# Patient Record
Sex: MALE | Race: WHITE | NOT HISPANIC OR LATINO | Employment: OTHER | URBAN - METROPOLITAN AREA
[De-identification: names, ages, dates, MRNs, and addresses within clinical notes are randomized per-mention and may not be internally consistent; named-entity substitution may affect disease eponyms.]

---

## 2017-01-16 ENCOUNTER — LAB VISIT (OUTPATIENT)
Dept: LAB | Facility: HOSPITAL | Age: 82
End: 2017-01-16
Attending: NURSE PRACTITIONER
Payer: MEDICARE

## 2017-01-16 DIAGNOSIS — E11.9 UNCOMPLICATED TYPE 2 DIABETES MELLITUS: ICD-10-CM

## 2017-01-16 DIAGNOSIS — E78.5 HYPERLIPIDEMIA: ICD-10-CM

## 2017-01-16 LAB
ALBUMIN SERPL BCP-MCNC: 3.8 G/DL
ALP SERPL-CCNC: 111 U/L
ALT SERPL W/O P-5'-P-CCNC: 24 U/L
AST SERPL-CCNC: 21 U/L
BILIRUB DIRECT SERPL-MCNC: 0.2 MG/DL
BILIRUB SERPL-MCNC: 0.5 MG/DL
CHOLEST/HDLC SERPL: 3.8 {RATIO}
HDL/CHOLESTEROL RATIO: 26.4 %
HDLC SERPL-MCNC: 110 MG/DL
HDLC SERPL-MCNC: 29 MG/DL
LDLC SERPL CALC-MCNC: 65 MG/DL
NONHDLC SERPL-MCNC: 81 MG/DL
PROT SERPL-MCNC: 6.7 G/DL
TRIGL SERPL-MCNC: 80 MG/DL

## 2017-01-16 PROCEDURE — 83036 HEMOGLOBIN GLYCOSYLATED A1C: CPT

## 2017-01-16 PROCEDURE — 36415 COLL VENOUS BLD VENIPUNCTURE: CPT | Mod: PO

## 2017-01-16 PROCEDURE — 80061 LIPID PANEL: CPT

## 2017-01-16 PROCEDURE — 80076 HEPATIC FUNCTION PANEL: CPT

## 2017-01-17 LAB
ESTIMATED AVG GLUCOSE: 166 MG/DL
HBA1C MFR BLD HPLC: 7.4 %

## 2017-01-18 ENCOUNTER — PATIENT MESSAGE (OUTPATIENT)
Dept: FAMILY MEDICINE | Facility: CLINIC | Age: 82
End: 2017-01-18

## 2017-01-18 ENCOUNTER — TELEPHONE (OUTPATIENT)
Dept: FAMILY MEDICINE | Facility: CLINIC | Age: 82
End: 2017-01-18

## 2017-01-18 DIAGNOSIS — E11.9 TYPE 2 DIABETES MELLITUS WITHOUT COMPLICATION, UNSPECIFIED LONG TERM INSULIN USE STATUS: ICD-10-CM

## 2017-01-18 DIAGNOSIS — E78.5 HYPERLIPIDEMIA, UNSPECIFIED HYPERLIPIDEMIA TYPE: Primary | ICD-10-CM

## 2017-01-18 NOTE — TELEPHONE ENCOUNTER
----- Message from Tae Goel MD sent at 1/17/2017  9:00 AM CST -----  Book the patient for a lipid and liver panel in 6 months and send the patient a letter to inform the patient of the appointment.  Also, refill the patient's lipid medicines for 12 months.       Please book the patient for a repeat a1c in 6 months and send the patient a letter of the appointment. Send in 12 months of refills of the patient's diabetes medicines to the pharmacy.       BOOK HIM FOR AN EXAM SOON FOR FOOT EXAM, ETC.

## 2017-02-22 RX ORDER — CLOPIDOGREL BISULFATE 75 MG/1
TABLET ORAL
Qty: 90 TABLET | Refills: 0 | Status: SHIPPED | OUTPATIENT
Start: 2017-02-22 | End: 2017-05-21 | Stop reason: SDUPTHER

## 2017-02-22 RX ORDER — ATORVASTATIN CALCIUM 40 MG/1
TABLET, FILM COATED ORAL
Qty: 90 TABLET | Refills: 0 | Status: SHIPPED | OUTPATIENT
Start: 2017-02-22 | End: 2017-05-21 | Stop reason: SDUPTHER

## 2017-02-22 NOTE — TELEPHONE ENCOUNTER
I have sent in the prescriptions that the patient requested.  HOwever, I sent them in without any refills because he is due for an eye exam since September of last year and he has failed to get this done according to our records.  Call him and book him for an eye appointment to be done prior to the next refill of meds.

## 2017-03-03 RX ORDER — AMLODIPINE BESYLATE 10 MG/1
10 TABLET ORAL DAILY
Qty: 90 TABLET | Refills: 2 | OUTPATIENT
Start: 2017-03-03

## 2017-03-03 NOTE — TELEPHONE ENCOUNTER
I have refused a medicine for the patient.  He is due an eye exam and needs this done ASAP for future refills.  Call him and inform him and ask him to get an eye exam ASAP and have the report faxed to me at [phone].  It has been due since Sept 2016.

## 2017-03-10 DIAGNOSIS — Z01.00 ROUTINE EYE EXAM: Primary | ICD-10-CM

## 2017-03-10 RX ORDER — OMEPRAZOLE 20 MG/1
CAPSULE, DELAYED RELEASE ORAL
Qty: 90 CAPSULE | Refills: 0 | Status: SHIPPED | OUTPATIENT
Start: 2017-03-10 | End: 2017-05-22 | Stop reason: SDUPTHER

## 2017-03-13 NOTE — TELEPHONE ENCOUNTER
I have signed for the following orders AND/OR meds.  Please call the patient and ask the patient to schedule the testing AND/OR inform about any medications that were sent.      Orders Placed This Encounter   Procedures    Ambulatory referral to Optometry     Referral Priority:   Routine     Referral Type:   Vision (Optometry)     Referral Reason:   Specialty Services Required     Requested Specialty:   Optometry     Number of Visits Requested:   1         Medications Ordered This Encounter      omeprazole (PRILOSEC) 20 MG capsule          Sig: TAKE 1 CAPSULE DAILY          Dispense:  90 capsule          Refill:  0

## 2017-03-15 ENCOUNTER — OFFICE VISIT (OUTPATIENT)
Dept: OPTOMETRY | Facility: CLINIC | Age: 82
End: 2017-03-15
Payer: MEDICARE

## 2017-03-15 DIAGNOSIS — E11.9 DIABETES MELLITUS TYPE 2 WITHOUT RETINOPATHY: Primary | ICD-10-CM

## 2017-03-15 DIAGNOSIS — Z13.5 GLAUCOMA SCREENING: ICD-10-CM

## 2017-03-15 DIAGNOSIS — Z96.1 BILATERAL PSEUDOPHAKIA: ICD-10-CM

## 2017-03-15 DIAGNOSIS — H43.813 POSTERIOR VITREOUS DETACHMENT, BILATERAL: ICD-10-CM

## 2017-03-15 PROCEDURE — 99212 OFFICE O/P EST SF 10 MIN: CPT | Mod: PBBFAC,PO | Performed by: OPTOMETRIST

## 2017-03-15 PROCEDURE — 99999 PR PBB SHADOW E&M-EST. PATIENT-LVL II: CPT | Mod: PBBFAC,,, | Performed by: OPTOMETRIST

## 2017-03-15 PROCEDURE — 92004 COMPRE OPH EXAM NEW PT 1/>: CPT | Mod: S$PBB,,, | Performed by: OPTOMETRIST

## 2017-03-15 NOTE — MR AVS SNAPSHOT
[facility]  [address] [facility] [address]  [facility] [address]  Phone: [phone]  Fax: [phone]                  Héctor HIGGINS Thang   3/15/2017 9:30 AM   Office Visit    Description:  Male : 1935   Provider:  TESS Coley OD   Department:  [facility]           Reason for Visit     Annual Exam     Diabetic Eye Exam     Blurred Vision     Spots and/or Floaters           Diagnoses this Visit        Comments    Diabetes mellitus type 2 without retinopathy    -  Primary     Posterior vitreous detachment, bilateral         Glaucoma screening         Bilateral pseudophakia                To Do List           Future Appointments        Provider Department Dept Phone    2017 8:05 AM LABORATORY, [place] [facility] [phone]      Goals (5 Years of Data)     None      Follow-Up and Disposition     Return in about 1 year (around 3/15/2018) for Annual Diabetic Eye Exam.      [facility]     [facility] Care Line - / Assistance  Registered nurses in the [facility] provide clinical advisement, health education, appointment booking, and other advisory services.  Call for this free service at [phone].             Medications           Message regarding Medications     Verify the changes and/or additions to your medication regime listed below are the same as discussed with your clinician today.  If any of these changes or additions are incorrect, please notify your healthcare provider.             Verify that the below list of medications is an accurate representation of the medications you are currently taking.  If none reported, the list may be blank. If incorrect, please contact your healthcare provider. Carry this list with you in case of emergency.           Current Medications     amlodipine (NORVASC) 10 MG tablet TAKE 1 TABLET DAILY    atorvastatin (LIPITOR) 40 MG tablet TAKE 1 TABLET DAILY    CHROMIUM-GTF ORAL Take by mouth.     clopidogrel (PLAVIX) 75 mg tablet TAKE 1 TABLET DAILY IN THE MORNING    duloxetine (CYMBALTA) 60 MG capsule TAKE 1 CAPSULE DAILY    ERGOCALCIFEROL, VITAMIN D2, (VITAMIN D ORAL) Take by mouth.    glimepiride (AMARYL) 4 MG tablet TAKE 1 TABLET TWICE A DAY    lisinopril (PRINIVIL,ZESTRIL) 20 MG tablet Take 1 tablet (20 mg total) by mouth 2 (two) times daily.    omeprazole (PRILOSEC) 20 MG capsule TAKE 1 CAPSULE DAILY    STARLIX 120 mg tablet TAKE 1 TABLET THREE TIMES A DAY    blood sugar diagnostic Strp Use daily- Freestyle lite    blood-glucose meter kit Use before meals and before bed when the glucoses are not in control.  Otherwise, test it daily once a day before meals randomly to ensure stability of the gluocse.    cetirizine (ZYRTEC) 10 MG tablet Take 1 tablet (10 mg total) by mouth once daily.           Clinical Reference Information           Allergies as of 3/15/2017     No Known Drug Allergies      Immunizations Administered on Date of Encounter - 3/15/2017     None      Instructions    DIABETES AND THE EYE / DIABETIC RETINOPATHY    Diabetic retinopathy is a condition occurring in persons with diabetes, which causes progressive damage to the retina, the light sensitive lining at the back of the eye. It is a serious sight-threatening complication of diabetes.    Diabetic retinopathy is the result of damage to the tiny blood vessels that nourish the retina. They leak blood and other fluids that cause swelling of retinal tissue and clouding of vision. The condition usually affects both eyes. The longer a person has diabetes, the more likely they will develop diabetic retinopathy. If left untreated, diabetic retinopathy can cause blindness.  There are two basic types of diabetic retinopathy:    Background or nonproliferative diabetic retinopathy (NPDR)  Nonproliferative diabetic retinopathy (NPDR) is the earliest stage of diabetic retinopathy. With this condition, damaged blood vessels in the retina begin to leak  extra fluid and small amounts of blood into the eye. Sometimes, deposits of cholesterol or other fats from the blood may leak into the retina. Many people with diabetes have mild NPDR, which usually does not affect their vision. However, if their vision is affected, it is the result of macular edema and macular ischemia.    If vision is affected due to macular changes, a consult with a Retina Specialist may be advised.  This is an ophthalmologist that treats retina conditions, including diabetic retinopathy.     Proliferative diabetic retinopathy (PDR)  Proliferative diabetic retinopathy (PDR) mainly occurs when many of the blood vessels in the retina close, preventing enough blood flow. In an attempt to supply blood to the area where the original vessels closed, the retina responds by growing new blood vessels. This is called neovascularization. However, these new blood vessels are abnormal and do not supply the retina with proper blood flow. The new vessels are also often accompanied by scar tissue that may cause the retina to wrinkle or detach. PDR may cause more severe vision loss than NPDR because it can affect both central and peripheral vision.     A patient diagnosed with proliferative diabetic eye disease will be referred to a retinal specialist for consultation.    Often there are no visual symptoms in the early stages of diabetic retinopathy. That is why our eye care professionals recommend that everyone with diabetes have a comprehensive dilated eye examination once a year. Early detection and treatment can limit the potential for significant vision loss from diabetic retinopathy.        FLASHES / FLOATERS / POSTERIOR VITREOUS DETACHMENT    Call the clinic if you have any further changes in symptoms.  Including:  Increased numbers of floaters or flashing lights, dimness or darkness that moves through or stays constant in your vision, or any pain in the eye (s).            DRY EYES:  Use Over The Counter  "artificial tears as needed for dry eye symptoms.  Some common brands include:  Systane, Optive, and Refresh.  These drops can be used as frequently as desired, but may be most helpful use during long periods of concentrated work.  For example, reading / working at the computer.  Avoid drops that "get redness out", as these contain medication that may further irritate the eyes.    ALLERGY EYES / SYMPTOMS:    Over the counter medications include--Zaditor and Alaway  Use as directed 1-2 drops daily for symptoms of itching / watering eyes.  These drops will not help for dry eye or exposure symptoms.           Language Assistance Services     ATTENTION: Language assistance services are available, free of charge. Please call [phone].      ATENCIÓN: Si talia culver, tiene a nelson disposición servicios gratuitos de asistencia lingüística. Llame al [phone].     GLORY Ý: N?u b?n nói Ti?ng Vi?t, có các d?ch v? h? tr? ngôn ng? mi?n phí dành cho b?n. G?i s? [phone].         [facility] complies with applicable Federal civil rights laws and does not discriminate on the basis of race, color, national origin, age, disability, or sex.        "

## 2017-03-15 NOTE — LETTER
March 15, 2017      Tae Goel MD  [address] [facility] [address]           [facility]  [address] [facility] [address] [place] [address]  Phone: [phone]  Fax: [phone]          Patient: Héctor Desir   MR Number: [id number]   YOB: 1935   Date of Visit: 3/15/2017       Dear Dr. Tae Goel:    Thank you for referring Héctor Desir to me for evaluation. Attached you will find relevant portions of my assessment and plan of care.    If you have questions, please do not hesitate to call me. I look forward to following Héctor Desir along with you.    Sincerely,    TESS Coley, OD    Enclosure  CC:  No Recipients    If you would like to receive this communication electronically, please contact [email or web address] or [phone] to request more information on [facility] access.    For providers and/or their staff who would like to refer a patient to [facility], please contact us through our one-stop-shop provider referral line, [facility], at [phone].    If you feel you have received this communication in error or would no longer like to receive these types of communications, please e-mail [email or web address]

## 2017-03-15 NOTE — PROGRESS NOTES
HPI     Annual Exam    Additional comments: DLE x 1 yr (casper)    ocular health exam            Diabetic Eye Exam    Additional comments: BSL fluctuates           Blurred Vision    Additional comments: at both near & distance --- OD >> OS           Spots and/or Floaters    Additional comments: OU -- no light flashes           Comments   Hemoglobin A1C       Date                     Value               Ref Range             Status                01/16/2017               7.4 (H)             4.5 - 6.2 %           Final              Comment:    According to ADA guidelines, hemoglobin A1C <7.0% represents  optimal   control in non-pregnant diabetic patients.  Different  metrics may apply   to specific populations.   Standards of Medical Care in Diabetes -   2016.  For the purpose of screening for the presence of diabetes:  <5.7%       Consistent with the absence of diabetes  5.7-6.4%  Consistent with   increasing risk for diabetes   (prediabetes)  >or=6.5%  Consistent with   diabetes  Currently no consensus exists for use of hemoglobin A1C  for   diagnosis of diabetes for children.         07/13/2016               6.5 (H)             4.5 - 6.2 %           Final              Comment:    According to ADA guidelines, hemoglobin A1C <7.0% represents  optimal   control in non-pregnant diabetic patients.  Different  metrics may apply   to specific populations.   Standards of Medical Care in Diabetes -   2016.  For the purpose of screening for the presence of diabetes:  <5.7%       Consistent with the absence of diabetes  5.7-6.4%  Consistent with   increasing risk for diabetes   (prediabetes)  >or=6.5%  Consistent with   diabetes  Currently no consensus exists for use of hemoglobin A1C  for   diagnosis of diabetes for children.         01/13/2016               6.5 (H)             4.5 - 6.2 %           Final            ----------         Last edited by Pam Bobo on 3/15/2017  9:58 AM. (History)            Assessment  /Plan     For exam results, see Encounter Report.    Diabetes mellitus type 2 without retinopathy    Posterior vitreous detachment, bilateral    Glaucoma screening    Bilateral pseudophakia      1. No ret/ no csme, gave info, control glucose, annual DFE  2. RD precautions given  3. Not suspect  4. Stable OU  Continue otc readers, NC refraction today    Discussed and educated patient on current findings /plan.  RTC 1 year, prn if any changes / issues

## 2017-04-19 RX ORDER — NATEGLINIDE 120 MG/1
TABLET ORAL
Qty: 270 TABLET | Refills: 2 | Status: SHIPPED | OUTPATIENT
Start: 2017-04-19 | End: 2017-05-22 | Stop reason: SDUPTHER

## 2017-04-24 ENCOUNTER — PATIENT OUTREACH (OUTPATIENT)
Dept: ADMINISTRATIVE | Facility: HOSPITAL | Age: 82
End: 2017-04-24
Payer: MEDICARE

## 2017-04-24 DIAGNOSIS — E11.69 COMBINED HYPERLIPIDEMIA ASSOCIATED WITH TYPE 2 DIABETES MELLITUS: Primary | ICD-10-CM

## 2017-04-24 DIAGNOSIS — I15.2 HYPERTENSION ASSOCIATED WITH DIABETES: ICD-10-CM

## 2017-04-24 DIAGNOSIS — E78.2 COMBINED HYPERLIPIDEMIA ASSOCIATED WITH TYPE 2 DIABETES MELLITUS: Primary | ICD-10-CM

## 2017-04-24 DIAGNOSIS — E11.59 HYPERTENSION ASSOCIATED WITH DIABETES: ICD-10-CM

## 2017-05-18 RX ORDER — DULOXETIN HYDROCHLORIDE 60 MG/1
CAPSULE, DELAYED RELEASE ORAL
Qty: 90 CAPSULE | Refills: 0 | Status: SHIPPED | OUTPATIENT
Start: 2017-05-18 | End: 2017-05-22 | Stop reason: SDUPTHER

## 2017-05-21 RX ORDER — ATORVASTATIN CALCIUM 40 MG/1
TABLET, FILM COATED ORAL
Qty: 90 TABLET | Refills: 0 | Status: SHIPPED | OUTPATIENT
Start: 2017-05-21 | End: 2017-05-22 | Stop reason: SDUPTHER

## 2017-05-21 RX ORDER — CLOPIDOGREL BISULFATE 75 MG/1
TABLET ORAL
Qty: 90 TABLET | Refills: 0 | Status: SHIPPED | OUTPATIENT
Start: 2017-05-21 | End: 2017-05-22 | Stop reason: SDUPTHER

## 2017-05-22 ENCOUNTER — OFFICE VISIT (OUTPATIENT)
Dept: FAMILY MEDICINE | Facility: CLINIC | Age: 82
End: 2017-05-22
Payer: MEDICARE

## 2017-05-22 ENCOUNTER — LAB VISIT (OUTPATIENT)
Dept: LAB | Facility: HOSPITAL | Age: 82
End: 2017-05-22
Attending: FAMILY MEDICINE
Payer: MEDICARE

## 2017-05-22 ENCOUNTER — TELEPHONE (OUTPATIENT)
Dept: FAMILY MEDICINE | Facility: CLINIC | Age: 82
End: 2017-05-22

## 2017-05-22 VITALS
SYSTOLIC BLOOD PRESSURE: 105 MMHG | HEART RATE: 80 BPM | HEIGHT: 68 IN | DIASTOLIC BLOOD PRESSURE: 61 MMHG | TEMPERATURE: 98 F | BODY MASS INDEX: 34.74 KG/M2 | WEIGHT: 229.19 LBS

## 2017-05-22 DIAGNOSIS — E11.59 HYPERTENSION ASSOCIATED WITH DIABETES: ICD-10-CM

## 2017-05-22 DIAGNOSIS — Z85.46 H/O PROSTATE CANCER: ICD-10-CM

## 2017-05-22 DIAGNOSIS — E78.2 COMBINED HYPERLIPIDEMIA ASSOCIATED WITH TYPE 2 DIABETES MELLITUS: ICD-10-CM

## 2017-05-22 DIAGNOSIS — E11.69 COMBINED HYPERLIPIDEMIA ASSOCIATED WITH TYPE 2 DIABETES MELLITUS: ICD-10-CM

## 2017-05-22 DIAGNOSIS — K21.9 GASTROESOPHAGEAL REFLUX DISEASE, ESOPHAGITIS PRESENCE NOT SPECIFIED: ICD-10-CM

## 2017-05-22 DIAGNOSIS — F41.9 ANXIETY: ICD-10-CM

## 2017-05-22 DIAGNOSIS — Z12.5 ENCOUNTER FOR SCREENING FOR MALIGNANT NEOPLASM OF PROSTATE: ICD-10-CM

## 2017-05-22 DIAGNOSIS — I15.2 HYPERTENSION ASSOCIATED WITH DIABETES: ICD-10-CM

## 2017-05-22 DIAGNOSIS — I10 ESSENTIAL HYPERTENSION: ICD-10-CM

## 2017-05-22 DIAGNOSIS — Z86.73 CEREBROVASCULAR DISEASE, ARTERIOSCLEROTIC, POST-STROKE: ICD-10-CM

## 2017-05-22 DIAGNOSIS — Z85.46 HISTORY OF PROSTATE CANCER: ICD-10-CM

## 2017-05-22 DIAGNOSIS — Z86.010 HISTORY OF COLON POLYPS: ICD-10-CM

## 2017-05-22 DIAGNOSIS — I67.2 CEREBROVASCULAR DISEASE, ARTERIOSCLEROTIC, POST-STROKE: ICD-10-CM

## 2017-05-22 DIAGNOSIS — N18.30 CKD (CHRONIC KIDNEY DISEASE) STAGE 3, GFR 30-59 ML/MIN: ICD-10-CM

## 2017-05-22 DIAGNOSIS — E11.9 TYPE 2 DIABETES MELLITUS WITHOUT COMPLICATION, UNSPECIFIED LONG TERM INSULIN USE STATUS: ICD-10-CM

## 2017-05-22 DIAGNOSIS — E11.42 DIABETIC POLYNEUROPATHY ASSOCIATED WITH TYPE 2 DIABETES MELLITUS: ICD-10-CM

## 2017-05-22 LAB
ANION GAP SERPL CALC-SCNC: 9 MMOL/L
BUN SERPL-MCNC: 30 MG/DL
CALCIUM SERPL-MCNC: 8.9 MG/DL
CHLORIDE SERPL-SCNC: 108 MMOL/L
CO2 SERPL-SCNC: 23 MMOL/L
COMPLEXED PSA SERPL-MCNC: 0.1 NG/ML
CREAT SERPL-MCNC: 1.6 MG/DL
EST. GFR  (AFRICAN AMERICAN): 46.3 ML/MIN/1.73 M^2
EST. GFR  (NON AFRICAN AMERICAN): 40.1 ML/MIN/1.73 M^2
GLUCOSE SERPL-MCNC: 120 MG/DL
POTASSIUM SERPL-SCNC: 4.6 MMOL/L
SODIUM SERPL-SCNC: 140 MMOL/L

## 2017-05-22 PROCEDURE — 83036 HEMOGLOBIN GLYCOSYLATED A1C: CPT

## 2017-05-22 PROCEDURE — 99999 PR PBB SHADOW E&M-EST. PATIENT-LVL III: CPT | Mod: PBBFAC,,, | Performed by: FAMILY MEDICINE

## 2017-05-22 PROCEDURE — 36415 COLL VENOUS BLD VENIPUNCTURE: CPT | Mod: PO

## 2017-05-22 PROCEDURE — 80048 BASIC METABOLIC PNL TOTAL CA: CPT

## 2017-05-22 PROCEDURE — 99214 OFFICE O/P EST MOD 30 MIN: CPT | Mod: S$PBB,,, | Performed by: FAMILY MEDICINE

## 2017-05-22 PROCEDURE — 84153 ASSAY OF PSA TOTAL: CPT

## 2017-05-22 RX ORDER — NATEGLINIDE 120 MG/1
120 TABLET ORAL 3 TIMES DAILY
Qty: 270 TABLET | Refills: 1 | Status: SHIPPED | OUTPATIENT
Start: 2017-05-22 | End: 2018-03-26 | Stop reason: SDUPTHER

## 2017-05-22 RX ORDER — LISINOPRIL 20 MG/1
20 TABLET ORAL 2 TIMES DAILY
Qty: 180 TABLET | Refills: 3 | Status: SHIPPED | OUTPATIENT
Start: 2017-05-22 | End: 2018-03-26 | Stop reason: SDUPTHER

## 2017-05-22 RX ORDER — DULOXETIN HYDROCHLORIDE 60 MG/1
60 CAPSULE, DELAYED RELEASE ORAL DAILY
Qty: 90 CAPSULE | Refills: 3 | Status: SHIPPED | OUTPATIENT
Start: 2017-05-22 | End: 2018-03-26 | Stop reason: SDUPTHER

## 2017-05-22 RX ORDER — OMEPRAZOLE 20 MG/1
20 CAPSULE, DELAYED RELEASE ORAL DAILY
Qty: 90 CAPSULE | Refills: 3 | Status: SHIPPED | OUTPATIENT
Start: 2017-05-22 | End: 2018-03-26 | Stop reason: SDUPTHER

## 2017-05-22 RX ORDER — NAPROXEN SODIUM 220 MG
220 TABLET ORAL 2 TIMES DAILY WITH MEALS
COMMUNITY
End: 2017-05-22

## 2017-05-22 RX ORDER — CLOPIDOGREL BISULFATE 75 MG/1
75 TABLET ORAL EVERY MORNING
Qty: 90 TABLET | Refills: 3 | Status: SHIPPED | OUTPATIENT
Start: 2017-05-22 | End: 2018-03-26 | Stop reason: SDUPTHER

## 2017-05-22 RX ORDER — AMLODIPINE BESYLATE 10 MG/1
10 TABLET ORAL DAILY
Qty: 90 TABLET | Refills: 3 | Status: SHIPPED | OUTPATIENT
Start: 2017-05-22 | End: 2018-03-26

## 2017-05-22 RX ORDER — GLIMEPIRIDE 4 MG/1
TABLET ORAL
Qty: 180 TABLET | Refills: 1 | Status: SHIPPED | OUTPATIENT
Start: 2017-05-22 | End: 2017-11-18 | Stop reason: SDUPTHER

## 2017-05-22 RX ORDER — ATORVASTATIN CALCIUM 40 MG/1
40 TABLET, FILM COATED ORAL DAILY
Qty: 90 TABLET | Refills: 3 | Status: SHIPPED | OUTPATIENT
Start: 2017-05-22 | End: 2018-03-26 | Stop reason: SDUPTHER

## 2017-05-22 NOTE — PROGRESS NOTES
Subjective:      Patient ID: Héctor Desir is a 82 y.o. male.    Chief Complaint: Follow-up (diabetes)    HPI  The patient presents with diabetes.  The patient denies polyuria, polydipsia, polyphagia, hypoglycemia and paresthesias.  The patient's glucose control has been good.  Home glucose averages are routinely checked.  The patient is without retinopathy currently.  The patient has a history of neuropathy.  He is on cymbalta which helps with the neuropathy.  The patient currently complains of no podiatric problems.  The patient has excellent compliance.  Lab Results   Component Value Date    HGBA1C 7.4 (H) 01/16/2017       The patient presents with hyperlipidemia.  The patient reports tolerating the medication well and is in excellent compliance.  There have been no medication side effects.  The patient denies chest pain, neuropathy, and myalgias.  The patient has reduced fat intake and has been exercising.  Current treatment has included the medications listed in the med card.    Lab Results   Component Value Date    CHOL 110 (L) 01/16/2017    CHOL 149 07/13/2016    CHOL 105 (L) 01/12/2016     Lab Results   Component Value Date    HDL 29 (L) 01/16/2017    HDL 33 (L) 07/13/2016    HDL 28 (L) 01/12/2016     Lab Results   Component Value Date    LDLCALC 65.0 01/16/2017    LDLCALC 92.0 07/13/2016    LDLCALC 60.0 (L) 01/12/2016     Lab Results   Component Value Date    TRIG 80 01/16/2017    TRIG 120 07/13/2016    TRIG 85 01/12/2016     Lab Results   Component Value Date    CHOLHDL 26.4 01/16/2017    CHOLHDL 22.1 07/13/2016    CHOLHDL 26.7 01/12/2016     Lab Results   Component Value Date    ALT 24 01/16/2017    AST 21 01/16/2017    ALKPHOS 111 01/16/2017    BILITOT 0.5 01/16/2017     He does have CKD and he sees Dr. Sims.  His studies have been stable on this and he is controlling the bp.      The patient presents with essential hypertension.  The patient is tolerating the medication well and is in excellent  "compliance.  The patient is experiencing no side effects.  Counseling was offered regarding low salt diets.  The patient has a reduced salt intake.  The patient denies chest pain, palpitations, shortness of breath, dyspnea on exertion, left or murmur neck pain, nausea, vomiting, diaphoresis, paroxysmal nocturnal dyspnea, and orthopnea.   /61 (BP Location: Left arm, Patient Position: Sitting, BP Method: Automatic)   Pulse 80   Temp 98.3 °F (36.8 °C) (Oral)   Ht 5' 8" (1.727 m)   Wt 103.9 kg (229 lb 2.7 oz)   BMI 34.84 kg/m²        The patient presents with a history of GERD.  Denies chest pain, nausea & vomiting, belching, cramping, distention, dyspepsia, dysphagia, hematochezia, melena, abdominal pain and weight loss.  Current treatment has included medications that are listed in medications list with significant response.  There has been no medicine intolerance.  The patient cannot identify any exacerbating factors.  Avoidance of NSAID's, ASA, carbonated beverages and spicy food was discussed.     He has had a history of colon polyps and is not due for a recheck now.      He has had a TIA and he is on plavix at this time but not on ASA. He has not had recurrent problems with this at this time and has been stable on the medicine.      He has had prostate cancer and he has a prostatectomy.  He is due for a psa.    He also has CKD and he has Stage III.  He has not seen Dr. Sims in a long time.     He has chronic anxiety and he has been on cymbalta for a long time. HIs wife has colon cancer now and he is the primary caregiver and it has helped him get through that.       Health Maintenance Due   Topic Date Due    Zoster Vaccine  04/30/1997    Foot Exam  01/13/2017       Past Medical History:  Past Medical History:   Diagnosis Date    CKD (chronic kidney disease) stage 3, GFR 30-59 ml/min     Colon polyps 2011    due again in 2019    Combined hyperlipidemia associated with type 2 diabetes mellitus " 7/5/2013    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     DM (diabetes mellitus) type II controlled, neurological manifestation 7/5/2013    GERD (gastroesophageal reflux disease)     History of prostate cancer 2006    Hypertension associated with diabetes     Hypertension goal BP (blood pressure) < 130/80     TIA (transient ischemic attack)      Past Surgical History:   Procedure Laterality Date    CARDIAC CATHETERIZATION      CATARACT EXTRACTION W/  INTRAOCULAR LENS IMPLANT  11/2013    Bilateral Cataract     COLONOSCOPY W/ POLYPECTOMY      EYE SURGERY      PROSTATECTOMY  2006    TONSILLECTOMY      TONSILLECTOMY, ADENOIDECTOMY, BILATERAL MYRINGOTOMY AND TUBES       Review of patient's allergies indicates:   Allergen Reactions    No known drug allergies      Current Outpatient Prescriptions on File Prior to Visit   Medication Sig Dispense Refill    amlodipine (NORVASC) 10 MG tablet TAKE 1 TABLET DAILY 90 tablet 2    atorvastatin (LIPITOR) 40 MG tablet TAKE 1 TABLET DAILY 90 tablet 0    blood sugar diagnostic Strp Use daily- Freestyle lite 300 strip 3    blood-glucose meter kit Use before meals and before bed when the glucoses are not in control.  Otherwise, test it daily once a day before meals randomly to ensure stability of the gluocse. 1 each 0    cetirizine (ZYRTEC) 10 MG tablet Take 1 tablet (10 mg total) by mouth once daily. 30 tablet 0    CHROMIUM-GTF ORAL Take by mouth.      clopidogrel (PLAVIX) 75 mg tablet TAKE 1 TABLET DAILY IN THE MORNING 90 tablet 0    duloxetine (CYMBALTA) 60 MG capsule TAKE 1 CAPSULE DAILY 90 capsule 0    ERGOCALCIFEROL, VITAMIN D2, (VITAMIN D ORAL) Take by mouth.      glimepiride (AMARYL) 4 MG tablet TAKE 1 TABLET TWICE A  tablet 1    lisinopril (PRINIVIL,ZESTRIL) 20 MG tablet Take 1 tablet (20 mg total) by mouth 2 (two) times daily. 180 tablet 3    nateglinide (STARLIX) 120 MG tablet TAKE 1 TABLET THREE TIMES A  tablet  "2    omeprazole (PRILOSEC) 20 MG capsule TAKE 1 CAPSULE DAILY 90 capsule 0     Current Facility-Administered Medications on File Prior to Visit   Medication Dose Route Frequency Provider Last Rate Last Dose    diclofenac sodium 1 % gel   Topical Daily Tobias Modi MD         Social History     Social History    Marital status:      Spouse name: N/A    Number of children: N/A    Years of education: N/A     Occupational History    Not on file.     Social History Main Topics    Smoking status: Never Smoker    Smokeless tobacco: Never Used    Alcohol use No      Comment: He used to drink but quit in 1990    Drug use: No    Sexual activity: Not on file     Other Topics Concern    Not on file     Social History Narrative    No narrative on file     Family History   Problem Relation Age of Onset    Heart attack Mother     Melanoma Father     Cancer Father     Anesthesia problems Neg Hx     Clotting disorder Neg Hx     Kidney disease Neg Hx              Review of Systems   Constitutional: Negative.  Negative for chills, diaphoresis and fever.   HENT: Negative for congestion, hearing loss, mouth sores, postnasal drip and sore throat.    Eyes: Negative for pain and visual disturbance.   Respiratory: Negative for cough, chest tightness, shortness of breath and wheezing.    Cardiovascular: Negative for chest pain.   Gastrointestinal: Negative for abdominal pain, anal bleeding, blood in stool, constipation, diarrhea, nausea and vomiting.   Genitourinary: Negative for dysuria and hematuria.   Musculoskeletal: Negative for back pain, neck pain and neck stiffness.   Skin: Negative for rash.   Neurological: Negative for dizziness and weakness.       Objective:   /61 (BP Location: Left arm, Patient Position: Sitting, BP Method: Automatic)   Pulse 80   Temp 98.3 °F (36.8 °C) (Oral)   Ht 5' 8" (1.727 m)   Wt 103.9 kg (229 lb 2.7 oz)   BMI 34.84 kg/m²     Physical Exam   Constitutional: He is " oriented to person, place, and time. He appears well-developed and well-nourished.   HENT:   Head: Normocephalic and atraumatic.   Right Ear: External ear normal.   Left Ear: External ear normal.   Nose: Nose normal.   Mouth/Throat: Oropharynx is clear and moist. No oropharyngeal exudate.   Eyes: Conjunctivae and EOM are normal. Pupils are equal, round, and reactive to light. Right eye exhibits no discharge. Left eye exhibits no discharge. No scleral icterus.   Neck: Normal range of motion. Neck supple. No JVD present. No thyromegaly present.   Cardiovascular: Normal rate, regular rhythm, normal heart sounds and intact distal pulses.  Exam reveals no gallop and no friction rub.    No murmur heard.  Pulses:       Dorsalis pedis pulses are 2+ on the right side, and 2+ on the left side.        Posterior tibial pulses are 2+ on the right side, and 2+ on the left side.   Pulmonary/Chest: Effort normal and breath sounds normal. No respiratory distress. He has no wheezes. He has no rales. He exhibits no tenderness.   Abdominal: Soft. Bowel sounds are normal. He exhibits no distension and no mass. There is no tenderness. There is no rebound and no guarding.   Musculoskeletal: Normal range of motion. He exhibits no edema or tenderness.        Right foot: There is normal range of motion and no deformity.        Left foot: There is normal range of motion and no deformity.   Feet:   Right Foot:   Protective Sensation: 10 sites tested. 10 sites sensed.   Skin Integrity: Negative for ulcer, blister, skin breakdown, erythema, warmth, callus or dry skin.   Left Foot:   Protective Sensation: 10 sites tested. 10 sites sensed.   Skin Integrity: Negative for ulcer, blister, skin breakdown, erythema, warmth, callus or dry skin.   Lymphadenopathy:     He has no cervical adenopathy.   Neurological: He is alert and oriented to person, place, and time. No cranial nerve deficit. Coordination normal.   Skin: Skin is warm and dry. He is not  diaphoretic.   Psychiatric: He has a normal mood and affect.       Assessment:     1. 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 Uncontrolled type 2 diabetes mellitus with stage 3 chronic kidney disease, without long-term current use of insulin    3. Hypertension associated with diabetes    4. History of prostate cancer    5. History of colon polyps    6. H/O prostate cancer    7. Gastroesophageal reflux disease, esophagitis presence not specified    8. Diabetic polyneuropathy associated with type 2 diabetes mellitus    9. Combined hyperlipidemia associated with type 2 diabetes mellitus    10. CKD (chronic kidney disease) stage 3, GFR 30-59 ml/min    11. Essential hypertension    12. Cerebrovascular disease, arteriosclerotic, post-stroke    13. Anxiety        Plan:   Héctor was seen today for follow-up.    Diagnoses and all orders for this visit:    Uncontrolled type 2 diabetes mellitus with diabetic polyneuropathy, without long-term current use of insulin  -     Basic metabolic panel; Future  -     Hemoglobin A1c; Future  -     glimepiride (AMARYL) 4 MG tablet; TAKE 1 TABLET TWICE A DAY  -     nateglinide (STARLIX) 120 MG tablet; Take 1 tablet (120 mg total) by mouth 3 (three) times daily.    Uncontrolled type 2 diabetes mellitus with stage 3 chronic kidney disease, without long-term current use of insulin  -     Basic metabolic panel; Future  -     Hemoglobin A1c; Future  -     glimepiride (AMARYL) 4 MG tablet; TAKE 1 TABLET TWICE A DAY  -     nateglinide (STARLIX) 120 MG tablet; Take 1 tablet (120 mg total) by mouth 3 (three) times daily.    Hypertension associated with diabetes  -     Basic metabolic panel; Future  -     amlodipine (NORVASC) 10 MG tablet; Take 1 tablet (10 mg total) by mouth once daily.  -     lisinopril (PRINIVIL,ZESTRIL) 20 MG tablet; Take 1 tablet (20 mg total) by mouth 2 (two) times daily.    History of prostate  cancer    History of colon polyps    H/O prostate cancer  -     PSA, Screening; Future    Gastroesophageal reflux disease, esophagitis presence not specified  -     omeprazole (PRILOSEC) 20 MG capsule; Take 1 capsule (20 mg total) by mouth once daily.    Diabetic polyneuropathy associated with type 2 diabetes mellitus    Combined hyperlipidemia associated with type 2 diabetes mellitus  -     atorvastatin (LIPITOR) 40 MG tablet; Take 1 tablet (40 mg total) by mouth once daily.    CKD (chronic kidney disease) stage 3, GFR 30-59 ml/min  -     Ambulatory referral to Nephrology    Essential hypertension  -     lisinopril (PRINIVIL,ZESTRIL) 20 MG tablet; Take 1 tablet (20 mg total) by mouth 2 (two) times daily.    Cerebrovascular disease, arteriosclerotic, post-stroke  -     clopidogrel (PLAVIX) 75 mg tablet; Take 1 tablet (75 mg total) by mouth every morning.    Anxiety  -     duloxetine (CYMBALTA) 60 MG capsule; Take 1 capsule (60 mg total) by mouth once daily.    Encounter for screening for malignant neoplasm of prostate   -     PSA, Screening; Future      We discussed decreasing his prilosec for use only prn and he is going to let me know when he is in a better position with his wife's care to consdier decreasing the cymbalta in the future. He will stop NSAID's and see his nephrologist.  Check a bmp.

## 2017-05-22 NOTE — TELEPHONE ENCOUNTER
Call him and tell him that [facility] does not have a record of him having a shingles vaccine.  Get him to go to them to get one.

## 2017-05-22 NOTE — TELEPHONE ENCOUNTER
----- Message from Jenni Strong LPN sent at 5/22/2017 10:00 AM CDT -----  Dr Goel,  This pt has not had a Zoster at [facility] per Pharmacist.   ----- Message -----  From: Tae Goel MD  Sent: 5/22/2017   9:36 AM  To: Jenni Strong LPN    Call [facility] and get his zoster shot date updated.

## 2017-05-23 LAB
ESTIMATED AVG GLUCOSE: 160 MG/DL
HBA1C MFR BLD HPLC: 7.2 %

## 2017-05-23 NOTE — PROGRESS NOTES
The a1c is high.  Because of this, change the regimen to include precose 50 mg tabs before every meal three times a day.  Recheck an a1c in3  Months.   Give 3 mo of meds.      The kidney function is lower.  PLease confirm that a visit with the nephrologist, Dr. Sims, is being planned.      The psa is normal.  Recheck in 1 year.

## 2017-05-26 DIAGNOSIS — E11.9 TYPE 2 DIABETES MELLITUS WITHOUT COMPLICATION, UNSPECIFIED LONG TERM INSULIN USE STATUS: Primary | ICD-10-CM

## 2017-05-26 NOTE — TELEPHONE ENCOUNTER
----- Message from Tae Goel MD sent at 5/23/2017 12:36 PM CDT -----  The a1c is high.  Because of this, change the regimen to include precose 50 mg tabs before every meal three times a day.  Recheck an a1c in3  Months.   Give 3 mo of meds.      The kidney function is lower.  PLease confirm that a visit with the nephrologist, Dr. Sims, is being planned.      The psa is normal.  Recheck in 1 year.

## 2017-05-27 RX ORDER — ACARBOSE 50 MG/1
50 TABLET ORAL
Qty: 270 TABLET | Refills: 3 | Status: SHIPPED | OUTPATIENT
Start: 2017-05-27 | End: 2018-03-26

## 2017-06-09 ENCOUNTER — OFFICE VISIT (OUTPATIENT)
Dept: NEPHROLOGY | Facility: CLINIC | Age: 82
End: 2017-06-09
Payer: MEDICARE

## 2017-06-09 VITALS
TEMPERATURE: 98 F | HEIGHT: 68 IN | BODY MASS INDEX: 34.72 KG/M2 | DIASTOLIC BLOOD PRESSURE: 78 MMHG | OXYGEN SATURATION: 99 % | HEART RATE: 67 BPM | WEIGHT: 229.06 LBS | SYSTOLIC BLOOD PRESSURE: 126 MMHG

## 2017-06-09 DIAGNOSIS — N18.30 CKD (CHRONIC KIDNEY DISEASE) STAGE 3, GFR 30-59 ML/MIN: Primary | ICD-10-CM

## 2017-06-09 DIAGNOSIS — E11.69 COMBINED HYPERLIPIDEMIA ASSOCIATED WITH TYPE 2 DIABETES MELLITUS: ICD-10-CM

## 2017-06-09 DIAGNOSIS — E78.2 COMBINED HYPERLIPIDEMIA ASSOCIATED WITH TYPE 2 DIABETES MELLITUS: ICD-10-CM

## 2017-06-09 PROCEDURE — 1159F MED LIST DOCD IN RCRD: CPT | Mod: ,,, | Performed by: INTERNAL MEDICINE

## 2017-06-09 PROCEDURE — 99214 OFFICE O/P EST MOD 30 MIN: CPT | Mod: S$PBB,,, | Performed by: INTERNAL MEDICINE

## 2017-06-09 PROCEDURE — 99213 OFFICE O/P EST LOW 20 MIN: CPT | Mod: PBBFAC,PN | Performed by: INTERNAL MEDICINE

## 2017-06-09 PROCEDURE — 99999 PR PBB SHADOW E&M-EST. PATIENT-LVL III: CPT | Mod: PBBFAC,,, | Performed by: INTERNAL MEDICINE

## 2017-06-20 NOTE — PROGRESS NOTES
Subjective:       Patient ID: Héctor Desir is a 82 y.o. White male who presents for follow-up evaluation of Chronic Kidney Disease    HPI     He was lost to nephrology follow up. Last Hba1c was 7.2. No edema nor SOB. He denies problems with urination, no foamy urine and no hematuria. His wife is ill. She first had an MI then was diagnosed with colon cancer followed by a CVA. He is her primary caretaker    Review of Systems   Constitutional: Negative for activity change, appetite change and unexpected weight change.   HENT: Negative for facial swelling.    Respiratory: Negative for cough and shortness of breath.    Cardiovascular: Negative for chest pain and leg swelling.   Gastrointestinal: Negative for abdominal pain.   Genitourinary: Negative for difficulty urinating, dysuria and hematuria.   Musculoskeletal: Positive for arthralgias.   Neurological: Negative for weakness and headaches.       Objective:      Physical Exam   Constitutional: He is oriented to person, place, and time. He appears well-developed and well-nourished.   Neck: No JVD present.   Cardiovascular: S1 normal and S2 normal.  Exam reveals no friction rub.    Pulmonary/Chest: Breath sounds normal. He has no wheezes. He has no rales.   Abdominal: Soft.   Musculoskeletal: He exhibits no edema.   Neurological: He is alert and oriented to person, place, and time.   Skin: Skin is warm and dry.   Psychiatric: He has a normal mood and affect.   Vitals reviewed.      Assessment:       1. CKD (chronic kidney disease) stage 3, GFR 30-59 ml/min    2. Uncontrolled type 2 diabetes mellitus with stage 3 chronic kidney disease, without long-term current use of insulin    3. Uncontrolled type 2 diabetes mellitus with diabetic polyneuropathy, without long-term current use of insulin    4. Combined hyperlipidemia associated with type 2 diabetes mellitus        Plan:             CKD Stage 3 with stable kidney function.  Continue RAAS blockade for renal  preservation    HTN is controlled    DM--per PCP    Dyslipidemia--on a statin      RTC 4 months in [place] with labs prior

## 2017-10-03 RX ORDER — BENZONATATE 100 MG/1
100 CAPSULE ORAL 3 TIMES DAILY PRN
Qty: 30 CAPSULE | Refills: 0 | Status: SHIPPED | OUTPATIENT
Start: 2017-10-03 | End: 2017-10-13

## 2017-10-06 ENCOUNTER — LAB VISIT (OUTPATIENT)
Dept: LAB | Facility: HOSPITAL | Age: 82
End: 2017-10-06
Attending: FAMILY MEDICINE
Payer: MEDICARE

## 2017-10-06 DIAGNOSIS — N18.30 CKD (CHRONIC KIDNEY DISEASE) STAGE 3, GFR 30-59 ML/MIN: ICD-10-CM

## 2017-10-06 LAB
ALBUMIN SERPL BCP-MCNC: 3.6 G/DL
ANION GAP SERPL CALC-SCNC: 9 MMOL/L
BUN SERPL-MCNC: 25 MG/DL
CALCIUM SERPL-MCNC: 8.9 MG/DL
CHLORIDE SERPL-SCNC: 109 MMOL/L
CO2 SERPL-SCNC: 23 MMOL/L
CREAT SERPL-MCNC: 1.7 MG/DL
EST. GFR  (AFRICAN AMERICAN): 43.1 ML/MIN/1.73 M^2
EST. GFR  (NON AFRICAN AMERICAN): 37.3 ML/MIN/1.73 M^2
GLUCOSE SERPL-MCNC: 148 MG/DL
PHOSPHATE SERPL-MCNC: 2.9 MG/DL
POTASSIUM SERPL-SCNC: 4 MMOL/L
PTH-INTACT SERPL-MCNC: 92 PG/ML
SODIUM SERPL-SCNC: 141 MMOL/L

## 2017-10-06 PROCEDURE — 80069 RENAL FUNCTION PANEL: CPT

## 2017-10-06 PROCEDURE — 36415 COLL VENOUS BLD VENIPUNCTURE: CPT | Mod: PO

## 2017-10-06 PROCEDURE — 83970 ASSAY OF PARATHORMONE: CPT

## 2017-10-13 ENCOUNTER — LAB VISIT (OUTPATIENT)
Dept: LAB | Facility: HOSPITAL | Age: 82
End: 2017-10-13
Attending: INTERNAL MEDICINE
Payer: MEDICARE

## 2017-10-13 ENCOUNTER — OFFICE VISIT (OUTPATIENT)
Dept: NEPHROLOGY | Facility: CLINIC | Age: 82
End: 2017-10-13
Payer: MEDICARE

## 2017-10-13 ENCOUNTER — TELEPHONE (OUTPATIENT)
Dept: NEPHROLOGY | Facility: CLINIC | Age: 82
End: 2017-10-13

## 2017-10-13 VITALS
WEIGHT: 240.31 LBS | DIASTOLIC BLOOD PRESSURE: 72 MMHG | HEIGHT: 68 IN | BODY MASS INDEX: 36.42 KG/M2 | HEART RATE: 64 BPM | SYSTOLIC BLOOD PRESSURE: 132 MMHG

## 2017-10-13 DIAGNOSIS — E11.69 COMBINED HYPERLIPIDEMIA ASSOCIATED WITH TYPE 2 DIABETES MELLITUS: ICD-10-CM

## 2017-10-13 DIAGNOSIS — I15.2 HYPERTENSION ASSOCIATED WITH DIABETES: ICD-10-CM

## 2017-10-13 DIAGNOSIS — E11.42 DIABETIC POLYNEUROPATHY ASSOCIATED WITH TYPE 2 DIABETES MELLITUS: ICD-10-CM

## 2017-10-13 DIAGNOSIS — E11.59 HYPERTENSION ASSOCIATED WITH DIABETES: ICD-10-CM

## 2017-10-13 DIAGNOSIS — N18.30 CKD (CHRONIC KIDNEY DISEASE) STAGE 3, GFR 30-59 ML/MIN: Primary | ICD-10-CM

## 2017-10-13 DIAGNOSIS — E11.9 TYPE 2 DIABETES MELLITUS WITHOUT COMPLICATION, WITHOUT LONG-TERM CURRENT USE OF INSULIN: ICD-10-CM

## 2017-10-13 DIAGNOSIS — N25.81 SECONDARY HYPERPARATHYROIDISM, RENAL: ICD-10-CM

## 2017-10-13 DIAGNOSIS — Z86.73 H/O TIA (TRANSIENT ISCHEMIC ATTACK) AND STROKE: ICD-10-CM

## 2017-10-13 DIAGNOSIS — Z85.46 HISTORY OF PROSTATE CANCER: ICD-10-CM

## 2017-10-13 DIAGNOSIS — E78.2 COMBINED HYPERLIPIDEMIA ASSOCIATED WITH TYPE 2 DIABETES MELLITUS: ICD-10-CM

## 2017-10-13 LAB
ESTIMATED AVG GLUCOSE: 169 MG/DL
HBA1C MFR BLD HPLC: 7.5 %

## 2017-10-13 PROCEDURE — 99214 OFFICE O/P EST MOD 30 MIN: CPT | Mod: PBBFAC,PN | Performed by: INTERNAL MEDICINE

## 2017-10-13 PROCEDURE — 99999 PR PBB SHADOW E&M-EST. PATIENT-LVL IV: CPT | Mod: PBBFAC,,, | Performed by: INTERNAL MEDICINE

## 2017-10-13 PROCEDURE — 36415 COLL VENOUS BLD VENIPUNCTURE: CPT | Mod: PO

## 2017-10-13 PROCEDURE — 83036 HEMOGLOBIN GLYCOSYLATED A1C: CPT

## 2017-10-13 PROCEDURE — 99214 OFFICE O/P EST MOD 30 MIN: CPT | Mod: S$PBB,,, | Performed by: INTERNAL MEDICINE

## 2017-10-13 NOTE — TELEPHONE ENCOUNTER
Pt asked today to be deactivated from [facility] as he does not use it and has missed some appts because of it. Please change his status. Thank you

## 2017-10-16 ENCOUNTER — TELEPHONE (OUTPATIENT)
Dept: NEPHROLOGY | Facility: CLINIC | Age: 82
End: 2017-10-16

## 2017-10-17 ENCOUNTER — TELEPHONE (OUTPATIENT)
Dept: NEPHROLOGY | Facility: CLINIC | Age: 82
End: 2017-10-17

## 2017-10-24 PROBLEM — N25.81 SECONDARY HYPERPARATHYROIDISM, RENAL: Status: ACTIVE | Noted: 2017-10-24

## 2017-10-24 NOTE — PROGRESS NOTES
Subjective:       Patient ID: Héctor Desir is a 82 y.o. White male who presents for follow-up evaluation of Chronic Kidney Disease    HPI     He reports increased stress due to his wife's illness. They saw Oncology and she is on hospice. He feels overall well but notes his BS are running higher and he feels it's from stress. No LE edema nor SOB. Nocturia is 1-2X.     Review of Systems   Constitutional: Negative for activity change, appetite change, fatigue and unexpected weight change.   HENT: Negative for facial swelling.    Respiratory: Negative for shortness of breath.    Cardiovascular: Negative for chest pain and leg swelling.   Gastrointestinal: Positive for constipation.   Genitourinary: Positive for frequency. Negative for difficulty urinating, dysuria and hematuria.   Musculoskeletal: Negative for arthralgias.   Neurological: Negative for weakness and headaches.   Psychiatric/Behavioral: Negative for decreased concentration.       Objective:      Physical Exam   Constitutional: He is oriented to person, place, and time. He appears well-developed and well-nourished. No distress.   Neck: No JVD present.   Cardiovascular: S1 normal, S2 normal and normal heart sounds.  Exam reveals no friction rub.    Pulmonary/Chest: Breath sounds normal. He has no wheezes. He has no rales.   Abdominal: Soft.   Musculoskeletal: He exhibits edema.   Neurological: He is alert and oriented to person, place, and time.   Skin: Skin is warm and dry.   Psychiatric: He has a normal mood and affect.   Vitals reviewed.      Assessment:       1. CKD (chronic kidney disease) stage 3, GFR 30-59 ml/min    2. Type 2 diabetes mellitus without complication, without long-term current use of insulin    3. Uncontrolled type 2 diabetes mellitus with stage 3 chronic kidney disease, without long-term current use of insulin    4. Combined hyperlipidemia associated with type 2 diabetes mellitus    5. Hypertension associated with diabetes    6.  Diabetic polyneuropathy associated with type 2 diabetes mellitus    7. H/O TIA (transient ischemic attack) and stroke    8. History of prostate cancer    9. Secondary hyperparathyroidism, renal        Plan:             CKD stage 3 with stable kidney function.  Continue RAAS blockade (lisinopril) for renal preservation    HTN is controlled    DM--check Hba1c today per his request as last was drawn in May    Mineral and Bone Disease--continue D2      RTC 4 months

## 2017-11-19 RX ORDER — GLIMEPIRIDE 4 MG/1
TABLET ORAL
Qty: 180 TABLET | Refills: 1 | Status: SHIPPED | OUTPATIENT
Start: 2017-11-19 | End: 2018-03-26 | Stop reason: SDUPTHER

## 2018-02-02 ENCOUNTER — LAB VISIT (OUTPATIENT)
Dept: LAB | Facility: HOSPITAL | Age: 83
End: 2018-02-02
Attending: INTERNAL MEDICINE
Payer: MEDICARE

## 2018-02-02 DIAGNOSIS — N18.30 CKD (CHRONIC KIDNEY DISEASE) STAGE 3, GFR 30-59 ML/MIN: ICD-10-CM

## 2018-02-02 LAB
ALBUMIN SERPL BCP-MCNC: 3.9 G/DL
ANION GAP SERPL CALC-SCNC: 9 MMOL/L
BUN SERPL-MCNC: 24 MG/DL
CALCIUM SERPL-MCNC: 9.2 MG/DL
CHLORIDE SERPL-SCNC: 109 MMOL/L
CO2 SERPL-SCNC: 23 MMOL/L
CREAT SERPL-MCNC: 1.6 MG/DL
EST. GFR  (AFRICAN AMERICAN): 46.3 ML/MIN/1.73 M^2
EST. GFR  (NON AFRICAN AMERICAN): 40.1 ML/MIN/1.73 M^2
GLUCOSE SERPL-MCNC: 154 MG/DL
PHOSPHATE SERPL-MCNC: 3.2 MG/DL
POTASSIUM SERPL-SCNC: 4.6 MMOL/L
PTH-INTACT SERPL-MCNC: 112 PG/ML
SODIUM SERPL-SCNC: 141 MMOL/L

## 2018-02-02 PROCEDURE — 80069 RENAL FUNCTION PANEL: CPT

## 2018-02-02 PROCEDURE — 83970 ASSAY OF PARATHORMONE: CPT

## 2018-02-02 PROCEDURE — 36415 COLL VENOUS BLD VENIPUNCTURE: CPT | Mod: PO

## 2018-03-09 ENCOUNTER — OFFICE VISIT (OUTPATIENT)
Dept: NEPHROLOGY | Facility: CLINIC | Age: 83
End: 2018-03-09
Payer: MEDICARE

## 2018-03-09 VITALS
BODY MASS INDEX: 36.17 KG/M2 | SYSTOLIC BLOOD PRESSURE: 100 MMHG | HEART RATE: 63 BPM | DIASTOLIC BLOOD PRESSURE: 69 MMHG | OXYGEN SATURATION: 98 % | WEIGHT: 237.88 LBS

## 2018-03-09 DIAGNOSIS — N25.81 SECONDARY HYPERPARATHYROIDISM, RENAL: ICD-10-CM

## 2018-03-09 DIAGNOSIS — E78.2 COMBINED HYPERLIPIDEMIA ASSOCIATED WITH TYPE 2 DIABETES MELLITUS: ICD-10-CM

## 2018-03-09 DIAGNOSIS — K21.9 GASTROESOPHAGEAL REFLUX DISEASE, ESOPHAGITIS PRESENCE NOT SPECIFIED: ICD-10-CM

## 2018-03-09 DIAGNOSIS — E11.69 COMBINED HYPERLIPIDEMIA ASSOCIATED WITH TYPE 2 DIABETES MELLITUS: ICD-10-CM

## 2018-03-09 DIAGNOSIS — N18.30 CKD (CHRONIC KIDNEY DISEASE) STAGE 3, GFR 30-59 ML/MIN: Primary | ICD-10-CM

## 2018-03-09 DIAGNOSIS — I15.2 HYPERTENSION ASSOCIATED WITH DIABETES: ICD-10-CM

## 2018-03-09 DIAGNOSIS — E11.59 HYPERTENSION ASSOCIATED WITH DIABETES: ICD-10-CM

## 2018-03-09 PROCEDURE — 99999 PR PBB SHADOW E&M-EST. PATIENT-LVL III: CPT | Mod: PBBFAC,,, | Performed by: INTERNAL MEDICINE

## 2018-03-09 PROCEDURE — 99213 OFFICE O/P EST LOW 20 MIN: CPT | Mod: PBBFAC,PO | Performed by: INTERNAL MEDICINE

## 2018-03-09 PROCEDURE — 99214 OFFICE O/P EST MOD 30 MIN: CPT | Mod: S$PBB,,, | Performed by: INTERNAL MEDICINE

## 2018-03-26 ENCOUNTER — OFFICE VISIT (OUTPATIENT)
Dept: FAMILY MEDICINE | Facility: CLINIC | Age: 83
End: 2018-03-26
Payer: MEDICARE

## 2018-03-26 VITALS
DIASTOLIC BLOOD PRESSURE: 81 MMHG | HEIGHT: 69 IN | BODY MASS INDEX: 34.51 KG/M2 | HEART RATE: 109 BPM | SYSTOLIC BLOOD PRESSURE: 123 MMHG | WEIGHT: 233 LBS

## 2018-03-26 DIAGNOSIS — N18.30 CKD (CHRONIC KIDNEY DISEASE) STAGE 3, GFR 30-59 ML/MIN: ICD-10-CM

## 2018-03-26 DIAGNOSIS — R60.9 EDEMA, UNSPECIFIED TYPE: Primary | ICD-10-CM

## 2018-03-26 DIAGNOSIS — E78.2 COMBINED HYPERLIPIDEMIA ASSOCIATED WITH TYPE 2 DIABETES MELLITUS: ICD-10-CM

## 2018-03-26 DIAGNOSIS — Z12.5 ENCOUNTER FOR SCREENING FOR MALIGNANT NEOPLASM OF PROSTATE: ICD-10-CM

## 2018-03-26 DIAGNOSIS — Z85.46 HISTORY OF PROSTATE CANCER: ICD-10-CM

## 2018-03-26 DIAGNOSIS — I15.2 HYPERTENSION ASSOCIATED WITH DIABETES: ICD-10-CM

## 2018-03-26 DIAGNOSIS — F41.9 ANXIETY: ICD-10-CM

## 2018-03-26 DIAGNOSIS — E11.59 HYPERTENSION ASSOCIATED WITH DIABETES: ICD-10-CM

## 2018-03-26 DIAGNOSIS — I67.2 CEREBROVASCULAR DISEASE, ARTERIOSCLEROTIC, POST-STROKE: ICD-10-CM

## 2018-03-26 DIAGNOSIS — N25.81 SECONDARY HYPERPARATHYROIDISM, RENAL: ICD-10-CM

## 2018-03-26 DIAGNOSIS — E11.69 COMBINED HYPERLIPIDEMIA ASSOCIATED WITH TYPE 2 DIABETES MELLITUS: ICD-10-CM

## 2018-03-26 DIAGNOSIS — K21.9 GASTROESOPHAGEAL REFLUX DISEASE, ESOPHAGITIS PRESENCE NOT SPECIFIED: ICD-10-CM

## 2018-03-26 DIAGNOSIS — Z86.73 CEREBROVASCULAR DISEASE, ARTERIOSCLEROTIC, POST-STROKE: ICD-10-CM

## 2018-03-26 DIAGNOSIS — E11.42 DIABETIC POLYNEUROPATHY ASSOCIATED WITH TYPE 2 DIABETES MELLITUS: ICD-10-CM

## 2018-03-26 PROCEDURE — 99214 OFFICE O/P EST MOD 30 MIN: CPT | Mod: S$PBB,,, | Performed by: FAMILY MEDICINE

## 2018-03-26 PROCEDURE — 99213 OFFICE O/P EST LOW 20 MIN: CPT | Mod: PBBFAC,PO | Performed by: FAMILY MEDICINE

## 2018-03-26 PROCEDURE — 99999 PR PBB SHADOW E&M-EST. PATIENT-LVL III: CPT | Mod: PBBFAC,,, | Performed by: FAMILY MEDICINE

## 2018-03-26 RX ORDER — LISINOPRIL 20 MG/1
20 TABLET ORAL 2 TIMES DAILY
Qty: 180 TABLET | Refills: 3 | Status: SHIPPED | OUTPATIENT
Start: 2018-03-26 | End: 2019-01-21 | Stop reason: SDUPTHER

## 2018-03-26 RX ORDER — GLIMEPIRIDE 4 MG/1
TABLET ORAL
Qty: 180 TABLET | Refills: 1 | Status: SHIPPED | OUTPATIENT
Start: 2018-03-26 | End: 2019-01-21 | Stop reason: SDUPTHER

## 2018-03-26 RX ORDER — CLOPIDOGREL BISULFATE 75 MG/1
75 TABLET ORAL EVERY MORNING
Qty: 90 TABLET | Refills: 3 | Status: SHIPPED | OUTPATIENT
Start: 2018-03-26 | End: 2018-06-21

## 2018-03-26 RX ORDER — NATEGLINIDE 120 MG/1
120 TABLET ORAL 3 TIMES DAILY
Qty: 270 TABLET | Refills: 1 | Status: SHIPPED | OUTPATIENT
Start: 2018-03-26 | End: 2019-01-21 | Stop reason: SDUPTHER

## 2018-03-26 RX ORDER — DULOXETIN HYDROCHLORIDE 60 MG/1
60 CAPSULE, DELAYED RELEASE ORAL DAILY
Qty: 90 CAPSULE | Refills: 3 | Status: SHIPPED | OUTPATIENT
Start: 2018-03-26 | End: 2019-01-21 | Stop reason: SDUPTHER

## 2018-03-26 RX ORDER — FUROSEMIDE 40 MG/1
40 TABLET ORAL DAILY
Qty: 90 TABLET | Refills: 3 | Status: SHIPPED | OUTPATIENT
Start: 2018-03-26 | End: 2019-01-21 | Stop reason: SDUPTHER

## 2018-03-26 RX ORDER — ATORVASTATIN CALCIUM 40 MG/1
40 TABLET, FILM COATED ORAL DAILY
Qty: 90 TABLET | Refills: 3 | Status: SHIPPED | OUTPATIENT
Start: 2018-03-26 | End: 2019-01-21 | Stop reason: SDUPTHER

## 2018-03-26 RX ORDER — OMEPRAZOLE 20 MG/1
20 CAPSULE, DELAYED RELEASE ORAL DAILY
Qty: 90 CAPSULE | Refills: 3 | Status: SHIPPED | OUTPATIENT
Start: 2018-03-26 | End: 2019-01-21 | Stop reason: SDUPTHER

## 2018-03-26 NOTE — PROGRESS NOTES
Subjective:      Patient ID: Héctor Desir is a 82 y.o. male.    Chief Complaint: Follow-up (diabetes)    Problem List Items Addressed This Visit     Anxiety    Overview     He has anxiety that has been going on for a couple of years and he is now dealing with his wife's cancer so this has been helpful with using the cymbalta.   He also had neuropathy and this has helped it.           Cerebrovascular disease, arteriosclerotic, post-stroke    CKD (chronic kidney disease) stage 3, GFR 30-59 ml/min    Overview     He has CKD and is seeing Dr. Sims.  He is doing OK right now with her.   CMP  Sodium   Date Value Ref Range Status   02/02/2018 141 136 - 145 mmol/L Final     Potassium   Date Value Ref Range Status   02/02/2018 4.6 3.5 - 5.1 mmol/L Final     Chloride   Date Value Ref Range Status   02/02/2018 109 95 - 110 mmol/L Final     CO2   Date Value Ref Range Status   02/02/2018 23 23 - 29 mmol/L Final     Glucose   Date Value Ref Range Status   02/02/2018 154 (H) 70 - 110 mg/dL Final     BUN, Bld   Date Value Ref Range Status   02/02/2018 24 (H) 8 - 23 mg/dL Final     Creatinine   Date Value Ref Range Status   02/02/2018 1.6 (H) 0.5 - 1.4 mg/dL Final     Calcium   Date Value Ref Range Status   02/02/2018 9.2 8.7 - 10.5 mg/dL Final     Total Protein   Date Value Ref Range Status   01/16/2017 6.7 6.0 - 8.4 g/dL Final     Albumin   Date Value Ref Range Status   02/02/2018 3.9 3.5 - 5.2 g/dL Final     Total Bilirubin   Date Value Ref Range Status   01/16/2017 0.5 0.1 - 1.0 mg/dL Final     Comment:     For infants and newborns, interpretation of results should be based  on gestational age, weight and in agreement with clinical  observations.  Premature Infant recommended reference ranges:  Up to 24 hours.............<8.0 mg/dL  Up to 48 hours............<12.0 mg/dL  3-5 days..................<15.0 mg/dL  6-29 days.................<15.0 mg/dL       Alkaline Phosphatase   Date Value Ref Range Status   01/16/2017 111 55 -  135 U/L Final     AST   Date Value Ref Range Status   01/16/2017 21 10 - 40 U/L Final     ALT   Date Value Ref Range Status   01/16/2017 24 10 - 44 U/L Final     Anion Gap   Date Value Ref Range Status   02/02/2018 9 8 - 16 mmol/L Final     eGFR if    Date Value Ref Range Status   02/02/2018 46.3 (A) >60 mL/min/1.73 m^2 Final     eGFR if non    Date Value Ref Range Status   02/02/2018 40.1 (A) >60 mL/min/1.73 m^2 Final     Comment:     Calculation used to obtain the estimated glomerular filtration  rate (eGFR) is the CKD-EPI equation.                 Combined hyperlipidemia associated with type 2 diabetes mellitus    Overview     The patient presents with hyperlipidemia.  The patient reports tolerating the medication well and is in excellent compliance.  There have been no medication side effects.  The patient denies chest pain, neuropathy, and myalgias.  The patient has reduced fat intake and has been exercising.  Current treatment has included the medications listed in the med card.    Lab Results   Component Value Date    CHOL 110 (L) 01/16/2017    CHOL 149 07/13/2016    CHOL 105 (L) 01/12/2016       Lab Results   Component Value Date    HDL 29 (L) 01/16/2017    HDL 33 (L) 07/13/2016    HDL 28 (L) 01/12/2016       Lab Results   Component Value Date    LDLCALC 65.0 01/16/2017    LDLCALC 92.0 07/13/2016    LDLCALC 60.0 (L) 01/12/2016       Lab Results   Component Value Date    TRIG 80 01/16/2017    TRIG 120 07/13/2016    TRIG 85 01/12/2016       Lab Results   Component Value Date    CHOLHDL 26.4 01/16/2017    CHOLHDL 22.1 07/13/2016    CHOLHDL 26.7 01/12/2016     Lab Results   Component Value Date    ALT 24 01/16/2017    AST 21 01/16/2017    ALKPHOS 111 01/16/2017    BILITOT 0.5 01/16/2017              Diabetic neuropathy    Overview     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  It does help it most of the time.         Edema - Primary     "Overview     He has had swelling of the legs since early 2018.  He has not had a change in his meds but is on amlodipine 10 mg po q day.  He has not had trauma noted.         GERD (gastroesophageal reflux disease)    History of prostate cancer    Overview     He had prostate cancer in 2006 and used to see Dr. Patterson. He would like to check a psa to f/u on this.  He has not had dysuria or hematuria or incontinence.         Hypertension associated with diabetes    Overview     The patient presents with essential hypertension.  The patient is tolerating the medication well and is in excellent compliance.  The patient is experiencing no side effects.  Counseling was offered regarding low salt diets.  The patient has a reduced salt intake.  The patient denies chest pain, palpitations, shortness of breath, dyspnea on exertion, left or murmur neck pain, nausea, vomiting, diaphoresis, paroxysmal nocturnal dyspnea, and orthopnea.   /81   Pulse 109   Ht 5' 9" (1.753 m)   Wt 105.7 kg (233 lb)   BMI 34.41 kg/m²            Secondary hyperparathyroidism, renal    Overview     He has renally induced hyperparathyroidism.   Lab Results   Component Value Date    .0 (H) 02/02/2018    CALCIUM 9.2 02/02/2018    PHOS 3.2 02/02/2018     He has just seen DR. Sims.         Type II diabetes mellitus with neurological manifestations, uncontrolled    Type II diabetes mellitus with renal manifestations, uncontrolled    Overview     The patient presents with diabetes.  The patient denies polyuria, polydipsia, polyphagia, hypoglycemia and paresthesias.  The patient's glucose control has been good.  Home glucose averages are routinely checked.  The patient is without retinopathy currently.  The patient has no history of neuropathy.  The patient currently complains of no podiatric problems.  The patient has excellent compliance.  Hemoglobin A1C   Date Value Ref Range Status   10/13/2017 7.5 (H) 4.0 - 5.6 % Final     Comment:     " According to ADA guidelines, hemoglobin A1c <7.0% represents  optimal control in non-pregnant diabetic patients. Different  metrics may apply to specific patient populations.   Standards of Medical Care in Diabetes-2016.  For the purpose of screening for the presence of diabetes:  <5.7%     Consistent with the absence of diabetes  5.7-6.4%  Consistent with increasing risk for diabetes   (prediabetes)  >or=6.5%  Consistent with diabetes  Currently, no consensus exists for use of hemoglobin A1c  for diagnosis of diabetes for children.  This Hemoglobin A1c assay has significant interference with fetal   hemoglobin   (HbF). The results are invalid for patients with abnormal amounts of   HbF,   including those with known Hereditary Persistence   of Fetal Hemoglobin. Heterozygous hemoglobin variants (HbAS, HbAC,   HbAD, HbAE, HbA2) do not significantly interfere with this assay;   however, presence of multiple variants in a sample may impact the %   interference.     05/22/2017 7.2 (H) 4.5 - 6.2 % Final     Comment:     According to ADA guidelines, hemoglobin A1C <7.0% represents  optimal control in non-pregnant diabetic patients.  Different  metrics may apply to specific populations.   Standards of Medical Care in Diabetes - 2016.  For the purpose of screening for the presence of diabetes:  <5.7%     Consistent with the absence of diabetes  5.7-6.4%  Consistent with increasing risk for diabetes   (prediabetes)  >or=6.5%  Consistent with diabetes  Currently no consensus exists for use of hemoglobin A1C  for diagnosis of diabetes for children.     01/16/2017 7.4 (H) 4.5 - 6.2 % Final     Comment:     According to ADA guidelines, hemoglobin A1C <7.0% represents  optimal control in non-pregnant diabetic patients.  Different  metrics may apply to specific populations.   Standards of Medical Care in Diabetes - 2016.  For the purpose of screening for the presence of diabetes:  <5.7%     Consistent with the absence of  diabetes  5.7-6.4%  Consistent with increasing risk for diabetes   (prediabetes)  >or=6.5%  Consistent with diabetes  Currently no consensus exists for use of hemoglobin A1C  for diagnosis of diabetes for children.       No results found for: DONNA MARTINEZ  Diabetes Management Status    Statin: Taking  ACE/ARB: Taking    Screening or Prevention Patient's value Goal Complete/Controlled?   HgA1C Testing and Control   Lab Results   Component Value Date    HGBA1C 7.5 (H) 10/13/2017      Annually/Less than 8% Yes   Lipid profile : 01/16/2017 Annually No   LDL control Lab Results   Component Value Date    LDLCALC 65.0 01/16/2017    Annually/Less than 100 mg/dl  No   Nephropathy screening Lab Results   Component Value Date    LABMICR 30 11/20/2012     Lab Results   Component Value Date    PROTEINUA Negative 08/08/2014    Annually No   Blood pressure BP Readings from Last 1 Encounters:   03/26/18 123/81    Less than 140/90 Yes   Dilated retinal exam : 03/15/2017 Annually No   Foot exam   : 05/22/2017 Annually Yes                    Past Medical History:  Past Medical History:   Diagnosis Date    CKD (chronic kidney disease) stage 3, GFR 30-59 ml/min     Colon polyps 2011    due again in 2019    Combined hyperlipidemia associated with type 2 diabetes mellitus 7/5/2013    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     DM (diabetes mellitus) type II controlled, neurological manifestation 7/5/2013    GERD (gastroesophageal reflux disease)     History of prostate cancer 2006    Hypertension associated with diabetes     Hypertension goal BP (blood pressure) < 130/80     TIA (transient ischemic attack)      Past Surgical History:   Procedure Laterality Date    CARDIAC CATHETERIZATION      CATARACT EXTRACTION W/  INTRAOCULAR LENS IMPLANT  11/2013    Bilateral Cataract     COLONOSCOPY W/ POLYPECTOMY      EYE SURGERY      PROSTATECTOMY  2006    TONSILLECTOMY      TONSILLECTOMY,  ADENOIDECTOMY, BILATERAL MYRINGOTOMY AND TUBES       Review of patient's allergies indicates:   Allergen Reactions    No known drug allergies      Current Outpatient Prescriptions on File Prior to Visit   Medication Sig Dispense Refill    atorvastatin (LIPITOR) 40 MG tablet Take 1 tablet (40 mg total) by mouth once daily. 90 tablet 3    blood sugar diagnostic Strp Use daily- Freestyle lite 300 strip 3    blood-glucose meter kit Use before meals and before bed when the glucoses are not in control.  Otherwise, test it daily once a day before meals randomly to ensure stability of the gluocse. 1 each 0    clopidogrel (PLAVIX) 75 mg tablet Take 1 tablet (75 mg total) by mouth every morning. 90 tablet 3    duloxetine (CYMBALTA) 60 MG capsule Take 1 capsule (60 mg total) by mouth once daily. 90 capsule 3    ERGOCALCIFEROL, VITAMIN D2, (VITAMIN D ORAL) Take by mouth.      glimepiride (AMARYL) 4 MG tablet TAKE 1 TABLET TWICE A  tablet 1    lisinopril (PRINIVIL,ZESTRIL) 20 MG tablet Take 1 tablet (20 mg total) by mouth 2 (two) times daily. 180 tablet 3    nateglinide (STARLIX) 120 MG tablet Take 1 tablet (120 mg total) by mouth 3 (three) times daily. 270 tablet 1    omeprazole (PRILOSEC) 20 MG capsule Take 1 capsule (20 mg total) by mouth once daily. 90 capsule 3    [DISCONTINUED] amlodipine (NORVASC) 10 MG tablet Take 1 tablet (10 mg total) by mouth once daily. 90 tablet 3    [DISCONTINUED] acarbose (PRECOSE) 50 MG Tab Take 1 tablet (50 mg total) by mouth 3 (three) times daily with meals. 270 tablet 3    [DISCONTINUED] cetirizine (ZYRTEC) 10 MG tablet Take 1 tablet (10 mg total) by mouth once daily. 30 tablet 0    [DISCONTINUED] CHROMIUM-GTF ORAL Take by mouth.       Current Facility-Administered Medications on File Prior to Visit   Medication Dose Route Frequency Provider Last Rate Last Dose    diclofenac sodium 1 % gel   Topical Daily Tobias Modi MD         Social History     Social History  "   Marital status:      Spouse name: N/A    Number of children: N/A    Years of education: N/A     Occupational History    Not on file.     Social History Main Topics    Smoking status: Never Smoker    Smokeless tobacco: Never Used    Alcohol use No      Comment: He used to drink but quit in 1990    Drug use: No    Sexual activity: Not on file     Other Topics Concern    Not on file     Social History Narrative    No narrative on file     Family History   Problem Relation Age of Onset    Heart attack Mother     Melanoma Father     Cancer Father     Anesthesia problems Neg Hx     Clotting disorder Neg Hx     Kidney disease Neg Hx        Review of Systems   Constitutional: Negative.  Negative for chills, diaphoresis and fever.   HENT: Negative for congestion, hearing loss, mouth sores, postnasal drip and sore throat.    Eyes: Negative for pain and visual disturbance.   Respiratory: Negative for cough, chest tightness, shortness of breath and wheezing.    Cardiovascular: Positive for leg swelling. Negative for chest pain.   Gastrointestinal: Negative for abdominal pain, anal bleeding, blood in stool, constipation, diarrhea, nausea and vomiting.   Genitourinary: Negative for dysuria and hematuria.   Musculoskeletal: Negative for back pain, neck pain and neck stiffness.   Skin: Negative for rash.   Neurological: Negative for dizziness and weakness.       Objective:     /81   Pulse 109   Ht 5' 9" (1.753 m)   Wt 105.7 kg (233 lb)   BMI 34.41 kg/m²     Physical Exam   Constitutional: He is oriented to person, place, and time. He appears well-developed and well-nourished.   HENT:   Head: Normocephalic and atraumatic.   Right Ear: External ear normal.   Left Ear: External ear normal.   Nose: Nose normal.   Mouth/Throat: Oropharynx is clear and moist. No oropharyngeal exudate.   Eyes: Conjunctivae and EOM are normal. Pupils are equal, round, and reactive to light. Right eye exhibits no " discharge. Left eye exhibits no discharge. No scleral icterus.   Neck: Normal range of motion. Neck supple. No JVD present. No thyromegaly present.   Cardiovascular: Normal rate, regular rhythm, normal heart sounds and intact distal pulses.  Exam reveals no gallop and no friction rub.    No murmur heard.  Pulses:       Dorsalis pedis pulses are 2+ on the right side, and 2+ on the left side.        Posterior tibial pulses are 2+ on the right side, and 2+ on the left side.   Pulmonary/Chest: Effort normal and breath sounds normal. No respiratory distress. He has no wheezes. He has no rales. He exhibits no tenderness.   Abdominal: Soft. Bowel sounds are normal. He exhibits no distension and no mass. There is no tenderness. There is no rebound and no guarding.   Musculoskeletal: Normal range of motion. He exhibits no edema or tenderness.        Right foot: There is normal range of motion and no deformity.        Left foot: There is normal range of motion and no deformity.   Feet:   Right Foot:   Protective Sensation: 10 sites tested. 10 sites sensed.   Skin Integrity: Negative for ulcer, blister, skin breakdown, erythema, warmth, callus or dry skin.   Left Foot:   Protective Sensation: 10 sites tested. 10 sites sensed.   Skin Integrity: Negative for ulcer, blister, skin breakdown, erythema, warmth, callus or dry skin.   Lymphadenopathy:     He has no cervical adenopathy.   Neurological: He is alert and oriented to person, place, and time. No cranial nerve deficit. Coordination normal.   Skin: Skin is warm and dry. He is not diaphoretic.   Psychiatric: He has a normal mood and affect.     Assessment:     1. Edema, unspecified type    2. Uncontrolled type 2 diabetes mellitus with stage 3 chronic kidney disease, without long-term current use of insulin    3. Hypertension associated with diabetes    4. Combined hyperlipidemia associated with type 2 diabetes mellitus    5. CKD (chronic kidney disease) stage 3, GFR 30-59  ml/min    6. Secondary hyperparathyroidism, renal    7. Cerebrovascular disease, arteriosclerotic, post-stroke    8. Anxiety    9. Uncontrolled type 2 diabetes mellitus with diabetic polyneuropathy, without long-term current use of insulin    10. Gastroesophageal reflux disease, esophagitis presence not specified    11. Diabetic polyneuropathy associated with type 2 diabetes mellitus        Plan:     Problem List Items Addressed This Visit     Anxiety    Relevant Medications    DULoxetine (CYMBALTA) 60 MG capsule    Cerebrovascular disease, arteriosclerotic, post-stroke    Relevant Medications    clopidogrel (PLAVIX) 75 mg tablet    CKD (chronic kidney disease) stage 3, GFR 30-59 ml/min    Combined hyperlipidemia associated with type 2 diabetes mellitus    Relevant Medications    atorvastatin (LIPITOR) 40 MG tablet    Other Relevant Orders    Lipid panel    Diabetic neuropathy    Edema - Primary    Relevant Medications    furosemide (LASIX) 40 MG tablet    Other Relevant Orders    Brain natriuretic peptide    D dimer, quantitative    CBC auto differential    TSH    GERD (gastroesophageal reflux disease)    Relevant Medications    omeprazole (PRILOSEC) 20 MG capsule    History of prostate cancer    Relevant Orders    PSA, Screening    Hypertension associated with diabetes    Relevant Medications    lisinopril (PRINIVIL,ZESTRIL) 20 MG tablet    furosemide (LASIX) 40 MG tablet    Secondary hyperparathyroidism, renal    Type II diabetes mellitus with neurological manifestations, uncontrolled    Type II diabetes mellitus with renal manifestations, uncontrolled    Relevant Medications    glimepiride (AMARYL) 4 MG tablet    nateglinide (STARLIX) 120 MG tablet    Other Relevant Orders    Hemoglobin A1c    Ambulatory referral to Optometry      Other Visit Diagnoses     Encounter for screening for malignant neoplasm of prostate         Relevant Orders    PSA, Screening        RTC in 3 weeks with NP to check the bp.     No  Follow-up on file.

## 2018-03-28 ENCOUNTER — LAB VISIT (OUTPATIENT)
Dept: LAB | Facility: HOSPITAL | Age: 83
End: 2018-03-28
Attending: FAMILY MEDICINE
Payer: MEDICARE

## 2018-03-28 DIAGNOSIS — R60.9 EDEMA, UNSPECIFIED TYPE: Primary | ICD-10-CM

## 2018-03-28 DIAGNOSIS — Z12.5 ENCOUNTER FOR SCREENING FOR MALIGNANT NEOPLASM OF PROSTATE: ICD-10-CM

## 2018-03-28 DIAGNOSIS — E78.2 COMBINED HYPERLIPIDEMIA ASSOCIATED WITH TYPE 2 DIABETES MELLITUS: ICD-10-CM

## 2018-03-28 DIAGNOSIS — R60.9 EDEMA, UNSPECIFIED TYPE: ICD-10-CM

## 2018-03-28 DIAGNOSIS — R22.42 LOCALIZED SWELLING, MASS AND LUMP, LEFT LOWER LIMB: ICD-10-CM

## 2018-03-28 DIAGNOSIS — E11.69 COMBINED HYPERLIPIDEMIA ASSOCIATED WITH TYPE 2 DIABETES MELLITUS: ICD-10-CM

## 2018-03-28 DIAGNOSIS — R79.89 ELEVATED D-DIMER: ICD-10-CM

## 2018-03-28 DIAGNOSIS — R79.89 ELEVATED BRAIN NATRIURETIC PEPTIDE (BNP) LEVEL: Primary | ICD-10-CM

## 2018-03-28 DIAGNOSIS — Z85.46 HISTORY OF PROSTATE CANCER: ICD-10-CM

## 2018-03-28 LAB
BASOPHILS # BLD AUTO: 0.03 K/UL
BASOPHILS NFR BLD: 0.6 %
BNP SERPL-MCNC: 150 PG/ML
CHOLEST SERPL-MCNC: 102 MG/DL
CHOLEST/HDLC SERPL: 4.1 {RATIO}
COMPLEXED PSA SERPL-MCNC: 0.09 NG/ML
D DIMER PPP IA.FEU-MCNC: 0.5 MG/L FEU
DIFFERENTIAL METHOD: ABNORMAL
EOSINOPHIL # BLD AUTO: 0.3 K/UL
EOSINOPHIL NFR BLD: 5.1 %
ERYTHROCYTE [DISTWIDTH] IN BLOOD BY AUTOMATED COUNT: 14.1 %
ESTIMATED AVG GLUCOSE: 157 MG/DL
HBA1C MFR BLD HPLC: 7.1 %
HCT VFR BLD AUTO: 42.6 %
HDLC SERPL-MCNC: 25 MG/DL
HDLC SERPL: 24.5 %
HGB BLD-MCNC: 14.3 G/DL
IMM GRANULOCYTES # BLD AUTO: 0.02 K/UL
IMM GRANULOCYTES NFR BLD AUTO: 0.4 %
LDLC SERPL CALC-MCNC: 60.6 MG/DL
LYMPHOCYTES # BLD AUTO: 1.1 K/UL
LYMPHOCYTES NFR BLD: 22.6 %
MCH RBC QN AUTO: 28 PG
MCHC RBC AUTO-ENTMCNC: 33.6 G/DL
MCV RBC AUTO: 84 FL
MONOCYTES # BLD AUTO: 0.5 K/UL
MONOCYTES NFR BLD: 11 %
NEUTROPHILS # BLD AUTO: 3 K/UL
NEUTROPHILS NFR BLD: 60.3 %
NONHDLC SERPL-MCNC: 77 MG/DL
NRBC BLD-RTO: 0 /100 WBC
PLATELET # BLD AUTO: 146 K/UL
PMV BLD AUTO: 11.9 FL
RBC # BLD AUTO: 5.1 M/UL
TRIGL SERPL-MCNC: 82 MG/DL
TSH SERPL DL<=0.005 MIU/L-ACNC: 1.68 UIU/ML
WBC # BLD AUTO: 4.91 K/UL

## 2018-03-28 PROCEDURE — 83036 HEMOGLOBIN GLYCOSYLATED A1C: CPT

## 2018-03-28 PROCEDURE — 84153 ASSAY OF PSA TOTAL: CPT

## 2018-03-28 PROCEDURE — 80061 LIPID PANEL: CPT

## 2018-03-28 PROCEDURE — 83880 ASSAY OF NATRIURETIC PEPTIDE: CPT

## 2018-03-28 PROCEDURE — 84443 ASSAY THYROID STIM HORMONE: CPT

## 2018-03-28 PROCEDURE — 36415 COLL VENOUS BLD VENIPUNCTURE: CPT | Mod: PO

## 2018-03-28 PROCEDURE — 85379 FIBRIN DEGRADATION QUANT: CPT

## 2018-03-28 PROCEDURE — 85025 COMPLETE CBC W/AUTO DIFF WBC: CPT

## 2018-03-29 ENCOUNTER — HOSPITAL ENCOUNTER (OUTPATIENT)
Dept: RADIOLOGY | Facility: HOSPITAL | Age: 83
Discharge: HOME | End: 2018-03-29
Attending: FAMILY MEDICINE
Payer: MEDICARE

## 2018-03-29 DIAGNOSIS — R79.89 ELEVATED D-DIMER: ICD-10-CM

## 2018-03-29 DIAGNOSIS — R22.42 LOCALIZED SWELLING, MASS AND LUMP, LEFT LOWER LIMB: ICD-10-CM

## 2018-03-29 DIAGNOSIS — R60.9 EDEMA, UNSPECIFIED TYPE: ICD-10-CM

## 2018-03-29 PROCEDURE — 93970 EXTREMITY STUDY: CPT | Mod: 26,,, | Performed by: RADIOLOGY

## 2018-03-29 PROCEDURE — 93970 EXTREMITY STUDY: CPT | Mod: TC,PO

## 2018-04-02 ENCOUNTER — CLINICAL SUPPORT (OUTPATIENT)
Dept: CARDIOLOGY | Facility: CLINIC | Age: 83
End: 2018-04-02
Attending: FAMILY MEDICINE
Payer: MEDICARE

## 2018-04-02 DIAGNOSIS — R60.9 EDEMA, UNSPECIFIED TYPE: ICD-10-CM

## 2018-04-02 DIAGNOSIS — R79.89 ELEVATED BRAIN NATRIURETIC PEPTIDE (BNP) LEVEL: ICD-10-CM

## 2018-04-02 LAB
DIASTOLIC DYSFUNCTION: NO
ESTIMATED PA SYSTOLIC PRESSURE: 31.09
RETIRED EF AND QEF - SEE NOTES: 65 (ref 55–65)

## 2018-04-02 PROCEDURE — 93306 TTE W/DOPPLER COMPLETE: CPT | Mod: PBBFAC,PO | Performed by: NUCLEAR MEDICINE

## 2018-04-03 NOTE — PROGRESS NOTES
Subjective:       Patient ID: Héctor Desir is a 82 y.o. White male who presents for follow-up evaluation of Follow-up (CKD) and Chronic Kidney Disease    HPI       He reports ongoing stress due to his wife's illness. She is no longer on hospice--but he needs to interact with her daughter who can be challenging. He feels overall well but notes his BS are running higher and he feels it's from stress. No LE edema nor SOB. Nocturia is 1-2X. Lasy Hba1c was 7.5, he is 83 y/o.     Review of Systems   Constitutional: Negative for activity change, appetite change, fatigue and unexpected weight change.   HENT: Negative for facial swelling.    Respiratory: Negative for shortness of breath.    Cardiovascular: Negative for chest pain and leg swelling.   Gastrointestinal: Positive for constipation.   Genitourinary: Positive for frequency. Negative for difficulty urinating, dysuria and hematuria.   Musculoskeletal: Negative for arthralgias.   Neurological: Negative for weakness and headaches.   Psychiatric/Behavioral: Negative for decreased concentration.       Objective:      Physical Exam   Constitutional: He is oriented to person, place, and time. He appears well-developed and well-nourished. No distress.   Neck: No JVD present.   Cardiovascular: S1 normal, S2 normal and normal heart sounds.  Exam reveals no friction rub.    Pulmonary/Chest: Breath sounds normal. He has no wheezes. He has no rales.   Abdominal: Soft.   Musculoskeletal: He exhibits edema.   Neurological: He is alert and oriented to person, place, and time.   Skin: Skin is warm and dry.   Psychiatric: He has a normal mood and affect.   Vitals reviewed.      Assessment:       1. CKD (chronic kidney disease) stage 3, GFR 30-59 ml/min    2. Hypertension associated with diabetes    3. Uncontrolled type 2 diabetes mellitus with stage 3 chronic kidney disease, without long-term current use of insulin    4. Secondary hyperparathyroidism, renal    5. Gastroesophageal  reflux disease, esophagitis presence not specified    6. Combined hyperlipidemia associated with type 2 diabetes mellitus        Plan:             CKD stage 3 with stable kidney function.  Continue RAAS blockade (lisinopril) for renal preservation    HTN is controlled    DM--check Hba1c today per his request as last was drawn in May    Mineral and Bone Disease--continue D2      RTC 5 months

## 2018-04-16 ENCOUNTER — OFFICE VISIT (OUTPATIENT)
Dept: OPTOMETRY | Facility: CLINIC | Age: 83
End: 2018-04-16
Payer: MEDICARE

## 2018-04-16 DIAGNOSIS — Z96.1 BILATERAL PSEUDOPHAKIA: ICD-10-CM

## 2018-04-16 DIAGNOSIS — H43.813 POSTERIOR VITREOUS DETACHMENT, BILATERAL: ICD-10-CM

## 2018-04-16 DIAGNOSIS — H18.20 CORNEAL EDEMA OF RIGHT EYE: ICD-10-CM

## 2018-04-16 DIAGNOSIS — Z13.5 GLAUCOMA SCREENING: ICD-10-CM

## 2018-04-16 DIAGNOSIS — E11.9 DIABETES MELLITUS TYPE 2 WITHOUT RETINOPATHY: Primary | ICD-10-CM

## 2018-04-16 PROCEDURE — 92014 COMPRE OPH EXAM EST PT 1/>: CPT | Mod: S$PBB,,, | Performed by: OPTOMETRIST

## 2018-04-16 PROCEDURE — 99999 PR PBB SHADOW E&M-EST. PATIENT-LVL III: CPT | Mod: PBBFAC,,, | Performed by: OPTOMETRIST

## 2018-04-16 PROCEDURE — 99213 OFFICE O/P EST LOW 20 MIN: CPT | Mod: PBBFAC,PO | Performed by: OPTOMETRIST

## 2018-04-16 PROCEDURE — 92015 DETERMINE REFRACTIVE STATE: CPT | Mod: ,,, | Performed by: OPTOMETRIST

## 2018-04-16 NOTE — PROGRESS NOTES
HPI     Annual Exam    Additional comments: DLE 3-17 (ranjit)   ocular health exam            Diabetic Eye Exam    Additional comments: BSL uncontrolled, fluctuates           Blurred Vision    Additional comments: at near only --- distance VA good           Itchy Eye    Additional comments: allergies -- no gtts           Spots and/or Floaters    Additional comments: OU occasionally --- no light flashes           Comments   Hemoglobin A1C       Date                     Value               Ref Range             Status                03/28/2018               7.1 (H)             4.0 - 5.6 %           Final              Comment:    According to ADA guidelines, hemoglobin A1c <7.0% represents  optimal   control in non-pregnant diabetic patients. Different  metrics may apply to   specific patient populations.   Standards of Medical Care in   Diabetes-2016.  For the purpose of screening for the presence of   diabetes:  <5.7%     Consistent with the absence of diabetes  5.7-6.4%    Consistent with increasing risk for diabetes   (prediabetes)  >or=6.5%    Consistent with diabetes  Currently, no consensus exists for use of   hemoglobin A1c  for diagnosis of diabetes for children.  This Hemoglobin   A1c assay has significant interference with fetal   hemoglobin   (HbF).   The results are invalid for patients with abnormal amounts of   HbF,     including those with known Hereditary Persistence   of Fetal Hemoglobin.   Heterozygous hemoglobin variants (HbAS, HbAC,   HbAD, HbAE, HbA2) do not   significantly interfere with this assay;   however, presence of multiple   variants in a sample may impact the %   interference.         10/13/2017               7.5 (H)             4.0 - 5.6 %           Final              Comment:    According to ADA guidelines, hemoglobin A1c <7.0% represents  optimal   control in non-pregnant diabetic patients. Different  metrics may apply to   specific patient populations.   Standards of Medical Care in    Diabetes-2016.  For the purpose of screening for the presence of   diabetes:  <5.7%     Consistent with the absence of diabetes  5.7-6.4%    Consistent with increasing risk for diabetes   (prediabetes)  >or=6.5%    Consistent with diabetes  Currently, no consensus exists for use of   hemoglobin A1c  for diagnosis of diabetes for children.  This Hemoglobin   A1c assay has significant interference with fetal   hemoglobin   (HbF).   The results are invalid for patients with abnormal amounts of   HbF,     including those with known Hereditary Persistence   of Fetal Hemoglobin.   Heterozygous hemoglobin variants (HbAS, HbAC,   HbAD, HbAE, HbA2) do not   significantly interfere with this assay;   however, presence of multiple   variants in a sample may impact the %   interference.         05/22/2017               7.2 (H)             4.5 - 6.2 %           Final              Comment:    According to ADA guidelines, hemoglobin A1C <7.0% represents  optimal   control in non-pregnant diabetic patients.  Different  metrics may apply   to specific populations.   Standards of Medical Care in Diabetes -   2016.  For the purpose of screening for the presence of diabetes:  <5.7%       Consistent with the absence of diabetes  5.7-6.4%  Consistent with   increasing risk for diabetes   (prediabetes)  >or=6.5%  Consistent with   diabetes  Currently no consensus exists for use of hemoglobin A1C  for   diagnosis of diabetes for children.    ----------         Last edited by Pam Bobo on 4/16/2018  2:11 PM. (History)            Assessment /Plan     For exam results, see Encounter Report.    Diabetes mellitus type 2 without retinopathy    Posterior vitreous detachment, bilateral    Glaucoma screening    Corneal edema of right eye    Bilateral pseudophakia      1. No ret/ no csme, gave info, control glucose, annual DFE  2. RD precautions given, reviewed  3. Not suspect  4. Mild area, not in vis axis, monitor  5. Stable OU    S/p  yag OD  Gave copy of new specs but can continue with  otc, gave copy    Discussed and educated patient on current findings /plan.  RTC 1 year, prn if any changes / issues

## 2018-04-16 NOTE — LETTER
April 16, 2018      Tae Goel MD  [address] [facility] [address]           [place] [facility]  [address] [facility] [address] [place] [address]  Phone: [phone]  Fax: [phone]          Patient: Héctor Desir   MR Number: [id number]   YOB: 1935   Date of Visit: 4/16/2018       Dear Dr. Tae Goel:    Thank you for referring Héctor Desir to me for evaluation. Attached you will find relevant portions of my assessment and plan of care.    If you have questions, please do not hesitate to call me. I look forward to following Héctor Desir along with you.    Sincerely,    TESS Coley, OD    Enclosure  CC:  No Recipients    If you would like to receive this communication electronically, please contact [email or web address] or [phone] to request more information on [employer] access.    For providers and/or their staff who would like to refer a patient to [facility], please contact us through our one-stop-shop provider referral line, [facility], at [phone].    If you feel you have received this communication in error or would no longer like to receive these types of communications, please e-mail [email or web address]

## 2018-04-23 DIAGNOSIS — N18.30 CKD (CHRONIC KIDNEY DISEASE) STAGE 3, GFR 30-59 ML/MIN: Primary | ICD-10-CM

## 2018-05-17 DIAGNOSIS — I15.2 HYPERTENSION ASSOCIATED WITH DIABETES: ICD-10-CM

## 2018-05-17 DIAGNOSIS — E11.59 HYPERTENSION ASSOCIATED WITH DIABETES: ICD-10-CM

## 2018-05-17 RX ORDER — AMLODIPINE BESYLATE 10 MG/1
TABLET ORAL
Qty: 90 TABLET | Refills: 3 | OUTPATIENT
Start: 2018-05-17

## 2018-05-17 NOTE — TELEPHONE ENCOUNTER
This is not on his medicine list ant he is asking for amlodipine to be sent in.  Please find out the reason for the incongruence.  It is listed in his allergy list as having caused swelling in the past.

## 2018-06-20 ENCOUNTER — OFFICE VISIT (OUTPATIENT)
Dept: FAMILY MEDICINE | Facility: CLINIC | Age: 83
End: 2018-06-20
Payer: MEDICARE

## 2018-06-20 ENCOUNTER — HOSPITAL ENCOUNTER (OUTPATIENT)
Dept: RADIOLOGY | Facility: HOSPITAL | Age: 83
Discharge: HOME | End: 2018-06-20
Attending: FAMILY MEDICINE
Payer: MEDICARE

## 2018-06-20 VITALS
BODY MASS INDEX: 35.31 KG/M2 | HEIGHT: 68 IN | DIASTOLIC BLOOD PRESSURE: 99 MMHG | TEMPERATURE: 98 F | WEIGHT: 233 LBS | HEART RATE: 87 BPM | SYSTOLIC BLOOD PRESSURE: 148 MMHG

## 2018-06-20 DIAGNOSIS — E11.59 HYPERTENSION ASSOCIATED WITH DIABETES: ICD-10-CM

## 2018-06-20 DIAGNOSIS — I48.91 ATRIAL FIBRILLATION, UNSPECIFIED TYPE: ICD-10-CM

## 2018-06-20 DIAGNOSIS — R10.9 RIGHT FLANK PAIN: ICD-10-CM

## 2018-06-20 DIAGNOSIS — R06.00 DYSPNEA, UNSPECIFIED TYPE: ICD-10-CM

## 2018-06-20 DIAGNOSIS — M54.6 ACUTE MIDLINE THORACIC BACK PAIN: ICD-10-CM

## 2018-06-20 DIAGNOSIS — I15.2 HYPERTENSION ASSOCIATED WITH DIABETES: ICD-10-CM

## 2018-06-20 DIAGNOSIS — I48.19 PERSISTENT ATRIAL FIBRILLATION: ICD-10-CM

## 2018-06-20 DIAGNOSIS — R06.00 DYSPNEA, UNSPECIFIED TYPE: Primary | ICD-10-CM

## 2018-06-20 DIAGNOSIS — I48.91 ATRIAL FIBRILLATION: ICD-10-CM

## 2018-06-20 LAB
BILIRUB UR QL STRIP: NEGATIVE
CLARITY UR: CLEAR
COLOR UR: YELLOW
GLUCOSE UR QL STRIP: ABNORMAL
HGB UR QL STRIP: NEGATIVE
KETONES UR QL STRIP: NEGATIVE
LEUKOCYTE ESTERASE UR QL STRIP: NEGATIVE
NITRITE UR QL STRIP: NEGATIVE
PH UR STRIP: 6 [PH] (ref 5–8)
PROT UR QL STRIP: ABNORMAL
SP GR UR STRIP: 1.02 (ref 1–1.03)
URN SPEC COLLECT METH UR: ABNORMAL

## 2018-06-20 PROCEDURE — 99999 PR PBB SHADOW E&M-EST. PATIENT-LVL IV: CPT | Mod: PBBFAC,,, | Performed by: FAMILY MEDICINE

## 2018-06-20 PROCEDURE — 71046 X-RAY EXAM CHEST 2 VIEWS: CPT | Mod: 26,,, | Performed by: RADIOLOGY

## 2018-06-20 PROCEDURE — 99214 OFFICE O/P EST MOD 30 MIN: CPT | Mod: S$PBB,,, | Performed by: FAMILY MEDICINE

## 2018-06-20 PROCEDURE — 99214 OFFICE O/P EST MOD 30 MIN: CPT | Mod: PBBFAC,PO,25 | Performed by: FAMILY MEDICINE

## 2018-06-20 PROCEDURE — 81002 URINALYSIS NONAUTO W/O SCOPE: CPT | Mod: PO

## 2018-06-20 PROCEDURE — 93010 ELECTROCARDIOGRAM REPORT: CPT | Mod: ,,, | Performed by: NUCLEAR MEDICINE

## 2018-06-20 PROCEDURE — 71046 X-RAY EXAM CHEST 2 VIEWS: CPT | Mod: TC,PO

## 2018-06-20 PROCEDURE — 93005 ELECTROCARDIOGRAM TRACING: CPT | Mod: PBBFAC,PO | Performed by: FAMILY MEDICINE

## 2018-06-20 RX ORDER — METOPROLOL SUCCINATE 25 MG/1
25 TABLET, EXTENDED RELEASE ORAL DAILY
Qty: 30 TABLET | Refills: 11 | Status: SHIPPED | OUTPATIENT
Start: 2018-06-20 | End: 2018-06-21 | Stop reason: SDUPTHER

## 2018-06-20 NOTE — PROGRESS NOTES
CC:LOW BACK PAIN  HPI:  Héctor Desir is a 83 y.o. male complains of recent mid right back pain. This is evaluated as a personal injury. He first noted symptoms 1month ago. It was not related to no known injury. He has been lifting more but did not have an injury,. The pain is rated 3 /10, and is located at the right mid back side. The pain is described as aching and occurs intermittently. The pain is positional with bending or lifting, without radiation down the legs. The symptoms denies been progressive. Symptoms are exacerbated by twisting. Factors which relieve the pain include sitting still. Other associated symptoms include sitting still. . Previous history of symptoms: never.   Treatment efforts have included nothing, without relief.  There is no numbness in the legs.  The patient's Health Maintenance was reviewed and the following appears to be due at this time:  Health Maintenance Due   Topic Date Due    Zoster Vaccine  04/30/1997     Dyspnea: Patient complains of shortness of breath after one flight stairs, and with lifting or with going uip a hill. .  Symptoms include dyspnea on exertion. Symptoms began 6 months ago, unchanged since that time.  Patient denies chest pain, located left chest and post nasal drip. Associated symptoms include a little cough in the am. . Patient has had recent travel. The dyspnea was prior to this and it has not worsened.  Weight has been stable.  Appetite has been unaffected. Symptoms are exacerbated by any exercise. Symptoms are alleviated by rest.   He never did smoke. He did spend a lot of time on [place] in his younger life.  He had exposures to benzene, carbon tet and transformer oil with TCP's.   Lab Results   Component Value Date    WBC 4.91 03/28/2018    HGB 14.3 03/28/2018    HCT 42.6 03/28/2018    MCV 84 03/28/2018     (L) 03/28/2018     CMP  Sodium   Date Value Ref Range Status   02/02/2018 141 136 - 145 mmol/L Final     Potassium   Date Value Ref Range  Status   02/02/2018 4.6 3.5 - 5.1 mmol/L Final     Chloride   Date Value Ref Range Status   02/02/2018 109 95 - 110 mmol/L Final     CO2   Date Value Ref Range Status   02/02/2018 23 23 - 29 mmol/L Final     Glucose   Date Value Ref Range Status   02/02/2018 154 (H) 70 - 110 mg/dL Final     BUN, Bld   Date Value Ref Range Status   02/02/2018 24 (H) 8 - 23 mg/dL Final     Creatinine   Date Value Ref Range Status   02/02/2018 1.6 (H) 0.5 - 1.4 mg/dL Final     Calcium   Date Value Ref Range Status   02/02/2018 9.2 8.7 - 10.5 mg/dL Final     Total Protein   Date Value Ref Range Status   01/16/2017 6.7 6.0 - 8.4 g/dL Final     Albumin   Date Value Ref Range Status   02/02/2018 3.9 3.5 - 5.2 g/dL Final     Total Bilirubin   Date Value Ref Range Status   01/16/2017 0.5 0.1 - 1.0 mg/dL Final     Comment:     For infants and newborns, interpretation of results should be based  on gestational age, weight and in agreement with clinical  observations.  Premature Infant recommended reference ranges:  Up to 24 hours.............<8.0 mg/dL  Up to 48 hours............<12.0 mg/dL  3-5 days..................<15.0 mg/dL  6-29 days.................<15.0 mg/dL       Alkaline Phosphatase   Date Value Ref Range Status   01/16/2017 111 55 - 135 U/L Final     AST   Date Value Ref Range Status   01/16/2017 21 10 - 40 U/L Final     ALT   Date Value Ref Range Status   01/16/2017 24 10 - 44 U/L Final     Anion Gap   Date Value Ref Range Status   02/02/2018 9 8 - 16 mmol/L Final     eGFR if    Date Value Ref Range Status   02/02/2018 46.3 (A) >60 mL/min/1.73 m^2 Final     eGFR if non    Date Value Ref Range Status   02/02/2018 40.1 (A) >60 mL/min/1.73 m^2 Final     Comment:     Calculation used to obtain the estimated glomerular filtration  rate (eGFR) is the CKD-EPI equation.        Lab Results   Component Value Date    TSH 1.679 03/28/2018       He did have an ECHO recently for edema that was induced by his  amlodpine.  This has gone away.   Details     Narrative     Date of Procedure: 04/02/2018        TEST DESCRIPTION       Aorta: The aortic root is normal in size, measuring 2.6 cm at sinotubular junction and 3.4 cm at Sinuses of Valsalva. The proximal ascending aorta is normal in size, measuring 3.3 cm across.     Left Atrium: The left atrial volume index is normal, measuring 31.14 cc/m2.     Left Ventricle: The left ventricle is normal in size, with an end-diastolic diameter of 3.9 cm, and an end-systolic diameter of 2.6 cm. LV wall thickness is normal, with the septum and the posterior wall each measuring 1.2 cm across. Relative wall   thickness was increased at 0.62, and the LV mass index was 84.0 g/m2 consistent with concentric remodeling. There are no regional wall motion abnormalities. Left ventricular systolic function appears normal. Visually estimated ejection fraction is   60-65%. The LV Doppler derived stroke volume equals 55.0 ccs.     Diastolic indices: Diastolic function is normal.     Right Atrium: The right atrium is normal in size, measuring 5.1 cm in length and 3.2 cm in width in the apical view.     Right Ventricle: The right ventricle is normal in size measuring 2.8 cm at the base in the apical right ventricle-focused view. Global right ventricular systolic function appears normal. The estimated PA systolic pressure is 31 mmHg.     Aortic Valve:  Aortic valve is normal in structure with normal leaflet mobility.     Mitral Valve:  Mitral valve is normal in structure with normal leaflet mobility.     Tricuspid Valve:  Tricuspid valve is normal in structure with normal leaflet mobility.     Pulmonary Valve:  Pulmonary valve is normal in structure with normal leaflet mobility.     IVC: IVC is normal in size and collapses > 50% with a sniff, suggesting normal right atrial pressure of 3 mmHg.     Intracavitary: There is no evidence of pericardial effusion, intracavity mass, thrombi, or vegetation.          CONCLUSIONS     1 - No wall motion abnormalities.     2 - Normal left ventricular systolic function (EF 60-65%).     3 - Normal left ventricular diastolic function.     4 - Normal right ventricular systolic function .     5 - The estimated PA systolic pressure is 31 mmHg.             This document has been electronically    SIGNED BY: Td Watters MD On: 04/02/2018 15:46         PMH:       The current allergy list that we have since it was last reconciled is as follows:  Review of patient's allergies indicates:   Allergen Reactions    Amlodipine      swelling     Outpatient Medications Prior to Visit   Medication Sig Dispense Refill    atorvastatin (LIPITOR) 40 MG tablet Take 1 tablet (40 mg total) by mouth once daily. 90 tablet 3    blood sugar diagnostic Strp Use daily- Freestyle lite 300 strip 3    blood-glucose meter kit Use before meals and before bed when the glucoses are not in control.  Otherwise, test it daily once a day before meals randomly to ensure stability of the gluocse. 1 each 0    clopidogrel (PLAVIX) 75 mg tablet Take 1 tablet (75 mg total) by mouth every morning. 90 tablet 3    DULoxetine (CYMBALTA) 60 MG capsule Take 1 capsule (60 mg total) by mouth once daily. 90 capsule 3    ERGOCALCIFEROL, VITAMIN D2, (VITAMIN D ORAL) Take by mouth.      furosemide (LASIX) 40 MG tablet Take 1 tablet (40 mg total) by mouth once daily. 90 tablet 3    glimepiride (AMARYL) 4 MG tablet TAKE 1 TABLET TWICE A  tablet 1    lisinopril (PRINIVIL,ZESTRIL) 20 MG tablet Take 1 tablet (20 mg total) by mouth 2 (two) times daily. 180 tablet 3    nateglinide (STARLIX) 120 MG tablet Take 1 tablet (120 mg total) by mouth 3 (three) times daily. 270 tablet 1    omeprazole (PRILOSEC) 20 MG capsule Take 1 capsule (20 mg total) by mouth once daily. 90 capsule 3     Facility-Administered Medications Prior to Visit   Medication Dose Route Frequency Provider Last Rate Last Dose    diclofenac sodium 1  "% gel   Topical Daily Tobias Modi MD          Lab Results   Component Value Date    HGBA1C 7.1 (H) 03/28/2018       Physical Exam   BP (!) 148/99   Pulse 87   Temp 97.9 °F (36.6 °C) (Oral)   Ht 5' 8" (1.727 m)   Wt 105.7 kg (233 lb)   BMI 35.43 kg/m²   Musculoskeletal:        Lumbar back: The patient exhibits tenderness, pain and spasm in the paraspinous musculature.  Lumbosacral spine area reveals no swelling, deformity, or mass. Painful and reduced LS ROM noted in the latissimus dorsi on the affected side.    Neurological:  The patient is alert and has normal strength. There is no atrophy and no tremor. No sensory deficit. The patient exhibits a normal muscle tone. Coordination and gait normal.he patient has a negative straight leg raise bilaterally.      Physical Exam   Constitutional: He appears well-developed and well-nourished.   HENT:   Head: Normocephalic and atraumatic.   Eyes: Conjunctivae and EOM are normal. Pupils are equal, round, and reactive to light.   Neck: Normal range of motion. Neck supple.   Cardiovascular: Normal rate.  An irregularly irregular rhythm present. Exam reveals friction rub. Exam reveals no gallop.    No murmur heard.  Pulmonary/Chest: Effort normal and breath sounds normal. No respiratory distress. He has no wheezes. He has no rales.   Abdominal: Soft. Bowel sounds are normal. He exhibits no distension. There is no tenderness.   Musculoskeletal: Normal range of motion. He exhibits no edema.     EKG shows afib with a rate of 94 and he has aQ waves in III and AVF.     Encounter Diagnoses   Name Primary?    Dyspnea, unspecified type Yes    Acute midline thoracic back pain     Right flank pain     Atrial fibrillation, unspecified type     Hypertension associated with diabetes        PLAN:    I am having Mr. Desir start on metoprolol succinate. I am also having him maintain his blood-glucose meter, (ERGOCALCIFEROL, VITAMIN D2, (VITAMIN D ORAL)), blood sugar " diagnostic, atorvastatin, clopidogrel, DULoxetine, glimepiride, lisinopril, nateglinide, omeprazole, and furosemide. We will continue to administer diclofenac sodium.    Diagnoses and all orders for this visit:    Dyspnea, unspecified type  -     X-Ray Chest PA And Lateral; Future  -     Cancel: Cardiac treadmill stress test; Future  -     Ambulatory referral to Cardiology    Acute midline thoracic back pain  -     X-Ray Chest PA And Lateral; Future    Right flank pain  -     Urinalysis; Future    Atrial fibrillation, unspecified type  -     EKG 12-lead; Future  -     Ambulatory referral to Cardiology  -     metoprolol succinate (TOPROL-XL) 25 MG 24 hr tablet; Take 1 tablet (25 mg total) by mouth once daily.    Hypertension associated with diabetes  -     metoprolol succinate (TOPROL-XL) 25 MG 24 hr tablet; Take 1 tablet (25 mg total) by mouth once daily.         Orders Placed This Encounter   Procedures    X-Ray Chest PA And Lateral     Standing Status:   Future     Standing Expiration Date:   7/20/2018     Order Specific Question:   Reason for Exam:     Answer:   SOB    Urinalysis     Standing Status:   Future     Standing Expiration Date:   6/20/2019    Ambulatory referral to Cardiology     Referral Priority:   Routine     Referral Type:   Consultation     Referral Reason:   Specialty Services Required     Requested Specialty:   Cardiology     Number of Visits Requested:   1    EKG 12-lead     Standing Status:   Future     Standing Expiration Date:   6/20/2019     Order Specific Question:   Diagnosis     Answer:   Arrhythmia [[id number]]        Use tylenol for the pain.  Use stretches.  F/u with cardiology to get the bp in control as they will be doing a workup.

## 2018-06-21 ENCOUNTER — OFFICE VISIT (OUTPATIENT)
Dept: CARDIOLOGY | Facility: CLINIC | Age: 83
End: 2018-06-21
Payer: MEDICARE

## 2018-06-21 VITALS
HEIGHT: 68 IN | HEART RATE: 84 BPM | DIASTOLIC BLOOD PRESSURE: 102 MMHG | BODY MASS INDEX: 34.86 KG/M2 | WEIGHT: 230 LBS | SYSTOLIC BLOOD PRESSURE: 151 MMHG

## 2018-06-21 DIAGNOSIS — I48.19 PERSISTENT ATRIAL FIBRILLATION: ICD-10-CM

## 2018-06-21 DIAGNOSIS — I48.91 ATRIAL FIBRILLATION, UNSPECIFIED TYPE: ICD-10-CM

## 2018-06-21 DIAGNOSIS — E11.59 HYPERTENSION ASSOCIATED WITH DIABETES: ICD-10-CM

## 2018-06-21 DIAGNOSIS — I15.2 HYPERTENSION ASSOCIATED WITH DIABETES: ICD-10-CM

## 2018-06-21 DIAGNOSIS — Z86.73 H/O TIA (TRANSIENT ISCHEMIC ATTACK) AND STROKE: Primary | ICD-10-CM

## 2018-06-21 DIAGNOSIS — R60.9 EDEMA, UNSPECIFIED TYPE: ICD-10-CM

## 2018-06-21 DIAGNOSIS — R06.02 SOB (SHORTNESS OF BREATH): ICD-10-CM

## 2018-06-21 PROCEDURE — 93010 ELECTROCARDIOGRAM REPORT: CPT | Mod: S$PBB,,, | Performed by: INTERNAL MEDICINE

## 2018-06-21 PROCEDURE — 99213 OFFICE O/P EST LOW 20 MIN: CPT | Mod: PBBFAC,PO,25 | Performed by: INTERNAL MEDICINE

## 2018-06-21 PROCEDURE — 99204 OFFICE O/P NEW MOD 45 MIN: CPT | Mod: S$PBB,,, | Performed by: INTERNAL MEDICINE

## 2018-06-21 PROCEDURE — 93005 ELECTROCARDIOGRAM TRACING: CPT | Mod: PBBFAC,PO | Performed by: INTERNAL MEDICINE

## 2018-06-21 PROCEDURE — 99999 PR PBB SHADOW E&M-EST. PATIENT-LVL III: CPT | Mod: PBBFAC,,, | Performed by: INTERNAL MEDICINE

## 2018-06-21 RX ORDER — METOPROLOL SUCCINATE 25 MG/1
25 TABLET, EXTENDED RELEASE ORAL DAILY
Qty: 90 TABLET | Refills: 3 | Status: SHIPPED | OUTPATIENT
Start: 2018-06-21 | End: 2018-08-30

## 2018-06-21 NOTE — LETTER
June 21, 2018      Tae Goel MD  [address] [facility] [address]           [facility]  [address] [facility] [address]  Phone: [phone]  Fax: [phone]          Patient: Héctor Desir   MR Number: [id number]   YOB: 1935   Date of Visit: 6/21/2018       Dear Dr. Tae Goel:    Thank you for referring Héctor Desir to me for evaluation. Attached you will find relevant portions of my assessment and plan of care.    If you have questions, please do not hesitate to call me. I look forward to following Héctor Desir along with you.    Sincerely,    Claudy Talamantes Jr., MD    Enclosure  CC:  No Recipients    If you would like to receive this communication electronically, please contact [email or web address] or [phone] to request more information on [employer] Link access.    For providers and/or their staff who would like to refer a patient to [facility], please contact us through our one-stop-shop provider referral line, [facility], at [phone].    If you feel you have received this communication in error or would no longer like to receive these types of communications, please e-mail [email or web address]

## 2018-06-21 NOTE — PROGRESS NOTES
"Subjective:    Patient ID:  Héctor Desir is a 83 y.o. male who presents for evaluation of new pt (new pt); ref from PCP; and Shortness of Breath      HPI 84 yo WM with long standing HTN who has noticed some increasing SOB for the past 6 months along with mild leg swelling. Denies chest or palpitations. Does not feel and change in his heart beat. Denies smoking or alcohol.States that he is on plavix for hx of "mini strokes"    CONCLUSIONS     1 - No wall motion abnormalities.     2 - Normal left ventricular systolic function (EF 60-65%).     3 - Normal left ventricular diastolic function.     4 - Normal right ventricular systolic function .     5 - The estimated PA systolic pressure is 31 mmHg.             This document has been electronically    SIGNED BY: Td Watters MD On: 04/02/2018 15:46        CONCLUSIONS     1 - Concentric remodeling.     2 - Normal left ventricular systolic function (EF 60-65%).     3 - Normal left ventricular diastolic function.     4 - Normal right ventricular systolic function .     5 - The estimated PA systolic pressure is 22 mmHg.     6 - Trivial mitral regurgitation.     No evidence of stress induced myocardial ischemia.         This document has been electronically    SIGNED BY: Jose Wasserman MD On: 11/03/2016 07:50    Review of Systems   Constitution: Negative for decreased appetite, fever, weakness, malaise/fatigue, weight gain and weight loss.   HENT: Negative for hearing loss and nosebleeds.    Eyes: Negative for visual disturbance.   Cardiovascular: Positive for dyspnea on exertion and leg swelling. Negative for chest pain, claudication, cyanosis, irregular heartbeat, near-syncope, orthopnea, palpitations, paroxysmal nocturnal dyspnea and syncope.   Respiratory: Positive for shortness of breath. Negative for cough, hemoptysis, sleep disturbances due to breathing, snoring and wheezing.    Endocrine: Negative for cold intolerance, heat intolerance, polydipsia and polyuria. " "  Hematologic/Lymphatic: Negative for adenopathy and bleeding problem. Does not bruise/bleed easily.   Skin: Negative for color change, itching, poor wound healing, rash and suspicious lesions.   Musculoskeletal: Negative for arthritis, back pain, falls, joint pain, joint swelling, muscle cramps, muscle weakness and myalgias.   Gastrointestinal: Negative for bloating, abdominal pain, change in bowel habit, constipation, flatus, heartburn, hematemesis, hematochezia, hemorrhoids, jaundice, melena, nausea and vomiting.   Genitourinary: Negative for bladder incontinence, decreased libido, frequency, hematuria, hesitancy and urgency.   Neurological: Negative for brief paralysis, difficulty with concentration, excessive daytime sleepiness, dizziness, focal weakness, headaches, light-headedness, loss of balance, numbness and vertigo.   Psychiatric/Behavioral: Negative for altered mental status, depression and memory loss. The patient does not have insomnia and is not nervous/anxious.    Allergic/Immunologic: Negative for environmental allergies, hives and persistent infections.        Objective:    Physical Exam   Constitutional: He is oriented to person, place, and time. He appears well-developed and well-nourished. No distress.   BP (!) 151/102 (BP Location: Left arm, Patient Position: Sitting, BP Method: Large (Automatic))   Pulse 84   Ht 5' 8" (1.727 m)   Wt 104.3 kg (230 lb)   BMI 34.97 kg/m²      HENT:   Head: Normocephalic and atraumatic.   Eyes: Conjunctivae and lids are normal. Pupils are equal, round, and reactive to light. Right eye exhibits no discharge. No scleral icterus.   Neck: Normal range of motion. Neck supple. No JVD present. No tracheal deviation present. No thyromegaly present.   Cardiovascular: Normal rate, S1 normal, S2 normal, normal heart sounds, intact distal pulses and normal pulses.  An irregularly irregular rhythm present. Exam reveals no gallop and no friction rub.    No murmur " heard.  Pulses:       Carotid pulses are 2+ on the right side, and 2+ on the left side.       Radial pulses are 2+ on the right side, and 2+ on the left side.        Femoral pulses are 2+ on the right side, and 2+ on the left side.       Popliteal pulses are 2+ on the right side, and 2+ on the left side.        Dorsalis pedis pulses are 2+ on the right side, and 2+ on the left side.        Posterior tibial pulses are 2+ on the right side, and 2+ on the left side.   Pulmonary/Chest: Effort normal and breath sounds normal. No respiratory distress. He has no wheezes. He has no rales. He exhibits no tenderness.   Abdominal: Soft. Bowel sounds are normal. He exhibits no distension and no mass. There is no hepatosplenomegaly or hepatomegaly. There is no tenderness. There is no rebound and no guarding.   Musculoskeletal: Normal range of motion. He exhibits edema. He exhibits no tenderness.   Lymphadenopathy:     He has no cervical adenopathy.   Neurological: He is alert and oriented to person, place, and time. He has normal reflexes. No cranial nerve deficit. Coordination normal.   Skin: Skin is warm and dry. No rash noted. He is not diaphoretic. No erythema. No pallor.   Psychiatric: He has a normal mood and affect. His speech is normal and behavior is normal. Judgment and thought content normal. Cognition and memory are normal.         Assessment:       1. H/O TIA (transient ischemic attack) and stroke    2. Atrial fibrillation, unspecified type    3. Hypertension associated with diabetes    4. Edema, unspecified type    5. SOB (shortness of breath)    6. Persistent atrial fibrillation         Plan:     Newly dx'd atrial fib    Risk of stroke from atrial fib has been discussed as well as bleeding risk from alternative anticoagulation regiments as well as risk and benefits of rate control vs cardioversion   Dc plavix, and begin xarelto 15 mg daily, continue lasix 40mg daily, lisinopril 20mg BID    Patient starting  metoprolol 25mg daily    Low salt diet    No orders of the defined types were placed in this encounter.    Follow-up in about 6 weeks (around 8/2/2018).

## 2018-07-02 ENCOUNTER — TELEPHONE (OUTPATIENT)
Dept: FAMILY MEDICINE | Facility: CLINIC | Age: 83
End: 2018-07-02

## 2018-07-02 DIAGNOSIS — I48.91 ATRIAL FIBRILLATION: ICD-10-CM

## 2018-07-02 DIAGNOSIS — I48.19 PERSISTENT ATRIAL FIBRILLATION: Primary | ICD-10-CM

## 2018-07-02 NOTE — TELEPHONE ENCOUNTER
----- Message from Johan Curry sent at 7/2/2018  9:40 AM CDT -----  Mr Desir had EKG done on 6/20/18 and there is no order in EPIC.  Please place order in EPIC so that EKG can be read and processed.    Thanks  Johan

## 2018-07-02 NOTE — TELEPHONE ENCOUNTER
I have signed for the following orders AND/OR meds.  Please call the patient and ask the patient to schedule the testing AND/OR inform about any medications that were sent.      Orders Placed This Encounter   Procedures    IN OFFICE EKG 12-LEAD (to [place])     Standing Status:   Future     Standing Expiration Date:   7/2/2019     Order Specific Question:   Diagnosis     Answer:   Atrial fibrillation [427.31.ICD-9-CM]

## 2018-08-02 ENCOUNTER — OFFICE VISIT (OUTPATIENT)
Dept: CARDIOLOGY | Facility: CLINIC | Age: 83
End: 2018-08-02
Payer: MEDICARE

## 2018-08-02 VITALS
HEART RATE: 93 BPM | DIASTOLIC BLOOD PRESSURE: 76 MMHG | WEIGHT: 236.31 LBS | HEIGHT: 68 IN | SYSTOLIC BLOOD PRESSURE: 143 MMHG | BODY MASS INDEX: 35.81 KG/M2

## 2018-08-02 DIAGNOSIS — E11.59 HYPERTENSION ASSOCIATED WITH DIABETES: ICD-10-CM

## 2018-08-02 DIAGNOSIS — R06.02 SOB (SHORTNESS OF BREATH): ICD-10-CM

## 2018-08-02 DIAGNOSIS — I15.2 HYPERTENSION ASSOCIATED WITH DIABETES: ICD-10-CM

## 2018-08-02 DIAGNOSIS — I48.19 PERSISTENT ATRIAL FIBRILLATION: Primary | ICD-10-CM

## 2018-08-02 DIAGNOSIS — N18.30 CKD (CHRONIC KIDNEY DISEASE) STAGE 3, GFR 30-59 ML/MIN: ICD-10-CM

## 2018-08-02 PROCEDURE — 99214 OFFICE O/P EST MOD 30 MIN: CPT | Mod: S$PBB,,, | Performed by: INTERNAL MEDICINE

## 2018-08-02 PROCEDURE — 99212 OFFICE O/P EST SF 10 MIN: CPT | Mod: PBBFAC,PO | Performed by: INTERNAL MEDICINE

## 2018-08-02 PROCEDURE — 99999 PR PBB SHADOW E&M-EST. PATIENT-LVL II: CPT | Mod: PBBFAC,,, | Performed by: INTERNAL MEDICINE

## 2018-08-02 NOTE — PROGRESS NOTES
Subjective:    Patient ID:  Héctor Desir is a 83 y.o. male who presents for evaluation of Follow-up      HPI 82 yo WM with long standing HTN found to be in AF 6 weeks ago and started on Xarelto and beta blocker along with other HTN meds.He returns today and still symptomatic with significant [place].    CONCLUSIONS     1 - No wall motion abnormalities.     2 - Normal left ventricular systolic function (EF 60-65%).     3 - Normal left ventricular diastolic function.     4 - Normal right ventricular systolic function .     5 - The estimated PA systolic pressure is 31 mmHg.             This document has been electronically    SIGNED BY: Td Watters MD On: 04/02/2018 15:46    Review of Systems   Constitution: Negative for decreased appetite, fever, weakness, malaise/fatigue, weight gain and weight loss.   HENT: Negative for hearing loss and nosebleeds.    Eyes: Negative for visual disturbance.   Cardiovascular: Positive for dyspnea on exertion and irregular heartbeat. Negative for chest pain, claudication, cyanosis, leg swelling, near-syncope, orthopnea, palpitations, paroxysmal nocturnal dyspnea and syncope.   Respiratory: Positive for shortness of breath. Negative for cough, hemoptysis, sleep disturbances due to breathing, snoring and wheezing.    Endocrine: Negative for cold intolerance, heat intolerance, polydipsia and polyuria.   Hematologic/Lymphatic: Negative for adenopathy and bleeding problem. Does not bruise/bleed easily.   Skin: Negative for color change, itching, poor wound healing, rash and suspicious lesions.   Musculoskeletal: Negative for arthritis, back pain, falls, joint pain, joint swelling, muscle cramps, muscle weakness and myalgias.   Gastrointestinal: Negative for bloating, abdominal pain, change in bowel habit, constipation, flatus, heartburn, hematemesis, hematochezia, hemorrhoids, jaundice, melena, nausea and vomiting.   Genitourinary: Negative for bladder incontinence, decreased libido,  "frequency, hematuria, hesitancy and urgency.   Neurological: Negative for brief paralysis, difficulty with concentration, excessive daytime sleepiness, dizziness, focal weakness, headaches, light-headedness, loss of balance, numbness and vertigo.   Psychiatric/Behavioral: Negative for altered mental status, depression and memory loss. The patient does not have insomnia and is not nervous/anxious.    Allergic/Immunologic: Negative for environmental allergies, hives and persistent infections.        Objective:    Physical Exam   Constitutional: He is oriented to person, place, and time. He appears well-developed and well-nourished. No distress.   BP (!) 143/76 (BP Location: Left arm, Patient Position: Sitting)   Pulse 93   Ht 5' 8" (1.727 m)   Wt 107.2 kg (236 lb 5.3 oz)   BMI 35.93 kg/m²      HENT:   Head: Normocephalic and atraumatic.   Eyes: Conjunctivae and lids are normal. Pupils are equal, round, and reactive to light. Right eye exhibits no discharge. No scleral icterus.   Neck: Normal range of motion. Neck supple. No JVD present. No tracheal deviation present. No thyromegaly present.   Cardiovascular: Normal rate, S1 normal, S2 normal, normal heart sounds and intact distal pulses.  An irregularly irregular rhythm present. Exam reveals no gallop and no friction rub.    No murmur heard.  Pulses:       Carotid pulses are 2+ on the right side, and 2+ on the left side.       Radial pulses are 2+ on the right side, and 2+ on the left side.        Femoral pulses are 2+ on the right side, and 2+ on the left side.       Popliteal pulses are 2+ on the right side, and 2+ on the left side.        Dorsalis pedis pulses are 2+ on the right side, and 2+ on the left side.        Posterior tibial pulses are 2+ on the right side, and 2+ on the left side.   Pulmonary/Chest: Effort normal and breath sounds normal. No respiratory distress. He has no wheezes. He has no rales. He exhibits no tenderness.   Abdominal: Soft. Bowel " sounds are normal. He exhibits no distension and no mass. There is no hepatosplenomegaly or hepatomegaly. There is no tenderness. There is no rebound and no guarding.   Musculoskeletal: Normal range of motion. He exhibits no edema or tenderness.   Lymphadenopathy:     He has no cervical adenopathy.   Neurological: He is alert and oriented to person, place, and time. He has normal reflexes. No cranial nerve deficit. Coordination normal.   Skin: Skin is warm and dry. No rash noted. He is not diaphoretic. No erythema. No pallor.   Psychiatric: He has a normal mood and affect. His speech is normal and behavior is normal. Judgment and thought content normal. Cognition and memory are normal.         Assessment:       1. Persistent atrial fibrillation    2. CKD (chronic kidney disease) stage 3, GFR 30-59 ml/min    3. SOB (shortness of breath)    4. Hypertension associated with diabetes         Plan:     Patient has been anticoagulated for 6 weeks and with rate control still SOB   Will attempt cardioversion and see if symptoms improve.   Continue present meds    No orders of the defined types were placed in this encounter.  Arrange cardioversion in [place]  Follow-up in about 3 months (around 11/2/2018).

## 2018-08-06 DIAGNOSIS — R00.2 PALPITATIONS: Primary | ICD-10-CM

## 2018-08-07 ENCOUNTER — TELEPHONE (OUTPATIENT)
Dept: CARDIOLOGY | Facility: CLINIC | Age: 83
End: 2018-08-07

## 2018-08-07 NOTE — TELEPHONE ENCOUNTER
The patient was notified of preop instructions.He will come by office on Thursday for review of instructions and to sign his consent. For DCCV at [facility] 8/10/18 at 1:30pm.

## 2018-08-09 ENCOUNTER — LAB VISIT (OUTPATIENT)
Dept: LAB | Facility: HOSPITAL | Age: 83
End: 2018-08-09
Attending: INTERNAL MEDICINE
Payer: MEDICARE

## 2018-08-09 DIAGNOSIS — N18.30 CKD (CHRONIC KIDNEY DISEASE) STAGE 3, GFR 30-59 ML/MIN: ICD-10-CM

## 2018-08-09 LAB
ALBUMIN SERPL BCP-MCNC: 3.5 G/DL
ANION GAP SERPL CALC-SCNC: 6 MMOL/L
BUN SERPL-MCNC: 31 MG/DL
CALCIUM SERPL-MCNC: 8.8 MG/DL
CHLORIDE SERPL-SCNC: 107 MMOL/L
CO2 SERPL-SCNC: 25 MMOL/L
CREAT SERPL-MCNC: 1.9 MG/DL
EST. GFR  (AFRICAN AMERICAN): 37.4 ML/MIN/1.73 M^2
EST. GFR  (NON AFRICAN AMERICAN): 32.3 ML/MIN/1.73 M^2
GLUCOSE SERPL-MCNC: 250 MG/DL
PHOSPHATE SERPL-MCNC: 3 MG/DL
POTASSIUM SERPL-SCNC: 4.4 MMOL/L
PTH-INTACT SERPL-MCNC: 95 PG/ML
SODIUM SERPL-SCNC: 138 MMOL/L

## 2018-08-09 PROCEDURE — 83970 ASSAY OF PARATHORMONE: CPT

## 2018-08-09 PROCEDURE — 36415 COLL VENOUS BLD VENIPUNCTURE: CPT | Mod: PO

## 2018-08-09 PROCEDURE — 80069 RENAL FUNCTION PANEL: CPT

## 2018-08-10 ENCOUNTER — HOSPITAL ENCOUNTER (OUTPATIENT)
Facility: HOSPITAL | Age: 83
Discharge: HOME | End: 2018-08-10
Attending: INTERNAL MEDICINE | Admitting: INTERNAL MEDICINE
Payer: MEDICARE

## 2018-08-10 ENCOUNTER — ANESTHESIA (OUTPATIENT)
Dept: CARDIOLOGY | Facility: HOSPITAL | Age: 83
End: 2018-08-10
Payer: MEDICARE

## 2018-08-10 ENCOUNTER — ANESTHESIA EVENT (OUTPATIENT)
Dept: CARDIOLOGY | Facility: HOSPITAL | Age: 83
End: 2018-08-10
Payer: MEDICARE

## 2018-08-10 VITALS
HEART RATE: 84 BPM | RESPIRATION RATE: 14 BRPM | DIASTOLIC BLOOD PRESSURE: 89 MMHG | SYSTOLIC BLOOD PRESSURE: 148 MMHG | OXYGEN SATURATION: 98 % | HEIGHT: 68 IN | BODY MASS INDEX: 35.77 KG/M2 | WEIGHT: 236 LBS | TEMPERATURE: 98 F

## 2018-08-10 DIAGNOSIS — I48.91 ATRIAL FIBRILLATION: ICD-10-CM

## 2018-08-10 PROCEDURE — 25000003 PHARM REV CODE 250

## 2018-08-10 PROCEDURE — 92960 CARDIOVERSION ELECTRIC EXT: CPT | Mod: ,,, | Performed by: INTERNAL MEDICINE

## 2018-08-10 PROCEDURE — 63600175 PHARM REV CODE 636 W HCPCS

## 2018-08-10 PROCEDURE — 63600175 PHARM REV CODE 636 W HCPCS: Performed by: NURSE ANESTHETIST, CERTIFIED REGISTERED

## 2018-08-10 PROCEDURE — 92960 CARDIOVERSION ELECTRIC EXT: CPT

## 2018-08-10 PROCEDURE — 93005 ELECTROCARDIOGRAM TRACING: CPT | Mod: 59

## 2018-08-10 PROCEDURE — 37000008 HC ANESTHESIA 1ST 15 MINUTES: Performed by: INTERNAL MEDICINE

## 2018-08-10 PROCEDURE — 93010 ELECTROCARDIOGRAM REPORT: CPT | Mod: 76,,, | Performed by: INTERNAL MEDICINE

## 2018-08-10 RX ORDER — LIDOCAINE HCL/PF 100 MG/5ML
SYRINGE (ML) INTRAVENOUS
Status: DISCONTINUED | OUTPATIENT
Start: 2018-08-10 | End: 2018-08-10

## 2018-08-10 RX ORDER — PROPOFOL 10 MG/ML
VIAL (ML) INTRAVENOUS
Status: DISCONTINUED | OUTPATIENT
Start: 2018-08-10 | End: 2018-08-10

## 2018-08-10 RX ADMIN — LIDOCAINE HYDROCHLORIDE 40 MG: 20 INJECTION, SOLUTION INTRAVENOUS at 01:08

## 2018-08-10 RX ADMIN — PROPOFOL 80 MG: 10 INJECTION, EMULSION INTRAVENOUS at 01:08

## 2018-08-10 NOTE — DISCHARGE INSTRUCTIONS
ACTIVITY LEVEL  · You may feel sleepy for several hours.  Rest until you are more awake.  Gradually resume your normal activities over the next 24 hours.   · For the next 8 hours, you should be watched by a responsible adult. This person should make sure your condition is not getting worse.   · Don't drink any alcohol for the next 24 hours.  · Don't drive, operate dangerous machinery, or make important business or personal decisions during the next 24 hours.  · Your healthcare provider may tell you not to take any medicine by mouth for pain or sleep in the next 4 hours. These medicines may react with the medicines you were given in the hospital. This could cause a much stronger response than usual.     DIET  At home, begin with liquids and then progress to normal foods if you don't experience nausea.    MEDICATIONS  Continue home medications as before procedure.  You may take Tylenol every four hours as needed for minor discomfort at pad sites or chest soreness.  If you take Coumadin, resume your regimen and continue to have your blood tested weekly as directed by your physician.    FOLLOW UP  You will be scheduled for a follow up appointment and EKG within two weeks of your procedure.              CALL YOUR HEALTHCARE PROVIDER IF YOU START TO HAVE THE FOLLOWING SYMPTOMS:        1.  Drowsiness that doesn't get better       2. Weakness or dizziness that doesn't get better            3. Repeated vomiting

## 2018-08-10 NOTE — ANESTHESIA POSTPROCEDURE EVALUATION
"Anesthesia Post Evaluation    Patient: Héctor Desir    Procedure(s) Performed: Procedure(s) (LRB):  CARDIOVERSION (N/A)    Final Anesthesia Type: MAC  Patient location during evaluation: PACU  Patient participation: Yes- Able to Participate  Level of consciousness: awake and alert  Post-procedure vital signs: reviewed and stable  Airway patency: patent  PONV status at discharge: No PONV  Anesthetic complications: no      Cardiovascular status: blood pressure returned to baseline  Respiratory status: unassisted  Hydration status: euvolemic  Follow-up not needed.        Visit Vitals  BP (!) 148/89 (BP Location: Right arm, Patient Position: Lying)   Pulse 84   Temp 36.8 °C (98.2 °F) (Oral)   Resp 14   Ht 5' 8" (1.727 m)   Wt 107 kg (236 lb)   SpO2 98%   BMI 35.88 kg/m²       Pain/Sharifa Score: No Data Recorded      "

## 2018-08-10 NOTE — H&P
Subjective:   Consult Note  Cardiology    Consult Requested By:  Reason for Consult:     SUBJECTIVE:     History of Present Illness:  Patient is a 83 y.o. male presents with atrial fibrillation.     Patient ID:  Héctor Desir is a 83 y.o. male who presents for evaluation of atrial fibrillation        HPI 84 yo WM with long standing HTN found to be in AF 6 weeks ago and started on Xarelto and beta blocker along with other HTN meds.He returns today and still symptomatic with significant [place].     CONCLUSIONS     1 - No wall motion abnormalities.     2 - Normal left ventricular systolic function (EF 60-65%).     3 - Normal left ventricular diastolic function.     4 - Normal right ventricular systolic function .     5 - The estimated PA systolic pressure is 31 mmHg.      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     DM (diabetes mellitus) type II controlled, neurological manifestation 7/5/2013    GERD (gastroesophageal reflux disease)     History of prostate cancer 2006    Hypertension associated with diabetes     Hypertension goal BP (blood pressure) < 130/80     TIA (transient ischemic attack)      Past Surgical History:   Procedure Laterality Date    CARDIAC CATHETERIZATION      CATARACT EXTRACTION W/  INTRAOCULAR LENS IMPLANT  11/2013    Bilateral Cataract     COLONOSCOPY W/ POLYPECTOMY      EYE SURGERY      PROSTATECTOMY  2006    TONSILLECTOMY      TONSILLECTOMY, ADENOIDECTOMY, BILATERAL MYRINGOTOMY AND TUBES       Family History   Problem Relation Age of Onset    Heart attack Mother     Melanoma Father     Cancer Father     Anesthesia problems Neg Hx      Clotting disorder Neg Hx     Kidney disease Neg Hx      Social History   Substance Use Topics    Smoking status: Never Smoker    Smokeless tobacco: Never Used    Alcohol use No      Comment: He used to drink but quit in 1990        Review of Systems:  Constitutional: negative  Eyes: negative  Ears, nose, mouth, throat, and face: negative  Respiratory: negative  Cardiovascular: negative  Gastrointestinal: negative  Genitourinary:negative  Hematologic/lymphatic: negative  Musculoskeletal:negative,   Neurological: negative  Behavioral/Psych: negative  Endocrine: negative  Allergic/Immunologic: negative    OBJECTIVE:     Vital Signs (Most Recent)       Vital Signs Range (Last 24H):       Current Facility-Administered Medications   Medication    diclofenac sodium 1 % gel     Current Outpatient Prescriptions   Medication Sig    atorvastatin (LIPITOR) 40 MG tablet Take 1 tablet (40 mg total) by mouth once daily.    blood sugar diagnostic Strp Use daily- Freestyle lite    blood-glucose meter kit Use before meals and before bed when the glucoses are not in control.  Otherwise, test it daily once a day before meals randomly to ensure stability of the gluocse.    DULoxetine (CYMBALTA) 60 MG capsule Take 1 capsule (60 mg total) by mouth once daily.    ERGOCALCIFEROL, VITAMIN D2, (VITAMIN D ORAL) Take by mouth.    furosemide (LASIX) 40 MG tablet Take 1 tablet (40 mg total) by mouth once daily.    glimepiride (AMARYL) 4 MG tablet TAKE 1 TABLET TWICE A DAY    lisinopril (PRINIVIL,ZESTRIL) 20 MG tablet Take 1 tablet (20 mg total) by mouth 2 (two) times daily.    metoprolol succinate (TOPROL-XL) 25 MG 24 hr tablet Take 1 tablet (25 mg total) by mouth once daily.    nateglinide (STARLIX) 120 MG tablet Take 1 tablet (120 mg total) by mouth 3 (three) times daily.    omeprazole (PRILOSEC) 20 MG capsule Take 1 capsule (20 mg total) by mouth once daily.     Current Facility-Administered Medications on File Prior to  Encounter   Medication    diclofenac sodium 1 % gel     Current Outpatient Prescriptions on File Prior to Encounter   Medication Sig    atorvastatin (LIPITOR) 40 MG tablet Take 1 tablet (40 mg total) by mouth once daily.    blood sugar diagnostic Strp Use daily- Freestyle lite    blood-glucose meter kit Use before meals and before bed when the glucoses are not in control.  Otherwise, test it daily once a day before meals randomly to ensure stability of the gluocse.    DULoxetine (CYMBALTA) 60 MG capsule Take 1 capsule (60 mg total) by mouth once daily.    ERGOCALCIFEROL, VITAMIN D2, (VITAMIN D ORAL) Take by mouth.    furosemide (LASIX) 40 MG tablet Take 1 tablet (40 mg total) by mouth once daily.    glimepiride (AMARYL) 4 MG tablet TAKE 1 TABLET TWICE A DAY    lisinopril (PRINIVIL,ZESTRIL) 20 MG tablet Take 1 tablet (20 mg total) by mouth 2 (two) times daily.    metoprolol succinate (TOPROL-XL) 25 MG 24 hr tablet Take 1 tablet (25 mg total) by mouth once daily.    nateglinide (STARLIX) 120 MG tablet Take 1 tablet (120 mg total) by mouth 3 (three) times daily.    omeprazole (PRILOSEC) 20 MG capsule Take 1 capsule (20 mg total) by mouth once daily.       Physical Exam:  General appearance: alert, appears stated age and cooperative  Head: Normocephalic, without obvious abnormality, atraumatic  Eyes:  conjunctivae/corneas clear. PERRL, EOM's intact. Fundi benign.  Nose: no discharge  Throat: normal findings: tongue midline and normal  Neck: no adenopathy, no carotid bruit, no JVD, supple, symmetrical, trachea midline and thyroid not enlarged, symmetric, no tenderness/mass/nodules  Back:  no skin lesions, erythema, or scars  Lungs:  clear to auscultation bilaterally  Chest wall: no tenderness  Heart: regular rate and rhythm, S1, S2 normal, no murmur, click, rub or gallop  Abdomen: soft, non-tender; bowel sounds normal; no masses,  no organomegaly  Extremities: extremities normal, atraumatic, no cyanosis or  edema  Pulses: 2+ and symmetric  Skin: Skin color, texture, turgor normal. No rashes or lesions  Neurologic: Grossly normal    Laboratory:  Chemistry:   Lab Results   Component Value Date     08/09/2018    K 4.4 08/09/2018     08/09/2018    CO2 25 08/09/2018    BUN 31 (H) 08/09/2018    CREATININE 1.9 (H) 08/09/2018    CALCIUM 8.8 08/09/2018     Cardiac Markers: No results found for: CKTOTAL, CKMB, CKMBINDEX, TROPONINI  Cardiac Markers (Last 3): No results found for: CKTOTAL, CKMB, CKMBINDEX, TROPONINI  CBC:   Lab Results   Component Value Date    WBC 4.91 03/28/2018    HGB 14.3 03/28/2018    HCT 42.6 03/28/2018    MCV 84 03/28/2018     (L) 03/28/2018     Lipids:   Lab Results   Component Value Date    CHOL 102 (L) 03/28/2018    TRIG 82 03/28/2018    HDL 25 (L) 03/28/2018     Coagulation: No results found for: PT, INR, APTT    Diagnostic Results:  ECG: Reviewed  X-Ray: Reviewed  US: Reviewed  CT: Reviewed  Echo: Reviewed      ASSESSMENT/PLAN:     Patient Active Problem List   Diagnosis    GERD (gastroesophageal reflux disease)    H/O prostate cancer    H/O TIA (transient ischemic attack) and stroke    Diabetic neuropathy    Pain in limb    Combined hyperlipidemia associated with type 2 diabetes mellitus    Hypertension associated with diabetes    Type II diabetes mellitus with neurological manifestations, uncontrolled    CKD (chronic kidney disease) stage 3, GFR 30-59 ml/min    Type II diabetes mellitus with renal manifestations, uncontrolled    History of colon polyps    Diverticulosis of colon    History of prostate cancer    Secondary hyperparathyroidism, renal    Edema    Anxiety    Cerebrovascular disease, arteriosclerotic, post-stroke    Elevated brain natriuretic peptide (BNP) level    SOB (shortness of breath)    Atrial fibrillation        Plan: DCCV , Risks and benefits explained

## 2018-08-10 NOTE — TRANSFER OF CARE
"Anesthesia Transfer of Care Note    Patient: Héctor Desir    Procedure(s) Performed: Procedure(s) (LRB):  CARDIOVERSION (N/A)    Patient location: PACU    Anesthesia Type: MAC    Transport from OR: Transported from OR on room air with adequate spontaneous ventilation    Post pain: adequate analgesia    Post assessment: no apparent anesthetic complications    Post vital signs: stable    Level of consciousness: awake    Nausea/Vomiting: no nausea/vomiting    Complications: none    Transfer of care protocol was followed      Last vitals:   Visit Vitals  BP (!) 148/89 (BP Location: Right arm, Patient Position: Lying)   Pulse 84   Temp 36.8 °C (98.2 °F) (Oral)   Resp 14   Ht 5' 8" (1.727 m)   Wt 107 kg (236 lb)   SpO2 98%   BMI 35.88 kg/m²     "

## 2018-08-10 NOTE — PLAN OF CARE
1430 FULLY AWAKE. DISCHARGE INST. REVIEWED AND COPY GIVEN.  1435 IV REMOVED.  1440 DISCHARGED HOME. TO EXIT VIA W/C

## 2018-08-10 NOTE — ANESTHESIA PREPROCEDURE EVALUATION
08/10/2018  Héctor Desir is a 83 y.o., male.    Anesthesia Evaluation    I have reviewed the Patient Summary Reports.    I have reviewed the Nursing Notes.   I have reviewed the Medications.     Review of Systems  Anesthesia Hx:  No problems with previous Anesthesia    Social:  Non-Smoker, No Alcohol Use    Hematology/Oncology:         -- Cancer in past history: Oncology Comments: Hx prostate cancer     EENT/Dental:EENT/Dental Normal   Cardiovascular:   Hypertension Dysrhythmias atrial fibrillation hyperlipidemia ECG has been reviewed. EKG (8/2/18) Atrial fibrillation, rate 85,  Abnormal ECG  When compared with ECG of 21-JUN-2018  No significant change was found    Echo (4/2/18) CONCLUSIONS     1 - No wall motion abnormalities.     2 - Normal left ventricular systolic function (EF 60-65%).     3 - Normal left ventricular diastolic function.     4 - Normal right ventricular systolic function .     5 - The estimated PA systolic pressure is 31 mmHg.    Pulmonary:   Shortness of breath    Renal/:   Chronic Renal Disease, CRI Cr 1.9   Hepatic/GI:   GERD Colon polyps  diverticulosis   Musculoskeletal:  Musculoskeletal Normal    Neurological:   TIA, CVA Diabetic neuropathy   Endocrine:   Diabetes, type 2 Secondary hyperparathyroidism   Dermatological:  Skin Normal    Psych:   anxiety          Physical Exam  General:  Well nourished    Airway/Jaw/Neck:  Airway Findings: Mouth Opening: Normal Tongue: Normal  General Airway Assessment: Adult  Mallampati: II      Dental:  Dental Findings: In tact   Chest/Lungs:  Chest/Lungs Findings: Clear to auscultation, Normal Respiratory Rate     Heart/Vascular:  Heart Findings: Rate: Normal  Rhythm: Irregularly Irregular        Mental Status:  Mental Status Findings:  Cooperative, Alert and Oriented         Anesthesia Plan  Type of Anesthesia, risks & benefits  discussed:  Anesthesia Type:  MAC  Patient's Preference:   Intra-op Monitoring Plan: standard ASA monitors  Intra-op Monitoring Plan Comments:   Post Op Pain Control Plan: per primary service following discharge from PACU  Post Op Pain Control Plan Comments:   Induction:    Beta Blocker:  Patient is on a Beta-Blocker and has received one dose within the past 24 hours (No further documentation required).       Informed Consent: Patient understands risks and agrees with Anesthesia plan.  Questions answered. Anesthesia consent signed with patient.  ASA Score: 3     Day of Surgery Review of History & Physical:    H&P update referred to the provider.     Anesthesia Plan Notes: Labs (8/9/18) K 4.4, Cr 1.9, Glu 250        Ready For Surgery From Anesthesia Perspective.

## 2018-08-10 NOTE — ANESTHESIA RELEASE NOTE
"Anesthesia Release from PACU Note    Patient: Héctor Desir    Procedure(s) Performed: Procedure(s) (LRB):  CARDIOVERSION (N/A)    Anesthesia type: MAC    Post pain: Adequate analgesia    Post assessment: no apparent anesthetic complications    Last Vitals:   Visit Vitals  BP (!) 148/89 (BP Location: Right arm, Patient Position: Lying)   Pulse 84   Temp 36.8 °C (98.2 °F) (Oral)   Resp 14   Ht 5' 8" (1.727 m)   Wt 107 kg (236 lb)   SpO2 98%   BMI 35.88 kg/m²       Post vital signs: stable    Level of consciousness: awake    Nausea/Vomiting: no nausea/no vomiting    Complications: none    Airway Patency: patent    Respiratory: unassisted    Cardiovascular: stable and blood pressure at baseline    Hydration: euvolemic  "

## 2018-08-14 ENCOUNTER — TELEPHONE (OUTPATIENT)
Dept: CARDIOLOGY | Facility: CLINIC | Age: 83
End: 2018-08-14

## 2018-08-14 NOTE — BRIEF OP NOTE
<[facility]  Surgery Department  Operative Note    SUMMARY     Date of Procedure: 8/10/2018     Procedure: Procedure(s) (LRB):  CARDIOVERSION (N/A)     Surgeon(s) and Role:     * Israel Cardoza MD - Primary    Assisting Surgeon: None    Pre-Operative Diagnosis: PAF (paroxysmal atrial fibrillation) [I48.0]    Post-Operative Diagnosis: Post-Op Diagnosis Codes:     * PAF (paroxysmal atrial fibrillation) [I48.0]    Anesthesia: Monitor Anesthesia Care    Technical Procedures Used: dccv    Description of the Findings of the Procedure: dccv 200 j to nsr    Significant Surgical Tasks Conducted by the Assistant(s), if Applicable: none    Complications: No    Estimated Blood Loss (EBL): < 50 cc           Implants: * No implants in log *    Specimens:   Specimen (12h ago, onward)    None                  Condition: Good    Disposition: PACU - hemodynamically stable.    Attestation: I was present and scrubbed for the entire procedure.

## 2018-08-14 NOTE — TELEPHONE ENCOUNTER
The patient was called and checked on status.He stated he was doing fine.He has appointment this Thursday for an EKG only.He was notified.

## 2018-08-15 NOTE — DISCHARGE SUMMARY
[facility] -   Discharge Summary      Admit Date: 8/10/2018    Discharge Date and Time: 8/10/2018  2:40 PM    Attending Physician:Israel Cardoza MD    Reason for Admission: Israel Cardoza MD    Procedures Performed: Procedure(s) (LRB):  CARDIOVERSION (N/A)    [facility] Course (synopsis of major diagnoses, care, treatment, and services provided during the course of the hospital stay): DCCV of afib to NSR with 200 J     Consults: none    Significant Diagnostic Studies: Labs: All labs within the past 24 hours have been reviewed    Final Diagnoses:    Principal Problem: <principal problem not specified>   Secondary Diagnoses:   Active [facility] Problems    Diagnosis  POA    Atrial fibrillation [I48.91]  Yes    Type II diabetes mellitus with neurological manifestations, uncontrolled [E11.49, E11.65]  Yes    H/O TIA (transient ischemic attack) and stroke [Z86.73]  Not Applicable      Resolved [facility] Problems   No resolved problems to display.       Discharged Condition: good    Disposition: Home or Self Care    Follow Up/Patient Instructions:     Medications:  Reconciled Home Medications:      Medication List      ASK your doctor about these medications    atorvastatin 40 MG tablet  Commonly known as:  LIPITOR  Take 1 tablet (40 mg total) by mouth once daily.     blood sugar diagnostic Strp  Use daily- Freestyle lite     blood-glucose meter kit  Use before meals and before bed when the glucoses are not in control.  Otherwise, test it daily once a day before meals randomly to ensure stability of the gluocse.     DULoxetine 60 MG capsule  Commonly known as:  CYMBALTA  Take 1 capsule (60 mg total) by mouth once daily.     furosemide 40 MG tablet  Commonly known as:  LASIX  Take 1 tablet (40 mg total) by mouth once daily.     glimepiride 4 MG tablet  Commonly known as:  AMARYL  TAKE 1 TABLET TWICE A DAY     lisinopril 20 MG tablet  Commonly known as:  PRINIVIL,ZESTRIL  Take 1 tablet (20 mg total) by  mouth 2 (two) times daily.     metoprolol succinate 25 MG 24 hr tablet  Commonly known as:  TOPROL-XL  Take 1 tablet (25 mg total) by mouth once daily.     nateglinide 120 MG tablet  Commonly known as:  STARLIX  Take 1 tablet (120 mg total) by mouth 3 (three) times daily.     omeprazole 20 MG capsule  Commonly known as:  PRILOSEC  Take 1 capsule (20 mg total) by mouth once daily.     VITAMIN D ORAL  Take by mouth.          No discharge procedures on file.  Follow-up Information     Please follow up.    Why:  FOLLOWUP WITH DR. SMITH AUGUST 28 AT 8 AM

## 2018-08-16 ENCOUNTER — CLINICAL SUPPORT (OUTPATIENT)
Dept: CARDIOLOGY | Facility: CLINIC | Age: 83
End: 2018-08-16
Payer: MEDICARE

## 2018-08-16 DIAGNOSIS — R00.2 PALPITATIONS: ICD-10-CM

## 2018-08-16 DIAGNOSIS — I48.19 PERSISTENT ATRIAL FIBRILLATION: Primary | ICD-10-CM

## 2018-08-16 PROCEDURE — 93010 ELECTROCARDIOGRAM REPORT: CPT | Mod: S$PBB,,, | Performed by: INTERNAL MEDICINE

## 2018-08-16 PROCEDURE — 93005 ELECTROCARDIOGRAM TRACING: CPT | Mod: PBBFAC,PO | Performed by: INTERNAL MEDICINE

## 2018-08-16 NOTE — TELEPHONE ENCOUNTER
The patient was seen and EKG done.Dr Talamantes noted he was in atrial fib.Plan: Stay on current meds and continue Xarelto 15 mg daily.Patient notified by Dr Talamantes and reviewed instructions.

## 2018-08-24 ENCOUNTER — OFFICE VISIT (OUTPATIENT)
Dept: NEPHROLOGY | Facility: CLINIC | Age: 83
End: 2018-08-24
Payer: MEDICARE

## 2018-08-24 VITALS
BODY MASS INDEX: 34.92 KG/M2 | HEIGHT: 68 IN | SYSTOLIC BLOOD PRESSURE: 160 MMHG | HEART RATE: 83 BPM | WEIGHT: 230.38 LBS | OXYGEN SATURATION: 97 % | DIASTOLIC BLOOD PRESSURE: 90 MMHG

## 2018-08-24 DIAGNOSIS — I48.19 PERSISTENT ATRIAL FIBRILLATION: ICD-10-CM

## 2018-08-24 DIAGNOSIS — N18.30 CKD (CHRONIC KIDNEY DISEASE) STAGE 3, GFR 30-59 ML/MIN: Primary | ICD-10-CM

## 2018-08-24 DIAGNOSIS — Z86.73 H/O TIA (TRANSIENT ISCHEMIC ATTACK) AND STROKE: ICD-10-CM

## 2018-08-24 DIAGNOSIS — E11.69 COMBINED HYPERLIPIDEMIA ASSOCIATED WITH TYPE 2 DIABETES MELLITUS: ICD-10-CM

## 2018-08-24 DIAGNOSIS — E78.2 COMBINED HYPERLIPIDEMIA ASSOCIATED WITH TYPE 2 DIABETES MELLITUS: ICD-10-CM

## 2018-08-24 DIAGNOSIS — N25.81 SECONDARY HYPERPARATHYROIDISM, RENAL: ICD-10-CM

## 2018-08-24 PROCEDURE — 99213 OFFICE O/P EST LOW 20 MIN: CPT | Mod: PBBFAC,PO | Performed by: INTERNAL MEDICINE

## 2018-08-24 PROCEDURE — 99214 OFFICE O/P EST MOD 30 MIN: CPT | Mod: S$PBB,,, | Performed by: INTERNAL MEDICINE

## 2018-08-24 PROCEDURE — 99999 PR PBB SHADOW E&M-EST. PATIENT-LVL III: CPT | Mod: PBBFAC,,, | Performed by: INTERNAL MEDICINE

## 2018-08-24 NOTE — PROGRESS NOTES
Subjective:       Patient ID: Héctor Desir is a 83 y.o. White male who presents for follow-up evaluation of Chronic Kidney Disease    HPI     He reports ongoing stress due to his wife's illness. She is no longer on hospice--but he needs to interact with her daughter who can be challenging. He feels overall well and notes his BS are running better, last Hba1c was 7.1. No LE edema nor SOB. Nocturia is 1-2X.      Review of Systems   Constitutional: Negative for activity change, appetite change, fatigue and unexpected weight change.   HENT: Negative for facial swelling.    Respiratory: Negative for shortness of breath.    Cardiovascular: Negative for chest pain and leg swelling.   Gastrointestinal: Positive for constipation.   Genitourinary: Positive for frequency. Negative for difficulty urinating, dysuria and hematuria.   Musculoskeletal: Negative for arthralgias.   Neurological: Negative for weakness and headaches.   Psychiatric/Behavioral: Negative for decreased concentration.       Objective:      Physical Exam   Constitutional: He is oriented to person, place, and time. He appears well-developed and well-nourished. No distress.   Neck: No JVD present.   Cardiovascular: S1 normal, S2 normal and normal heart sounds. Exam reveals no friction rub.   Pulmonary/Chest: Breath sounds normal. He has no wheezes. He has no rales.   Abdominal: Soft.   Musculoskeletal: He exhibits edema.   Neurological: He is alert and oriented to person, place, and time.   Skin: Skin is warm and dry.   Psychiatric: He has a normal mood and affect.   Vitals reviewed.      Assessment:       1. CKD (chronic kidney disease) stage 3, GFR 30-59 ml/min    2. Persistent atrial fibrillation    3. 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 H/O TIA (transient ischemic attack) and stroke    5. Secondary hyperparathyroidism, renal    6. Uncontrolled type 2 diabetes mellitus with stage 3 chronic kidney disease, without  long-term current use of insulin        Plan:             CKD stage 3 with stable kidney function.  Continue RAAS blockade (lisinopril) for renal preservation    HTN is controlled    DM--is contrrolled    Mineral and Bone Disease--continue D2      RTC 4 months

## 2018-08-30 ENCOUNTER — OFFICE VISIT (OUTPATIENT)
Dept: CARDIOLOGY | Facility: CLINIC | Age: 83
End: 2018-08-30
Payer: MEDICARE

## 2018-08-30 VITALS
BODY MASS INDEX: 34.25 KG/M2 | HEIGHT: 68 IN | SYSTOLIC BLOOD PRESSURE: 130 MMHG | HEART RATE: 84 BPM | DIASTOLIC BLOOD PRESSURE: 85 MMHG | WEIGHT: 226 LBS

## 2018-08-30 DIAGNOSIS — I15.2 HYPERTENSION ASSOCIATED WITH DIABETES: ICD-10-CM

## 2018-08-30 DIAGNOSIS — I48.91 ATRIAL FIBRILLATION, UNSPECIFIED TYPE: ICD-10-CM

## 2018-08-30 DIAGNOSIS — E11.59 HYPERTENSION ASSOCIATED WITH DIABETES: ICD-10-CM

## 2018-08-30 PROCEDURE — 99214 OFFICE O/P EST MOD 30 MIN: CPT | Mod: S$PBB,,, | Performed by: INTERNAL MEDICINE

## 2018-08-30 PROCEDURE — 99999 PR PBB SHADOW E&M-EST. PATIENT-LVL III: CPT | Mod: PBBFAC,,, | Performed by: INTERNAL MEDICINE

## 2018-08-30 PROCEDURE — 99213 OFFICE O/P EST LOW 20 MIN: CPT | Mod: PBBFAC,PO | Performed by: INTERNAL MEDICINE

## 2018-08-30 RX ORDER — METOPROLOL SUCCINATE 50 MG/1
50 TABLET, EXTENDED RELEASE ORAL DAILY
Qty: 90 TABLET | Refills: 3 | Status: SHIPPED | OUTPATIENT
Start: 2018-08-30 | End: 2019-01-21 | Stop reason: SDUPTHER

## 2018-08-30 NOTE — PROGRESS NOTES
Subjective:    Patient ID:  Héctor Desir is a 83 y.o. male who presents for evaluation of Follow-up (follow up)      HPI83 yo WM found to be in AF of uncertain duration. Anticoagulated for 6 weeks and underwent cardioversion. Did convert but within a few days was back in AF. Has some SOB but stays active.    Review of Systems   Constitution: Negative for decreased appetite, fever, weakness, malaise/fatigue, weight gain and weight loss.   HENT: Negative for hearing loss and nosebleeds.    Eyes: Negative for visual disturbance.   Cardiovascular: Positive for dyspnea on exertion. Negative for chest pain, claudication, cyanosis, irregular heartbeat, leg swelling, near-syncope, orthopnea, palpitations, paroxysmal nocturnal dyspnea and syncope.   Respiratory: Positive for shortness of breath. Negative for cough, hemoptysis, sleep disturbances due to breathing, snoring and wheezing.    Endocrine: Negative for cold intolerance, heat intolerance, polydipsia and polyuria.   Hematologic/Lymphatic: Negative for adenopathy and bleeding problem. Does not bruise/bleed easily.   Skin: Negative for color change, itching, poor wound healing, rash and suspicious lesions.   Musculoskeletal: Negative for arthritis, back pain, falls, joint pain, joint swelling, muscle cramps, muscle weakness and myalgias.   Gastrointestinal: Negative for bloating, abdominal pain, change in bowel habit, constipation, flatus, heartburn, hematemesis, hematochezia, hemorrhoids, jaundice, melena, nausea and vomiting.   Genitourinary: Negative for bladder incontinence, decreased libido, frequency, hematuria, hesitancy and urgency.   Neurological: Negative for brief paralysis, difficulty with concentration, excessive daytime sleepiness, dizziness, focal weakness, headaches, light-headedness, loss of balance, numbness and vertigo.   Psychiatric/Behavioral: Negative for altered mental status, depression and memory loss. The patient does not have insomnia and is  "not nervous/anxious.    Allergic/Immunologic: Negative for environmental allergies, hives and persistent infections.        Objective:    Physical Exam   Constitutional: He is oriented to person, place, and time. He appears well-developed and well-nourished. No distress.   /85   Pulse 84   Ht 5' 8" (1.727 m)   Wt 102.5 kg (226 lb)   BMI 34.36 kg/m²      HENT:   Head: Normocephalic and atraumatic.   Eyes: Conjunctivae and lids are normal. Pupils are equal, round, and reactive to light. Right eye exhibits no discharge. No scleral icterus.   Neck: Normal range of motion. Neck supple. No JVD present. No tracheal deviation present. No thyromegaly present.   Cardiovascular: Normal rate, S1 normal, S2 normal, normal heart sounds and intact distal pulses. An irregularly irregular rhythm present. Exam reveals no gallop and no friction rub.   No murmur heard.  Pulses:       Carotid pulses are 2+ on the right side, and 2+ on the left side.       Radial pulses are 2+ on the right side, and 2+ on the left side.        Femoral pulses are 2+ on the right side, and 2+ on the left side.       Popliteal pulses are 2+ on the right side, and 2+ on the left side.        Dorsalis pedis pulses are 2+ on the right side, and 2+ on the left side.        Posterior tibial pulses are 2+ on the right side, and 2+ on the left side.   Pulmonary/Chest: Effort normal and breath sounds normal. No respiratory distress. He has no wheezes. He has no rales. He exhibits no tenderness.   Abdominal: Soft. Bowel sounds are normal. He exhibits no distension and no mass. There is no hepatosplenomegaly or hepatomegaly. There is no tenderness. There is no rebound and no guarding.   Musculoskeletal: Normal range of motion. He exhibits no edema or tenderness.   Lymphadenopathy:     He has no cervical adenopathy.   Neurological: He is alert and oriented to person, place, and time. He has normal reflexes. No cranial nerve deficit. Coordination normal. "   Skin: Skin is warm and dry. No rash noted. He is not diaphoretic. No erythema. No pallor.   Psychiatric: He has a normal mood and affect. His speech is normal and behavior is normal. Judgment and thought content normal. Cognition and memory are normal.         Assessment:       1. Atrial fibrillation, unspecified type    2. Hypertension associated with diabetes         Plan:     Risk of stroke from atrial fib has been discussed as well as bleeding risk from alternative anticoagulation regiments as well as risk and benefits of rate control vs cardioversion  Will continue with rate control. Increase toprol to 50mg   No orders of the defined types were placed in this encounter.    Follow-up in about 6 weeks (around 10/11/2018).

## 2018-10-11 ENCOUNTER — OFFICE VISIT (OUTPATIENT)
Dept: CARDIOLOGY | Facility: CLINIC | Age: 83
End: 2018-10-11
Payer: MEDICARE

## 2018-10-11 VITALS
SYSTOLIC BLOOD PRESSURE: 124 MMHG | BODY MASS INDEX: 34.71 KG/M2 | DIASTOLIC BLOOD PRESSURE: 73 MMHG | WEIGHT: 229 LBS | HEART RATE: 80 BPM | HEIGHT: 68 IN

## 2018-10-11 DIAGNOSIS — E11.59 HYPERTENSION ASSOCIATED WITH DIABETES: ICD-10-CM

## 2018-10-11 DIAGNOSIS — I48.19 PERSISTENT ATRIAL FIBRILLATION: Primary | ICD-10-CM

## 2018-10-11 DIAGNOSIS — R06.02 SOB (SHORTNESS OF BREATH): ICD-10-CM

## 2018-10-11 DIAGNOSIS — I15.2 HYPERTENSION ASSOCIATED WITH DIABETES: ICD-10-CM

## 2018-10-11 PROCEDURE — 99214 OFFICE O/P EST MOD 30 MIN: CPT | Mod: S$PBB,,, | Performed by: INTERNAL MEDICINE

## 2018-10-11 PROCEDURE — 99999 PR PBB SHADOW E&M-EST. PATIENT-LVL III: CPT | Mod: PBBFAC,,, | Performed by: INTERNAL MEDICINE

## 2018-10-11 PROCEDURE — 99213 OFFICE O/P EST LOW 20 MIN: CPT | Mod: PBBFAC,PO | Performed by: INTERNAL MEDICINE

## 2018-10-11 NOTE — PROGRESS NOTES
Subjective:    Patient ID:  Héctor Desir is a 83 y.o. male who presents for evaluation of Follow-up (follow up)      HPI83 yo WM with persistent AF who underwent cardioversion that did not maintain sinu and rate control strategy was initiated.He currently states SOB has improved and only mild palpitations.    Review of Systems   Constitution: Negative for decreased appetite, fever, weakness, malaise/fatigue, weight gain and weight loss.   HENT: Negative for hearing loss and nosebleeds.    Eyes: Negative for visual disturbance.   Cardiovascular: Positive for dyspnea on exertion and irregular heartbeat. Negative for chest pain, claudication, cyanosis, leg swelling, near-syncope, orthopnea, palpitations, paroxysmal nocturnal dyspnea and syncope.   Respiratory: Negative for cough, hemoptysis, shortness of breath, sleep disturbances due to breathing, snoring and wheezing.    Endocrine: Negative for cold intolerance, heat intolerance, polydipsia and polyuria.   Hematologic/Lymphatic: Negative for adenopathy and bleeding problem. Does not bruise/bleed easily.   Skin: Negative for color change, itching, poor wound healing, rash and suspicious lesions.   Musculoskeletal: Negative for arthritis, back pain, falls, joint pain, joint swelling, muscle cramps, muscle weakness and myalgias.   Gastrointestinal: Negative for bloating, abdominal pain, change in bowel habit, constipation, flatus, heartburn, hematemesis, hematochezia, hemorrhoids, jaundice, melena, nausea and vomiting.   Genitourinary: Negative for bladder incontinence, decreased libido, frequency, hematuria, hesitancy and urgency.   Neurological: Negative for brief paralysis, difficulty with concentration, excessive daytime sleepiness, dizziness, focal weakness, headaches, light-headedness, loss of balance, numbness and vertigo.   Psychiatric/Behavioral: Negative for altered mental status, depression and memory loss. The patient does not have insomnia and is not  "nervous/anxious.    Allergic/Immunologic: Negative for environmental allergies, hives and persistent infections.        Objective:    Physical Exam   Constitutional: He is oriented to person, place, and time. He appears well-developed and well-nourished. No distress.   /73   Pulse 80   Ht 5' 8" (1.727 m)   Wt 103.9 kg (229 lb)   BMI 34.82 kg/m²      HENT:   Head: Normocephalic and atraumatic.   Eyes: Conjunctivae and lids are normal. Pupils are equal, round, and reactive to light. Right eye exhibits no discharge. No scleral icterus.   Neck: Normal range of motion. Neck supple. No JVD present. No tracheal deviation present. No thyromegaly present.   Cardiovascular: Normal rate, S1 normal, S2 normal, normal heart sounds and intact distal pulses. An irregularly irregular rhythm present. Exam reveals no gallop and no friction rub.   No murmur heard.  Pulses:       Carotid pulses are 2+ on the right side, and 2+ on the left side.       Radial pulses are 2+ on the right side, and 2+ on the left side.        Femoral pulses are 2+ on the right side, and 2+ on the left side.       Popliteal pulses are 2+ on the right side, and 2+ on the left side.        Dorsalis pedis pulses are 2+ on the right side, and 2+ on the left side.        Posterior tibial pulses are 2+ on the right side, and 2+ on the left side.   Pulmonary/Chest: Effort normal and breath sounds normal. No respiratory distress. He has no wheezes. He has no rales. He exhibits no tenderness.   Abdominal: Soft. Bowel sounds are normal. He exhibits no distension and no mass. There is no hepatosplenomegaly or hepatomegaly. There is no tenderness. There is no rebound and no guarding.   Musculoskeletal: Normal range of motion. He exhibits no edema or tenderness.   Lymphadenopathy:     He has no cervical adenopathy.   Neurological: He is alert and oriented to person, place, and time. He has normal reflexes. No cranial nerve deficit. Coordination normal.   Skin: " Skin is warm and dry. No rash noted. He is not diaphoretic. No erythema. No pallor.   Psychiatric: He has a normal mood and affect. His speech is normal and behavior is normal. Judgment and thought content normal. Cognition and memory are normal.         Assessment:       1. Persistent atrial fibrillation    2. Hypertension associated with diabetes    3. SOB (shortness of breath)         Plan:     Patient doing well with rate control    No bleeding issues    The current medical regimen is effective;  continue present plan and medications.     Risk of stroke from atrial fib has been discussed as well as bleeding risk from alternative anticoagulation regiments as well as risk and benefits of rate control vs cardioversion    No orders of the defined types were placed in this encounter.    Follow-up in about 3 months (around 1/11/2019).

## 2019-01-07 ENCOUNTER — PATIENT OUTREACH (OUTPATIENT)
Dept: ADMINISTRATIVE | Facility: HOSPITAL | Age: 84
End: 2019-01-07

## 2019-01-08 ENCOUNTER — TELEPHONE (OUTPATIENT)
Dept: CARDIOLOGY | Facility: CLINIC | Age: 84
End: 2019-01-08

## 2019-01-08 NOTE — TELEPHONE ENCOUNTER
Attempted to reach patient to notify him his 1/10/19 appointment has been rescheduled due to Dr Stanley clinic change.I was unable to reach patient but left message and mailed new appointment.

## 2019-01-14 ENCOUNTER — OFFICE VISIT (OUTPATIENT)
Dept: CARDIOLOGY | Facility: CLINIC | Age: 84
End: 2019-01-14
Payer: MEDICARE

## 2019-01-14 VITALS
DIASTOLIC BLOOD PRESSURE: 76 MMHG | SYSTOLIC BLOOD PRESSURE: 122 MMHG | WEIGHT: 211.75 LBS | BODY MASS INDEX: 32.09 KG/M2 | HEART RATE: 81 BPM | HEIGHT: 68 IN

## 2019-01-14 DIAGNOSIS — I15.2 HYPERTENSION ASSOCIATED WITH DIABETES: ICD-10-CM

## 2019-01-14 DIAGNOSIS — E11.59 HYPERTENSION ASSOCIATED WITH DIABETES: ICD-10-CM

## 2019-01-14 DIAGNOSIS — R07.9 CHEST PAIN, UNSPECIFIED TYPE: Primary | ICD-10-CM

## 2019-01-14 DIAGNOSIS — R06.02 SOB (SHORTNESS OF BREATH): ICD-10-CM

## 2019-01-14 DIAGNOSIS — I48.20 CHRONIC ATRIAL FIBRILLATION: ICD-10-CM

## 2019-01-14 DIAGNOSIS — N18.30 CKD (CHRONIC KIDNEY DISEASE) STAGE 3, GFR 30-59 ML/MIN: ICD-10-CM

## 2019-01-14 PROCEDURE — 99214 PR OFFICE/OUTPT VISIT, EST, LEVL IV, 30-39 MIN: ICD-10-PCS | Mod: S$PBB,,, | Performed by: INTERNAL MEDICINE

## 2019-01-14 PROCEDURE — 99999 PR PBB SHADOW E&M-EST. PATIENT-LVL III: CPT | Mod: PBBFAC,,, | Performed by: INTERNAL MEDICINE

## 2019-01-14 PROCEDURE — 99213 OFFICE O/P EST LOW 20 MIN: CPT | Mod: PBBFAC,PO | Performed by: INTERNAL MEDICINE

## 2019-01-14 PROCEDURE — 99214 OFFICE O/P EST MOD 30 MIN: CPT | Mod: S$PBB,,, | Performed by: INTERNAL MEDICINE

## 2019-01-14 PROCEDURE — 99999 PR PBB SHADOW E&M-EST. PATIENT-LVL III: ICD-10-PCS | Mod: PBBFAC,,, | Performed by: INTERNAL MEDICINE

## 2019-01-14 NOTE — PROGRESS NOTES
"Subjective:    Patient ID:  Héctor Desir is a 83 y.o. male who presents for evaluation of Follow-up (follow up) and Shortness of Breath      HPI 84 yo WM with persistent AF who underwent cardioversion that did not maintain sinus.Has been on rate control strategy.he has [place] that is stable and has noticed occasional CP when he does heavy exrecise. 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   HENT: Negative for hearing loss and nosebleeds.    Eyes: Negative for visual disturbance.   Cardiovascular: Positive for chest pain and dyspnea on exertion. Negative for claudication, cyanosis, irregular heartbeat, leg swelling, near-syncope, orthopnea, palpitations, paroxysmal nocturnal dyspnea and syncope.   Respiratory: Positive for shortness of breath. Negative for cough, hemoptysis, sleep disturbances due to breathing, snoring and wheezing.    Endocrine: Negative for cold intolerance, heat intolerance, polydipsia and polyuria.   Hematologic/Lymphatic: Negative for adenopathy and bleeding problem. Does not bruise/bleed easily.   Skin: Negative for color change, itching, poor wound healing, rash and suspicious lesions.   Musculoskeletal: Negative for arthritis, back pain, falls, joint pain, joint swelling, muscle cramps, muscle weakness and myalgias.   Gastrointestinal: Negative for bloating, abdominal pain, change in bowel habit, constipation, flatus, heartburn, hematemesis, hematochezia, hemorrhoids, jaundice, melena, nausea and vomiting.   Genitourinary: Negative for bladder incontinence, decreased libido, frequency, hematuria, hesitancy and urgency.   Neurological: Negative for brief paralysis, difficulty with concentration, excessive daytime sleepiness, dizziness, focal weakness, headaches, light-headedness, loss of balance, numbness and vertigo. " "  Psychiatric/Behavioral: Negative for altered mental status, depression and memory loss. The patient does not have insomnia and is not nervous/anxious.    Allergic/Immunologic: Negative for environmental allergies, hives and persistent infections.        Objective:    Physical Exam   Constitutional: He is oriented to person, place, and time. He appears well-developed and well-nourished. No distress.   /76 (BP Location: Left arm, Patient Position: Sitting, BP Method: Large (Automatic))   Pulse 81   Ht 5' 8" (1.727 m)   Wt 96.1 kg (211 lb 12 oz)   BMI 32.20 kg/m²      HENT:   Head: Normocephalic and atraumatic.   Eyes: Conjunctivae and lids are normal. Pupils are equal, round, and reactive to light. Right eye exhibits no discharge. No scleral icterus.   Neck: Normal range of motion. Neck supple. No JVD present. No tracheal deviation present. No thyromegaly present.   Cardiovascular: Normal rate, S1 normal, S2 normal, normal heart sounds and intact distal pulses. An irregularly irregular rhythm present. Exam reveals no gallop and no friction rub.   No murmur heard.  Pulses:       Carotid pulses are 2+ on the right side, and 2+ on the left side.       Radial pulses are 2+ on the right side, and 2+ on the left side.        Femoral pulses are 2+ on the right side, and 2+ on the left side.       Popliteal pulses are 2+ on the right side, and 2+ on the left side.        Dorsalis pedis pulses are 2+ on the right side, and 2+ on the left side.        Posterior tibial pulses are 2+ on the right side, and 2+ on the left side.   Pulmonary/Chest: Effort normal and breath sounds normal. No respiratory distress. He has no wheezes. He has no rales. He exhibits no tenderness.   Abdominal: Soft. Bowel sounds are normal. He exhibits no distension and no mass. There is no hepatosplenomegaly or hepatomegaly. There is no tenderness. There is no rebound and no guarding.   Musculoskeletal: Normal range of motion. He exhibits no " edema or tenderness.   Lymphadenopathy:     He has no cervical adenopathy.   Neurological: He is alert and oriented to person, place, and time. He has normal reflexes. No cranial nerve deficit. Coordination normal.   Skin: Skin is warm and dry. No rash noted. He is not diaphoretic. No erythema. No pallor.   Psychiatric: He has a normal mood and affect. His speech is normal and behavior is normal. Judgment and thought content normal. Cognition and memory are normal.         Assessment:       1. Chest pain, unspecified type    2. Chronic atrial fibrillation    3. Hypertension associated with diabetes    4. CKD (chronic kidney disease) stage 3, GFR 30-59 ml/min    5. SOB (shortness of breath)         Plan:     SOB stable but the chest pain new complaint    Patient advised to limit exposure to caffeine, stimulants, and alcohol    Risk of stroke from atrial fib has been discussed as well as bleeding risk from alternative anticoagulation regiments as well as risk and benefits of rate control vs cardioversion  Orders Placed This Encounter   Procedures    CBC auto differential    Basic metabolic panel    Lipid panel    Nuclear Stress Test - Treadmill - ([facility] ONLY)     Follow-up in about 6 months (around 7/14/2019).

## 2019-01-15 ENCOUNTER — LAB VISIT (OUTPATIENT)
Dept: LAB | Facility: HOSPITAL | Age: 84
End: 2019-01-15
Attending: INTERNAL MEDICINE
Payer: MEDICARE

## 2019-01-15 DIAGNOSIS — N18.30 CKD (CHRONIC KIDNEY DISEASE) STAGE 3, GFR 30-59 ML/MIN: ICD-10-CM

## 2019-01-15 DIAGNOSIS — I15.2 HYPERTENSION ASSOCIATED WITH DIABETES: ICD-10-CM

## 2019-01-15 DIAGNOSIS — E11.59 HYPERTENSION ASSOCIATED WITH DIABETES: ICD-10-CM

## 2019-01-15 DIAGNOSIS — I48.20 CHRONIC ATRIAL FIBRILLATION: ICD-10-CM

## 2019-01-15 DIAGNOSIS — R06.02 SOB (SHORTNESS OF BREATH): ICD-10-CM

## 2019-01-15 LAB
ANION GAP SERPL CALC-SCNC: 9 MMOL/L
BASOPHILS # BLD AUTO: 0.05 K/UL
BASOPHILS NFR BLD: 0.7 %
BUN SERPL-MCNC: 25 MG/DL
CALCIUM SERPL-MCNC: 9.1 MG/DL
CHLORIDE SERPL-SCNC: 103 MMOL/L
CHOLEST SERPL-MCNC: 114 MG/DL
CHOLEST/HDLC SERPL: 3.9 {RATIO}
CO2 SERPL-SCNC: 25 MMOL/L
CREAT SERPL-MCNC: 1.8 MG/DL
DIFFERENTIAL METHOD: ABNORMAL
EOSINOPHIL # BLD AUTO: 0.2 K/UL
EOSINOPHIL NFR BLD: 2.6 %
ERYTHROCYTE [DISTWIDTH] IN BLOOD BY AUTOMATED COUNT: 13.9 %
EST. GFR  (AFRICAN AMERICAN): 39.9 ML/MIN/1.73 M^2
EST. GFR  (NON AFRICAN AMERICAN): 34.5 ML/MIN/1.73 M^2
GLUCOSE SERPL-MCNC: 195 MG/DL
HCT VFR BLD AUTO: 46.5 %
HDLC SERPL-MCNC: 29 MG/DL
HDLC SERPL: 25.4 %
HGB BLD-MCNC: 15.4 G/DL
IMM GRANULOCYTES # BLD AUTO: 0.03 K/UL
IMM GRANULOCYTES NFR BLD AUTO: 0.4 %
LDLC SERPL CALC-MCNC: 61 MG/DL
LYMPHOCYTES # BLD AUTO: 1.3 K/UL
LYMPHOCYTES NFR BLD: 17.9 %
MCH RBC QN AUTO: 27.4 PG
MCHC RBC AUTO-ENTMCNC: 33.1 G/DL
MCV RBC AUTO: 83 FL
MONOCYTES # BLD AUTO: 0.6 K/UL
MONOCYTES NFR BLD: 8.1 %
NEUTROPHILS # BLD AUTO: 4.9 K/UL
NEUTROPHILS NFR BLD: 70.3 %
NONHDLC SERPL-MCNC: 85 MG/DL
NRBC BLD-RTO: 0 /100 WBC
PLATELET # BLD AUTO: 172 K/UL
PMV BLD AUTO: 12.7 FL
POTASSIUM SERPL-SCNC: 4.3 MMOL/L
RBC # BLD AUTO: 5.62 M/UL
SODIUM SERPL-SCNC: 137 MMOL/L
TRIGL SERPL-MCNC: 120 MG/DL
WBC # BLD AUTO: 7 K/UL

## 2019-01-15 PROCEDURE — 80048 BASIC METABOLIC PNL TOTAL CA: CPT

## 2019-01-15 PROCEDURE — 80061 LIPID PANEL: CPT

## 2019-01-15 PROCEDURE — 85025 COMPLETE CBC W/AUTO DIFF WBC: CPT

## 2019-01-15 PROCEDURE — 36415 COLL VENOUS BLD VENIPUNCTURE: CPT | Mod: PO

## 2019-01-21 ENCOUNTER — OFFICE VISIT (OUTPATIENT)
Dept: FAMILY MEDICINE | Facility: CLINIC | Age: 84
End: 2019-01-21
Payer: MEDICARE

## 2019-01-21 ENCOUNTER — LAB VISIT (OUTPATIENT)
Dept: LAB | Facility: HOSPITAL | Age: 84
End: 2019-01-21
Attending: FAMILY MEDICINE
Payer: MEDICARE

## 2019-01-21 VITALS
BODY MASS INDEX: 34.86 KG/M2 | TEMPERATURE: 98 F | WEIGHT: 230 LBS | SYSTOLIC BLOOD PRESSURE: 134 MMHG | HEART RATE: 96 BPM | HEIGHT: 68 IN | DIASTOLIC BLOOD PRESSURE: 80 MMHG

## 2019-01-21 DIAGNOSIS — K21.9 GASTROESOPHAGEAL REFLUX DISEASE, ESOPHAGITIS PRESENCE NOT SPECIFIED: ICD-10-CM

## 2019-01-21 DIAGNOSIS — I48.19 PERSISTENT ATRIAL FIBRILLATION: ICD-10-CM

## 2019-01-21 DIAGNOSIS — I48.91 ATRIAL FIBRILLATION, UNSPECIFIED TYPE: ICD-10-CM

## 2019-01-21 DIAGNOSIS — I15.2 HYPERTENSION ASSOCIATED WITH DIABETES: ICD-10-CM

## 2019-01-21 DIAGNOSIS — E11.42 DIABETIC POLYNEUROPATHY ASSOCIATED WITH TYPE 2 DIABETES MELLITUS: Primary | ICD-10-CM

## 2019-01-21 DIAGNOSIS — N18.30 CKD (CHRONIC KIDNEY DISEASE) STAGE 3, GFR 30-59 ML/MIN: ICD-10-CM

## 2019-01-21 DIAGNOSIS — E11.59 HYPERTENSION ASSOCIATED WITH DIABETES: ICD-10-CM

## 2019-01-21 DIAGNOSIS — I48.20 CHRONIC ATRIAL FIBRILLATION: ICD-10-CM

## 2019-01-21 DIAGNOSIS — R60.9 EDEMA, UNSPECIFIED TYPE: ICD-10-CM

## 2019-01-21 DIAGNOSIS — E78.2 COMBINED HYPERLIPIDEMIA ASSOCIATED WITH TYPE 2 DIABETES MELLITUS: ICD-10-CM

## 2019-01-21 DIAGNOSIS — N25.81 SECONDARY HYPERPARATHYROIDISM, RENAL: ICD-10-CM

## 2019-01-21 DIAGNOSIS — F41.9 ANXIETY: ICD-10-CM

## 2019-01-21 DIAGNOSIS — E11.69 COMBINED HYPERLIPIDEMIA ASSOCIATED WITH TYPE 2 DIABETES MELLITUS: ICD-10-CM

## 2019-01-21 LAB
ESTIMATED AVG GLUCOSE: 223 MG/DL
HBA1C MFR BLD HPLC: 9.4 %
PTH-INTACT SERPL-MCNC: 110 PG/ML

## 2019-01-21 PROCEDURE — 83036 HEMOGLOBIN GLYCOSYLATED A1C: CPT

## 2019-01-21 PROCEDURE — 99214 OFFICE O/P EST MOD 30 MIN: CPT | Mod: S$PBB,,, | Performed by: FAMILY MEDICINE

## 2019-01-21 PROCEDURE — 99214 PR OFFICE/OUTPT VISIT, EST, LEVL IV, 30-39 MIN: ICD-10-PCS | Mod: S$PBB,,, | Performed by: FAMILY MEDICINE

## 2019-01-21 PROCEDURE — 99999 PR PBB SHADOW E&M-EST. PATIENT-LVL IV: ICD-10-PCS | Mod: PBBFAC,,, | Performed by: FAMILY MEDICINE

## 2019-01-21 PROCEDURE — 99214 OFFICE O/P EST MOD 30 MIN: CPT | Mod: PBBFAC,PO | Performed by: FAMILY MEDICINE

## 2019-01-21 PROCEDURE — 90662 IIV NO PRSV INCREASED AG IM: CPT | Mod: PBBFAC,PO

## 2019-01-21 PROCEDURE — 83970 ASSAY OF PARATHORMONE: CPT

## 2019-01-21 PROCEDURE — 99999 PR PBB SHADOW E&M-EST. PATIENT-LVL IV: CPT | Mod: PBBFAC,,, | Performed by: FAMILY MEDICINE

## 2019-01-21 PROCEDURE — 36415 COLL VENOUS BLD VENIPUNCTURE: CPT | Mod: PO

## 2019-01-21 RX ORDER — METOPROLOL SUCCINATE 50 MG/1
50 TABLET, EXTENDED RELEASE ORAL DAILY
Qty: 90 TABLET | Refills: 3 | Status: SHIPPED | OUTPATIENT
Start: 2019-01-21 | End: 2020-01-20 | Stop reason: SDUPTHER

## 2019-01-21 RX ORDER — OMEPRAZOLE 20 MG/1
20 CAPSULE, DELAYED RELEASE ORAL DAILY
Qty: 90 CAPSULE | Refills: 3 | Status: SHIPPED | OUTPATIENT
Start: 2019-01-21 | End: 2020-02-04

## 2019-01-21 RX ORDER — ATORVASTATIN CALCIUM 40 MG/1
40 TABLET, FILM COATED ORAL DAILY
Qty: 90 TABLET | Refills: 3 | Status: SHIPPED | OUTPATIENT
Start: 2019-01-21 | End: 2020-01-20 | Stop reason: SDUPTHER

## 2019-01-21 RX ORDER — LISINOPRIL 20 MG/1
20 TABLET ORAL 2 TIMES DAILY
Qty: 180 TABLET | Refills: 3 | Status: SHIPPED | OUTPATIENT
Start: 2019-01-21 | End: 2020-01-20 | Stop reason: SDUPTHER

## 2019-01-21 RX ORDER — NATEGLINIDE 120 MG/1
120 TABLET ORAL 3 TIMES DAILY
Qty: 270 TABLET | Refills: 1 | Status: SHIPPED | OUTPATIENT
Start: 2019-01-21 | End: 2019-04-29 | Stop reason: SDUPTHER

## 2019-01-21 RX ORDER — GLIMEPIRIDE 4 MG/1
TABLET ORAL
Qty: 180 TABLET | Refills: 1 | Status: SHIPPED | OUTPATIENT
Start: 2019-01-21 | End: 2019-04-29 | Stop reason: SDUPTHER

## 2019-01-21 RX ORDER — DULOXETIN HYDROCHLORIDE 60 MG/1
60 CAPSULE, DELAYED RELEASE ORAL DAILY
Qty: 90 CAPSULE | Refills: 3 | Status: SHIPPED | OUTPATIENT
Start: 2019-01-21 | End: 2020-02-19 | Stop reason: SDUPTHER

## 2019-01-21 RX ORDER — FUROSEMIDE 40 MG/1
40 TABLET ORAL DAILY
Qty: 90 TABLET | Refills: 3 | Status: SHIPPED | OUTPATIENT
Start: 2019-01-21 | End: 2020-01-20 | Stop reason: SDUPTHER

## 2019-01-21 NOTE — PROGRESS NOTES
Subjective:      Patient ID: Héctor Desir is a 83 y.o. male.    Chief Complaint: Annual Exam    Problem List Items Addressed This Visit     Anxiety    Overview     He has anxiety that has been going on for a couple of years and he is now dealing with his wife's cancer so this has been helpful with using the cymbalta.   He also had neuropathy and this has helped it.           Atrial fibrillation    Overview     He has chronic afib.  He has had a cardioversion but this did not help. He is following with Dr. Talamantes for this and he is on xarelto for this and is on toprol for rate control.  It is controlled.         CKD (chronic kidney disease) stage 3, GFR 30-59 ml/min    Overview     He has CKD and is seeing Dr. Sims.  He has not seen her in a while and we need to get this done.   CMP  Sodium   Date Value Ref Range Status   01/15/2019 137 136 - 145 mmol/L Final     Potassium   Date Value Ref Range Status   01/15/2019 4.3 3.5 - 5.1 mmol/L Final     Chloride   Date Value Ref Range Status   01/15/2019 103 95 - 110 mmol/L Final     CO2   Date Value Ref Range Status   01/15/2019 25 23 - 29 mmol/L Final     Glucose   Date Value Ref Range Status   01/15/2019 195 (H) 70 - 110 mg/dL Final     BUN, Bld   Date Value Ref Range Status   01/15/2019 25 (H) 8 - 23 mg/dL Final     Creatinine   Date Value Ref Range Status   01/15/2019 1.8 (H) 0.5 - 1.4 mg/dL Final     Calcium   Date Value Ref Range Status   01/15/2019 9.1 8.7 - 10.5 mg/dL Final     Total Protein   Date Value Ref Range Status   01/16/2017 6.7 6.0 - 8.4 g/dL Final     Albumin   Date Value Ref Range Status   08/09/2018 3.5 3.5 - 5.2 g/dL Final     Total Bilirubin   Date Value Ref Range Status   01/16/2017 0.5 0.1 - 1.0 mg/dL Final     Comment:     For infants and newborns, interpretation of results should be based  on gestational age, weight and in agreement with clinical  observations.  Premature Infant recommended reference ranges:  Up to 24 hours.............<8.0  mg/dL  Up to 48 hours............<12.0 mg/dL  3-5 days..................<15.0 mg/dL  6-29 days.................<15.0 mg/dL       Alkaline Phosphatase   Date Value Ref Range Status   01/16/2017 111 55 - 135 U/L Final     AST   Date Value Ref Range Status   01/16/2017 21 10 - 40 U/L Final     ALT   Date Value Ref Range Status   01/16/2017 24 10 - 44 U/L Final     Anion Gap   Date Value Ref Range Status   01/15/2019 9 8 - 16 mmol/L Final     eGFR if    Date Value Ref Range Status   01/15/2019 39.9 (A) >60 mL/min/1.73 m^2 Final     eGFR if non    Date Value Ref Range Status   01/15/2019 34.5 (A) >60 mL/min/1.73 m^2 Final     Comment:     Calculation used to obtain the estimated glomerular filtration  rate (eGFR) is the CKD-EPI equation.                 Combined hyperlipidemia associated with type 2 diabetes mellitus    Overview     The patient presents with hyperlipidemia.  The patient reports tolerating the medication well and is in excellent compliance.  There have been no medication side effects.  The patient denies chest pain, neuropathy, and myalgias.  The patient has reduced fat intake and has been exercising.  Current treatment has included the medications listed in the med card.    Lab Results   Component Value Date    CHOL 114 (L) 01/15/2019    CHOL 102 (L) 03/28/2018    CHOL 110 (L) 01/16/2017       Lab Results   Component Value Date    HDL 29 (L) 01/16/2017    HDL 33 (L) 07/13/2016    HDL 28 (L) 01/12/2016       Lab Results   Component Value Date    LDLCALC 65.0 01/16/2017    LDLCALC 92.0 07/13/2016    LDLCALC 60.0 (L) 01/12/2016       Lab Results   Component Value Date    TRIG 80 01/16/2017    TRIG 120 07/13/2016    TRIG 85 01/12/2016       Lab Results   Component Value Date    CHOLHDL 26.4 01/16/2017    CHOLHDL 22.1 07/13/2016    CHOLHDL 26.7 01/12/2016     Lab Results   Component Value Date    ALT 24 01/16/2017    AST 21 01/16/2017    ALKPHOS 111 01/16/2017    BILITOT 0.5  01/16/2017              Diabetic neuropathy - Primary    Overview     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  It does help it most of the time.         Edema    Overview     He has had swelling of the legs since early 2018.  He has not had a change in his meds but is on amlodipine 10 mg po q day.  He has not had trauma noted.         GERD (gastroesophageal reflux disease)    Overview     The patient presents with GERD.  Denies chest pain, nausea & vomiting, belching, cramping, distention, dyspepsia, dysphagia, hematochezia, melena, abdominal pain and weight loss.  Current treatment has included medications that are listed in medications list with significant response.  There has been no medicine intolerance.  The patient cannot identify any exacerbating factors.  Avoidance of NSAID's, ASA, carbonated beverages and spicy food was discussed.             Hypertension associated with diabetes    Overview     The patient presents with essential hypertension.  The patient is tolerating the medication well and is in excellent compliance.  The patient is experiencing no side effects.  Counseling was offered regarding low salt diets.  The patient has a reduced salt intake.  The patient denies chest pain, palpitations, shortness of breath, dyspnea on exertion, left or murmur neck pain, nausea, vomiting, diaphoresis, paroxysmal nocturnal dyspnea, and orthopnea.   Hypertension Medications             furosemide (LASIX) 40 MG tablet Take 1 tablet (40 mg total) by mouth once daily.    lisinopril (PRINIVIL,ZESTRIL) 20 MG tablet Take 1 tablet (20 mg total) by mouth 2 (two) times daily.    metoprolol succinate (TOPROL-XL) 50 MG 24 hr tablet Take 1 tablet (50 mg total) by mouth once daily.                   Secondary hyperparathyroidism, renal    Overview     He has renally induced hyperparathyroidism.   Lab Results   Component Value Date    PTH 95.0 (H) 08/09/2018    CALCIUM 9.1 01/15/2019     PHOS 3.0 08/09/2018     He has just seen DR. Sims.         Type II diabetes mellitus with neurological manifestations, uncontrolled    Overview     The patient presents with diabetes.  The patient denies polyuria, polydipsia, polyphagia, hypoglycemia and paresthesias.  The patient's glucose control has been good.  Home glucose averages are routinely checked.  The patient is without retinopathy currently.  The patient has no history of neuropathy.  The patient currently complains of no podiatric problems.  The patient has excellent compliance.  Hemoglobin A1C   Date Value Ref Range Status   03/28/2018 7.1 (H) 4.0 - 5.6 % Final     Comment:     According to ADA guidelines, hemoglobin A1c <7.0% represents  optimal control in non-pregnant diabetic patients. Different  metrics may apply to specific patient populations.   Standards of Medical Care in Diabetes-2016.  For the purpose of screening for the presence of diabetes:  <5.7%     Consistent with the absence of diabetes  5.7-6.4%  Consistent with increasing risk for diabetes   (prediabetes)  >or=6.5%  Consistent with diabetes  Currently, no consensus exists for use of hemoglobin A1c  for diagnosis of diabetes for children.  This Hemoglobin A1c assay has significant interference with fetal   hemoglobin   (HbF). The results are invalid for patients with abnormal amounts of   HbF,   including those with known Hereditary Persistence   of Fetal Hemoglobin. Heterozygous hemoglobin variants (HbAS, HbAC,   HbAD, HbAE, HbA2) do not significantly interfere with this assay;   however, presence of multiple variants in a sample may impact the %   interference.     10/13/2017 7.5 (H) 4.0 - 5.6 % Final     Comment:     According to ADA guidelines, hemoglobin A1c <7.0% represents  optimal control in non-pregnant diabetic patients. Different  metrics may apply to specific patient populations.   Standards of Medical Care in Diabetes-2016.  For the purpose of screening for the  presence of diabetes:  <5.7%     Consistent with the absence of diabetes  5.7-6.4%  Consistent with increasing risk for diabetes   (prediabetes)  >or=6.5%  Consistent with diabetes  Currently, no consensus exists for use of hemoglobin A1c  for diagnosis of diabetes for children.  This Hemoglobin A1c assay has significant interference with fetal   hemoglobin   (HbF). The results are invalid for patients with abnormal amounts of   HbF,   including those with known Hereditary Persistence   of Fetal Hemoglobin. Heterozygous hemoglobin variants (HbAS, HbAC,   HbAD, HbAE, HbA2) do not significantly interfere with this assay;   however, presence of multiple variants in a sample may impact the %   interference.     05/22/2017 7.2 (H) 4.5 - 6.2 % Final     Comment:     According to ADA guidelines, hemoglobin A1C <7.0% represents  optimal control in non-pregnant diabetic patients.  Different  metrics may apply to specific populations.   Standards of Medical Care in Diabetes - 2016.  For the purpose of screening for the presence of diabetes:  <5.7%     Consistent with the absence of diabetes  5.7-6.4%  Consistent with increasing risk for diabetes   (prediabetes)  >or=6.5%  Consistent with diabetes  Currently no consensus exists for use of hemoglobin A1C  for diagnosis of diabetes for children.       No results found for: MICROALBUR, DONNA  Diabetes Management Status    Statin: Taking  ACE/ARB: Taking    Screening or Prevention Patient's value Goal Complete/Controlled?   HgA1C Testing and Control   Lab Results   Component Value Date    HGBA1C 7.1 (H) 03/28/2018      Annually/Less than 8% Yes   Lipid profile : 01/15/2019 Annually Yes   LDL control Lab Results   Component Value Date    LDLCALC 61.0 (L) 01/15/2019    Annually/Less than 100 mg/dl  Yes   Nephropathy screening Lab Results   Component Value Date    LABMICR 30 11/20/2012     Lab Results   Component Value Date    PROTEINUA Trace (A) 06/20/2018    Annually Yes    Blood pressure BP Readings from Last 1 Encounters:   01/21/19 (!) 143/92    Less than 140/90 No   Dilated retinal exam : 04/16/2018 Annually Yes   Foot exam   : 03/26/2018 Annually Yes              Type II diabetes mellitus with renal manifestations, uncontrolled    Overview     The patient presents with diabetes.  The patient denies polyuria, polydipsia, polyphagia, hypoglycemia and paresthesias.  The patient's glucose control has been good.  Home glucose averages are routinely checked.  The patient is without retinopathy currently.  The patient has no history of neuropathy.  The patient currently complains of no podiatric problems.  The patient has excellent compliance.  Hemoglobin A1C   Date Value Ref Range Status   10/13/2017 7.5 (H) 4.0 - 5.6 % Final     Comment:     According to ADA guidelines, hemoglobin A1c <7.0% represents  optimal control in non-pregnant diabetic patients. Different  metrics may apply to specific patient populations.   Standards of Medical Care in Diabetes-2016.  For the purpose of screening for the presence of diabetes:  <5.7%     Consistent with the absence of diabetes  5.7-6.4%  Consistent with increasing risk for diabetes   (prediabetes)  >or=6.5%  Consistent with diabetes  Currently, no consensus exists for use of hemoglobin A1c  for diagnosis of diabetes for children.  This Hemoglobin A1c assay has significant interference with fetal   hemoglobin   (HbF). The results are invalid for patients with abnormal amounts of   HbF,   including those with known Hereditary Persistence   of Fetal Hemoglobin. Heterozygous hemoglobin variants (HbAS, HbAC,   HbAD, HbAE, HbA2) do not significantly interfere with this assay;   however, presence of multiple variants in a sample may impact the %   interference.     05/22/2017 7.2 (H) 4.5 - 6.2 % Final     Comment:     According to ADA guidelines, hemoglobin A1C <7.0% represents  optimal control in non-pregnant diabetic patients.  Different  metrics  may apply to specific populations.   Standards of Medical Care in Diabetes - 2016.  For the purpose of screening for the presence of diabetes:  <5.7%     Consistent with the absence of diabetes  5.7-6.4%  Consistent with increasing risk for diabetes   (prediabetes)  >or=6.5%  Consistent with diabetes  Currently no consensus exists for use of hemoglobin A1C  for diagnosis of diabetes for children.     01/16/2017 7.4 (H) 4.5 - 6.2 % Final     Comment:     According to ADA guidelines, hemoglobin A1C <7.0% represents  optimal control in non-pregnant diabetic patients.  Different  metrics may apply to specific populations.   Standards of Medical Care in Diabetes - 2016.  For the purpose of screening for the presence of diabetes:  <5.7%     Consistent with the absence of diabetes  5.7-6.4%  Consistent with increasing risk for diabetes   (prediabetes)  >or=6.5%  Consistent with diabetes  Currently no consensus exists for use of hemoglobin A1C  for diagnosis of diabetes for children.       No results found for: MICROALBUR, [id number]  Diabetes Management Status    Statin: Taking  ACE/ARB: Taking    Screening or Prevention Patient's value Goal Complete/Controlled?   HgA1C Testing and Control   Lab Results   Component Value Date    HGBA1C 7.5 (H) 10/13/2017      Annually/Less than 8% Yes   Lipid profile : 01/16/2017 Annually No   LDL control Lab Results   Component Value Date    LDLCALC 65.0 01/16/2017    Annually/Less than 100 mg/dl  No   Nephropathy screening Lab Results   Component Value Date    LABMICR 30 11/20/2012     Lab Results   Component Value Date    PROTEINUA Negative 08/08/2014    Annually No   Blood pressure BP Readings from Last 1 Encounters:   03/26/18 123/81    Less than 140/90 Yes   Dilated retinal exam : 03/15/2017 Annually No   Foot exam   : 05/22/2017 Annually Yes                Other Visit Diagnoses     Uncontrolled type 2 diabetes mellitus with stage 3 chronic kidney disease, without long-term current  use of insulin              Past Medical History:  Past Medical History:   Diagnosis Date    CKD (chronic kidney disease) stage 3, GFR 30-59 ml/min     Colon polyps 2011    due again in 2019    Combined hyperlipidemia associated with type 2 diabetes mellitus 7/5/2013    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     DM (diabetes mellitus) type II controlled, neurological manifestation 7/5/2013    GERD (gastroesophageal reflux disease)     History of prostate cancer 2006    Hypertension associated with diabetes     Hypertension goal BP (blood pressure) < 130/80     TIA (transient ischemic attack)      Past Surgical History:   Procedure Laterality Date    CARDIAC CATHETERIZATION      CARDIOVERSION N/A 8/10/2018    Performed by Israel Cardoza MD at [facility] C[facility] LAB    CATARACT EXTRACTION W/  INTRAOCULAR LENS IMPLANT  11/2013    Bilateral Cataract     COLONOSCOPY N/A 10/23/2014    Performed by Tae Goel MD at [facility] ENDO    COLONOSCOPY W/ POLYPECTOMY      EYE SURGERY      PROSTATECTOMY  2006    TONSILLECTOMY      TONSILLECTOMY, ADENOIDECTOMY, BILATERAL MYRINGOTOMY AND TUBES       Review of patient's allergies indicates:   Allergen Reactions    Amlodipine      swelling     Current Outpatient Medications on File Prior to Visit   Medication Sig Dispense Refill    atorvastatin (LIPITOR) 40 MG tablet Take 1 tablet (40 mg total) by mouth once daily. 90 tablet 3    DULoxetine (CYMBALTA) 60 MG capsule Take 1 capsule (60 mg total) by mouth once daily. 90 capsule 3    ERGOCALCIFEROL, VITAMIN D2, (VITAMIN D ORAL) Take by mouth.      furosemide (LASIX) 40 MG tablet Take 1 tablet (40 mg total) by mouth once daily. 90 tablet 3    glimepiride (AMARYL) 4 MG tablet TAKE 1 TABLET TWICE A  tablet 1    lisinopril (PRINIVIL,ZESTRIL) 20 MG tablet Take 1 tablet (20 mg total) by mouth 2 (two) times daily. 180 tablet 3    metoprolol succinate (TOPROL-XL) 50 MG 24 hr tablet Take 1  tablet (50 mg total) by mouth once daily. 90 tablet 3    nateglinide (STARLIX) 120 MG tablet Take 1 tablet (120 mg total) by mouth 3 (three) times daily. 270 tablet 1    omeprazole (PRILOSEC) 20 MG capsule Take 1 capsule (20 mg total) by mouth once daily. 90 capsule 3    rivaroxaban (XARELTO) 15 mg Tab Take 1 tablet (15 mg total) by mouth daily with dinner or evening meal. 90 tablet 3    blood sugar diagnostic Strp Use daily- Freestyle lite 300 strip 3    blood-glucose meter kit Use before meals and before bed when the glucoses are not in control.  Otherwise, test it daily once a day before meals randomly to ensure stability of the gluocse. 1 each 0     Current Facility-Administered Medications on File Prior to Visit   Medication Dose Route Frequency Provider Last Rate Last Dose    diclofenac sodium 1 % gel   Topical Daily Tobias Modi MD         Social History     Socioeconomic History    Marital status:      Spouse name: Not on file    Number of children: Not on file    Years of education: Not on file    Highest education level: Not on file   Social Needs    Financial resource strain: Not on file    Food insecurity - worry: Not on file    Food insecurity - inability: Not on file    Transportation needs - medical: Not on file    Transportation needs - non-medical: Not on file   Occupational History    Not on file   Tobacco Use    Smoking status: Never Smoker    Smokeless tobacco: Never Used   Substance and Sexual Activity    Alcohol use: No     Comment: He used to drink but quit in 1990    Drug use: No    Sexual activity: Not on file   Other Topics Concern    Not on file   Social History Narrative    Not on file     Family History   Problem Relation Age of Onset    Heart attack Mother     Melanoma Father     Cancer Father     Anesthesia problems Neg Hx     Clotting disorder Neg Hx     Kidney disease Neg Hx        Review of Systems   Constitutional: Negative.  Negative for  "chills, diaphoresis and fever.   HENT: Negative for congestion, hearing loss, mouth sores, postnasal drip and sore throat.    Eyes: Negative for pain and visual disturbance.   Respiratory: Negative for cough, chest tightness, shortness of breath and wheezing.    Cardiovascular: Negative for chest pain.   Gastrointestinal: Negative for abdominal pain, anal bleeding, blood in stool, constipation, diarrhea, nausea and vomiting.   Genitourinary: Negative for dysuria and hematuria.   Musculoskeletal: Negative for back pain, neck pain and neck stiffness.   Skin: Negative for rash.   Neurological: Negative for dizziness and weakness.       Objective:     /80   Pulse 96   Temp 98.2 °F (36.8 °C) (Oral)   Ht 5' 8" (1.727 m)   Wt 104.3 kg (230 lb)   BMI 34.97 kg/m²     Physical Exam   Constitutional: He is oriented to person, place, and time. He appears well-developed and well-nourished.   HENT:   Head: Normocephalic and atraumatic.   Right Ear: External ear normal.   Left Ear: External ear normal.   Nose: Nose normal.   Mouth/Throat: Oropharynx is clear and moist. No oropharyngeal exudate.   Eyes: Conjunctivae and EOM are normal. Pupils are equal, round, and reactive to light. Right eye exhibits no discharge. Left eye exhibits no discharge. No scleral icterus.   Neck: Normal range of motion. Neck supple. No JVD present. No thyromegaly present.   Cardiovascular: Normal rate, regular rhythm, normal heart sounds and intact distal pulses. Exam reveals no gallop and no friction rub.   No murmur heard.  Pulmonary/Chest: Effort normal and breath sounds normal. No respiratory distress. He has no wheezes. He has no rales. He exhibits no tenderness.   Abdominal: Soft. Bowel sounds are normal. He exhibits no distension and no mass. There is no tenderness. There is no rebound and no guarding.   Musculoskeletal: Normal range of motion. He exhibits no edema or tenderness.   Lymphadenopathy:     He has no cervical adenopathy. "   Neurological: He is alert and oriented to person, place, and time. No cranial nerve deficit. Coordination normal.   Skin: Skin is warm and dry. He is not diaphoretic.   Psychiatric: He has a normal mood and affect.     Assessment:     1. Diabetic polyneuropathy associated with type 2 diabetes mellitus    2. Type II diabetes mellitus with neurological manifestations, uncontrolled    3. CKD (chronic kidney disease) stage 3, GFR 30-59 ml/min    4. Combined hyperlipidemia associated with type 2 diabetes mellitus    5. Type II diabetes mellitus with renal manifestations, uncontrolled    6. Secondary hyperparathyroidism, renal    7. Chronic atrial fibrillation    8. Gastroesophageal reflux disease, esophagitis presence not specified    9. Hypertension associated with diabetes    10. Uncontrolled type 2 diabetes mellitus with stage 3 chronic kidney disease, without long-term current use of insulin    11. Anxiety    12. Persistent atrial fibrillation    13. Atrial fibrillation, unspecified type    14. Edema, unspecified type        Plan:     Problem List Items Addressed This Visit     Anxiety    Relevant Medications    DULoxetine (CYMBALTA) 60 MG capsule    Atrial fibrillation    Relevant Medications    rivaroxaban (XARELTO) 15 mg Tab    metoprolol succinate (TOPROL-XL) 50 MG 24 hr tablet    CKD (chronic kidney disease) stage 3, GFR 30-59 ml/min    Relevant Orders    Ambulatory referral to Nephrology    Combined hyperlipidemia associated with type 2 diabetes mellitus    Relevant Medications    nateglinide (STARLIX) 120 MG tablet    glimepiride (AMARYL) 4 MG tablet    atorvastatin (LIPITOR) 40 MG tablet    Diabetic neuropathy - Primary    Relevant Medications    nateglinide (STARLIX) 120 MG tablet    glimepiride (AMARYL) 4 MG tablet    DULoxetine (CYMBALTA) 60 MG capsule    Edema    Relevant Medications    furosemide (LASIX) 40 MG tablet    GERD (gastroesophageal reflux disease)    Relevant Medications    omeprazole  (PRILOSEC) 20 MG capsule    Hypertension associated with diabetes    Relevant Medications    nateglinide (STARLIX) 120 MG tablet    glimepiride (AMARYL) 4 MG tablet    metoprolol succinate (TOPROL-XL) 50 MG 24 hr tablet    lisinopril (PRINIVIL,ZESTRIL) 20 MG tablet    furosemide (LASIX) 40 MG tablet    Secondary hyperparathyroidism, renal    Relevant Orders    PTH, intact    Type II diabetes mellitus with neurological manifestations, uncontrolled    Relevant Medications    nateglinide (STARLIX) 120 MG tablet    glimepiride (AMARYL) 4 MG tablet    Other Relevant Orders    Hemoglobin A1c    Influenza - High Dose (65+) (PF) (IM)    Ambulatory referral to Optometry    Type II diabetes mellitus with renal manifestations, uncontrolled    Relevant Medications    nateglinide (STARLIX) 120 MG tablet    glimepiride (AMARYL) 4 MG tablet      Other Visit Diagnoses     Uncontrolled type 2 diabetes mellitus with stage 3 chronic kidney disease, without long-term current use of insulin        Relevant Medications    nateglinide (STARLIX) 120 MG tablet    glimepiride (AMARYL) 4 MG tablet        No Follow-up on file.

## 2019-01-22 ENCOUNTER — TELEPHONE (OUTPATIENT)
Dept: CARDIOLOGY | Facility: CLINIC | Age: 84
End: 2019-01-22

## 2019-01-22 NOTE — TELEPHONE ENCOUNTER
Attempted to reach patient at home and was disconnected.Attempted on mobile phone and no answer.Left message with instructions.

## 2019-01-23 ENCOUNTER — CLINICAL SUPPORT (OUTPATIENT)
Dept: CARDIOLOGY | Facility: CLINIC | Age: 84
End: 2019-01-23
Attending: INTERNAL MEDICINE
Payer: MEDICARE

## 2019-01-23 DIAGNOSIS — I48.20 CHRONIC ATRIAL FIBRILLATION: ICD-10-CM

## 2019-01-23 DIAGNOSIS — R06.02 SOB (SHORTNESS OF BREATH): ICD-10-CM

## 2019-01-23 DIAGNOSIS — E11.59 HYPERTENSION ASSOCIATED WITH DIABETES: ICD-10-CM

## 2019-01-23 DIAGNOSIS — I15.2 HYPERTENSION ASSOCIATED WITH DIABETES: ICD-10-CM

## 2019-01-23 DIAGNOSIS — N18.30 CKD (CHRONIC KIDNEY DISEASE) STAGE 3, GFR 30-59 ML/MIN: ICD-10-CM

## 2019-01-23 DIAGNOSIS — R07.9 CHEST PAIN, UNSPECIFIED TYPE: ICD-10-CM

## 2019-01-23 PROCEDURE — 78452 NM MULTI TREAD STRESS CARDIAC COMPONENT: ICD-10-PCS | Mod: 26,S$PBB,, | Performed by: INTERNAL MEDICINE

## 2019-01-23 PROCEDURE — 78452 HT MUSCLE IMAGE SPECT MULT: CPT | Mod: PBBFAC,PO | Performed by: INTERNAL MEDICINE

## 2019-01-23 PROCEDURE — 93018 NM MULTI TREAD STRESS CARDIAC COMPONENT: ICD-10-PCS | Mod: S$PBB,,, | Performed by: INTERNAL MEDICINE

## 2019-01-23 PROCEDURE — 93016 CV STRESS TEST SUPVJ ONLY: CPT | Mod: S$PBB,,, | Performed by: INTERNAL MEDICINE

## 2019-01-23 PROCEDURE — 93018 CV STRESS TEST I&R ONLY: CPT | Mod: S$PBB,,, | Performed by: INTERNAL MEDICINE

## 2019-01-23 PROCEDURE — 93016 NM MULTI TREAD STRESS CARDIAC COMPONENT: ICD-10-PCS | Mod: S$PBB,,, | Performed by: INTERNAL MEDICINE

## 2019-01-24 ENCOUNTER — TELEPHONE (OUTPATIENT)
Dept: FAMILY MEDICINE | Facility: CLINIC | Age: 84
End: 2019-01-24

## 2019-01-24 DIAGNOSIS — R79.89 ELEVATED PTHRP LEVEL: Primary | ICD-10-CM

## 2019-01-24 DIAGNOSIS — E11.29 TYPE 2 DIABETES MELLITUS WITH OTHER DIABETIC KIDNEY COMPLICATION, WITHOUT LONG-TERM CURRENT USE OF INSULIN: Primary | ICD-10-CM

## 2019-01-24 RX ORDER — METFORMIN HYDROCHLORIDE 1000 MG/1
1000 TABLET ORAL 2 TIMES DAILY WITH MEALS
Qty: 180 TABLET | Refills: 3 | Status: SHIPPED | OUTPATIENT
Start: 2019-01-24 | End: 2019-01-29 | Stop reason: SDUPTHER

## 2019-01-24 NOTE — TELEPHONE ENCOUNTER
----- Message from Tae Goel MD sent at 1/23/2019  7:08 PM CST -----  The a1c is abnormal.  Start metformin 1000 mg po bid.   Refer to the diabetic educator.    Recheck an a1c in 3 months.  Continue other medicine.     His PTH is high.  Refer to endocrinology for hyperparathyroidism.

## 2019-01-24 NOTE — PROGRESS NOTES
The a1c is abnormal.  Start metformin 1000 mg po bid.   Refer to the diabetic educator.    Recheck an a1c in 3 months.  Continue other medicine.     His PTH is high.  Refer to endocrinology for hyperparathyroidism.

## 2019-01-25 ENCOUNTER — TELEPHONE (OUTPATIENT)
Dept: CARDIOLOGY | Facility: CLINIC | Age: 84
End: 2019-01-25

## 2019-01-25 NOTE — TELEPHONE ENCOUNTER
I have signed for the following orders AND/OR meds.  Please call the patient and ask the patient to schedule the testing AND/OR inform about any medications that were sent.      Orders Placed This Encounter   Procedures    Hemoglobin A1c     Standing Status:   Standing     Number of Occurrences:   99     Standing Expiration Date:   3/24/2020       Medications Ordered This Encounter   Medications    metFORMIN (GLUCOPHAGE) 1000 MG tablet     Sig: Take 1 tablet (1,000 mg total) by mouth 2 (two) times daily with meals.     Dispense:  180 tablet     Refill:  3

## 2019-01-25 NOTE — TELEPHONE ENCOUNTER
I have signed for the following orders AND/OR meds.  Please call the patient and ask the patient to schedule the testing AND/OR inform about any medications that were sent.      Orders Placed This Encounter   Procedures    Ambulatory consult to Diabetic Education     Referral Priority:   Routine     Referral Type:   Consultation     Referral Reason:   Specialty Services Required     Requested Specialty:   Diabetes     Number of Visits Requested:   1     Expiration Date:   1/24/2020

## 2019-01-29 ENCOUNTER — TELEPHONE (OUTPATIENT)
Dept: FAMILY MEDICINE | Facility: CLINIC | Age: 84
End: 2019-01-29

## 2019-01-29 RX ORDER — METFORMIN HYDROCHLORIDE 1000 MG/1
1000 TABLET ORAL 2 TIMES DAILY WITH MEALS
Qty: 180 TABLET | Refills: 3 | Status: SHIPPED | OUTPATIENT
Start: 2019-01-29 | End: 2019-02-14

## 2019-01-29 NOTE — TELEPHONE ENCOUNTER
The safest option would be tylenol take two 500 mg tablets every 8 hours in the day.  If that does not help, we can add other things.

## 2019-01-29 NOTE — TELEPHONE ENCOUNTER
Pt states he is having joint pain due to his arthritis. He asks if there is a medication that he can take for this.

## 2019-02-14 ENCOUNTER — TELEPHONE (OUTPATIENT)
Dept: CARDIOLOGY | Facility: CLINIC | Age: 84
End: 2019-02-14

## 2019-02-14 ENCOUNTER — OFFICE VISIT (OUTPATIENT)
Dept: DIABETES | Facility: CLINIC | Age: 84
End: 2019-02-14
Payer: MEDICARE

## 2019-02-14 VITALS — WEIGHT: 231.38 LBS | BODY MASS INDEX: 35.07 KG/M2 | HEIGHT: 68 IN

## 2019-02-14 DIAGNOSIS — E11.8 TYPE 2 DIABETES MELLITUS WITH COMPLICATION, UNSPECIFIED WHETHER LONG TERM INSULIN USE: Primary | ICD-10-CM

## 2019-02-14 LAB — GLUCOSE SERPL-MCNC: 182 MG/DL (ref 70–110)

## 2019-02-14 PROCEDURE — 99999 PR PBB SHADOW E&M-EST. PATIENT-LVL III: CPT | Mod: PBBFAC,,, | Performed by: NURSE PRACTITIONER

## 2019-02-14 PROCEDURE — 99215 OFFICE O/P EST HI 40 MIN: CPT | Mod: S$PBB,,, | Performed by: NURSE PRACTITIONER

## 2019-02-14 PROCEDURE — 82962 GLUCOSE BLOOD TEST: CPT | Mod: PBBFAC,PO | Performed by: NURSE PRACTITIONER

## 2019-02-14 PROCEDURE — 99215 PR OFFICE/OUTPT VISIT, EST, LEVL V, 40-54 MIN: ICD-10-PCS | Mod: S$PBB,,, | Performed by: NURSE PRACTITIONER

## 2019-02-14 PROCEDURE — 99999 PR PBB SHADOW E&M-EST. PATIENT-LVL III: ICD-10-PCS | Mod: PBBFAC,,, | Performed by: NURSE PRACTITIONER

## 2019-02-14 PROCEDURE — 99213 OFFICE O/P EST LOW 20 MIN: CPT | Mod: PBBFAC,PO | Performed by: NURSE PRACTITIONER

## 2019-02-14 RX ORDER — PEN NEEDLE, DIABETIC 30 GX3/16"
1 NEEDLE, DISPOSABLE MISCELLANEOUS DAILY
Qty: 100 EACH | Refills: 11 | Status: SHIPPED | OUTPATIENT
Start: 2019-02-14 | End: 2019-05-06

## 2019-02-14 RX ORDER — INSULIN GLARGINE 300 [IU]/ML
10 INJECTION, SOLUTION SUBCUTANEOUS NIGHTLY
Qty: 3 SYRINGE | Refills: 2 | Status: SHIPPED | OUTPATIENT
Start: 2019-02-14 | End: 2019-02-20 | Stop reason: CLARIF

## 2019-02-14 NOTE — LETTER
February 14, 2019      Tae Goel MD  [address] [facility] [address]           [place] [facility] Diabetes [facility]  [address] [facility] [address]  Phone: [phone]  Fax: [phone]          Patient: Héctor Desir   MR Number: [id number]   YOB: 1935   Date of Visit: 2/14/2019       Dear Dr. Tae Goel:    Thank you for referring Héctor Desir to me for evaluation. Attached you will find relevant portions of my assessment and plan of care.    If you have questions, please do not hesitate to call me. I look forward to following Héctor Desir along with you.    Sincerely,    Judith Howard, RAE    Enclosure  CC:  No Recipients    If you would like to receive this communication electronically, please contact [email or web address] or [phone] to request more information on [employer] access.    For providers and/or their staff who would like to refer a patient to [facility], please contact us through our one-stop-shop provider referral line, [facility], at [phone].    If you feel you have received this communication in error or would no longer like to receive these types of communications, please e-mail [email or web address]

## 2019-02-14 NOTE — PROGRESS NOTES
"Subjective:         Patient ID: Héctor Desir is a 83 y.o. male.  Patient's current PCP is Tae Goel MD.       Chief Complaint: No chief complaint on file.    HPI  Héctor Desir is a 83 y.o. White male presenting for a new consult for diabetes. Patient has been diagnosed with diabetes for several years and has the following complications from diabetes: nephropathy, peripheral neuropathy, cardiovascular disease and cerebrovascular disease, HTN, Hyperlipidemia. Blood glucose testing is performed regularly. The patient reports medication compliance all of the time and diet noncompliance much of the time. Patient had been controlled in the past but after the death of 3 grandchildrens and 2 wives, had to move out of home onto his son's property his condition worsened. He does not have prepared meals. He is considering a meal prep plan to help. He is currently taking Starlix, Glimepiride and was recently started on Metformin. He has been on Metformin in the past, but taken off due to renal function. Condition: uncontrolled , worsened He denies recent hospital admissions, denies emergency room visits, denies hypoglycemia.      Height: 5' 8" (172.7 cm)  //  Weight: 105 kg (231 lb 6.4 oz), Body mass index is 35.18 kg/m².  Home Blood Glucose reading this AM:  mg/dl fasting.  His blood sugar in clinic today is:   Lab Results   Component Value Date    POCGLU 182 (A) 02/14/2019       Labs reviewed and are noted below.    His most recent A1C is:  Lab Results   Component Value Date    HGBA1C 9.4 (H) 01/21/2019    HGBA1C 7.1 (H) 03/28/2018    HGBA1C 7.5 (H) 10/13/2017     No results found for: CPEPTIDE  No results found for: GLUTAMICACID  Lab Results   Component Value Date    WBC 7.00 01/15/2019    HGB 15.4 01/15/2019    HCT 46.5 01/15/2019     01/15/2019    CHOL 114 (L) 01/15/2019    TRIG 120 01/15/2019    HDL 29 (L) 01/15/2019    ALT 24 01/16/2017    AST 21 01/16/2017     01/15/2019    K 4.3 01/15/2019    CL " 103 01/15/2019    CREATININE 1.8 (H) 01/15/2019    BUN 25 (H) 01/15/2019    CO2 25 01/15/2019    TSH 1.679 03/28/2018    PSA 0.09 03/28/2018    GLUF 134 (H) 07/10/2009    HGBA1C 9.4 (H) 01/21/2019     CMP  Sodium   Date Value Ref Range Status   01/15/2019 137 136 - 145 mmol/L Final     Potassium   Date Value Ref Range Status   01/15/2019 4.3 3.5 - 5.1 mmol/L Final     Chloride   Date Value Ref Range Status   01/15/2019 103 95 - 110 mmol/L Final     CO2   Date Value Ref Range Status   01/15/2019 25 23 - 29 mmol/L Final     Glucose   Date Value Ref Range Status   01/15/2019 195 (H) 70 - 110 mg/dL Final     BUN, Bld   Date Value Ref Range Status   01/15/2019 25 (H) 8 - 23 mg/dL Final     Creatinine   Date Value Ref Range Status   01/15/2019 1.8 (H) 0.5 - 1.4 mg/dL Final     Calcium   Date Value Ref Range Status   01/15/2019 9.1 8.7 - 10.5 mg/dL Final     Total Protein   Date Value Ref Range Status   01/16/2017 6.7 6.0 - 8.4 g/dL Final     Albumin   Date Value Ref Range Status   08/09/2018 3.5 3.5 - 5.2 g/dL Final     Total Bilirubin   Date Value Ref Range Status   01/16/2017 0.5 0.1 - 1.0 mg/dL Final     Comment:     For infants and newborns, interpretation of results should be based  on gestational age, weight and in agreement with clinical  observations.  Premature Infant recommended reference ranges:  Up to 24 hours.............<8.0 mg/dL  Up to 48 hours............<12.0 mg/dL  3-5 days..................<15.0 mg/dL  6-29 days.................<15.0 mg/dL       Alkaline Phosphatase   Date Value Ref Range Status   01/16/2017 111 55 - 135 U/L Final     AST   Date Value Ref Range Status   01/16/2017 21 10 - 40 U/L Final     ALT   Date Value Ref Range Status   01/16/2017 24 10 - 44 U/L Final     Anion Gap   Date Value Ref Range Status   01/15/2019 9 8 - 16 mmol/L Final     eGFR if    Date Value Ref Range Status   01/15/2019 39.9 (A) >60 mL/min/1.73 m^2 Final     eGFR if non    Date Value  Ref Range Status   01/15/2019 34.5 (A) >60 mL/min/1.73 m^2 Final     Comment:     Calculation used to obtain the estimated glomerular filtration  rate (eGFR) is the CKD-EPI equation.            CURRENT DM MEDICATIONS:   Diabetes Medications             glimepiride (AMARYL) 4 MG tablet TAKE 1 TABLET TWICE A DAY    metFORMIN (GLUCOPHAGE) 1000 MG tablet Take 1 tablet (1,000 mg total) by mouth 2 (two) times daily with meals.    nateglinide (STARLIX) 120 MG tablet Take 1 tablet (120 mg total) by mouth 3 (three) times daily.              Health Maintenance   Topic Date Due    Zoster Vaccine  04/30/1997    Foot Exam  03/26/2019    Eye Exam  04/16/2019    Hemoglobin A1c  07/21/2019    Colonoscopy  10/23/2019    Lipid Panel  01/15/2020    TETANUS VACCINE  07/05/2023    Pneumococcal Vaccine (65+ Low/Medium Risk)  Completed    Influenza Vaccine  Completed       STANDARDS OF CARE:  Current Ophthalmologist/Optometrist: recommended annually;UTD     Current Dentist: recommended annually  Current Podiatrist: recommended annually; due   He  is to be enrolled in diabetes education classes.     LIFESTYLE:  ACTIVITY LEVEL: Sedentary  EXERCISE: none  MEAL PLANNING: Patient reports number of meals per day to be 3 and number of snacks per day to be 2  BLOOD GLUCOSE TESTING: Patient is testing 1 times per day       Review of Systems   Constitutional: Negative for activity change and appetite change.   Eyes: Negative for visual disturbance.   Gastrointestinal: Negative for constipation and diarrhea.   Endocrine: Negative for polydipsia, polyphagia and polyuria.   Neurological: Negative for syncope and weakness.   Psychiatric/Behavioral: Negative for confusion.         Objective:      Physical Exam   Constitutional: He is oriented to person, place, and time. He appears well-developed and well-nourished. No distress.   Neck: Normal range of motion.   Cardiovascular: Normal rate.   Pulmonary/Chest: Effort normal.   Musculoskeletal:  "Normal range of motion.   Neurological: He is alert and oriented to person, place, and time.   Skin: Skin is warm and dry. He is not diaphoretic.   Psychiatric: He has a normal mood and affect. His behavior is normal. Judgment and thought content normal.       Assessment:       1. Type 2 diabetes mellitus with complication, unspecified whether long term insulin use        Plan:   Type 2 diabetes mellitus with complication, unspecified whether long term insulin use  -     POCT Glucose, Hand-Held Device  -     insulin glargine, TOUJEO, (TOUJEO SOLOSTAR U-300 INSULIN) 300 unit/mL (1.5 mL) InPn pen; Inject 10 Units into the skin every evening.  Dispense: 3 Syringe; Refill: 2  -     pen needle, diabetic 32 gauge x 5/32" Ndle; 1 each by Misc.(Non-Drug; Combo Route) route once daily.  Dispense: 100 each; Refill: 11            Additional Plan Details:    1.) Patient was instructed to monitor blood glucose 3x daily, fasting and ac dinner or at bedtime. Discussed ADA goal for fasting blood sugar, 80 - 130mg/dL; pp blood sugars below 180 mg/dl. Also, discussed prevention of hypoglycemia and the need to adjust goals to higher levels if persistent hypoglycemia.  Reminded to bring BG records or meter to each visit for review. Patient instructed to send in log weekly for review and potential medication adjustments.  2.) Reviewed pathophysiology of Type 2 diabetes, complications related to the disease, importance of annual dilated eye exam and daily foot examination.  3.) We discussed the ADA recommendations, which are as follows:  Hemoglobin A1c below 7.0 %. All patients with diabetes should be on statins unless contraindicated.  ACE or ARB therapy if not contraindicated.    4.) Start Toujeo 10 units HS (I will start this due to A1C near 10 and renal function, hopefully this can be stop and previous plan resumed as diet improves),Stop Metformin, Reduce Glimepiride 1/2 tab daily to avoid hypoglycemia, Continue Starlix  5.) Meal " planning teaching: Reviewed carb counting, portion control, importance of spacing meals throughout the day to prevent post prandial elevations.  6.) Discussed activity with related benefits, methods, and precautions. Recommended patient start or continue some form of exercise and increase as tolerated to 30 minutes per day to aid in control of BGs.  7.) A1C, TSH, Lipid Panel, CMP with eGFR and Micro/Creatinine are utd or were ordered per ADA protocol.  8.) Return to clinic in 2 weeks for follow up. The patient was explained the above plan and given opportunity to ask questions.  He understands, chooses and consents to this plan and accepts all the risks, which include but are not limited to the risks mentioned above. He understands the alternative of having no testing, interventions or treatments at this time. He left content and without further questions.     A total of 60 minutes was spent in face to face time, of which over 50% was spent in counseling patient on disease process, complications, treatment, and side effects of medications.

## 2019-02-14 NOTE — PATIENT INSTRUCTIONS
PATIENT INSTRUCTIONS  - Follow up as scheduled.   - Carb Count: 30-45G/meal and 15G/snack  - Exercise: Goal is 150 minutes or more per week  - Bring meter and blood sugar log to each appointment.     Medication instructions:   Start Toujeo 10 units at the same time each night     Stop Metformin     Take half tablet (2 mg) of Glimepiride 2 times day     Continue Starlix      - Blood Sugar Goals:  1. The goal for fasting blood sugars is 90 -130 mg/dl.   2. The goal for the 2 hour after meal blood sugars is below 180 mg/dl.  3. Blood sugars below 70 are considered LOW and you must eat or drink 15G of carbohydrates immediately, then recheck blood sugar after 15 minutes to ensure blood sugar has returned to normal range, (70 or above)                                                              Diabetes (General Information)  Diabetes is a long-term health problem. It means your body does not make enough insulin. Or it may mean that your body cannot use the insulin it makes. Insulin is a hormone in your body. It lets blood sugar (glucose) reach the cells in your body. All of your cells need glucose for fuel.  When you have diabetes, the glucose in your blood builds up because it cannot get into the cells. This buildup is called high blood sugar (hyperglycemia).  Your blood sugar level depends on several things. It depends on what kind of food you eat and how much of it you eat. It also depends on how much exercise you get, and how much insulin you have in your body. Eating too much of the wrong kinds of food or not taking diabetes medicine on time can cause high blood sugar. Infections can cause high blood sugar even if you are taking medicines correctly.  These things can also cause low blood sugar:  · Missing meals  · Not eating enough food  · Taking too much diabetes medicine  Diabetes can cause serious problems over time if you do not get treated. These problems include heart disease, stroke, kidney failure, and  blindness. They also include nerve pain or loss of feeling in your legs and feet, and gangrene of the feet. By keeping your blood sugar under control you can prevent or delay these problems.  Normal blood sugar levels are 80 to 100 before a meal and less than 180 in the 1 to 2 hours after a meal.  Home care  Follow these guidelines when caring for yourself at home:  · Follow the diet your healthcare provider gives you. Take insulin or other diabetes medicine exactly as told to.  · Watch your blood sugar as you are told to. Keep a log of your results. This will help your provider change your medicines to keep your blood sugar under control.  · Try to reach your ideal weight. You may be able to cut back on or not have to take diabetes medicine if you eat the right foods and get exercise.  · Do not smoke. Smoking worsens the effects of diabetes on your circulation. You are much more likely to have a heart attack if you have diabetes and you smoke.  · Take good care of your feet. If you have lost feeling in your feet, you may not see an injury or infection. Check your feet and between your toes at least once a week.  · Wear a medical alert bracelet or necklace, or carry a card in your wallet that says you have diabetes. This will help healthcare providers give you the right care if you get very ill and cannot tell them that you have diabetes.  Sick day plan  If you get a cold, the flu, or a bacterial or viral infection, take these steps:  · Look at your diabetes sick plan and call your healthcare provider as you were told to. You may need to call your provider right away if:  ¨ Your blood sugar is above 240 while taking your diabetes medicine  ¨ Your urine ketone levels are above normal or high  ¨ You have been vomiting more than 6 hours  ¨ You have trouble breathing or your breath ha s a fruity smell  ¨ You have a high fever  ¨ You have a fever for several days and you are not getting better  ¨ You get light-headed and  are sleepier than usual  · Keep taking your diabetes pills (oral medicine) even if you have been vomiting and are feeling sick. Call your provider right away because you may need insulin to lower your blood sugar until you recover from your illness.  · Keep taking your insulin even if you have been vomiting and are feeling sick. Call your provider right away to ask if you need to change your insulin dose. This will depend on your blood sugar results.  · Check your blood sugar every 2 to 4 hours, or at least 4 times a day.  · Check your ketones often. If you are vomiting and having diarrhea, watch them more often.  · Do not skip meals. Try to eat small meals on a regular schedule. Do this even if you do not feel like eating.  · Drink water or other liquids that do not have caffeine or calories. This will keep you from getting dehydrated. If you are nauseated or vomiting, takes small sips every 5 minutes. To prevent dehydration try to drink a cup (8 ounces) of fluids every hour while you are awake.  General care  Always bring a source of fast-acting sugar with you in case you have symptoms of low blood sugar (below 70). At the first sign of low blood sugar, eat or drink 15 to 20 grams of fast-acting sugar to raise your blood sugar. Examples are:  · 3 to 4 glucose tablets. You can buy these at most drugstores.  · 4 ounces (1/2 cup) of regular (not diet) soft drinks  · 4 ounces (1/2 cup) of any fruit juice  · 8 ounces (1 cup) of milk  · 5 to 6 pieces of hard candy  · 1 tablespoon of honey  Check your blood sugar 15 minutes after treating yourself. If it is still below 70, take 15 to 20 more grams of fast-acting sugar. Test again in 15 minutes. If it returns to normal (70 or above), eat a snack or meal to keep your blood sugar in a safe range. If it stays low, call your doctor or go to an emergency room.  Follow-up care  Follow-up with your healthcare provider, or as advised. For more information about diabetes, visit  the American Diabetes Association website at www.diabetes.org or call 174-107-5768.  When to seek medical advice  Call your healthcare provider right away if you have any of these symptoms of high blood sugar:  · Frequent urination  · Dizziness  · Drowsiness  · Thirst  · Headache  · Nausea or vomiting  · Abdominal pain  · Eyesight changes  · Fast breathing  · Confusion or loss of consciousness  Also call your provider right away if you have any of these signs of low blood sugar:  · Fatigue  · Headache  · Shakes  · Excess sweating  · Hunger  · Feeling anxious or restless  · Eyesight changes  · Drowsiness  · Weakness  · Confusion or loss of consciousness  Call 911  Call for emergency help right away if any of these occur:  · Chest pain or shortness of breath  · Dizziness or fainting  · Weakness of an arm or leg or one side of the face  · Trouble speaking or seeing   Date Last Reviewed: 6/1/2016  © 7899-4339 [employer]. [address] [place] [address]. All rights reserved. This information is not intended as a substitute for professional medical care. Always follow your healthcare professional's instructions.                                                                     Understanding Type 2 Diabetes  When your body is working normally, the food you eat is digested and used as fuel. This fuel supplies energy to the bodys cells. When you have diabetes, the fuel cant enter the cells. Without treatment, diabetes can cause serious long-term health problems.     Your body breaks down the food you eat into glucose.          How the body gets energy  The digestive system breaks down food, resulting in a sugar called glucose. Some of this glucose is stored in the liver. But most of it enters the bloodstream and travels to the cells to be used as fuel. Glucose needs the help of a hormone called insulin to enter the cells. Insulin is made in the pancreas. It is released into the bloodstream in  response to the presence of glucose in the blood. Think of insulin as a key. When insulin reaches a cell, it attaches to the cell wall. This signals the cell to create an opening that allows glucose to enter the cell.  When you have type 2 diabetes  Early in type 2 diabetes, your cells dont respond properly to insulin. Because of this, less glucose than normal moves into cells. This is called insulin resistance. In response, the pancreas makes more insulin. But eventually, the pancreas cant produce enough insulin to overcome insulin resistance. As less and less glucose enters cells, it builds up to a harmful level in the bloodstream. This is known as high blood sugar or hyperglycemia. The result is type 2 diabetes. The cells become starved for energy, which can leave you feeling tired and rundown.  Why high blood sugar is a problem  If high blood sugar is not controlled, blood vessels throughout the body become damaged. Prolonged high blood sugar affects organs, blood vessels, and nerves. As a result, the risks of damage to the heart, kidneys, eyes, and limbs increase. Diabetes also makes other problems, such as high blood pressure and high cholesterol, more dangerous. Over time, people with uncontrolled high blood sugar have an increase in risk of dying of, or being disabled by, heart attack or stroke.  Date Last Reviewed: 7/1/2016  © 7758-5565 [employer], [employer]. [address] [place] [address], [place] [address]. All rights reserved. This information is not intended as a substitute for professional medical care. Always follow your healthcare professional's instructions.                                                            Using a Blood Sugar Log    You have diabetes. This means your body has trouble regulating a sugar called glucose. To help manage your diabetes, youll need to check your blood sugar level as directed by your healthcare provider. Keeping a log of your blood sugar levels will help you track  your blood sugar readings. Its a simple and easy way to see how well you are controlling your diabetes.  Checking your blood sugar level  You can check your blood sugar level with a blood glucose meter. Youll first prick the side of your finger with a tiny lancet to draw a tiny drop of blood onto the test strip. Some glucose meters let you use another place on your body to test. But these other places should not be used in some cases as they may be inaccurate. Follow the instructions for your glucose meter. And talk with your healthcare provider before doing the test on other places.  The strip goes into the meter first, then a drop of blood is placed on the tip of the strip. The meter then shows a reading that tells you the level of your blood sugar. Your readings should be in your target range as often as possible. This means not too high or too low. Staying in this range helps lower your risk for complications. Your healthcare provider will help you figure out the target range that is best for you.  Tracking your readings  Every time you check your blood sugar, use your log to keep track of your readings. Your meter will also probably have a memory feature that your healthcare provider can check at your next visit. You may be advised by your healthcare provider to check your blood sugar in the morning, at bedtime, and before and after meals. Be sure to write down all of your numbers. Also use your log to record things that might have affected your blood sugar. Some examples include being sick, certain medicines, being physically active, feeling stressed, or skipping meals.   Lessons learned from your readings  Tracking your blood sugar readings helps you see patterns. These patterns tell you how your actions affect your blood sugar. For instance, you may have higher numbers after eating certain foods or lower numbers after exercise. They just help you understand how to stay in your target range more often, so  that your diabetes remains in good control.  Sharing your log with your healthcare team  Bring your blood sugar log and glucose meter with you to all of your healthcare appointments. This can help your healthcare team make changes to your treatment plan, if needed. This may involve making changes in what you eat, what medicines you take, or how much you exercise.  To learn more  The resources below can help you learn more:  · American Diabetes Association 629-311-3156 www.diabetes.org  · [employer] 362-013-9550 www.lighthouse.org  · [employer] [facility] [place] 721-527-9197 www.nei.nih.gov  · [employer] 444-862-9920 www.hormone.org  Date Last Reviewed: 5/1/2016 © 2000-2016 [employer]. [address] [place] [address], [place] [address]. All rights reserved. This information is not intended as a substitute for professional medical care. Always follow your healthcare professional's instructions.                                                                                            Diabetes: Exams and Tests    For your diabetes care, you may see your primary care provider or a specialist 2 to 4 times a year, or as directed. This page lists some of the regular exams and tests recommended for people with diabetes. To learn more, contact the American Diabetes Association (913-494-7116, www.diabetes.org).  Tests and immunizations  These should be done at least as often as stated below:  · Blood pressure check: every healthcare provider visit  · A1C: at first, every 3 months; if controlled, then every 3 to 6 months   · Cholesterol and blood lipid tests: at least every 12 months.  · Urine tests for kidney function: every 12 months  · Flu shots: once a year  · Pneumonia shots: talk with your healthcare provider about which pneumonia vaccines are right for you  · Hepatitis B shots: as soon as possible if youre under 60, or as advised by your healthcare provider if youre older than 60  · Shingles  vaccine after age 60, even if you have already had shingles   · Other tests or vaccines: as advised by your healthcare provider  · Individualized medical nutrition therapy: at least once, then as needed  · Stop smoking counseling, if you still smoke, at each visit   Regular exams  The following exams help keep you healthy:  · Foot exams. Nerve and blood vessel problems can affect your feet sooner than other parts of your body. Make sure that your healthcare provider checks your feet at every office visit.  · Eye exams. You can have problems with your eyes even if you dont have trouble seeing. An ophthalmologist (eye healthcare provider) or specially trained optometrist will give you a dilated eye exam at least once a year. If you see dark spots, see poorly in dim light, have eye pain or pressure, or notice any other problems, tell your healthcare provider right away.  · Dental exams. Gum disease (also called periodontal disease) and other mouth problems are common in people with diabetes. To help prevent these problems, see your dentist two or more times a year.  Ask your healthcare provider what other exams youll need on a regular basis.  Date Last Reviewed: 6/1/2016  © 8881-4208 [employer]. [address] [place] [address], [place] [address]. All rights reserved. This information is not intended as a substitute for professional medical care. Always follow your healthcare professional's instructions.

## 2019-02-14 NOTE — TELEPHONE ENCOUNTER
----- Message from Judith López MA sent at 2/14/2019  3:49 PM CST -----  Contact: self [phone]  Patient had a stress test done and would like to know the results of it. Please call.  Thanks

## 2019-02-20 DIAGNOSIS — E11.8 TYPE 2 DIABETES MELLITUS WITH COMPLICATION, UNSPECIFIED WHETHER LONG TERM INSULIN USE: ICD-10-CM

## 2019-02-20 RX ORDER — INSULIN GLARGINE 100 [IU]/ML
10 INJECTION, SOLUTION SUBCUTANEOUS DAILY
Qty: 1 BOX | Refills: 1 | Status: SHIPPED | OUTPATIENT
Start: 2019-02-20 | End: 2019-04-29 | Stop reason: SDUPTHER

## 2019-02-20 NOTE — TELEPHONE ENCOUNTER
----- Message from Ginny Cox sent at 2/20/2019  9:31 AM CST -----  Contact: Patient  Type:  Needs Medical Advice    Who Called: Patient  Symptoms (please be specific):   How long has patient had these symptoms:    Pharmacy name and phone #:  [facility]  Would the patient rather a call back or a response via [facility]? call  Best Call Back Number: [phone]  Additional Information: Patient states that the insurance company will not pay for Glargine, something else will need to be called in. Patient still taking old medciation

## 2019-02-20 NOTE — TELEPHONE ENCOUNTER
Spoke with patient and Toujeo is not covered under pt's formulary, but Basglar is. Requests for Rx to be sent in to [facility] for a 90 day supply. Patient said he has resumed taking Glimepiride BID since he wasn't able to receive insulin, and will continue to do so until Basaglar arrives from mail order pharmacy.

## 2019-02-26 NOTE — PROGRESS NOTES
"Subjective:         Patient ID: Héctor Desir is a 83 y.o. male.  Patient's current PCP is Tae Goel MD.       Chief Complaint: Diabetes Mellitus    HPI  Héctor Desir is a 83 y.o. White male presenting for a follow up for diabetes. Patient has been diagnosed with diabetes for several years and has the following complications from diabetes: nephropathy, peripheral neuropathy, cardiovascular disease and cerebrovascular disease, HTN, Hyperlipidemia. Blood glucose testing is performed regularly. The patient reports medication compliance all of the time and diet noncompliance much of the time. Patient had been controlled in the past but after the death of 3 grandchildrens and 2 wives, had to move out of home onto his son's property his condition worsened. He does not have prepared meals. He is considering a meal prep plan to help. Since his last visit, he has made improvements to his diet. Condition: uncontrolled , improved He denies recent hospital admissions, denies emergency room visits, denies hypoglycemia. At the last visit patient advised to: Start Toujeo 10 units HS (I will start this due to A1C near 10 and renal function, hopefully this can be stop and previous plan resumed as diet improves),Stop Metformin, Reduce Glimepiride 1/2 tab daily to avoid hypoglycemia, Continue Starlix. He was not able to get Toujeo or Basaglar due to cost.               Height: 5' 8" (172.7 cm)  //  Weight: 104.3 kg (230 lb), Body mass index is 34.97 kg/m².  Home Blood Glucose reading this AM:  mg/dl fasting.  His blood sugar in clinic today is:   Lab Results   Component Value Date    POCGLU 144 (A) 02/28/2019       Labs reviewed and are noted below.    His most recent A1C is:  Lab Results   Component Value Date    HGBA1C 9.4 (H) 01/21/2019    HGBA1C 7.1 (H) 03/28/2018    HGBA1C 7.5 (H) 10/13/2017     No results found for: CPEPTIDE  No results found for: GLUTAMICACID  Lab Results   Component Value Date    WBC 7.00 01/15/2019 "    HGB 15.4 01/15/2019    HCT 46.5 01/15/2019     01/15/2019    CHOL 114 (L) 01/15/2019    TRIG 120 01/15/2019    HDL 29 (L) 01/15/2019    ALT 24 01/16/2017    AST 21 01/16/2017     01/15/2019    K 4.3 01/15/2019     01/15/2019    CREATININE 1.8 (H) 01/15/2019    BUN 25 (H) 01/15/2019    CO2 25 01/15/2019    TSH 1.679 03/28/2018    PSA 0.09 03/28/2018    GLUF 134 (H) 07/10/2009    HGBA1C 9.4 (H) 01/21/2019     CMP  Sodium   Date Value Ref Range Status   01/15/2019 137 136 - 145 mmol/L Final     Potassium   Date Value Ref Range Status   01/15/2019 4.3 3.5 - 5.1 mmol/L Final     Chloride   Date Value Ref Range Status   01/15/2019 103 95 - 110 mmol/L Final     CO2   Date Value Ref Range Status   01/15/2019 25 23 - 29 mmol/L Final     Glucose   Date Value Ref Range Status   01/15/2019 195 (H) 70 - 110 mg/dL Final     BUN, Bld   Date Value Ref Range Status   01/15/2019 25 (H) 8 - 23 mg/dL Final     Creatinine   Date Value Ref Range Status   01/15/2019 1.8 (H) 0.5 - 1.4 mg/dL Final     Calcium   Date Value Ref Range Status   01/15/2019 9.1 8.7 - 10.5 mg/dL Final     Total Protein   Date Value Ref Range Status   01/16/2017 6.7 6.0 - 8.4 g/dL Final     Albumin   Date Value Ref Range Status   08/09/2018 3.5 3.5 - 5.2 g/dL Final     Total Bilirubin   Date Value Ref Range Status   01/16/2017 0.5 0.1 - 1.0 mg/dL Final     Comment:     For infants and newborns, interpretation of results should be based  on gestational age, weight and in agreement with clinical  observations.  Premature Infant recommended reference ranges:  Up to 24 hours.............<8.0 mg/dL  Up to 48 hours............<12.0 mg/dL  3-5 days..................<15.0 mg/dL  6-29 days.................<15.0 mg/dL       Alkaline Phosphatase   Date Value Ref Range Status   01/16/2017 111 55 - 135 U/L Final     AST   Date Value Ref Range Status   01/16/2017 21 10 - 40 U/L Final     ALT   Date Value Ref Range Status   01/16/2017 24 10 - 44 U/L Final      Anion Gap   Date Value Ref Range Status   01/15/2019 9 8 - 16 mmol/L Final     eGFR if    Date Value Ref Range Status   01/15/2019 39.9 (A) >60 mL/min/1.73 m^2 Final     eGFR if non    Date Value Ref Range Status   01/15/2019 34.5 (A) >60 mL/min/1.73 m^2 Final     Comment:     Calculation used to obtain the estimated glomerular filtration  rate (eGFR) is the CKD-EPI equation.            CURRENT DM MEDICATIONS:   Diabetes Medications             glimepiride (AMARYL) 4 MG tablet TAKE 1 TABLET TWICE A DAY    insulin (BASAGLAR KWIKPEN U-100 INSULIN) glargine 100 units/mL (3mL) SubQ pen Inject 10 Units into the skin once daily.    nateglinide (STARLIX) 120 MG tablet Take 1 tablet (120 mg total) by mouth 3 (three) times daily.                Health Maintenance   Topic Date Due    Zoster Vaccine  04/30/1997    Eye Exam  04/16/2019    Hemoglobin A1c  07/21/2019    Colonoscopy  10/23/2019    Lipid Panel  01/15/2020    Foot Exam  02/14/2020    TETANUS VACCINE  07/05/2023    Pneumococcal Vaccine (65+ Low/Medium Risk)  Completed    Influenza Vaccine  Completed       STANDARDS OF CARE:  Current Ophthalmologist/Optometrist: recommended annually;UTD     Current Dentist: recommended annually  Current Podiatrist: recommended annually; due   He  is to be enrolled in diabetes education classes.     LIFESTYLE:  ACTIVITY LEVEL: Sedentary  EXERCISE: none  MEAL PLANNING: Patient reports number of meals per day to be 3 and number of snacks per day to be 2  BLOOD GLUCOSE TESTING: Patient is testing 2 times per day       Review of Systems   Constitutional: Negative for activity change and appetite change.   Eyes: Negative for visual disturbance.   Gastrointestinal: Negative for constipation and diarrhea.   Endocrine: Negative for polydipsia, polyphagia and polyuria.   Neurological: Negative for syncope and weakness.   Psychiatric/Behavioral: Negative for confusion.         Objective:      Physical  Exam   Constitutional: He is oriented to person, place, and time. He appears well-developed and well-nourished. No distress.   Neck: Normal range of motion.   Cardiovascular: Normal rate.   Pulmonary/Chest: Effort normal.   Musculoskeletal: Normal range of motion.   Neurological: He is alert and oriented to person, place, and time.   Skin: Skin is warm and dry. He is not diaphoretic.   Psychiatric: He has a normal mood and affect. His behavior is normal. Judgment and thought content normal.       Assessment:       1. Type 2 diabetes mellitus with complication, unspecified whether long term insulin use        Plan:   Type 2 diabetes mellitus with complication, unspecified whether long term insulin use  -     POCT Glucose, Hand-Held Device  -     C-peptide; Future; Expected date: 02/28/2019  -     Glutamic acid decarboxylase; Future; Expected date: 02/28/2019  -     Glucose, random; Future; Expected date: 02/28/2019      Additional Plan Details:    1.) Patient was instructed to monitor blood glucose 3x daily, fasting and ac dinner or at bedtime. Discussed ADA goal for fasting blood sugar, 80 - 130mg/dL; pp blood sugars below 180 mg/dl. Also, discussed prevention of hypoglycemia and the need to adjust goals to higher levels if persistent hypoglycemia.  Reminded to bring BG records or meter to each visit for review. Patient instructed to send in log weekly for review and potential medication adjustments.  2.) Reviewed pathophysiology of Type 2 diabetes, complications related to the disease, importance of annual dilated eye exam and daily foot examination.  3.) We discussed the ADA recommendations, which are as follows:  Hemoglobin A1c below 7.0 %. All patients with diabetes should be on statins unless contraindicated.  ACE or ARB therapy if not contraindicated.    4.) Continue Starlix and Glimepiride, his BGs have improved since changing his diet. Advised him to continue to monitor and if BG rise contact me to discuss  other options  5.) Meal planning teaching: Reviewed carb counting, portion control, importance of spacing meals throughout the day to prevent post prandial elevations.  6.) Discussed activity with related benefits, methods, and precautions. Recommended patient start or continue some form of exercise and increase as tolerated to 30 minutes per day to aid in control of BGs.  7.) A1C, TSH, Lipid Panel, CMP with eGFR and Micro/Creatinine are utd or were ordered per ADA protocol.  8.) Return to clinic in 2 months for follow up. The patient was explained the above plan and given opportunity to ask questions.  He understands, chooses and consents to this plan and accepts all the risks, which include but are not limited to the risks mentioned above. He understands the alternative of having no testing, interventions or treatments at this time. He left content and without further questions.     A total of 30 minutes was spent in face to face time, of which over 50% was spent in counseling patient on disease process, complications, treatment, and side effects of medications.

## 2019-02-28 ENCOUNTER — OFFICE VISIT (OUTPATIENT)
Dept: DIABETES | Facility: CLINIC | Age: 84
End: 2019-02-28
Payer: MEDICARE

## 2019-02-28 ENCOUNTER — OFFICE VISIT (OUTPATIENT)
Dept: NEPHROLOGY | Facility: CLINIC | Age: 84
End: 2019-02-28
Payer: MEDICARE

## 2019-02-28 VITALS
DIASTOLIC BLOOD PRESSURE: 70 MMHG | SYSTOLIC BLOOD PRESSURE: 110 MMHG | BODY MASS INDEX: 34.86 KG/M2 | WEIGHT: 230 LBS | HEIGHT: 68 IN

## 2019-02-28 VITALS
HEART RATE: 88 BPM | SYSTOLIC BLOOD PRESSURE: 121 MMHG | BODY MASS INDEX: 34.83 KG/M2 | DIASTOLIC BLOOD PRESSURE: 86 MMHG | HEIGHT: 68 IN | WEIGHT: 229.81 LBS

## 2019-02-28 DIAGNOSIS — E11.8 TYPE 2 DIABETES MELLITUS WITH COMPLICATION, UNSPECIFIED WHETHER LONG TERM INSULIN USE: Primary | ICD-10-CM

## 2019-02-28 DIAGNOSIS — N18.30 CKD (CHRONIC KIDNEY DISEASE) STAGE 3, GFR 30-59 ML/MIN: Primary | ICD-10-CM

## 2019-02-28 LAB — GLUCOSE SERPL-MCNC: 144 MG/DL (ref 70–110)

## 2019-02-28 PROCEDURE — 99999 PR PBB SHADOW E&M-EST. PATIENT-LVL III: CPT | Mod: PBBFAC,,, | Performed by: INTERNAL MEDICINE

## 2019-02-28 PROCEDURE — 99214 OFFICE O/P EST MOD 30 MIN: CPT | Mod: S$PBB,,, | Performed by: NURSE PRACTITIONER

## 2019-02-28 PROCEDURE — 99999 PR PBB SHADOW E&M-EST. PATIENT-LVL III: CPT | Mod: PBBFAC,,, | Performed by: NURSE PRACTITIONER

## 2019-02-28 PROCEDURE — 99214 PR OFFICE/OUTPT VISIT, EST, LEVL IV, 30-39 MIN: ICD-10-PCS | Mod: S$PBB,,, | Performed by: NURSE PRACTITIONER

## 2019-02-28 PROCEDURE — 99999 PR PBB SHADOW E&M-EST. PATIENT-LVL III: ICD-10-PCS | Mod: PBBFAC,,, | Performed by: INTERNAL MEDICINE

## 2019-02-28 PROCEDURE — 82962 GLUCOSE BLOOD TEST: CPT | Mod: PBBFAC,PO | Performed by: NURSE PRACTITIONER

## 2019-02-28 PROCEDURE — 99214 PR OFFICE/OUTPT VISIT, EST, LEVL IV, 30-39 MIN: ICD-10-PCS | Mod: S$PBB,,, | Performed by: INTERNAL MEDICINE

## 2019-02-28 PROCEDURE — 99214 OFFICE O/P EST MOD 30 MIN: CPT | Mod: S$PBB,,, | Performed by: INTERNAL MEDICINE

## 2019-02-28 PROCEDURE — 99213 OFFICE O/P EST LOW 20 MIN: CPT | Mod: PBBFAC,27,PO | Performed by: INTERNAL MEDICINE

## 2019-02-28 PROCEDURE — 99999 PR PBB SHADOW E&M-EST. PATIENT-LVL III: ICD-10-PCS | Mod: PBBFAC,,, | Performed by: NURSE PRACTITIONER

## 2019-02-28 PROCEDURE — 99213 OFFICE O/P EST LOW 20 MIN: CPT | Mod: PBBFAC,PO | Performed by: NURSE PRACTITIONER

## 2019-02-28 RX ORDER — CLOPIDOGREL BISULFATE 75 MG/1
TABLET ORAL
COMMUNITY
Start: 2019-01-21 | End: 2019-05-02 | Stop reason: ALTCHOICE

## 2019-02-28 NOTE — LETTER
March 8, 2019      Tae Goel MD  [address]  [place] [address]           [place] - Nephrology  [address] [facility] [address]  Phone: [phone]  Fax: [phone]          Patient: Héctor Desir   MR Number: [id number]   YOB: 1935   Date of Visit: 2/28/2019       Dear Dr. Tae Goel:    Thank you for referring Héctor Desir to me for evaluation. Attached you will find relevant portions of my assessment and plan of care.    If you have questions, please do not hesitate to call me. I look forward to following Héctor Desir along with you.    Sincerely,    Link Guy  CC:  No Recipients    If you would like to receive this communication electronically, please contact [email or web address] or [phone] to request more information on [employer] Link access.    For providers and/or their staff who would like to refer a patient to [facility], please contact us through our one-stop-shop provider referral line, [facility], at [phone].    If you feel you have received this communication in error or would no longer like to receive these types of communications, please e-mail [email or web address]

## 2019-03-10 NOTE — PROGRESS NOTES
Subjective:       Patient ID: Héctor Desir is a 83 y.o. White male who presents for follow-up evaluation of Follow-up and Chronic Kidney Disease    HPI     He reports ongoing stress due to his wife's illness. She is no longer on hospice--but he needs to interact with her daughter who can be challenging. He feels overall well and notes his BS are running better, last Hba1c was 7.1. No LE edema nor SOB. Nocturia is 1-2X.      Review of Systems   Constitutional: Negative for activity change, appetite change, fatigue and unexpected weight change.   HENT: Negative for facial swelling.    Respiratory: Negative for shortness of breath.    Cardiovascular: Negative for chest pain and leg swelling.   Gastrointestinal: Positive for constipation.   Genitourinary: Positive for frequency. Negative for difficulty urinating, dysuria and hematuria.   Musculoskeletal: Negative for arthralgias.   Neurological: Negative for weakness and headaches.   Psychiatric/Behavioral: Negative for decreased concentration.       Objective:      Physical Exam   Constitutional: He is oriented to person, place, and time. He appears well-developed and well-nourished. No distress.   Neck: No JVD present.   Cardiovascular: S1 normal, S2 normal and normal heart sounds. Exam reveals no friction rub.   Pulmonary/Chest: Breath sounds normal. He has no wheezes. He has no rales.   Abdominal: Soft.   Musculoskeletal: He exhibits edema.   Neurological: He is alert and oriented to person, place, and time.   Skin: Skin is warm and dry.   Psychiatric: He has a normal mood and affect.   Vitals reviewed.     Subjective:       Patient ID: Héctor Desir is a 83 y.o. White male who presents for follow-up evaluation of Follow-up and Chronic Kidney Disease    HPI     He reports ongoing stress due to his wife's illness. She is no longer on hospice--but he needs to interact with her daughter who can be challenging. He feels overall well and notes his BS are running  better, last Hba1c was 7.1. No LE edema nor SOB. Nocturia is 1-2X.      Interval history Feb 2019: Lost to follow up. Wife passed. He's in a new place, doing better since his Hbac1 went up. Saw Endocrine.     Review of Systems   Constitutional: Negative for activity change, appetite change, fatigue and unexpected weight change.   HENT: Negative for facial swelling.    Respiratory: Negative for shortness of breath.    Cardiovascular: Negative for chest pain and leg swelling.   Gastrointestinal: Positive for constipation.   Genitourinary: Positive for frequency. Negative for difficulty urinating, dysuria and hematuria.   Musculoskeletal: Negative for arthralgias.   Neurological: Negative for weakness and headaches.   Psychiatric/Behavioral: Negative for decreased concentration.       Objective:      Physical Exam   Constitutional: He is oriented to person, place, and time. He appears well-developed and well-nourished. No distress.   Neck: No JVD present.   Cardiovascular: S1 normal, S2 normal and normal heart sounds. Exam reveals no friction rub.   Pulmonary/Chest: Breath sounds normal. He has no wheezes. He has no rales.   Abdominal: Soft.   Musculoskeletal: He exhibits edema.   Neurological: He is alert and oriented to person, place, and time.   Skin: Skin is warm and dry.   Psychiatric: He has a normal mood and affect.   Vitals reviewed.      Assessment:       1. CKD (chronic kidney disease) stage 3, GFR 30-59 ml/min          Plan:             CKD stage 3 with stable kidney function.  Continue RAAS blockade (lisinopril) for renal preservation    HTN is controlled    DM--is contrrolled    Mineral and Bone Disease--continue D2      RTC 4 months

## 2019-04-05 ENCOUNTER — OFFICE VISIT (OUTPATIENT)
Dept: ENDOCRINOLOGY | Facility: CLINIC | Age: 84
End: 2019-04-05
Payer: MEDICARE

## 2019-04-05 VITALS
SYSTOLIC BLOOD PRESSURE: 140 MMHG | BODY MASS INDEX: 35.09 KG/M2 | DIASTOLIC BLOOD PRESSURE: 90 MMHG | WEIGHT: 231.5 LBS | HEIGHT: 68 IN | HEART RATE: 80 BPM

## 2019-04-05 DIAGNOSIS — N18.30 CKD (CHRONIC KIDNEY DISEASE) STAGE 3, GFR 30-59 ML/MIN: ICD-10-CM

## 2019-04-05 DIAGNOSIS — I10 BENIGN ESSENTIAL HTN: ICD-10-CM

## 2019-04-05 DIAGNOSIS — N25.81 SECONDARY HYPERPARATHYROIDISM: Primary | ICD-10-CM

## 2019-04-05 PROCEDURE — 99213 OFFICE O/P EST LOW 20 MIN: CPT | Mod: PBBFAC,PO | Performed by: INTERNAL MEDICINE

## 2019-04-05 PROCEDURE — 99204 OFFICE O/P NEW MOD 45 MIN: CPT | Mod: S$PBB,,, | Performed by: INTERNAL MEDICINE

## 2019-04-05 PROCEDURE — 99999 PR PBB SHADOW E&M-EST. PATIENT-LVL III: ICD-10-PCS | Mod: PBBFAC,,, | Performed by: INTERNAL MEDICINE

## 2019-04-05 PROCEDURE — 99204 PR OFFICE/OUTPT VISIT, NEW, LEVL IV, 45-59 MIN: ICD-10-PCS | Mod: S$PBB,,, | Performed by: INTERNAL MEDICINE

## 2019-04-05 PROCEDURE — 99999 PR PBB SHADOW E&M-EST. PATIENT-LVL III: CPT | Mod: PBBFAC,,, | Performed by: INTERNAL MEDICINE

## 2019-04-05 NOTE — PROGRESS NOTES
CHIEF COMPLAINT: Elevated PTH  83 year old being seen as a new patient. Has had an elevated PTH for several years. No kidney stones. No fractures. No N/V. Seeing nephrology. Working with DM BRENNAN and scheduled to see her in May. Scheduled for eye exam 4/17.       PAST MEDICAL HISTORY/PAST SURGICAL HISTORY:  Reviewed in [facility]    SOCIAL HISTORY: NO T/A    FAMILY HISTORY:  No DM. No known thyroid disease    MEDICATIONS/ALLERGIES: The patient's MedCard has been updated and reviewed.      ROS:   Constitutional: No recent significant weight change  Eyes: No recent visual changes  ENT: No dysphagia  Cardiovascular: Denies current anginal symptoms  Respiratory: Denies current respiratory difficulty  Gastrointestinal: Denies recent bowel disturbances  GenitoUrinary - No dysuria  Skin: No new skin rash  Neurologic: No focal neurologic complaints  Remainder ROS negative        PE:    GENERAL: Well developed, well nourished.  PSYCH:  appropriate mood and affect  EYES:  PERRL, EOM intact.  ENT: Nares patent, oropharynx clear, mucosa pink,   NECK: Supple, trachea midline, no palpable thyroid nodules.   CHEST: Resp even and unlabored, CTA bilateral.  CARDIAC: RRR, S1, S2 heard, no murmurs, rubs, S3, or S4  ABDOMEN: Soft, non-tender, non-distended;  No organomegaly  VASCULAR:  DP pulses +2/4 bilaterally, no edema  NEURO: Gait steady, CN II-VII grossly intact  SKIN: No areas of breakdown, no acanthosis nigracans.    LABS   Results for CAMPBELL AUSTIN (MRN [id number]) as of 4/5/2019 09:16   Ref. Range 1/21/2019 10:35   PTH Latest Ref Range: 9.0 - 77.0 pg/mL 110.0 (H)     Results for CAMPBELL AUSTIN (MRN [id number]) as of 4/5/2019 09:16   Ref. Range 1/15/2019 09:14   Sodium Latest Ref Range: 136 - 145 mmol/L 137   Potassium Latest Ref Range: 3.5 - 5.1 mmol/L 4.3   Chloride Latest Ref Range: 95 - 110 mmol/L 103   CO2 Latest Ref Range: 23 - 29 mmol/L 25   Anion Gap Latest Ref Range: 8 - 16 mmol/L 9   BUN, Bld Latest Ref Range: 8 - 23 mg/dL 25  (H)   Creatinine Latest Ref Range: 0.5 - 1.4 mg/dL 1.8 (H)   eGFR if non African American Latest Ref Range: >60 mL/min/1.73 m^2 34.5 (A)   eGFR if African American Latest Ref Range: >60 mL/min/1.73 m^2 39.9 (A)   Glucose Latest Ref Range: 70 - 110 mg/dL 195 (H)   Calcium Latest Ref Range: 8.7 - 10.5 mg/dL 9.1       ASSESSMENT/PLAN:  1. Secondary Hyperparathyroidism- Elevated PTH. Ca WNL. Appears to be secondary to CKD. Can continue to follow with nephrology check Vit D. If develops hypercalcemia can see PRN    2. CKD 3- See nephrology    3. DM 2- seeing DM BRENNAN. Reviewed HM. Scheduled for yearly eye exam.     4. HTN- borderline, can continue current Tx.       FOLLOWUP  Add to next- Vit D, CMP, PTH, Phos

## 2019-04-05 NOTE — LETTER
April 5, 2019      Tae Goel MD  [address] [facility]  [place] [address]           [place] - Endocrinology [address] [facility] [address] [place] [address]  Phone: [phone]  Fax: [phone]          Patient: Héctor Desir   MR Number: [id number]   YOB: 1935   Date of Visit: 4/5/2019       Dear Dr. Tae Goel:    Thank you for referring Héctor Desir to me for evaluation. Attached you will find relevant portions of my assessment and plan of care.    If you have questions, please do not hesitate to call me. I look forward to following Héctor Desir along with you.    Sincerely,    Niels Bryant, DO [place]  CC:  No Recipients    If you would like to receive this communication electronically, please contact [email or web address] or [phone] to request more information on [employer] Link access.    For providers and/or their staff who would like to refer a patient to [facility], please contact us through our one-stop-shop provider referral line, [facility], at [phone].    If you feel you have received this communication in error or would no longer like to receive these types of communications, please e-mail [email or web address]

## 2019-04-17 ENCOUNTER — OFFICE VISIT (OUTPATIENT)
Dept: OPTOMETRY | Facility: CLINIC | Age: 84
End: 2019-04-17
Payer: MEDICARE

## 2019-04-17 DIAGNOSIS — Z13.5 GLAUCOMA SCREENING: ICD-10-CM

## 2019-04-17 DIAGNOSIS — H18.20 CORNEAL EDEMA OF RIGHT EYE: ICD-10-CM

## 2019-04-17 DIAGNOSIS — Z96.1 BILATERAL PSEUDOPHAKIA: ICD-10-CM

## 2019-04-17 DIAGNOSIS — E11.9 DIABETES MELLITUS TYPE 2 WITHOUT RETINOPATHY: Primary | ICD-10-CM

## 2019-04-17 DIAGNOSIS — H43.813 POSTERIOR VITREOUS DETACHMENT, BILATERAL: ICD-10-CM

## 2019-04-17 PROCEDURE — 99999 PR PBB SHADOW E&M-EST. PATIENT-LVL III: CPT | Mod: PBBFAC,,, | Performed by: OPTOMETRIST

## 2019-04-17 PROCEDURE — 92014 PR EYE EXAM, EST PATIENT,COMPREHESV: ICD-10-PCS | Mod: S$PBB,,, | Performed by: OPTOMETRIST

## 2019-04-17 PROCEDURE — 99999 PR PBB SHADOW E&M-EST. PATIENT-LVL III: ICD-10-PCS | Mod: PBBFAC,,, | Performed by: OPTOMETRIST

## 2019-04-17 PROCEDURE — 92014 COMPRE OPH EXAM EST PT 1/>: CPT | Mod: S$PBB,,, | Performed by: OPTOMETRIST

## 2019-04-17 PROCEDURE — 99213 OFFICE O/P EST LOW 20 MIN: CPT | Mod: PBBFAC,PO | Performed by: OPTOMETRIST

## 2019-04-17 RX ORDER — METFORMIN HYDROCHLORIDE 1000 MG/1
TABLET ORAL
COMMUNITY
Start: 2019-04-01 | End: 2019-04-29 | Stop reason: SDUPTHER

## 2019-04-17 NOTE — LETTER
April 17, 2019      Tae Goel MD  [address]  [place] [address]           [place] - Optometry  [address] [facility] [address] [place] [address]  Phone: [phone]  Fax: [phone]          Patient: Héctor Desir   MR Number: [id number]   YOB: 1935   Date of Visit: 4/17/2019       Dear Dr. Tae Goel:    Thank you for referring Héctor Deisr to me for evaluation. Attached you will find relevant portions of my assessment and plan of care.    If you have questions, please do not hesitate to call me. I look forward to following Héctor Desir along with you.    Sincerely,    TESS Coley, OD    Enclosure  CC:  No Recipients    If you would like to receive this communication electronically, please contact [email or web address] or [phone] to request more information on [employer] Link access.    For providers and/or their staff who would like to refer a patient to [facility], please contact us through our one-stop-shop provider referral line, [facility] , at [phone].    If you feel you have received this communication in error or would no longer like to receive these types of communications, please e-mail [email or web address]

## 2019-04-17 NOTE — PROGRESS NOTES
HPI     Annual Exam      Additional comments: DLE 4-18 (ranjit)   ocular health exam               Diabetic Eye Exam      Additional comments: BSL fluctuates, uncontrolled              Blurred Vision      Additional comments: at near only -- distance VA good              Spots and/or Floaters      Additional comments: OU -- no light flashes              Comments     Hemoglobin A1C       Date                     Value               Ref Range             Status                01/21/2019               9.4 (H)             4.0 - 5.6 %           Final              Comment:    ADA Screening Guidelines:  5.7-6.4%  Consistent with   prediabetes  >or=6.5%  Consistent with diabetes  High levels of fetal   hemoglobin interfere with the HbA1C  assay. Heterozygous hemoglobin   variants (HbS, HgC, etc)do  not significantly interfere with this assay.     However, presence of multiple variants may affect accuracy.         03/28/2018               7.1 (H)             4.0 - 5.6 %           Final              Comment:    According to ADA guidelines, hemoglobin A1c <7.0% represents  optimal   control in non-pregnant diabetic patients. Different  metrics may apply to   specific patient populations.   Standards of Medical Care in   Diabetes-2016.  For the purpose of screening for the presence of   diabetes:  <5.7%     Consistent with the absence of diabetes  5.7-6.4%    Consistent with increasing risk for diabetes   (prediabetes)  >or=6.5%    Consistent with diabetes  Currently, no consensus exists for use of   hemoglobin A1c  for diagnosis of diabetes for children.  This Hemoglobin   A1c assay has significant interference with fetal   hemoglobin   (HbF).   The results are invalid for patients with abnormal amounts of   HbF,     including those with known Hereditary Persistence   of Fetal Hemoglobin.   Heterozygous hemoglobin variants (HbAS, HbAC,   HbAD, HbAE, HbA2) do not   significantly interfere with this assay;   however, presence of  multiple   variants in a sample may impact the %   interference.         10/13/2017               7.5 (H)             4.0 - 5.6 %           Final              Comment:    According to ADA guidelines, hemoglobin A1c <7.0% represents  optimal   control in non-pregnant diabetic patients. Different  metrics may apply to   specific patient populations.   Standards of Medical Care in   Diabetes-2016.  For the purpose of screening for the presence of   diabetes:  <5.7%     Consistent with the absence of diabetes  5.7-6.4%    Consistent with increasing risk for diabetes   (prediabetes)  >or=6.5%    Consistent with diabetes  Currently, no consensus exists for use of   hemoglobin A1c  for diagnosis of diabetes for children.  This Hemoglobin   A1c assay has significant interference with fetal   hemoglobin   (HbF).   The results are invalid for patients with abnormal amounts of   HbF,     including those with known Hereditary Persistence   of Fetal Hemoglobin.   Heterozygous hemoglobin variants (HbAS, HbAC,   HbAD, HbAE, HbA2) do not   significantly interfere with this assay;   however, presence of multiple   variants in a sample may impact the %   interference.    ----------    Poor glucose control  Feels VA stable  BP controlled w/ meds  No other new issues            Last edited by TESS Coley, OD on 4/17/2019  9:51 AM. (History)        ROS     Positive for: Eyes    Negative for: Constitutional, Gastrointestinal, Neurological, Skin,   Genitourinary, Musculoskeletal, HENT, Endocrine, Cardiovascular,   Respiratory, Psychiatric, Allergic/Imm, Heme/Lymph    Last edited by TESS Coley, OD on 4/17/2019  9:51 AM. (History)        Assessment /Plan     For exam results, see Encounter Report.    Diabetes mellitus type 2 without retinopathy    Posterior vitreous detachment, bilateral    Glaucoma screening    Corneal edema of right eye    Bilateral pseudophakia      1. No ret/ no csme, gave info, control glucose, annual DFE  2.  RD precautions given  3. Not suspect  4. Longstanding following CE--stable  5. Stable OU, s/p Yag    No refraction---ok continue to use otc only for near    Discussed and educated patient on current findings /plan.  RTC 1 year, prn if any changes / issues

## 2019-04-25 ENCOUNTER — LAB VISIT (OUTPATIENT)
Dept: LAB | Facility: HOSPITAL | Age: 84
End: 2019-04-25
Attending: FAMILY MEDICINE
Payer: MEDICARE

## 2019-04-25 DIAGNOSIS — E11.8 TYPE 2 DIABETES MELLITUS WITH COMPLICATION, UNSPECIFIED WHETHER LONG TERM INSULIN USE: ICD-10-CM

## 2019-04-25 DIAGNOSIS — N25.81 SECONDARY HYPERPARATHYROIDISM: ICD-10-CM

## 2019-04-25 LAB
25(OH)D3+25(OH)D2 SERPL-MCNC: 57 NG/ML (ref 30–96)
ALBUMIN SERPL BCP-MCNC: 3.5 G/DL (ref 3.5–5.2)
ALP SERPL-CCNC: 111 U/L (ref 55–135)
ALT SERPL W/O P-5'-P-CCNC: 25 U/L (ref 10–44)
ANION GAP SERPL CALC-SCNC: 9 MMOL/L (ref 8–16)
AST SERPL-CCNC: 22 U/L (ref 10–40)
BILIRUB SERPL-MCNC: 0.6 MG/DL (ref 0.1–1)
BUN SERPL-MCNC: 39 MG/DL (ref 8–23)
C PEPTIDE SERPL-MCNC: 8.85 NG/ML (ref 0.78–5.19)
CALCIUM SERPL-MCNC: 8.8 MG/DL (ref 8.7–10.5)
CHLORIDE SERPL-SCNC: 111 MMOL/L (ref 95–110)
CO2 SERPL-SCNC: 22 MMOL/L (ref 23–29)
CREAT SERPL-MCNC: 1.6 MG/DL (ref 0.5–1.4)
EST. GFR  (AFRICAN AMERICAN): 46 ML/MIN/1.73 M^2
EST. GFR  (NON AFRICAN AMERICAN): 39.8 ML/MIN/1.73 M^2
ESTIMATED AVG GLUCOSE: 160 MG/DL (ref 68–131)
GLUCOSE SERPL-MCNC: 183 MG/DL (ref 70–110)
GLUCOSE SERPL-MCNC: 183 MG/DL (ref 70–110)
HBA1C MFR BLD HPLC: 7.2 % (ref 4–5.6)
PHOSPHATE SERPL-MCNC: 2.7 MG/DL (ref 2.7–4.5)
POTASSIUM SERPL-SCNC: 4.2 MMOL/L (ref 3.5–5.1)
PROT SERPL-MCNC: 6.2 G/DL (ref 6–8.4)
PTH-INTACT SERPL-MCNC: 104 PG/ML (ref 9–77)
SODIUM SERPL-SCNC: 142 MMOL/L (ref 136–145)

## 2019-04-25 PROCEDURE — 36415 COLL VENOUS BLD VENIPUNCTURE: CPT | Mod: PO

## 2019-04-25 PROCEDURE — 80053 COMPREHEN METABOLIC PANEL: CPT

## 2019-04-25 PROCEDURE — 84100 ASSAY OF PHOSPHORUS: CPT

## 2019-04-25 PROCEDURE — 83036 HEMOGLOBIN GLYCOSYLATED A1C: CPT

## 2019-04-25 PROCEDURE — 82306 VITAMIN D 25 HYDROXY: CPT

## 2019-04-25 PROCEDURE — 84681 ASSAY OF C-PEPTIDE: CPT

## 2019-04-25 PROCEDURE — 83970 ASSAY OF PARATHORMONE: CPT

## 2019-04-28 ENCOUNTER — TELEPHONE (OUTPATIENT)
Dept: ENDOCRINOLOGY | Facility: CLINIC | Age: 84
End: 2019-04-28

## 2019-04-28 NOTE — TELEPHONE ENCOUNTER
Let him know that the Ca and Vit D is normal. PTH elevated due to kidney function.   Can see us PRN if Ca increases

## 2019-04-29 LAB — GAD65 AB SER-SCNC: 0 NMOL/L

## 2019-04-29 RX ORDER — INSULIN GLARGINE 100 [IU]/ML
15 INJECTION, SOLUTION SUBCUTANEOUS DAILY
Qty: 15 ML | Refills: 1 | Status: SHIPPED | OUTPATIENT
Start: 2019-04-29 | End: 2019-05-02

## 2019-04-29 RX ORDER — GLIMEPIRIDE 4 MG/1
TABLET ORAL
Qty: 180 TABLET | Refills: 0 | Status: SHIPPED | OUTPATIENT
Start: 2019-04-29 | End: 2019-05-09 | Stop reason: SDUPTHER

## 2019-04-29 RX ORDER — NATEGLINIDE 120 MG/1
120 TABLET ORAL 3 TIMES DAILY
Qty: 270 TABLET | Refills: 1 | Status: SHIPPED | OUTPATIENT
Start: 2019-04-29 | End: 2019-05-09 | Stop reason: SDUPTHER

## 2019-04-29 RX ORDER — METFORMIN HYDROCHLORIDE 1000 MG/1
1000 TABLET ORAL 2 TIMES DAILY WITH MEALS
Qty: 180 TABLET | Refills: 0 | Status: SHIPPED | OUTPATIENT
Start: 2019-04-29 | End: 2019-05-03

## 2019-04-29 NOTE — TELEPHONE ENCOUNTER
----- Message from Tae Goel MD sent at 4/28/2019  8:31 PM CDT -----  The patient has an abnormal a1c.  Book the patient for an a1c in 3 months and send the patient the appointment for this. The patient will need to have a change in the medicine to include lantus 15 u a day instead of 10 u a day.  Continue all other meds.    Please make the change in the medcard to reflect this change and send a prescription for all of the patient's diabetes medications to the pharmacy for 3 months.

## 2019-04-29 NOTE — TELEPHONE ENCOUNTER
Patient states that he is not taking any insulin at this time. He stated that there was a problem with his insurance and they would not cover it. He states that he is only taking pills. Patient states that a Nurse Practitioner put him on Metformin 1,000.  Patient could not remember the Nurse Practitioner's name.

## 2019-04-30 ENCOUNTER — TELEPHONE (OUTPATIENT)
Dept: FAMILY MEDICINE | Facility: CLINIC | Age: 84
End: 2019-04-30

## 2019-04-30 NOTE — TELEPHONE ENCOUNTER
"----- Message from Tae Goel MD sent at 4/30/2019  9:36 AM CDT -----  The current medication list that we have since it was last reconciled is as follows:  Current Outpatient Medications on File Prior to Visit:  atorvastatin (LIPITOR) 40 MG tablet, Take 1 tablet (40 mg total) by mouth once daily., Disp: 90 tablet, Rfl: 3  blood sugar diagnostic Strp, Use daily- Freestyle lite, Disp: 300 strip, Rfl: 3  blood-glucose meter kit, Use before meals and before bed when the glucoses are not in control.  Otherwise, test it daily once a day before meals randomly to ensure stability of the gluocse., Disp: 1 each, Rfl: 0  clopidogrel (PLAVIX) 75 mg tablet, , Disp: , Rfl:   DULoxetine (CYMBALTA) 60 MG capsule, Take 1 capsule (60 mg total) by mouth once daily., Disp: 90 capsule, Rfl: 3  ERGOCALCIFEROL, VITAMIN D2, (VITAMIN D ORAL), Take by mouth., Disp: , Rfl:   furosemide (LASIX) 40 MG tablet, Take 1 tablet (40 mg total) by mouth once daily., Disp: 90 tablet, Rfl: 3  lisinopril (PRINIVIL,ZESTRIL) 20 MG tablet, Take 1 tablet (20 mg total) by mouth 2 (two) times daily., Disp: 180 tablet, Rfl: 3  metoprolol succinate (TOPROL-XL) 50 MG 24 hr tablet, Take 1 tablet (50 mg total) by mouth once daily., Disp: 90 tablet, Rfl: 3  omeprazole (PRILOSEC) 20 MG capsule, Take 1 capsule (20 mg total) by mouth once daily., Disp: 90 capsule, Rfl: 3  pen needle, diabetic 32 gauge x 5/32" Ndle, 1 each by Misc.(Non-Drug; Combo Route) route once daily., Disp: 100 each, Rfl: 11  rivaroxaban (XARELTO) 15 mg Tab, Take 1 tablet (15 mg total) by mouth daily with dinner or evening meal., Disp: 90 tablet, Rfl: 3    Current Facility-Administered Medications on File Prior to Visit:  diclofenac sodium 1 % gel, , Topical, Daily, Tobias Modi MD    Please add the metformin to the medlist and find out if he is on it once or twice a day.  We need to send in a 6 month RX for this.   If he is on once a day metformin, change his dose to 1000 mg po bid. " If he is on 1000 mg po bid, I will need to have you start him on amaryl 1 mg every AM #30 and rf x 2 and recheck the a1c in 3 months.

## 2019-04-30 NOTE — PROGRESS NOTES
That is my oversight when I reviewed his medicine.  Thank you for catching that.  I will await his visit to change things.

## 2019-04-30 NOTE — PROGRESS NOTES
"The current medication list that we have since it was last reconciled is as follows:  Current Outpatient Medications on File Prior to Visit:  atorvastatin (LIPITOR) 40 MG tablet, Take 1 tablet (40 mg total) by mouth once daily., Disp: 90 tablet, Rfl: 3  blood sugar diagnostic Strp, Use daily- Freestyle lite, Disp: 300 strip, Rfl: 3  blood-glucose meter kit, Use before meals and before bed when the glucoses are not in control.  Otherwise, test it daily once a day before meals randomly to ensure stability of the gluocse., Disp: 1 each, Rfl: 0  clopidogrel (PLAVIX) 75 mg tablet, , Disp: , Rfl:   DULoxetine (CYMBALTA) 60 MG capsule, Take 1 capsule (60 mg total) by mouth once daily., Disp: 90 capsule, Rfl: 3  ERGOCALCIFEROL, VITAMIN D2, (VITAMIN D ORAL), Take by mouth., Disp: , Rfl:   furosemide (LASIX) 40 MG tablet, Take 1 tablet (40 mg total) by mouth once daily., Disp: 90 tablet, Rfl: 3  lisinopril (PRINIVIL,ZESTRIL) 20 MG tablet, Take 1 tablet (20 mg total) by mouth 2 (two) times daily., Disp: 180 tablet, Rfl: 3  metoprolol succinate (TOPROL-XL) 50 MG 24 hr tablet, Take 1 tablet (50 mg total) by mouth once daily., Disp: 90 tablet, Rfl: 3  omeprazole (PRILOSEC) 20 MG capsule, Take 1 capsule (20 mg total) by mouth once daily., Disp: 90 capsule, Rfl: 3  pen needle, diabetic 32 gauge x 5/32" Ndle, 1 each by Misc.(Non-Drug; Combo Route) route once daily., Disp: 100 each, Rfl: 11  rivaroxaban (XARELTO) 15 mg Tab, Take 1 tablet (15 mg total) by mouth daily with dinner or evening meal., Disp: 90 tablet, Rfl: 3    Current Facility-Administered Medications on File Prior to Visit:  diclofenac sodium 1 % gel, , Topical, Daily, Tobias Modi MD    Please add the metformin to the medlist and find out if he is on it once or twice a day.  We need to send in a 6 month RX for this.   If he is on once a day metformin, change his dose to 1000 mg po bid. If he is on 1000 mg po bid, I will need to have you start him on amaryl 1 mg " every AM #30 and rf x 2 and recheck the a1c in 3 months.

## 2019-05-02 ENCOUNTER — OFFICE VISIT (OUTPATIENT)
Dept: DIABETES | Facility: CLINIC | Age: 84
End: 2019-05-02
Payer: MEDICARE

## 2019-05-02 ENCOUNTER — TELEPHONE (OUTPATIENT)
Dept: DIABETES | Facility: CLINIC | Age: 84
End: 2019-05-02

## 2019-05-02 VITALS
SYSTOLIC BLOOD PRESSURE: 120 MMHG | HEIGHT: 68 IN | BODY MASS INDEX: 33.95 KG/M2 | DIASTOLIC BLOOD PRESSURE: 78 MMHG | WEIGHT: 224 LBS

## 2019-05-02 DIAGNOSIS — E11.8 TYPE 2 DIABETES MELLITUS WITH COMPLICATION, UNSPECIFIED WHETHER LONG TERM INSULIN USE: Primary | ICD-10-CM

## 2019-05-02 PROCEDURE — 99214 OFFICE O/P EST MOD 30 MIN: CPT | Mod: S$PBB,,, | Performed by: NURSE PRACTITIONER

## 2019-05-02 PROCEDURE — 99213 OFFICE O/P EST LOW 20 MIN: CPT | Mod: PBBFAC,PO | Performed by: NURSE PRACTITIONER

## 2019-05-02 PROCEDURE — 99214 PR OFFICE/OUTPT VISIT, EST, LEVL IV, 30-39 MIN: ICD-10-PCS | Mod: S$PBB,,, | Performed by: NURSE PRACTITIONER

## 2019-05-02 PROCEDURE — 99999 PR PBB SHADOW E&M-EST. PATIENT-LVL III: ICD-10-PCS | Mod: PBBFAC,,, | Performed by: NURSE PRACTITIONER

## 2019-05-02 PROCEDURE — 99999 PR PBB SHADOW E&M-EST. PATIENT-LVL III: CPT | Mod: PBBFAC,,, | Performed by: NURSE PRACTITIONER

## 2019-05-02 PROCEDURE — 82962 GLUCOSE BLOOD TEST: CPT | Mod: PBBFAC,PO | Performed by: NURSE PRACTITIONER

## 2019-05-02 NOTE — TELEPHONE ENCOUNTER
Called and spoke with patient regarding medications. Advise patient to stop taking Metformin for now and to continue taking Glimeperide and Starlix. Asked patient to repeated back the medication changes and he did. Patient verbalized understanding regarding the changes.

## 2019-05-02 NOTE — PROGRESS NOTES
"Subjective:         Patient ID: Héctor Desir is a 84 y.o. male.  Patient's current PCP is Tae Goel MD.       Chief Complaint: Diabetes Mellitus    HPI  Héctor Desir is a 83 y.o. White male presenting for a follow up for diabetes. Patient has been diagnosed with diabetes for several years and has the following complications from diabetes: nephropathy, peripheral neuropathy, cardiovascular disease and cerebrovascular disease, HTN, Hyperlipidemia. Blood glucose testing is performed regularly. The patient reports medication compliance all of the time and diet noncompliance much of the time. Patient had been controlled in the past but after the death of 3 grandchildrens and 2 wives, had to move out of home onto his son's property his condition worsened. He does not have prepared meals. He is considering a meal prep plan to help. He denies recent hospital admissions, denies emergency room visits, denies hypoglycemia. At the last visit patient advised to:  Continue Starlix and Glimepiride      Height: 5' 8" (172.7 cm)  //  Weight: 101.6 kg (224 lb), Body mass index is 34.06 kg/m².  His blood sugar in clinic today is:   Lab Results   Component Value Date    POCGLU 171 (A) 05/03/2019       Labs reviewed and are noted below.  His most recent A1C is:  Lab Results   Component Value Date    HGBA1C 7.2 (H) 04/25/2019    HGBA1C 9.4 (H) 01/21/2019    HGBA1C 7.1 (H) 03/28/2018     Lab Results   Component Value Date    CPEPTIDE 8.85 (H) 04/25/2019     Lab Results   Component Value Date    GLUTAMICACID 0.00 04/25/2019     Glucose   Date Value Ref Range Status   04/25/2019 183 (H) 70 - 110 mg/dL Final   04/25/2019 183 (H) 70 - 110 mg/dL Final     Anion Gap   Date Value Ref Range Status   04/25/2019 9 8 - 16 mmol/L Final     eGFR if    Date Value Ref Range Status   04/25/2019 46.0 (A) >60 mL/min/1.73 m^2 Final     eGFR if non    Date Value Ref Range Status   04/25/2019 39.8 (A) >60 mL/min/1.73 " m^2 Final     Comment:     Calculation used to obtain the estimated glomerular filtration  rate (eGFR) is the CKD-EPI equation.            CURRENT DM MEDICATIONS:   Diabetes Medications             glimepiride (AMARYL) 4 MG tablet TAKE 1 TABLET TWICE A DAY    nateglinide (STARLIX) 120 MG tablet Take 1 tablet (120 mg total) by mouth 3 (three) times daily.            Diabetes Management Status    Statin: Taking  ACE/ARB: Taking    Screening or Prevention Patient's value Goal Complete/Controlled?   HgA1C Testing and Control   Lab Results   Component Value Date    HGBA1C 7.2 (H) 04/25/2019      Annually/Less than 8% Yes   Lipid profile : 01/15/2019 Annually Yes   LDL control Lab Results   Component Value Date    LDLCALC 61.0 (L) 01/15/2019    Annually/Less than 100 mg/dl  Yes   Nephropathy screening Lab Results   Component Value Date    LABMICR 30 11/20/2012     Lab Results   Component Value Date    PROTEINUA Trace (A) 06/20/2018    Annually Yes   Blood pressure BP Readings from Last 1 Encounters:   05/02/19 120/78    Less than 140/90 Yes   Dilated retinal exam : 04/17/2019 Annually Yes   Foot exam   : 02/14/2019 Annually Yes     LIFESTYLE:  ACTIVITY LEVEL: Sedentary  EXERCISE: none  MEAL PLANNING: Patient reports number of meals per day to be 3 and number of snacks per day to be 2  BLOOD GLUCOSE TESTING: Patient is testing 2 times per day          Review of Systems   Constitutional: Negative for activity change and appetite change.   Eyes: Negative for visual disturbance.   Gastrointestinal: Negative for constipation and diarrhea.   Endocrine: Negative for polydipsia, polyphagia and polyuria.   Neurological: Negative for syncope and weakness.   Psychiatric/Behavioral: Negative for confusion.         Objective:      Physical Exam   Constitutional: He is oriented to person, place, and time. He appears well-developed and well-nourished. No distress.   Neck: Normal range of motion.   Cardiovascular: Normal rate.    Pulmonary/Chest: Effort normal.   Musculoskeletal: Normal range of motion.   Neurological: He is alert and oriented to person, place, and time.   Skin: Skin is warm and dry. He is not diaphoretic.   Psychiatric: He has a normal mood and affect. His behavior is normal. Judgment and thought content normal.       Assessment:       1. Type 2 diabetes mellitus with complication, unspecified whether long term insulin use        Plan:   Type 2 diabetes mellitus with complication, unspecified whether long term insulin use  -     POCT Glucose, Hand-Held Device        PLAN:   - Condition: uncontrolled , improved   - Monitor blood glucose 2x daily, fasting and ac dinner or at bedtime.   - Diet reviewed  - Medication Changes:  Hold Metformin, Continue Glimepiride and Starlix, this regimen has controlled his DM well in the past, but due to family stressors, DM worsened, stressors have improved. Cost is also a major factor    - Labs ordered as above  - Follow up: 1 week for log review    A total of 30 minutes was spent in face to face time, of which over 50% was spent in counseling patient on disease process, complications, treatment, and side effects of medications.

## 2019-05-03 LAB — GLUCOSE SERPL-MCNC: 171 MG/DL (ref 70–110)

## 2019-05-06 ENCOUNTER — OFFICE VISIT (OUTPATIENT)
Dept: FAMILY MEDICINE | Facility: CLINIC | Age: 84
End: 2019-05-06
Payer: MEDICARE

## 2019-05-06 VITALS
TEMPERATURE: 98 F | HEART RATE: 79 BPM | HEIGHT: 68 IN | BODY MASS INDEX: 34.56 KG/M2 | DIASTOLIC BLOOD PRESSURE: 77 MMHG | WEIGHT: 228 LBS | SYSTOLIC BLOOD PRESSURE: 131 MMHG

## 2019-05-06 PROCEDURE — 99999 PR PBB SHADOW E&M-EST. PATIENT-LVL III: ICD-10-PCS | Mod: PBBFAC,,, | Performed by: FAMILY MEDICINE

## 2019-05-06 PROCEDURE — 99213 OFFICE O/P EST LOW 20 MIN: CPT | Mod: PBBFAC,PO | Performed by: FAMILY MEDICINE

## 2019-05-06 PROCEDURE — 99999 PR PBB SHADOW E&M-EST. PATIENT-LVL III: CPT | Mod: PBBFAC,,, | Performed by: FAMILY MEDICINE

## 2019-05-06 PROCEDURE — 99213 OFFICE O/P EST LOW 20 MIN: CPT | Mod: S$PBB,,, | Performed by: FAMILY MEDICINE

## 2019-05-06 PROCEDURE — 99213 PR OFFICE/OUTPT VISIT, EST, LEVL III, 20-29 MIN: ICD-10-PCS | Mod: S$PBB,,, | Performed by: FAMILY MEDICINE

## 2019-05-06 NOTE — PROGRESS NOTES
Subjective:      Patient ID: Héctor Desir is a 84 y.o. male.    Chief Complaint:  Diabetes and medications  Problem List Items Addressed This Visit     Type II diabetes mellitus with renal manifestations, uncontrolled    Overview     The patient presents with diabetes.  The patient denies polyuria, polydipsia, polyphagia, hypoglycemia and paresthesias.  The patient's glucose control has been good.  Home glucose averages are routinely checked.  The patient is without retinopathy currently.  The patient has no history of neuropathy.  The patient currently complains of no podiatric problems.  The patient has excellent compliance.  There was concern over the patient's medicine and what he has been taking so he came for a visit today to reconcile what he is doing.  Hemoglobin A1C   Date Value Ref Range Status   04/25/2019 7.2 (H) 4.0 - 5.6 % Final     Comment:     ADA Screening Guidelines:  5.7-6.4%  Consistent with prediabetes  >or=6.5%  Consistent with diabetes  High levels of fetal hemoglobin interfere with the HbA1C  assay. Heterozygous hemoglobin variants (HbS, HgC, etc)do  not significantly interfere with this assay.   However, presence of multiple variants may affect accuracy.     01/21/2019 9.4 (H) 4.0 - 5.6 % Final     Comment:     ADA Screening Guidelines:  5.7-6.4%  Consistent with prediabetes  >or=6.5%  Consistent with diabetes  High levels of fetal hemoglobin interfere with the HbA1C  assay. Heterozygous hemoglobin variants (HbS, HgC, etc)do  not significantly interfere with this assay.   However, presence of multiple variants may affect accuracy.     03/28/2018 7.1 (H) 4.0 - 5.6 % Final     Comment:     According to ADA guidelines, hemoglobin A1c <7.0% represents  optimal control in non-pregnant diabetic patients. Different  metrics may apply to specific patient populations.   Standards of Medical Care in Diabetes-2016.  For the purpose of screening for the presence of diabetes:  <5.7%     Consistent with  the absence of diabetes  5.7-6.4%  Consistent with increasing risk for diabetes   (prediabetes)  >or=6.5%  Consistent with diabetes  Currently, no consensus exists for use of hemoglobin A1c  for diagnosis of diabetes for children.  This Hemoglobin A1c assay has significant interference with fetal   hemoglobin   (HbF). The results are invalid for patients with abnormal amounts of   HbF,   including those with known Hereditary Persistence   of Fetal Hemoglobin. Heterozygous hemoglobin variants (HbAS, HbAC,   HbAD, HbAE, HbA2) do not significantly interfere with this assay;   however, presence of multiple variants in a sample may impact the %   interference.       No results found for: MICROALBUR, [id number]  Diabetes Management Status    Statin: Taking  ACE/ARB: Taking    Screening or Prevention Patient's value Goal Complete/Controlled?   HgA1C Testing and Control   Lab Results   Component Value Date    HGBA1C 7.2 (H) 04/25/2019      Annually/Less than 8% Yes   Lipid profile : 01/15/2019 Annually No   LDL control Lab Results   Component Value Date    LDLCALC 61.0 (L) 01/15/2019    Annually/Less than 100 mg/dl  No   Nephropathy screening Lab Results   Component Value Date    LABMICR 30 11/20/2012     Lab Results   Component Value Date    PROTEINUA Trace (A) 06/20/2018    Annually No   Blood pressure BP Readings from Last 1 Encounters:   05/02/19 120/78    Less than 140/90 Yes   Dilated retinal exam : 04/17/2019 Annually No   Foot exam   : 02/14/2019 Annually Yes                    Past Medical History:  Past Medical History:   Diagnosis Date    CKD (chronic kidney disease) stage 3, GFR 30-59 ml/min     Colon polyps 2011    due again in 2019    Combined hyperlipidemia associated with type 2 diabetes mellitus 7/5/2013    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     DM (diabetes mellitus) type II controlled, neurological manifestation 7/5/2013    GERD (gastroesophageal reflux disease)      History of prostate cancer 2006    Hypertension associated with diabetes     Hypertension goal BP (blood pressure) < 130/80     TIA (transient ischemic attack)      Past Surgical History:   Procedure Laterality Date    CARDIAC CATHETERIZATION      CARDIOVERSION N/A 8/10/2018    Performed by [facility] at [facility] C[facility] LAB    CATARACT EXTRACTION W/  INTRAOCULAR LENS IMPLANT  11/2013    Bilateral Cataract     COLONOSCOPY N/A 10/23/2014    Performed by Tae Goel MD at [facility] ENDO    COLONOSCOPY W/ POLYPECTOMY      EYE SURGERY      PROSTATECTOMY  2006    TONSILLECTOMY      TONSILLECTOMY, ADENOIDECTOMY, BILATERAL MYRINGOTOMY AND TUBES       Review of patient's allergies indicates:   Allergen Reactions    Amlodipine      swelling     Current Outpatient Medications on File Prior to Visit   Medication Sig Dispense Refill    atorvastatin (LIPITOR) 40 MG tablet Take 1 tablet (40 mg total) by mouth once daily. 90 tablet 3    blood sugar diagnostic Strp Use daily- Freestyle lite 300 strip 3    blood-glucose meter kit Use before meals and before bed when the glucoses are not in control.  Otherwise, test it daily once a day before meals randomly to ensure stability of the gluocse. 1 each 0    DULoxetine (CYMBALTA) 60 MG capsule Take 1 capsule (60 mg total) by mouth once daily. 90 capsule 3    ERGOCALCIFEROL, VITAMIN D2, (VITAMIN D ORAL) Take by mouth.      furosemide (LASIX) 40 MG tablet Take 1 tablet (40 mg total) by mouth once daily. 90 tablet 3    glimepiride (AMARYL) 4 MG tablet TAKE 1 TABLET TWICE A  tablet 0    lisinopril (PRINIVIL,ZESTRIL) 20 MG tablet Take 1 tablet (20 mg total) by mouth 2 (two) times daily. 180 tablet 3    metoprolol succinate (TOPROL-XL) 50 MG 24 hr tablet Take 1 tablet (50 mg total) by mouth once daily. 90 tablet 3    nateglinide (STARLIX) 120 MG tablet Take 1 tablet (120 mg total) by mouth 3 (three) times daily. 270 tablet 1    omeprazole (PRILOSEC) 20 MG  "capsule Take 1 capsule (20 mg total) by mouth once daily. 90 capsule 3    pen needle, diabetic 32 gauge x 5/32" Ndle 1 each by Misc.(Non-Drug; Combo Route) route once daily. 100 each 11    rivaroxaban (XARELTO) 15 mg Tab Take 1 tablet (15 mg total) by mouth daily with dinner or evening meal. 90 tablet 3     Current Facility-Administered Medications on File Prior to Visit   Medication Dose Route Frequency Provider Last Rate Last Dose    diclofenac sodium 1 % gel   Topical Daily Tobias Modi MD         Social History     Socioeconomic History    Marital status:      Spouse name: Not on file    Number of children: Not on file    Years of education: Not on file    Highest education level: Not on file   Occupational History    Not on file   Social Needs    Financial resource strain: Not on file    Food insecurity:     Worry: Not on file     Inability: Not on file    Transportation needs:     Medical: Not on file     Non-medical: Not on file   Tobacco Use    Smoking status: Never Smoker    Smokeless tobacco: Never Used   Substance and Sexual Activity    Alcohol use: No     Comment: He used to drink but quit in 1990    Drug use: No    Sexual activity: Not on file   Lifestyle    Physical activity:     Days per week: Not on file     Minutes per session: Not on file    Stress: Not on file   Relationships    Social connections:     Talks on phone: Not on file     Gets together: Not on file     Attends Protestant service: Not on file     Active member of club or organization: Not on file     Attends meetings of clubs or organizations: Not on file     Relationship status: Not on file   Other Topics Concern    Not on file   Social History Narrative    Not on file     Family History   Problem Relation Age of Onset    Heart attack Mother     Melanoma Father     Cancer Father     Anesthesia problems Neg Hx     Clotting disorder Neg Hx     Kidney disease Neg Hx        Review of " "Systems    Objective:     /77   Pulse 79   Temp 97.6 °F (36.4 °C) (Oral)   Ht 5' 8" (1.727 m)   Wt 103.4 kg (228 lb)   BMI 34.67 kg/m²     Physical Exam   Constitutional: He appears well-developed and well-nourished.   Eyes: Pupils are equal, round, and reactive to light. EOM are normal.   Cardiovascular: Normal rate, regular rhythm and normal heart sounds.   Pulmonary/Chest: Effort normal and breath sounds normal. No stridor. No respiratory distress. He has no wheezes. He has no rales.     Assessment:     1. Type II diabetes mellitus with renal manifestations, uncontrolled        Plan:     Problem List Items Addressed This Visit     Type II diabetes mellitus with renal manifestations, uncontrolled    Relevant Orders    Protein / creatinine ratio, urine        He is to be on this regimen for the next 3 months and recheck all of his labs in late July including the cmp, lipid and a1c.  Obtain a shingles vaccine.  "

## 2019-05-09 ENCOUNTER — OFFICE VISIT (OUTPATIENT)
Dept: DIABETES | Facility: CLINIC | Age: 84
End: 2019-05-09
Payer: MEDICARE

## 2019-05-09 VITALS
BODY MASS INDEX: 35.31 KG/M2 | DIASTOLIC BLOOD PRESSURE: 88 MMHG | HEIGHT: 68 IN | WEIGHT: 233 LBS | SYSTOLIC BLOOD PRESSURE: 140 MMHG

## 2019-05-09 DIAGNOSIS — E11.8 TYPE 2 DIABETES MELLITUS WITH COMPLICATION, UNSPECIFIED WHETHER LONG TERM INSULIN USE: Primary | ICD-10-CM

## 2019-05-09 LAB — GLUCOSE SERPL-MCNC: 220 MG/DL (ref 70–110)

## 2019-05-09 PROCEDURE — 99214 OFFICE O/P EST MOD 30 MIN: CPT | Mod: S$PBB,,, | Performed by: NURSE PRACTITIONER

## 2019-05-09 PROCEDURE — 99213 OFFICE O/P EST LOW 20 MIN: CPT | Mod: PBBFAC,PO | Performed by: NURSE PRACTITIONER

## 2019-05-09 PROCEDURE — 82962 GLUCOSE BLOOD TEST: CPT | Mod: PBBFAC,PO | Performed by: NURSE PRACTITIONER

## 2019-05-09 PROCEDURE — 99214 PR OFFICE/OUTPT VISIT, EST, LEVL IV, 30-39 MIN: ICD-10-PCS | Mod: S$PBB,,, | Performed by: NURSE PRACTITIONER

## 2019-05-09 PROCEDURE — 99999 PR PBB SHADOW E&M-EST. PATIENT-LVL III: CPT | Mod: PBBFAC,,, | Performed by: NURSE PRACTITIONER

## 2019-05-09 PROCEDURE — 99999 PR PBB SHADOW E&M-EST. PATIENT-LVL III: ICD-10-PCS | Mod: PBBFAC,,, | Performed by: NURSE PRACTITIONER

## 2019-05-09 RX ORDER — GLIMEPIRIDE 4 MG/1
4 TABLET ORAL
Qty: 180 TABLET | Refills: 0
Start: 2019-05-09 | End: 2019-10-10 | Stop reason: SDUPTHER

## 2019-05-09 RX ORDER — NATEGLINIDE 120 MG/1
120 TABLET ORAL
Qty: 270 TABLET | Refills: 1
Start: 2019-05-09 | End: 2020-02-19 | Stop reason: SDUPTHER

## 2019-05-09 NOTE — PROGRESS NOTES
"Subjective:         Patient ID: Héctor Desir is a 84 y.o. male.  Patient's current PCP is Tae Goel MD.       Chief Complaint: Diabetes Mellitus    HPI  Héctor Desir is a 83 y.o. White male presenting for a follow up for diabetes. Patient has been diagnosed with diabetes for several years and has the following complications from diabetes: nephropathy, peripheral neuropathy, cardiovascular disease and cerebrovascular disease, HTN, Hyperlipidemia. Blood glucose testing is performed regularly. The patient reports medication compliance all of the time and diet compliance much of the time. Patient had been controlled in the past but after the death of 3 grandchildrens and 2 wives, had to move out of home onto his son's property his. He does not have prepared meals.He denies recent hospital admissions, denies emergency room visits, denies hypoglycemia. At the last visit patient advised to:  Continue Starlix and Glimepiride      Height: 5' 8" (172.7 cm)  //  Weight: 105.7 kg (233 lb), Body mass index is 35.43 kg/m².  His blood sugar in clinic today is:   Lab Results   Component Value Date    POCGLU 220 (A) 05/09/2019       Labs reviewed and are noted below.  His most recent A1C is:  Lab Results   Component Value Date    HGBA1C 7.2 (H) 04/25/2019    HGBA1C 9.4 (H) 01/21/2019    HGBA1C 7.1 (H) 03/28/2018     Lab Results   Component Value Date    CPEPTIDE 8.85 (H) 04/25/2019     Lab Results   Component Value Date    GLUTAMICACID 0.00 04/25/2019     Glucose   Date Value Ref Range Status   04/25/2019 183 (H) 70 - 110 mg/dL Final   04/25/2019 183 (H) 70 - 110 mg/dL Final     Anion Gap   Date Value Ref Range Status   04/25/2019 9 8 - 16 mmol/L Final     eGFR if    Date Value Ref Range Status   04/25/2019 46.0 (A) >60 mL/min/1.73 m^2 Final     eGFR if non    Date Value Ref Range Status   04/25/2019 39.8 (A) >60 mL/min/1.73 m^2 Final     Comment:     Calculation used to obtain the estimated " glomerular filtration  rate (eGFR) is the CKD-EPI equation.            CURRENT DM MEDICATIONS:   Diabetes Medications             glimepiride (AMARYL) 4 MG tablet TAKE 1 TABLET TWICE A DAY    nateglinide (STARLIX) 120 MG tablet Take 1 tablet (120 mg total) by mouth 3 (three) times daily.            Diabetes Management Status    Statin: Taking  ACE/ARB: Taking    Screening or Prevention Patient's value Goal Complete/Controlled?   HgA1C Testing and Control   Lab Results   Component Value Date    HGBA1C 7.2 (H) 04/25/2019      Annually/Less than 8% Yes   Lipid profile : 01/15/2019 Annually Yes   LDL control Lab Results   Component Value Date    LDLCALC 61.0 (L) 01/15/2019    Annually/Less than 100 mg/dl  Yes   Nephropathy screening Lab Results   Component Value Date    LABMICR 30 11/20/2012     Lab Results   Component Value Date    PROTEINUA Trace (A) 06/20/2018    Annually Yes   Blood pressure BP Readings from Last 1 Encounters:   05/09/19 (!) 140/88    Less than 140/90 Yes   Dilated retinal exam : 04/17/2019 Annually Yes   Foot exam   : 02/14/2019 Annually Yes     LIFESTYLE:  ACTIVITY LEVEL: Sedentary  EXERCISE: none  MEAL PLANNING: Patient reports number of meals per day to be 3 and number of snacks per day to be 2  BLOOD GLUCOSE TESTING: Patient is testing 2 times per day          Review of Systems   Constitutional: Negative for activity change and appetite change.   Eyes: Negative for visual disturbance.   Gastrointestinal: Negative for constipation and diarrhea.   Endocrine: Negative for polydipsia, polyphagia and polyuria.   Neurological: Negative for syncope and weakness.   Psychiatric/Behavioral: Negative for confusion.         Objective:      Physical Exam   Constitutional: He is oriented to person, place, and time. He appears well-developed and well-nourished. No distress.   Neck: Normal range of motion.   Cardiovascular: Normal rate.   Pulmonary/Chest: Effort normal.   Musculoskeletal: Normal range of  motion.   Neurological: He is alert and oriented to person, place, and time.   Skin: Skin is warm and dry. He is not diaphoretic.   Psychiatric: He has a normal mood and affect. His behavior is normal. Judgment and thought content normal.       Assessment:       1. Type 2 diabetes mellitus with complication, unspecified whether long term insulin use    2. Uncontrolled type 2 diabetes mellitus with stage 3 chronic kidney disease, without long-term current use of insulin        Plan:   Type 2 diabetes mellitus with complication, unspecified whether long term insulin use  -     POCT Glucose, Hand-Held Device    Uncontrolled type 2 diabetes mellitus with stage 3 chronic kidney disease, without long-term current use of insulin  -     nateglinide (STARLIX) 120 MG tablet; Take 1 tablet (120 mg total) by mouth 3 (three) times daily before meals. If you skip a meal, do not take dose  Dispense: 270 tablet; Refill: 1  -     glimepiride (AMARYL) 4 MG tablet; Take 1 tablet (4 mg total) by mouth 2 (two) times daily before meals.  Dispense: 180 tablet; Refill: 0        PLAN:   - Condition: uncontrolled , improved   - Monitor blood glucose 2x daily, fasting and ac dinner or at bedtime.   - Diet reviewed  - Medication Changes:  No log for review. Continue Glimepiride and Starlix, reminded him to take before meals to avoid low blood sugars  - Labs ordered as above  - Follow up: 6 weeks    A total of 30 minutes was spent in face to face time, of which over 50% was spent in counseling patient on disease process, complications, treatment, and side effects of medications.

## 2019-05-09 NOTE — PATIENT INSTRUCTIONS
Continue Glimepiride 4 mg 2 times a day BEFORE MEALS (BREAKFAST AND DINNER)    Continue Starlix 120 mg 3 times a BEFORE MEALS (BREAKFAST, LUNCH AND DINNER). IF YOU SKIP A MEAL, DO NOT TAKE DOSE

## 2019-06-20 ENCOUNTER — OFFICE VISIT (OUTPATIENT)
Dept: DIABETES | Facility: CLINIC | Age: 84
End: 2019-06-20
Payer: MEDICARE

## 2019-06-20 VITALS — BODY MASS INDEX: 34.36 KG/M2 | DIASTOLIC BLOOD PRESSURE: 70 MMHG | WEIGHT: 226 LBS | SYSTOLIC BLOOD PRESSURE: 140 MMHG

## 2019-06-20 DIAGNOSIS — I15.2 HYPERTENSION ASSOCIATED WITH DIABETES: ICD-10-CM

## 2019-06-20 DIAGNOSIS — E78.2 COMBINED HYPERLIPIDEMIA ASSOCIATED WITH TYPE 2 DIABETES MELLITUS: ICD-10-CM

## 2019-06-20 DIAGNOSIS — E11.8 TYPE 2 DIABETES MELLITUS WITH COMPLICATION, UNSPECIFIED WHETHER LONG TERM INSULIN USE: Primary | ICD-10-CM

## 2019-06-20 DIAGNOSIS — E11.69 COMBINED HYPERLIPIDEMIA ASSOCIATED WITH TYPE 2 DIABETES MELLITUS: ICD-10-CM

## 2019-06-20 DIAGNOSIS — E11.59 HYPERTENSION ASSOCIATED WITH DIABETES: ICD-10-CM

## 2019-06-20 LAB — GLUCOSE SERPL-MCNC: 144 MG/DL (ref 70–110)

## 2019-06-20 PROCEDURE — 99999 PR PBB SHADOW E&M-EST. PATIENT-LVL III: ICD-10-PCS | Mod: PBBFAC,,, | Performed by: NURSE PRACTITIONER

## 2019-06-20 PROCEDURE — 99999 PR PBB SHADOW E&M-EST. PATIENT-LVL III: CPT | Mod: PBBFAC,,, | Performed by: NURSE PRACTITIONER

## 2019-06-20 PROCEDURE — 99214 OFFICE O/P EST MOD 30 MIN: CPT | Mod: S$PBB,,, | Performed by: NURSE PRACTITIONER

## 2019-06-20 PROCEDURE — 82962 GLUCOSE BLOOD TEST: CPT | Mod: PBBFAC,PO | Performed by: NURSE PRACTITIONER

## 2019-06-20 PROCEDURE — 99213 OFFICE O/P EST LOW 20 MIN: CPT | Mod: PBBFAC,PO | Performed by: NURSE PRACTITIONER

## 2019-06-20 PROCEDURE — 99214 PR OFFICE/OUTPT VISIT, EST, LEVL IV, 30-39 MIN: ICD-10-PCS | Mod: S$PBB,,, | Performed by: NURSE PRACTITIONER

## 2019-06-20 NOTE — PATIENT INSTRUCTIONS
Continue Medications  Advised to add 15g snack in the day if lunch is missed to avoid low BGs before dinner

## 2019-06-20 NOTE — PROGRESS NOTES
Subjective:         Patient ID: Héctor Desir is a 84 y.o. male.  Patient's current PCP is Tae Goel MD.       Chief Complaint: Diabetes Mellitus    HPI  Héctor Desir is a 83 y.o. White male presenting for a follow up for diabetes. Patient has been diagnosed with diabetes for several years and has the following complications from diabetes: nephropathy, peripheral neuropathy, cardiovascular disease and cerebrovascular disease, HTN, Hyperlipidemia. Blood glucose testing is performed regularly. The patient reports medication compliance all of the time and diet compliance much of the time. Patient had been controlled in the past but after the death of 3 grandchildrens and 2 wives, had to move out of home onto his son's property his. He is adjusting to the changes. He denies recent hospital admissions, denies emergency room visits, reports hypoglycemia. At the last visit patient advised to:  Continue Starlix and Glimepiride                 //  Weight: 102.5 kg (226 lb), Body mass index is 34.36 kg/m².  His blood sugar in clinic today is:   Lab Results   Component Value Date    POCGLU 144 (A) 06/20/2019       Labs reviewed and are noted below.  His most recent A1C is:  Lab Results   Component Value Date    HGBA1C 7.2 (H) 04/25/2019    HGBA1C 9.4 (H) 01/21/2019    HGBA1C 7.1 (H) 03/28/2018     Lab Results   Component Value Date    CPEPTIDE 8.85 (H) 04/25/2019     Lab Results   Component Value Date    GLUTAMICACID 0.00 04/25/2019     Glucose   Date Value Ref Range Status   04/25/2019 183 (H) 70 - 110 mg/dL Final   04/25/2019 183 (H) 70 - 110 mg/dL Final     Anion Gap   Date Value Ref Range Status   04/25/2019 9 8 - 16 mmol/L Final     eGFR if    Date Value Ref Range Status   04/25/2019 46.0 (A) >60 mL/min/1.73 m^2 Final     eGFR if non    Date Value Ref Range Status   04/25/2019 39.8 (A) >60 mL/min/1.73 m^2 Final     Comment:     Calculation used to obtain the estimated glomerular  filtration  rate (eGFR) is the CKD-EPI equation.            CURRENT DM MEDICATIONS:   Diabetes Medications             glimepiride (AMARYL) 4 MG tablet TAKE 1 TABLET TWICE A DAY    nateglinide (STARLIX) 120 MG tablet Take 1 tablet (120 mg total) by mouth 3 (three) times daily.            Diabetes Management Status    Statin: Taking  ACE/ARB: Taking    Screening or Prevention Patient's value Goal Complete/Controlled?   HgA1C Testing and Control   Lab Results   Component Value Date    HGBA1C 7.2 (H) 04/25/2019      Annually/Less than 8% Yes   Lipid profile : 01/15/2019 Annually Yes   LDL control Lab Results   Component Value Date    LDLCALC 61.0 (L) 01/15/2019    Annually/Less than 100 mg/dl  Yes   Nephropathy screening Lab Results   Component Value Date    LABMICR 30 11/20/2012     Lab Results   Component Value Date    PROTEINUA Trace (A) 06/20/2018    Annually Yes   Blood pressure BP Readings from Last 1 Encounters:   06/20/19 (!) 140/70    Less than 140/90 Yes   Dilated retinal exam : 04/17/2019 Annually Yes   Foot exam   : 02/14/2019 Annually Yes     LIFESTYLE:  ACTIVITY LEVEL: Sedentary  EXERCISE: none  MEAL PLANNING: Patient reports number of meals per day to be 3 and number of snacks per day to be 2  BLOOD GLUCOSE TESTING: Patient is testing 2 times per day          Review of Systems   Constitutional: Negative for activity change and appetite change.   Eyes: Negative for visual disturbance.   Gastrointestinal: Negative for constipation and diarrhea.   Endocrine: Negative for polydipsia, polyphagia and polyuria.   Neurological: Negative for syncope and weakness.   Psychiatric/Behavioral: Negative for confusion.         Objective:      Physical Exam   Constitutional: He is oriented to person, place, and time. He appears well-developed and well-nourished. No distress.   Neck: Normal range of motion.   Cardiovascular: Normal rate.   Pulmonary/Chest: Effort normal.   Musculoskeletal: Normal range of motion.    Neurological: He is alert and oriented to person, place, and time.   Skin: Skin is warm and dry. He is not diaphoretic.   Psychiatric: He has a normal mood and affect. His behavior is normal. Judgment and thought content normal.       Assessment:       1. Type 2 diabetes mellitus with complication, unspecified whether long term insulin use    2. Hypertension associated with diabetes    3. Combined hyperlipidemia associated with type 2 diabetes mellitus        Plan:   Type 2 diabetes mellitus with complication, unspecified whether long term insulin use  -     POCT Glucose, Hand-Held Device    Hypertension associated with diabetes  Comments:  fair control based on age; on ACE    Combined hyperlipidemia associated with type 2 diabetes mellitus  Comments:  LDL controlled on statin        PLAN:   - Condition: uncontrolled , improved   - Monitor blood glucose 2x daily, fasting and ac dinner or at bedtime.   - Diet reviewed  - Medication Changes:   Continue Glimepiride and Starlix,   - Hypoglycemia before dinner; Patient is sometimes missing lunch, he is not taking the Starlix if meal is missed. Advised to add 15g snack in the day if lunch is missed to avoid low BGs before dinner  - Labs ordered as above  - Follow up: 6-8 weeks for A1c and BG log review    A total of 30 minutes was spent in face to face time, of which over 50% was spent in counseling patient on disease process, complications, treatment, and side effects of medications.

## 2019-07-15 ENCOUNTER — OFFICE VISIT (OUTPATIENT)
Dept: CARDIOLOGY | Facility: CLINIC | Age: 84
End: 2019-07-15
Payer: MEDICARE

## 2019-07-15 VITALS
DIASTOLIC BLOOD PRESSURE: 88 MMHG | HEIGHT: 68 IN | SYSTOLIC BLOOD PRESSURE: 138 MMHG | WEIGHT: 225 LBS | BODY MASS INDEX: 34.1 KG/M2 | HEART RATE: 89 BPM

## 2019-07-15 DIAGNOSIS — R06.02 SOB (SHORTNESS OF BREATH): ICD-10-CM

## 2019-07-15 DIAGNOSIS — Z86.73 H/O TIA (TRANSIENT ISCHEMIC ATTACK) AND STROKE: Primary | ICD-10-CM

## 2019-07-15 DIAGNOSIS — I15.2 HYPERTENSION ASSOCIATED WITH DIABETES: ICD-10-CM

## 2019-07-15 DIAGNOSIS — I48.20 CHRONIC ATRIAL FIBRILLATION: ICD-10-CM

## 2019-07-15 DIAGNOSIS — E11.59 HYPERTENSION ASSOCIATED WITH DIABETES: ICD-10-CM

## 2019-07-15 PROCEDURE — 99214 OFFICE O/P EST MOD 30 MIN: CPT | Mod: S$PBB,,, | Performed by: INTERNAL MEDICINE

## 2019-07-15 PROCEDURE — 99213 OFFICE O/P EST LOW 20 MIN: CPT | Mod: PBBFAC,PO | Performed by: INTERNAL MEDICINE

## 2019-07-15 PROCEDURE — 99214 PR OFFICE/OUTPT VISIT, EST, LEVL IV, 30-39 MIN: ICD-10-PCS | Mod: S$PBB,,, | Performed by: INTERNAL MEDICINE

## 2019-07-15 PROCEDURE — 99999 PR PBB SHADOW E&M-EST. PATIENT-LVL III: CPT | Mod: PBBFAC,,, | Performed by: INTERNAL MEDICINE

## 2019-07-15 PROCEDURE — 99999 PR PBB SHADOW E&M-EST. PATIENT-LVL III: ICD-10-PCS | Mod: PBBFAC,,, | Performed by: INTERNAL MEDICINE

## 2019-07-15 RX ORDER — METFORMIN HYDROCHLORIDE 1000 MG/1
TABLET ORAL
COMMUNITY
Start: 2019-06-12 | End: 2019-07-15

## 2019-07-15 NOTE — PROGRESS NOTES
Subjective:    Patient ID:  Héctor Desir is a 84 y.o. male who presents for evaluation of Follow-up (follow up )      HPI 83 yo with HTN and chronic AF.States no new problems. Denies any bleeding issues. Has some SOB but unchanged. Had nuclear stress test in January that was normal.    Impression: NORMAL MYOCARDIAL PERFUSION  1. The perfusion scan is free of evidence for myocardial ischemia or injury.   2. Resting wall motion is physiologic.   3. Resting LV function is normal.   4. The ventricular volumes are normal at rest and stress.   5. The extracardiac distribution of radioactivity is normal.           This document has been electronically    SIGNED BY: Israel Cardoza MD On: 01/25/2019 08:39    Review of Systems   Constitution: Negative for decreased appetite, fever, malaise/fatigue, weight gain and weight loss.   HENT: Negative for hearing loss and nosebleeds.    Eyes: Negative for visual disturbance.   Cardiovascular: Positive for dyspnea on exertion. Negative for chest pain, claudication, cyanosis, irregular heartbeat, leg swelling, near-syncope, orthopnea, palpitations, paroxysmal nocturnal dyspnea and syncope.   Respiratory: Negative for cough, hemoptysis, shortness of breath, sleep disturbances due to breathing, snoring and wheezing.    Endocrine: Negative for cold intolerance, heat intolerance, polydipsia and polyuria.   Hematologic/Lymphatic: Negative for adenopathy and bleeding problem. Does not bruise/bleed easily.   Skin: Negative for color change, itching, poor wound healing, rash and suspicious lesions.   Musculoskeletal: Negative for arthritis, back pain, falls, joint pain, joint swelling, muscle cramps, muscle weakness and myalgias.   Gastrointestinal: Negative for bloating, abdominal pain, change in bowel habit, constipation, flatus, heartburn, hematemesis, hematochezia, hemorrhoids, jaundice, melena, nausea and vomiting.   Genitourinary: Negative for bladder incontinence, decreased libido,  "frequency, hematuria, hesitancy and urgency.   Neurological: Negative for brief paralysis, difficulty with concentration, excessive daytime sleepiness, dizziness, focal weakness, headaches, light-headedness, loss of balance, numbness, vertigo and weakness.   Psychiatric/Behavioral: Negative for altered mental status, depression and memory loss. The patient does not have insomnia and is not nervous/anxious.    Allergic/Immunologic: Negative for environmental allergies, hives and persistent infections.        Objective:    Physical Exam   Constitutional: He is oriented to person, place, and time. He appears well-developed and well-nourished. No distress.   /88   Pulse 89   Ht 5' 8" (1.727 m)   Wt 102.1 kg (225 lb)   BMI 34.21 kg/m²      HENT:   Head: Normocephalic and atraumatic.   Eyes: Pupils are equal, round, and reactive to light. Conjunctivae and lids are normal. Right eye exhibits no discharge. No scleral icterus.   Neck: Normal range of motion. Neck supple. No JVD present. No tracheal deviation present. No thyromegaly present.   Cardiovascular: Normal rate, S1 normal, S2 normal, normal heart sounds and intact distal pulses. An irregularly irregular rhythm present. Exam reveals no gallop and no friction rub.   No murmur heard.  Pulses:       Carotid pulses are 2+ on the right side, and 2+ on the left side.       Radial pulses are 2+ on the right side, and 2+ on the left side.        Femoral pulses are 2+ on the right side, and 2+ on the left side.       Popliteal pulses are 2+ on the right side, and 2+ on the left side.        Dorsalis pedis pulses are 2+ on the right side, and 2+ on the left side.        Posterior tibial pulses are 2+ on the right side, and 2+ on the left side.   Pulmonary/Chest: Effort normal and breath sounds normal. No respiratory distress. He has no wheezes. He has no rales. He exhibits no tenderness.   Abdominal: Soft. Bowel sounds are normal. He exhibits no distension and no " mass. There is no hepatosplenomegaly or hepatomegaly. There is no tenderness. There is no rebound and no guarding.   Musculoskeletal: Normal range of motion. He exhibits no edema or tenderness.   Lymphadenopathy:     He has no cervical adenopathy.   Neurological: He is alert and oriented to person, place, and time. He has normal reflexes. No cranial nerve deficit. Coordination normal.   Skin: Skin is warm and dry. No rash noted. He is not diaphoretic. No erythema. No pallor.   Psychiatric: He has a normal mood and affect. His speech is normal and behavior is normal. Judgment and thought content normal. Cognition and memory are normal.         Assessment:       1. H/O TIA (transient ischemic attack) and stroke    2. Chronic atrial fibrillation    3. Hypertension associated with diabetes    4. SOB (shortness of breath)         Plan:     The current medical regimen is effective;  continue present plan and medications.    Reviewed RX's    Risk of stroke from atrial fib has been discussed as well as bleeding risk from alternative anticoagulation regiments as well as risk and benefits of rate control vs cardioversion    No orders of the defined types were placed in this encounter.    Follow up in about 6 months (around 1/15/2020).

## 2019-07-22 ENCOUNTER — LAB VISIT (OUTPATIENT)
Dept: LAB | Facility: HOSPITAL | Age: 84
End: 2019-07-22
Attending: FAMILY MEDICINE
Payer: MEDICARE

## 2019-07-22 DIAGNOSIS — N18.30 CKD (CHRONIC KIDNEY DISEASE) STAGE 3, GFR 30-59 ML/MIN: ICD-10-CM

## 2019-07-22 LAB
ALBUMIN SERPL BCP-MCNC: 3.6 G/DL (ref 3.5–5.2)
ANION GAP SERPL CALC-SCNC: 8 MMOL/L (ref 8–16)
BUN SERPL-MCNC: 25 MG/DL (ref 8–23)
CALCIUM SERPL-MCNC: 8.9 MG/DL (ref 8.7–10.5)
CHLORIDE SERPL-SCNC: 108 MMOL/L (ref 95–110)
CO2 SERPL-SCNC: 26 MMOL/L (ref 23–29)
CREAT SERPL-MCNC: 1.4 MG/DL (ref 0.5–1.4)
EST. GFR  (AFRICAN AMERICAN): 53.7 ML/MIN/1.73 M^2
EST. GFR  (NON AFRICAN AMERICAN): 46.5 ML/MIN/1.73 M^2
ESTIMATED AVG GLUCOSE: 143 MG/DL (ref 68–131)
GLUCOSE SERPL-MCNC: 207 MG/DL (ref 70–110)
HBA1C MFR BLD HPLC: 6.6 % (ref 4–5.6)
PHOSPHATE SERPL-MCNC: 2.6 MG/DL (ref 2.7–4.5)
POTASSIUM SERPL-SCNC: 3.9 MMOL/L (ref 3.5–5.1)
PTH-INTACT SERPL-MCNC: 123 PG/ML (ref 9–77)
SODIUM SERPL-SCNC: 142 MMOL/L (ref 136–145)

## 2019-07-22 PROCEDURE — 83036 HEMOGLOBIN GLYCOSYLATED A1C: CPT

## 2019-07-22 PROCEDURE — 80069 RENAL FUNCTION PANEL: CPT

## 2019-07-22 PROCEDURE — 36415 COLL VENOUS BLD VENIPUNCTURE: CPT | Mod: PO

## 2019-07-22 PROCEDURE — 83970 ASSAY OF PARATHORMONE: CPT

## 2019-10-10 ENCOUNTER — OFFICE VISIT (OUTPATIENT)
Dept: DIABETES | Facility: CLINIC | Age: 84
End: 2019-10-10
Payer: MEDICARE

## 2019-10-10 VITALS
DIASTOLIC BLOOD PRESSURE: 80 MMHG | SYSTOLIC BLOOD PRESSURE: 140 MMHG | WEIGHT: 223.19 LBS | BODY MASS INDEX: 33.94 KG/M2

## 2019-10-10 DIAGNOSIS — I15.2 HYPERTENSION ASSOCIATED WITH DIABETES: ICD-10-CM

## 2019-10-10 DIAGNOSIS — E11.59 HYPERTENSION ASSOCIATED WITH DIABETES: ICD-10-CM

## 2019-10-10 LAB — GLUCOSE SERPL-MCNC: 163 MG/DL (ref 70–110)

## 2019-10-10 PROCEDURE — 99999 PR PBB SHADOW E&M-EST. PATIENT-LVL III: ICD-10-PCS | Mod: PBBFAC,,, | Performed by: NURSE PRACTITIONER

## 2019-10-10 PROCEDURE — 99213 OFFICE O/P EST LOW 20 MIN: CPT | Mod: PBBFAC,PO | Performed by: NURSE PRACTITIONER

## 2019-10-10 PROCEDURE — 99999 PR PBB SHADOW E&M-EST. PATIENT-LVL III: CPT | Mod: PBBFAC,,, | Performed by: NURSE PRACTITIONER

## 2019-10-10 PROCEDURE — 99214 PR OFFICE/OUTPT VISIT, EST, LEVL IV, 30-39 MIN: ICD-10-PCS | Mod: S$PBB,,, | Performed by: NURSE PRACTITIONER

## 2019-10-10 PROCEDURE — 82962 GLUCOSE BLOOD TEST: CPT | Mod: PBBFAC,PO | Performed by: NURSE PRACTITIONER

## 2019-10-10 PROCEDURE — 99214 OFFICE O/P EST MOD 30 MIN: CPT | Mod: S$PBB,,, | Performed by: NURSE PRACTITIONER

## 2019-10-10 RX ORDER — GLIMEPIRIDE 4 MG/1
4 TABLET ORAL
Qty: 180 TABLET | Refills: 0 | Status: SHIPPED | OUTPATIENT
Start: 2019-10-10 | End: 2020-01-15 | Stop reason: SDUPTHER

## 2019-10-10 NOTE — PROGRESS NOTES
Subjective:         Patient ID: Héctor Desir is a 84 y.o. male.  Patient's current PCP is Tae Geol MD.       Chief Complaint: Diabetes Mellitus    HPI  Héctor Desir is a 83 y.o. White male presenting for a follow up for diabetes. Patient has been diagnosed with diabetes for several years and has the following complications from diabetes: nephropathy, peripheral neuropathy, cardiovascular disease and cerebrovascular disease, HTN, Hyperlipidemia. Blood glucose testing is performed regularly. The patient reports medication compliance all of the time and diet compliance much of the time. Patient had been controlled in the past but after the death of 3 grandchildrens and 2 wives, had to move out of home onto his son's property his (this situation has improved) He denies recent hospital admissions, denies emergency room visits, reports hypoglycemia- improved. At the last visit patient advised to:  Continue Starlix and Glimepiride (HE HAS BEEN OUT OF GLIMEPIRIDE FOR OVER 1 MONTH)               //  Weight: 101.2 kg (223 lb 3.2 oz), Body mass index is 33.94 kg/m².  His blood sugar in clinic today is:   Lab Results   Component Value Date    POCGLU 163 (A) 10/10/2019       Labs reviewed and are noted below.  His most recent A1C is:  Lab Results   Component Value Date    HGBA1C 6.6 (H) 07/22/2019    HGBA1C 7.2 (H) 04/25/2019    HGBA1C 9.4 (H) 01/21/2019     Lab Results   Component Value Date    CPEPTIDE 8.85 (H) 04/25/2019     Lab Results   Component Value Date    GLUTAMICACID 0.00 04/25/2019     Glucose   Date Value Ref Range Status   07/22/2019 207 (H) 70 - 110 mg/dL Final     Anion Gap   Date Value Ref Range Status   07/22/2019 8 8 - 16 mmol/L Final     eGFR if    Date Value Ref Range Status   07/22/2019 53.7 (A) >60 mL/min/1.73 m^2 Final     eGFR if non    Date Value Ref Range Status   07/22/2019 46.5 (A) >60 mL/min/1.73 m^2 Final     Comment:     Calculation used to obtain the  estimated glomerular filtration  rate (eGFR) is the CKD-EPI equation.            CURRENT DM MEDICATIONS:   Diabetes Medications             glimepiride (AMARYL) 4 MG tablet TAKE 1 TABLET TWICE A DAY    nateglinide (STARLIX) 120 MG tablet Take 1 tablet (120 mg total) by mouth 3 (three) times daily.            Diabetes Management Status    Statin: Taking  ACE/ARB: Taking    Screening or Prevention Patient's value Goal Complete/Controlled?   HgA1C Testing and Control   Lab Results   Component Value Date    HGBA1C 6.6 (H) 07/22/2019      Annually/Less than 8% Yes   Lipid profile : 01/15/2019 Annually Yes   LDL control Lab Results   Component Value Date    LDLCALC 61.0 (L) 01/15/2019    Annually/Less than 100 mg/dl  Yes   Nephropathy screening Lab Results   Component Value Date    LABMICR 30 11/20/2012     Lab Results   Component Value Date    PROTEINUA Trace (A) 06/20/2018    Annually Yes   Blood pressure BP Readings from Last 1 Encounters:   10/10/19 (!) 140/80    Less than 140/90 Yes   Dilated retinal exam : 04/17/2019 Annually Yes   Foot exam   : 02/14/2019 Annually Yes     LIFESTYLE:  ACTIVITY LEVEL: Sedentary  EXERCISE: none  MEAL PLANNING: Patient reports number of meals per day to be 3 and number of snacks per day to be 2  BLOOD GLUCOSE TESTING: Patient is testing 2 times per day          Review of Systems   Constitutional: Negative for activity change and appetite change.   Eyes: Negative for visual disturbance.   Gastrointestinal: Negative for constipation and diarrhea.   Endocrine: Negative for polydipsia, polyphagia and polyuria.   Neurological: Negative for syncope and weakness.   Psychiatric/Behavioral: Negative for confusion.         Objective:      Physical Exam   Constitutional: He is oriented to person, place, and time. He appears well-developed and well-nourished. No distress.   Neck: Normal range of motion.   Cardiovascular: Normal rate.   Pulmonary/Chest: Effort normal.   Musculoskeletal: Normal  range of motion.   Neurological: He is alert and oriented to person, place, and time.   Skin: Skin is warm and dry. He is not diaphoretic.   Psychiatric: He has a normal mood and affect. His behavior is normal. Judgment and thought content normal.       Assessment:       1. Uncontrolled type 2 diabetes mellitus with stage 3 chronic kidney disease, without long-term current use of insulin    2. Hypertension associated with diabetes        Plan:   Uncontrolled type 2 diabetes mellitus with stage 3 chronic kidney disease, without long-term current use of insulin  -     POCT Glucose, Hand-Held Device  -     CBC auto differential; Future; Expected date: 10/10/2019  -     Comprehensive metabolic panel; Future; Expected date: 10/10/2019  -     Lipid panel; Future; Expected date: 10/10/2019  -     Microalbumin/creatinine urine ratio; Future; Expected date: 10/10/2019  -     Hemoglobin A1c; Future; Expected date: 10/10/2019  -     TSH; Future; Expected date: 10/10/2019  -     glimepiride (AMARYL) 4 MG tablet; Take 1 tablet (4 mg total) by mouth 2 (two) times daily before meals.  Dispense: 180 tablet; Refill: 0  -     Hemoglobin A1c; Future; Expected date: 01/10/2020    Hypertension associated with diabetes  Comments:  stable, managed by Cardiology, no changes made at last visit        PLAN:   - Condition: uncontrolled    - Monitor blood glucose 2x daily, fasting and ac dinner or at bedtime.   - Diet reviewed  - Medication Changes: Continue Glimepiride (refill sent) and Starlix   - Hypoglycemia- rare,  Due to missed meal/snack  - Labs ordered as above- to be done Monday when fasting  - Follow up: 3 months    A total of 30 minutes was spent in face to face time, of which over 50% was spent in counseling patient on disease process, complications, treatment, and side effects of medications.

## 2019-10-14 ENCOUNTER — LAB VISIT (OUTPATIENT)
Dept: LAB | Facility: HOSPITAL | Age: 84
End: 2019-10-14
Attending: FAMILY MEDICINE
Payer: MEDICARE

## 2019-10-14 LAB
ALBUMIN SERPL BCP-MCNC: 3.8 G/DL (ref 3.5–5.2)
ALP SERPL-CCNC: 110 U/L (ref 55–135)
ALT SERPL W/O P-5'-P-CCNC: 24 U/L (ref 10–44)
ANION GAP SERPL CALC-SCNC: 9 MMOL/L (ref 8–16)
AST SERPL-CCNC: 20 U/L (ref 10–40)
BASOPHILS # BLD AUTO: 0.04 K/UL (ref 0–0.2)
BASOPHILS NFR BLD: 0.7 % (ref 0–1.9)
BILIRUB SERPL-MCNC: 0.8 MG/DL (ref 0.1–1)
BUN SERPL-MCNC: 26 MG/DL (ref 8–23)
CALCIUM SERPL-MCNC: 8.6 MG/DL (ref 8.7–10.5)
CHLORIDE SERPL-SCNC: 107 MMOL/L (ref 95–110)
CHOLEST SERPL-MCNC: 102 MG/DL (ref 120–199)
CHOLEST/HDLC SERPL: 3.4 {RATIO} (ref 2–5)
CO2 SERPL-SCNC: 25 MMOL/L (ref 23–29)
CREAT SERPL-MCNC: 1.6 MG/DL (ref 0.5–1.4)
DIFFERENTIAL METHOD: ABNORMAL
EOSINOPHIL # BLD AUTO: 0.3 K/UL (ref 0–0.5)
EOSINOPHIL NFR BLD: 5 % (ref 0–8)
ERYTHROCYTE [DISTWIDTH] IN BLOOD BY AUTOMATED COUNT: 14.2 % (ref 11.5–14.5)
EST. GFR  (AFRICAN AMERICAN): 45.7 ML/MIN/1.73 M^2
EST. GFR  (NON AFRICAN AMERICAN): 39.5 ML/MIN/1.73 M^2
ESTIMATED AVG GLUCOSE: 169 MG/DL (ref 68–131)
GLUCOSE SERPL-MCNC: 155 MG/DL (ref 70–110)
HBA1C MFR BLD HPLC: 7.5 % (ref 4–5.6)
HCT VFR BLD AUTO: 46 % (ref 40–54)
HDLC SERPL-MCNC: 30 MG/DL (ref 40–75)
HDLC SERPL: 29.4 % (ref 20–50)
HGB BLD-MCNC: 15.5 G/DL (ref 14–18)
IMM GRANULOCYTES # BLD AUTO: 0.03 K/UL (ref 0–0.04)
IMM GRANULOCYTES NFR BLD AUTO: 0.5 % (ref 0–0.5)
LDLC SERPL CALC-MCNC: 57 MG/DL (ref 63–159)
LYMPHOCYTES # BLD AUTO: 1 K/UL (ref 1–4.8)
LYMPHOCYTES NFR BLD: 17.8 % (ref 18–48)
MCH RBC QN AUTO: 28.9 PG (ref 27–31)
MCHC RBC AUTO-ENTMCNC: 33.7 G/DL (ref 32–36)
MCV RBC AUTO: 86 FL (ref 82–98)
MONOCYTES # BLD AUTO: 0.5 K/UL (ref 0.3–1)
MONOCYTES NFR BLD: 8.5 % (ref 4–15)
NEUTROPHILS # BLD AUTO: 3.7 K/UL (ref 1.8–7.7)
NEUTROPHILS NFR BLD: 67.5 % (ref 38–73)
NONHDLC SERPL-MCNC: 72 MG/DL
NRBC BLD-RTO: 0 /100 WBC
PLATELET # BLD AUTO: 144 K/UL (ref 150–350)
PMV BLD AUTO: 12.2 FL (ref 9.2–12.9)
POTASSIUM SERPL-SCNC: 4.1 MMOL/L (ref 3.5–5.1)
PROT SERPL-MCNC: 6.5 G/DL (ref 6–8.4)
RBC # BLD AUTO: 5.36 M/UL (ref 4.6–6.2)
SODIUM SERPL-SCNC: 141 MMOL/L (ref 136–145)
TRIGL SERPL-MCNC: 75 MG/DL (ref 30–150)
TSH SERPL DL<=0.005 MIU/L-ACNC: 1.75 UIU/ML (ref 0.4–4)
WBC # BLD AUTO: 5.55 K/UL (ref 3.9–12.7)

## 2019-10-14 PROCEDURE — 36415 COLL VENOUS BLD VENIPUNCTURE: CPT | Mod: PO

## 2019-10-14 PROCEDURE — 80053 COMPREHEN METABOLIC PANEL: CPT

## 2019-10-14 PROCEDURE — 84443 ASSAY THYROID STIM HORMONE: CPT

## 2019-10-14 PROCEDURE — 80061 LIPID PANEL: CPT

## 2019-10-14 PROCEDURE — 85025 COMPLETE CBC W/AUTO DIFF WBC: CPT

## 2019-10-14 PROCEDURE — 83036 HEMOGLOBIN GLYCOSYLATED A1C: CPT

## 2019-10-17 NOTE — PROGRESS NOTES
He did not take his diabetes meds for a month prior to this. Continue current meds and recheck the a1c in 3 months.  F/u with Ms. Howard.     The thyroid function is normal.     The electrolytes are stable with continued low kidney function. Please confirm that he has a kidney doctor follow up.     The lipid and the CBC are stable at this time.

## 2019-10-21 ENCOUNTER — OFFICE VISIT (OUTPATIENT)
Dept: FAMILY MEDICINE | Facility: CLINIC | Age: 84
End: 2019-10-21
Payer: MEDICARE

## 2019-10-21 ENCOUNTER — LAB VISIT (OUTPATIENT)
Dept: LAB | Facility: HOSPITAL | Age: 84
End: 2019-10-21
Attending: FAMILY MEDICINE
Payer: MEDICARE

## 2019-10-21 VITALS
SYSTOLIC BLOOD PRESSURE: 114 MMHG | WEIGHT: 221 LBS | HEIGHT: 68 IN | BODY MASS INDEX: 33.49 KG/M2 | TEMPERATURE: 98 F | HEART RATE: 69 BPM | DIASTOLIC BLOOD PRESSURE: 62 MMHG

## 2019-10-21 DIAGNOSIS — B35.1 ONYCHOMYCOSIS: Primary | ICD-10-CM

## 2019-10-21 DIAGNOSIS — Z12.5 ENCOUNTER FOR PROSTATE CANCER SCREENING: ICD-10-CM

## 2019-10-21 DIAGNOSIS — Z86.010 HISTORY OF COLON POLYPS: ICD-10-CM

## 2019-10-21 DIAGNOSIS — Z85.46 HISTORY OF PROSTATE CANCER: ICD-10-CM

## 2019-10-21 LAB — COMPLEXED PSA SERPL-MCNC: <0.01 NG/ML (ref 0–4)

## 2019-10-21 PROCEDURE — 99999 PR PBB SHADOW E&M-EST. PATIENT-LVL IV: CPT | Mod: PBBFAC,,, | Performed by: FAMILY MEDICINE

## 2019-10-21 PROCEDURE — 99214 PR OFFICE/OUTPT VISIT, EST, LEVL IV, 30-39 MIN: ICD-10-PCS | Mod: S$PBB,,, | Performed by: FAMILY MEDICINE

## 2019-10-21 PROCEDURE — 36415 COLL VENOUS BLD VENIPUNCTURE: CPT | Mod: PO

## 2019-10-21 PROCEDURE — 99214 OFFICE O/P EST MOD 30 MIN: CPT | Mod: S$PBB,,, | Performed by: FAMILY MEDICINE

## 2019-10-21 PROCEDURE — 90662 IIV NO PRSV INCREASED AG IM: CPT | Mod: PBBFAC,PO

## 2019-10-21 PROCEDURE — 99214 OFFICE O/P EST MOD 30 MIN: CPT | Mod: PBBFAC,PO,25 | Performed by: FAMILY MEDICINE

## 2019-10-21 PROCEDURE — 84153 ASSAY OF PSA TOTAL: CPT

## 2019-10-21 PROCEDURE — 99999 PR PBB SHADOW E&M-EST. PATIENT-LVL IV: ICD-10-PCS | Mod: PBBFAC,,, | Performed by: FAMILY MEDICINE

## 2019-10-21 RX ORDER — TERBINAFINE HYDROCHLORIDE 250 MG/1
250 TABLET ORAL DAILY
Qty: 90 TABLET | Refills: 0 | Status: SHIPPED | OUTPATIENT
Start: 2019-10-21 | End: 2020-01-20

## 2019-10-21 NOTE — PROGRESS NOTES
Subjective:      Patient ID: Héctor Desir is a 84 y.o. male.    Chief Complaint: Follow-up    Problem List Items Addressed This Visit     History of colon polyps    Relevant Orders    Case request GI: COLONOSCOPY (Completed)    History of prostate cancer    Overview     He had prostate cancer in 2006 and used to see Dr. Patterson. He would like to check a psa to f/u on this.  He has not had dysuria or hematuria or incontinence.         Type II diabetes mellitus with renal manifestations, uncontrolled    Overview     The patient presents with diabetes.  The patient denies polyuria, polydipsia, polyphagia, hypoglycemia and paresthesias.  The patient's glucose control has been good.  Home glucose averages are routinely checked.  The patient is without retinopathy currently.  The patient has no history of neuropathy.  The patient currently complains of no podiatric problems.  The patient has excellent compliance.  He had been off of the amaryl for some unknown reason and he states that he got back on this now and the readings have been better..   Hemoglobin A1C   Date Value Ref Range Status   10/14/2019 7.5 (H) 4.0 - 5.6 % Final     Comment:     ADA Screening Guidelines:  5.7-6.4%  Consistent with prediabetes  >or=6.5%  Consistent with diabetes  High levels of fetal hemoglobin interfere with the HbA1C  assay. Heterozygous hemoglobin variants (HbS, HgC, etc)do  not significantly interfere with this assay.   However, presence of multiple variants may affect accuracy.     07/22/2019 6.6 (H) 4.0 - 5.6 % Final     Comment:     ADA Screening Guidelines:  5.7-6.4%  Consistent with prediabetes  >or=6.5%  Consistent with diabetes  High levels of fetal hemoglobin interfere with the HbA1C  assay. Heterozygous hemoglobin variants (HbS, HgC, etc)do  not significantly interfere with this assay.   However, presence of multiple variants may affect accuracy.     04/25/2019 7.2 (H) 4.0 - 5.6 % Final     Comment:     ADA Screening  Guidelines:  5.7-6.4%  Consistent with prediabetes  >or=6.5%  Consistent with diabetes  High levels of fetal hemoglobin interfere with the HbA1C  assay. Heterozygous hemoglobin variants (HbS, HgC, etc)do  not significantly interfere with this assay.   However, presence of multiple variants may affect accuracy.       No results found for: MICHELLE, [id number]  Diabetes Management Status    Statin: Taking  ACE/ARB: Taking    Screening or Prevention Patient's value Goal Complete/Controlled?   HgA1C Testing and Control   Lab Results   Component Value Date    HGBA1C 7.5 (H) 10/14/2019      Annually/Less than 8% Yes   Lipid profile : 10/14/2019 Annually No   LDL control Lab Results   Component Value Date    LDLCALC 57.0 (L) 10/14/2019    Annually/Less than 100 mg/dl  No   Nephropathy screening Lab Results   Component Value Date    LABMICR 54.0 10/14/2019     Lab Results   Component Value Date    PROTEINUA Trace (A) 06/20/2018    Annually No   Blood pressure BP Readings from Last 1 Encounters:   10/21/19 114/62    Less than 140/90 Yes   Dilated retinal exam : 04/17/2019 Annually No   Foot exam   : 02/14/2019 Annually Yes                Other Visit Diagnoses     Onychomycosis    -  Primary    Encounter for prostate cancer screening        Relevant Orders    PSA, Screening         She has noted some changes in her toenails for the last few months.  She has had problems on the right second, third and 5th toes.  He has not had treatment of the nails in the past.   Lab Results   Component Value Date    ALT 24 10/14/2019    AST 20 10/14/2019    ALKPHOS 110 10/14/2019    BILITOT 0.8 10/14/2019     He has not had a psa in a long time. He had prostate cancer in 2006.  He would like to check this now. He did go to Dr. Patterson in the past for this.    He also has not had a colonoscopy since 5 years ago. He is due now.    Past Medical History:  Past Medical History:   Diagnosis Date    CKD (chronic kidney disease) stage 3, GFR 30-59  ml/min     Colon polyps 2011    due again in 2019    Combined hyperlipidemia associated with type 2 diabetes mellitus 7/5/2013    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     DM (diabetes mellitus) type II controlled, neurological manifestation 7/5/2013    GERD (gastroesophageal reflux disease)     History of prostate cancer 2006    Hypertension associated with diabetes     Hypertension goal BP (blood pressure) < 130/80     TIA (transient ischemic attack)      Past Surgical History:   Procedure Laterality Date    CARDIAC CATHETERIZATION      CARDIOVERSION N/A 8/10/2018    Procedure: CARDIOVERSION;  Surgeon: Israel Cardoza MD;  Location: [facility] CATH LAB;  Service: Cardiology;  Laterality: N/A;  Dr. Talamantes pt    CATARACT EXTRACTION W/  INTRAOCULAR LENS IMPLANT  11/2013    Bilateral Cataract     COLONOSCOPY W/ POLYPECTOMY      EYE SURGERY      PROSTATECTOMY  2006    TONSILLECTOMY      TONSILLECTOMY, ADENOIDECTOMY, BILATERAL MYRINGOTOMY AND TUBES       Review of patient's allergies indicates:   Allergen Reactions    Amlodipine      swelling     Current Outpatient Medications on File Prior to Visit   Medication Sig Dispense Refill    atorvastatin (LIPITOR) 40 MG tablet Take 1 tablet (40 mg total) by mouth once daily. 90 tablet 3    blood sugar diagnostic Strp Use daily- Freestyle lite 300 strip 3    blood-glucose meter kit Use before meals and before bed when the glucoses are not in control.  Otherwise, test it daily once a day before meals randomly to ensure stability of the gluocse. 1 each 0    DULoxetine (CYMBALTA) 60 MG capsule Take 1 capsule (60 mg total) by mouth once daily. 90 capsule 3    ERGOCALCIFEROL, VITAMIN D2, (VITAMIN D ORAL) Take by mouth once daily.       furosemide (LASIX) 40 MG tablet Take 1 tablet (40 mg total) by mouth once daily. 90 tablet 3    glimepiride (AMARYL) 4 MG tablet Take 1 tablet (4 mg total) by mouth 2 (two) times daily before meals.  180 tablet 0    lisinopril (PRINIVIL,ZESTRIL) 20 MG tablet Take 1 tablet (20 mg total) by mouth 2 (two) times daily. 180 tablet 3    metoprolol succinate (TOPROL-XL) 50 MG 24 hr tablet Take 1 tablet (50 mg total) by mouth once daily. 90 tablet 3    nateglinide (STARLIX) 120 MG tablet Take 1 tablet (120 mg total) by mouth 3 (three) times daily before meals. If you skip a meal, do not take dose 270 tablet 1    omeprazole (PRILOSEC) 20 MG capsule Take 1 capsule (20 mg total) by mouth once daily. 90 capsule 3    rivaroxaban (XARELTO) 15 mg Tab Take 1 tablet (15 mg total) by mouth daily with dinner or evening meal. 90 tablet 3     No current facility-administered medications on file prior to visit.      Social History     Socioeconomic History    Marital status:      Spouse name: Not on file    Number of children: Not on file    Years of education: Not on file    Highest education level: Not on file   Occupational History    Not on file   Social Needs    Financial resource strain: Not on file    Food insecurity:     Worry: Not on file     Inability: Not on file    Transportation needs:     Medical: Not on file     Non-medical: Not on file   Tobacco Use    Smoking status: Never Smoker    Smokeless tobacco: Never Used   Substance and Sexual Activity    Alcohol use: No     Comment: He used to drink but quit in 1990    Drug use: No    Sexual activity: Not on file   Lifestyle    Physical activity:     Days per week: Not on file     Minutes per session: Not on file    Stress: Not on file   Relationships    Social connections:     Talks on phone: Not on file     Gets together: Not on file     Attends Mandaen service: Not on file     Active member of club or organization: Not on file     Attends meetings of clubs or organizations: Not on file     Relationship status: Not on file   Other Topics Concern    Not on file   Social History Narrative    Not on file     Family History   Problem Relation  "Age of Onset    Heart attack Mother     Melanoma Father     Cancer Father     Anesthesia problems Neg Hx     Clotting disorder Neg Hx     Kidney disease Neg Hx        Review of Systems   Constitutional: Negative for diaphoresis, fatigue and fever.   Respiratory: Negative for cough and shortness of breath.    Cardiovascular: Negative for chest pain and palpitations.       Objective:     /62   Pulse 69   Temp 97.7 °F (36.5 °C) (Oral)   Ht 5' 8" (1.727 m)   Wt 100.2 kg (221 lb)   BMI 33.60 kg/m²     Physical Exam   Constitutional: He appears well-developed and well-nourished.   Cardiovascular: Normal rate, regular rhythm and normal heart sounds. Exam reveals no gallop and no friction rub.   No murmur heard.  Pulmonary/Chest: Effort normal and breath sounds normal. No respiratory distress. He has no wheezes. He has no rales.   Musculoskeletal: He exhibits no edema.   Skin:   onychomycosis of the toenails is noted on the 2nd, 3rd and 5th toe(s).  Paronychia is not noted.  There is not pain noted.         Assessment:     1. Onychomycosis    2. Type II diabetes mellitus with renal manifestations, uncontrolled    3. Encounter for prostate cancer screening    4. History of prostate cancer    5. History of colon polyps        Plan:     Problem List Items Addressed This Visit     Type II diabetes mellitus with renal manifestations, uncontrolled      Other Visit Diagnoses     Onychomycosis    -  Primary        Héctor was seen today for follow-up.    Diagnoses and all orders for this visit:    Onychomycosis    Type II diabetes mellitus with renal manifestations, uncontrolled    Encounter for prostate cancer screening  -     PSA, Screening; Future    History of prostate cancer    History of colon polyps  -     Case request GI: COLONOSCOPY    Other orders  -     terbinafine HCl (LAMISIL) 250 mg tablet; Take 1 tablet (250 mg total) by mouth once daily. Take 1 po q day for 3 months then stop.  -     Influenza - High " Dose (65+) (PF) (IM)    get a psa now and he is to go back to Dr krishna for his ED issues.    No follow-ups on file.

## 2019-10-23 ENCOUNTER — TELEPHONE (OUTPATIENT)
Dept: ENDOSCOPY | Facility: HOSPITAL | Age: 84
End: 2019-10-23

## 2019-10-23 RX ORDER — POLYETHYLENE GLYCOL 3350, SODIUM SULFATE ANHYDROUS, SODIUM BICARBONATE, SODIUM CHLORIDE, POTASSIUM CHLORIDE 236; 22.74; 6.74; 5.86; 2.97 G/4L; G/4L; G/4L; G/4L; G/4L
4 POWDER, FOR SOLUTION ORAL ONCE
Qty: 4000 ML | Refills: 0 | Status: SHIPPED | OUTPATIENT
Start: 2019-10-23 | End: 2019-10-23

## 2019-10-23 NOTE — TELEPHONE ENCOUNTER
Patient is scheduled for a Colonoscopy on 12/20/19. Please advise if it's okay for the patient to hold his/her Xarelto 2 days prior to the procedure.

## 2019-10-23 NOTE — TELEPHONE ENCOUNTER
Endoscopy Scheduling Questionnaire:    1. Have you been admitted overnight to the hospital in the past 3 months? no  2. Have you had a cardiac stent placed in the past 12 months? no  3. Have you had a stroke or heart attack in the past 6 months? no  4. Have you had any chest pain in the past 3 months? no      If so, have you been evaluated by your PCP or Cardiologist? no  5. Do you take prescription weight loss medication? no  6. Have you been diagnosed with Diverticulitis within the past 3 months? no  7. Are you on dialysis? no  8. Are you diabetic? Yes. Advised to hold diabetes medication the morning of procedure. Do you have an insulin pump? no  9. Do you have any other health issues that you feel might limit your ability to safely have the procedure and/or sedation? no  10. Is the patient over 79 yo? Yes        If so, has the patient been seen by their PCP or GI in the last 6 months?      -I have reviewed the last colonoscopy for recommendations regarding surveillance and bowel prep  Yes  -I have reviewed the patient's medications and allergies. He is on high risk medication, A clearance request was sent to Dr. Claudy Talamantes  -I have verified the pharmacy information. The prep being used is Golytely. The patient's prep instructions were sent via mail..    Date Endoscopy Scheduled: 12/20/19

## 2019-11-12 NOTE — TELEPHONE ENCOUNTER
----- Message from Tae Goel MD sent at 4/28/2019  8:31 PM CDT -----  The patient has an abnormal a1c.  Book the patient for an a1c in 3 months and send the patient the appointment for this. The patient will need to have a change in the medicine to include lantus 15 u a day instead of 10 u a day.  Continue all other meds.    Please make the change in the medcard to reflect this change and send a prescription for all of the patient's diabetes medications to the pharmacy for 3 months.       Patient notified of normal glucola results as well as normal hemogram via phone message.

## 2019-12-03 ENCOUNTER — OFFICE VISIT (OUTPATIENT)
Dept: FAMILY MEDICINE | Facility: CLINIC | Age: 84
End: 2019-12-03
Payer: MEDICARE

## 2019-12-03 VITALS
RESPIRATION RATE: 16 BRPM | TEMPERATURE: 98 F | WEIGHT: 224 LBS | BODY MASS INDEX: 33.95 KG/M2 | SYSTOLIC BLOOD PRESSURE: 126 MMHG | HEIGHT: 68 IN | HEART RATE: 71 BPM | DIASTOLIC BLOOD PRESSURE: 74 MMHG

## 2019-12-03 DIAGNOSIS — N18.30 CKD (CHRONIC KIDNEY DISEASE) STAGE 3, GFR 30-59 ML/MIN: ICD-10-CM

## 2019-12-03 DIAGNOSIS — I48.91 ATRIAL FIBRILLATION, UNSPECIFIED TYPE: ICD-10-CM

## 2019-12-03 DIAGNOSIS — E78.2 COMBINED HYPERLIPIDEMIA ASSOCIATED WITH TYPE 2 DIABETES MELLITUS: ICD-10-CM

## 2019-12-03 DIAGNOSIS — I15.2 HYPERTENSION ASSOCIATED WITH DIABETES: ICD-10-CM

## 2019-12-03 DIAGNOSIS — Z86.73 H/O TIA (TRANSIENT ISCHEMIC ATTACK) AND STROKE: ICD-10-CM

## 2019-12-03 DIAGNOSIS — R79.89 ELEVATED PTHRP LEVEL: ICD-10-CM

## 2019-12-03 DIAGNOSIS — E11.59 HYPERTENSION ASSOCIATED WITH DIABETES: ICD-10-CM

## 2019-12-03 DIAGNOSIS — Z86.73 CEREBROVASCULAR DISEASE, ARTERIOSCLEROTIC, POST-STROKE: ICD-10-CM

## 2019-12-03 DIAGNOSIS — K21.9 GASTROESOPHAGEAL REFLUX DISEASE, ESOPHAGITIS PRESENCE NOT SPECIFIED: ICD-10-CM

## 2019-12-03 DIAGNOSIS — Z86.010 HISTORY OF COLON POLYPS: ICD-10-CM

## 2019-12-03 DIAGNOSIS — I67.2 CEREBROVASCULAR DISEASE, ARTERIOSCLEROTIC, POST-STROKE: ICD-10-CM

## 2019-12-03 DIAGNOSIS — K57.30 DIVERTICULOSIS OF COLON: ICD-10-CM

## 2019-12-03 DIAGNOSIS — F41.9 ANXIETY: ICD-10-CM

## 2019-12-03 DIAGNOSIS — Z85.46 HISTORY OF PROSTATE CANCER: ICD-10-CM

## 2019-12-03 DIAGNOSIS — E11.42 DIABETIC POLYNEUROPATHY ASSOCIATED WITH TYPE 2 DIABETES MELLITUS: ICD-10-CM

## 2019-12-03 DIAGNOSIS — N25.81 SECONDARY HYPERPARATHYROIDISM, RENAL: ICD-10-CM

## 2019-12-03 DIAGNOSIS — E11.69 COMBINED HYPERLIPIDEMIA ASSOCIATED WITH TYPE 2 DIABETES MELLITUS: ICD-10-CM

## 2019-12-03 PROCEDURE — 99999 PR PBB SHADOW E&M-EST. PATIENT-LVL III: ICD-10-PCS | Mod: PBBFAC,,, | Performed by: NURSE PRACTITIONER

## 2019-12-03 PROCEDURE — G0439 PPPS, SUBSEQ VISIT: HCPCS | Mod: S$GLB,,, | Performed by: NURSE PRACTITIONER

## 2019-12-03 PROCEDURE — 99213 OFFICE O/P EST LOW 20 MIN: CPT | Mod: PBBFAC,PO | Performed by: NURSE PRACTITIONER

## 2019-12-03 PROCEDURE — 99999 PR PBB SHADOW E&M-EST. PATIENT-LVL III: CPT | Mod: PBBFAC,,, | Performed by: NURSE PRACTITIONER

## 2019-12-03 PROCEDURE — G0439 PR MEDICARE ANNUAL WELLNESS SUBSEQUENT VISIT: ICD-10-PCS | Mod: S$GLB,,, | Performed by: NURSE PRACTITIONER

## 2019-12-03 NOTE — PROGRESS NOTES
"Héctor Desir presented for a  Medicare AWV and comprehensive Health Risk Assessment today. The following components were reviewed and updated:    · Medical history  · Family History  · Social history  · Allergies and Current Medications  · Health Risk Assessment  · Health Maintenance  · Care Team     ** See Completed Assessments for Annual Wellness Visit within the encounter summary.**       The following assessments were completed:  · Living Situation  · CAGE  · Depression Screening  · Timed Get Up and Go  · Whisper Test  · Cognitive Function Screening  · Nutrition Screening  · ADL Screening  · PAQ Screening    Vitals:    12/03/19 1307   BP: 126/74   Pulse: 71   Resp: 16   Temp: 97.9 °F (36.6 °C)   TempSrc: Oral   Weight: 101.6 kg (224 lb)   Height: 5' 8" (1.727 m)     Body mass index is 34.06 kg/m².  Physical Exam   Constitutional: He is oriented to person, place, and time. He appears well-developed. No distress.   HENT:   Head: Normocephalic and atraumatic.   Eyes: Pupils are equal, round, and reactive to light. EOM are normal.   Neck: Normal range of motion. Neck supple.   Cardiovascular: Normal rate and regular rhythm.   Pulmonary/Chest: Effort normal and breath sounds normal.   Musculoskeletal: Normal range of motion.   Neurological: He is alert and oriented to person, place, and time.   Skin: Skin is warm and dry. No rash noted.   Psychiatric: He has a normal mood and affect. Thought content normal.   Nursing note and vitals reviewed.        Diagnoses and health risks identified today and associated recommendations/orders:    1. Type II diabetes mellitus with renal manifestations, uncontrolled  Stable and controlled on starlix, glimepiride  Continue medication as prescribed  Continue current treatment plan as previously prescribed with your PCP      2. Type II diabetes mellitus with neurological manifestations, uncontrolled  Stable and controlled on starlix, glimepiride  Continue medication as " prescribed  Continue current treatment plan as previously prescribed with your PCP      3. Secondary hyperparathyroidism, renal  Stable - routine follow up and labs    4. Elevated PTHrP level  Stable - routine follow up and labs    5. History of prostate cancer  Stable - routine follow up with urology    6. CKD (chronic kidney disease) stage 3, GFR 30-59 ml/min  Stable - routine follow up and labs     7. Hypertension associated with diabetes  Stable and controlled on toprol, lisinopril  Continue medication as prescribed  Continue current treatment plan as previously prescribed with your PCP      8. Combined hyperlipidemia associated with type 2 diabetes mellitus  Stable and controlled on atorvastatin  Continue medication as prescribed  Continue current treatment plan as previously prescribed with your PCP      9. Atrial fibrillation, unspecified type  Stable and controlled on xarelto  Continue medication as prescribed  Continue current treatment plan as previously prescribed with your PCP      10. Anxiety  Stable and controlled on cymbalta  Continue medication as prescribed  Continue current treatment plan as previously prescribed with your PCP      11. H/O TIA (transient ischemic attack) and stroke  Stable and controlled on xarelto  Continue medication as prescribed  Continue current treatment plan as previously prescribed with your PCP      12. Diabetic polyneuropathy associated with type 2 diabetes mellitus  Stable - follow up for worsening symptoms    13. Cerebrovascular disease, arteriosclerotic, post-stroke  Stable and controlled on xarelto, atorvastatin  Continue medication as prescribed  Continue current treatment plan as previously prescribed with your PCP      14. Gastroesophageal reflux disease, esophagitis presence not specified  Stable and controlled on omeprazole  Continue medication as prescribed  Continue current treatment plan as previously prescribed with your PCP      15. Diverticulosis of  colon  Stable - follow up for worsening symptoms    16. History of colon polyps  Stable - routine colonoscopy      Provided Héctor with a 5-10 year written screening schedule and personal prevention plan. Recommendations were developed using the USPSTF age appropriate recommendations. Education, counseling, and referrals were provided as needed. After Visit Summary printed and given to patient which includes a list of additional screenings\tests needed.    No follow-ups on file.    Porter Jacobo NP

## 2019-12-05 ENCOUNTER — OFFICE VISIT (OUTPATIENT)
Dept: NEPHROLOGY | Facility: CLINIC | Age: 84
End: 2019-12-05
Payer: MEDICARE

## 2019-12-05 VITALS
HEIGHT: 68 IN | BODY MASS INDEX: 34.59 KG/M2 | HEART RATE: 68 BPM | DIASTOLIC BLOOD PRESSURE: 62 MMHG | SYSTOLIC BLOOD PRESSURE: 140 MMHG | WEIGHT: 228.19 LBS

## 2019-12-05 DIAGNOSIS — E11.69 COMBINED HYPERLIPIDEMIA ASSOCIATED WITH TYPE 2 DIABETES MELLITUS: ICD-10-CM

## 2019-12-05 DIAGNOSIS — Z86.73 H/O TIA (TRANSIENT ISCHEMIC ATTACK) AND STROKE: ICD-10-CM

## 2019-12-05 DIAGNOSIS — E11.59 HYPERTENSION ASSOCIATED WITH DIABETES: ICD-10-CM

## 2019-12-05 DIAGNOSIS — N25.81 SECONDARY HYPERPARATHYROIDISM, RENAL: ICD-10-CM

## 2019-12-05 DIAGNOSIS — I48.19 PERSISTENT ATRIAL FIBRILLATION: ICD-10-CM

## 2019-12-05 DIAGNOSIS — I15.2 HYPERTENSION ASSOCIATED WITH DIABETES: ICD-10-CM

## 2019-12-05 DIAGNOSIS — E78.2 COMBINED HYPERLIPIDEMIA ASSOCIATED WITH TYPE 2 DIABETES MELLITUS: ICD-10-CM

## 2019-12-05 DIAGNOSIS — N18.30 CKD (CHRONIC KIDNEY DISEASE) STAGE 3, GFR 30-59 ML/MIN: Primary | ICD-10-CM

## 2019-12-05 PROCEDURE — 1159F MED LIST DOCD IN RCRD: CPT | Mod: ,,, | Performed by: INTERNAL MEDICINE

## 2019-12-05 PROCEDURE — 1159F PR MEDICATION LIST DOCUMENTED IN MEDICAL RECORD: ICD-10-PCS | Mod: ,,, | Performed by: INTERNAL MEDICINE

## 2019-12-05 PROCEDURE — 99213 OFFICE O/P EST LOW 20 MIN: CPT | Mod: PBBFAC,PO | Performed by: INTERNAL MEDICINE

## 2019-12-05 PROCEDURE — 99214 OFFICE O/P EST MOD 30 MIN: CPT | Mod: S$PBB,,, | Performed by: INTERNAL MEDICINE

## 2019-12-05 PROCEDURE — 99999 PR PBB SHADOW E&M-EST. PATIENT-LVL III: CPT | Mod: PBBFAC,,, | Performed by: INTERNAL MEDICINE

## 2019-12-05 PROCEDURE — 99214 PR OFFICE/OUTPT VISIT, EST, LEVL IV, 30-39 MIN: ICD-10-PCS | Mod: S$PBB,,, | Performed by: INTERNAL MEDICINE

## 2019-12-05 PROCEDURE — 99999 PR PBB SHADOW E&M-EST. PATIENT-LVL III: ICD-10-PCS | Mod: PBBFAC,,, | Performed by: INTERNAL MEDICINE

## 2019-12-05 NOTE — PROGRESS NOTES
Subjective:       Patient ID: Héctor Desir is a 84 y.o. White male who presents for follow-up evaluation of Chronic Kidney Disease    HPI     He reports ongoing stress due to his wife's illness. She is no longer on hospice--but he needs to interact with her daughter who can be challenging. He feels overall well and notes his BS are running better, last Hba1c was 7.1. No LE edema nor SOB. Nocturia is 1-2X.      Interval history Feb 2019: Lost to follow up. Wife passed. He's in a new place, doing better since his Hbac1 went up. Saw Endocrine.     Interval history Dec 2019: doing well. No LE edema and no SOB. NO new medications. Got flu shot.     Review of Systems   Constitutional: Negative for activity change, appetite change, fatigue and unexpected weight change.   HENT: Negative for facial swelling.    Respiratory: Negative for shortness of breath.    Cardiovascular: Negative for chest pain and leg swelling.   Gastrointestinal: Positive for constipation.   Genitourinary: Positive for frequency. Negative for difficulty urinating, dysuria and hematuria.   Musculoskeletal: Negative for arthralgias.   Neurological: Negative for weakness and headaches.   Psychiatric/Behavioral: Negative for decreased concentration.       Objective:      Physical Exam   Constitutional: He is oriented to person, place, and time. He appears well-developed and well-nourished. No distress.   Neck: No JVD present.   Cardiovascular: S1 normal, S2 normal and normal heart sounds. Exam reveals no friction rub.   Pulmonary/Chest: Breath sounds normal. He has no wheezes. He has no rales.   Abdominal: Soft.   Musculoskeletal: He exhibits edema.   Neurological: He is alert and oriented to person, place, and time.   Skin: Skin is warm and dry.   Psychiatric: He has a normal mood and affect.   Vitals reviewed.      Assessment:       1. CKD (chronic kidney disease) stage 3, GFR 30-59 ml/min    2. Persistent atrial fibrillation    3. Combined  hyperlipidemia associated with type 2 diabetes mellitus    4. Secondary hyperparathyroidism, renal    5. Hypertension associated with diabetes    6. Uncontrolled type 2 diabetes mellitus with stage 3 chronic kidney disease, without long-term current use of insulin    7. H/O TIA (transient ischemic attack) and stroke    8. Type II diabetes mellitus with renal manifestations, uncontrolled          Plan:             CKD stage 3 with stable kidney function.  Continue RAAS blockade (lisinopril) for renal preservation    HTN is controlled    DM--is controlled  (Hbac 7.5)    Mineral and Bone Disease--continue D2      RTC 4 months

## 2019-12-12 NOTE — TELEPHONE ENCOUNTER
Called pt to advise him to start holding his Xarelto 2 days prior to his colonoscopy. He verbalized an understanding.

## 2019-12-19 ENCOUNTER — TELEPHONE (OUTPATIENT)
Dept: ENDOSCOPY | Facility: HOSPITAL | Age: 84
End: 2019-12-19

## 2019-12-19 RX ORDER — SODIUM CHLORIDE, SODIUM LACTATE, POTASSIUM CHLORIDE, CALCIUM CHLORIDE 600; 310; 30; 20 MG/100ML; MG/100ML; MG/100ML; MG/100ML
INJECTION, SOLUTION INTRAVENOUS CONTINUOUS
Status: CANCELLED | OUTPATIENT
Start: 2019-12-19

## 2019-12-19 NOTE — TELEPHONE ENCOUNTER
Spoke with patient regarding moving procedure time up to a earlier time on 12-.  He declined stated he had everything in place and could not come in earlier.

## 2019-12-20 ENCOUNTER — ANESTHESIA (OUTPATIENT)
Dept: ENDOSCOPY | Facility: HOSPITAL | Age: 84
End: 2019-12-20
Payer: MEDICARE

## 2019-12-20 ENCOUNTER — HOSPITAL ENCOUNTER (OUTPATIENT)
Facility: HOSPITAL | Age: 84
Discharge: HOME | End: 2019-12-20
Attending: COLON & RECTAL SURGERY | Admitting: COLON & RECTAL SURGERY
Payer: MEDICARE

## 2019-12-20 ENCOUNTER — ANESTHESIA EVENT (OUTPATIENT)
Dept: ENDOSCOPY | Facility: HOSPITAL | Age: 84
End: 2019-12-20
Payer: MEDICARE

## 2019-12-20 VITALS
WEIGHT: 222 LBS | TEMPERATURE: 98 F | SYSTOLIC BLOOD PRESSURE: 109 MMHG | BODY MASS INDEX: 33.65 KG/M2 | DIASTOLIC BLOOD PRESSURE: 66 MMHG | HEIGHT: 68 IN | RESPIRATION RATE: 20 BRPM | HEART RATE: 86 BPM | OXYGEN SATURATION: 98 %

## 2019-12-20 DIAGNOSIS — Z12.11 SCREENING FOR COLON CANCER: ICD-10-CM

## 2019-12-20 DIAGNOSIS — Z86.010 PERSONAL HISTORY OF COLONIC POLYPS: Primary | ICD-10-CM

## 2019-12-20 LAB — POCT GLUCOSE: 129 MG/DL (ref 70–110)

## 2019-12-20 PROCEDURE — 88305 TISSUE EXAM BY PATHOLOGIST: CPT | Mod: 59 | Performed by: PATHOLOGY

## 2019-12-20 PROCEDURE — 45385 COLONOSCOPY W/LESION REMOVAL: CPT | Mod: PT,,, | Performed by: COLON & RECTAL SURGERY

## 2019-12-20 PROCEDURE — 45380 COLONOSCOPY AND BIOPSY: CPT | Mod: 59,,, | Performed by: COLON & RECTAL SURGERY

## 2019-12-20 PROCEDURE — 45380 PR COLONOSCOPY,BIOPSY: ICD-10-PCS | Mod: 59,,, | Performed by: COLON & RECTAL SURGERY

## 2019-12-20 PROCEDURE — 37000009 HC ANESTHESIA EA ADD 15 MINS: Performed by: COLON & RECTAL SURGERY

## 2019-12-20 PROCEDURE — 45385 PR COLONOSCOPY,REMV LESN,SNARE: ICD-10-PCS | Mod: PT,,, | Performed by: COLON & RECTAL SURGERY

## 2019-12-20 PROCEDURE — 88305 TISSUE EXAM BY PATHOLOGIST: CPT | Mod: 26,,, | Performed by: PATHOLOGY

## 2019-12-20 PROCEDURE — 88305 TISSUE EXAM BY PATHOLOGIST: ICD-10-PCS | Mod: 26,,, | Performed by: PATHOLOGY

## 2019-12-20 PROCEDURE — 37000008 HC ANESTHESIA 1ST 15 MINUTES: Performed by: COLON & RECTAL SURGERY

## 2019-12-20 PROCEDURE — 25000003 PHARM REV CODE 250: Performed by: NURSE ANESTHETIST, CERTIFIED REGISTERED

## 2019-12-20 PROCEDURE — 63600175 PHARM REV CODE 636 W HCPCS: Performed by: NURSE ANESTHETIST, CERTIFIED REGISTERED

## 2019-12-20 PROCEDURE — 27201089 HC SNARE, DISP (ANY): Performed by: COLON & RECTAL SURGERY

## 2019-12-20 PROCEDURE — 45385 COLONOSCOPY W/LESION REMOVAL: CPT | Performed by: COLON & RECTAL SURGERY

## 2019-12-20 PROCEDURE — 45380 COLONOSCOPY AND BIOPSY: CPT | Performed by: COLON & RECTAL SURGERY

## 2019-12-20 PROCEDURE — 27201012 HC FORCEPS, HOT/COLD, DISP: Performed by: COLON & RECTAL SURGERY

## 2019-12-20 RX ORDER — PROPOFOL 10 MG/ML
VIAL (ML) INTRAVENOUS
Status: DISCONTINUED | OUTPATIENT
Start: 2019-12-20 | End: 2019-12-20

## 2019-12-20 RX ORDER — SODIUM CHLORIDE, SODIUM LACTATE, POTASSIUM CHLORIDE, CALCIUM CHLORIDE 600; 310; 30; 20 MG/100ML; MG/100ML; MG/100ML; MG/100ML
INJECTION, SOLUTION INTRAVENOUS CONTINUOUS PRN
Status: DISCONTINUED | OUTPATIENT
Start: 2019-12-20 | End: 2019-12-20

## 2019-12-20 RX ORDER — LIDOCAINE HYDROCHLORIDE 10 MG/ML
INJECTION, SOLUTION EPIDURAL; INFILTRATION; INTRACAUDAL; PERINEURAL
Status: DISCONTINUED | OUTPATIENT
Start: 2019-12-20 | End: 2019-12-20

## 2019-12-20 RX ADMIN — SODIUM CHLORIDE, SODIUM LACTATE, POTASSIUM CHLORIDE, AND CALCIUM CHLORIDE: .6; .31; .03; .02 INJECTION, SOLUTION INTRAVENOUS at 11:12

## 2019-12-20 RX ADMIN — PROPOFOL 50 MG: 10 INJECTION, EMULSION INTRAVENOUS at 11:12

## 2019-12-20 RX ADMIN — PROPOFOL 25 MG: 10 INJECTION, EMULSION INTRAVENOUS at 11:12

## 2019-12-20 RX ADMIN — LIDOCAINE HYDROCHLORIDE 50 MG: 10 INJECTION, SOLUTION EPIDURAL; INFILTRATION; INTRACAUDAL; PERINEURAL at 11:12

## 2019-12-20 NOTE — ANESTHESIA RELEASE NOTE
"Anesthesia Release from PACU Note    Patient: Héctor Desir    Procedure(s) Performed: Procedure(s) (LRB):  COLONOSCOPY (N/A)    Anesthesia type: MAC    Post pain: Adequate analgesia    Post assessment: no apparent anesthetic complications, tolerated procedure well and no evidence of recall    Last Vitals:   Visit Vitals  /71   Pulse 88   Temp 36.7 °C (98.1 °F) (Temporal)   Resp 20   Ht 5' 8" (1.727 m)   Wt 100.7 kg (222 lb 0.1 oz)   SpO2 96%   BMI 33.76 kg/m²       Post vital signs: stable    Level of consciousness: awake, alert  and oriented    Nausea/Vomiting: no nausea/no vomiting    Complications: none    Airway Patency: patent    Respiratory: unassisted, spontaneous ventilation, room air    Cardiovascular: stable and blood pressure at baseline    Hydration: euvolemic  "

## 2019-12-20 NOTE — ANESTHESIA PREPROCEDURE EVALUATION
12/20/2019  Héctor Desir is a 84 y.o., male.    Anesthesia Evaluation    I have reviewed the Patient Summary Reports.    I have reviewed the Nursing Notes.   I have reviewed the Medications.     Review of Systems  Anesthesia Hx:  No problems with previous Anesthesia    Social:  Non-Smoker    Cardiovascular:   Hypertension CONCLUSIONS     1 - No wall motion abnormalities.     2 - Normal left ventricular systolic function (EF 60-65%).     3 - Normal left ventricular diastolic function.     4 - Normal right ventricular systolic function .     5 - The estimated PA systolic pressure is 31 mmHg.  Hypertension, Essential Hypertension  Disorder of Cardiac Rhythm, Atrial Fibrillation    Pulmonary:   Shortness of breath    Renal/:   Chronic Renal Disease  Kidney Function/Disease, Chronic Kidney Disease (CKD) , CKD Stage III (GFR 30-59)    Hepatic/GI:   Bowel Prep. GERD    Neurological:   TIA,  TIA - Transient Ischemic Attack    Endocrine:   Diabetes, type 2  Diabetes, Type 2 Diabetes , controlled by oral hypoglycemics.  Metabolic Disorders, Obesity / BMI > 30      Physical Exam  General:  Obesity    Airway/Jaw/Neck:  Airway Findings: Mouth Opening: Normal Tongue: Normal  General Airway Assessment: Adult       Chest/Lungs:  Chest/Lungs Findings: Normal Respiratory Rate     Heart/Vascular:  Heart Findings: Rate: Normal             Anesthesia Plan  Type of Anesthesia, risks & benefits discussed:  Anesthesia Type:  MAC  Patient's Preference:   Intra-op Monitoring Plan: standard ASA monitors  Intra-op Monitoring Plan Comments:   Post Op Pain Control Plan:   Post Op Pain Control Plan Comments:   Induction:   IV  Beta Blocker:  Patient is on a Beta-Blocker and has received one dose within the past 24 hours (No further documentation required).       Informed Consent: Patient understands risks and agrees with Anesthesia  plan.  Questions answered. Anesthesia consent signed with patient.  ASA Score: 3     Day of Surgery Review of History & Physical: I have interviewed and examined the patient. I have reviewed the patient's H&P dated:  There are no significant changes.          Ready For Surgery From Anesthesia Perspective.

## 2019-12-20 NOTE — DISCHARGE INSTRUCTIONS
Understanding Colon and Rectal Polyps    The colon (also called the large intestine) is a muscular tube that forms the last part of the digestive tract. It absorbs water and stores food waste. The colon is about 4 to 6 feet long. The rectum is the last 6 inches of the colon. The colon and rectum have a smooth lining composed of millions of cells. Changes in these cells can lead to growths in the colon that can become cancerous and should be removed. Multiple tests are available to screen for colon cancer, but the colonoscopy is the most recommended test. During colonoscopy, these polyps can be removed. How often you need this test depends on many things including your condition, your family history, symptoms, and what the findings were at the previous colonoscopy.   When the colon lining changes  Changes that happen in the cells that line the colon or rectum can lead to growths called polyps. Over a period of years, polyps can turn cancerous. Removing polyps early may prevent cancer from ever forming.  Polyps  Polyps are fleshy clumps of tissue that form on the lining of the colon or rectum. Small polyps are usually benign (not cancerous). However, over time, cells in a polyp can change and become cancerous. Certain types of polyps known as adenomatous polyps are premalignant. The risk for invasive cancer increases with the size of the polyp and certain cell and gene features. This means that they can become cancerous if they're not removed. Hyperplastic polyps are benign. They can grow quite large and not turn cancerous.   Cancer  Almost all colorectal cancers start when polyp cells begin growing abnormally. As a cancerous tumor grows, it may involve more and more of the colon or rectum. In time, cancer can also grow beyond the colon or rectum and spread to nearby organs or to glands called lymph nodes. The cells can also travel to other parts of the body. This is known as metastasis. The earlier a cancerous  tumor is removed, the better the chance of preventing its spread.    Date Last Reviewed: 8/1/2016  © 9065-1701 [employer]. [address] [place] [address], [place] [address]. All rights reserved. This information is not intended as a substitute for professional medical care. Always follow your healthcare professional's instructions.        Understanding Diverticulosis and Diverticulitis     Pouches or diverticula usually occur in the lower part of the colon called the sigmoid.     The colon (large intestine) is the last part of the digestive tract. It absorbs water from stool and changes it from a liquid to a solid. In certain cases, small pouches called diverticula can form in the colon wall. This condition is called diverticulosis. The pouches can become infected. If this happens, it becomes a more serious problem called diverticulitis. These problems can be painful. But they can be managed.  Managing your condition  Diet changes or medicines may be prescribed.   If you have diverticulosis  Recommendations include:  · Diet changes are often enough to control symptoms. The main changes are adding fiber (roughage) and drinking more water. Fiber absorbs water as it travels through your colon. This helps your stool stay soft and move smoothly. Water helps this process.  · If needed, you may be told to take over-the-counter stool softeners.  · To help relieve pain, antispasmodic medicines may be prescribed.  · Watch for changes in your bowel movements. Tell the healthcare provider if you notice any changes.  · Begin an exercise program. Ask your healthcare provider how to get started.  · Get plenty of rest and sleep.   If you have diverticulitis  Treatment depends on how bad your symptoms are.  · For mild symptoms. You may be put on a liquid diet for a short time. Antibiotics are usually prescribed. If these two steps relieve your symptoms, you may then be prescribed a high-fiber diet. If you still have symptoms,  your healthcare provider will discuss more treatment choices with you.  · For severe symptoms. You may need to be admitted to the hospital. There, you can be given IV antibiotics and fluids. You will also be put on a low-fiber or liquid diet. Although not common, surgery is needed in some people with severe symptoms.  [place] to colon health     Diverticulitis occurs when the pouches become infected or inflamed.     Help keep your colon healthy with a diet that includes plenty of high-fiber fruits, vegetables, and whole grains. Drink plenty of liquids like water and juice. Maintain a healthy lifestyle including regular exercise, stress management, and adequate rest and sleep.   Date Last Reviewed: 7/1/2016  © 3785-0282 [employer]. [address] [place] [address], [place] [address]. All rights reserved. This information is not intended as a substitute for professional medical care. Always follow your healthcare professional's instructions.

## 2019-12-20 NOTE — [employer] PATIENT INSTRUCTIONS
Discharge Summary/Instructions after an Endoscopic Procedure  Patient Name: Héctor Desir  Patient MRN: [id number]  Patient YOB: 1935 Friday, December 20, 2019 Forrest Roy MD  RESTRICTIONS:  During your procedure today, you received medications for sedation.  These   medications may affect your judgment, balance and coordination.  Therefore,   for 24 hours, you have the following restrictions:   - DO NOT drive a car, operate machinery, make legal/financial decisions,   sign important papers or drink alcohol.    ACTIVITY:  Today: no heavy lifting, straining or running due to procedural   sedation/anesthesia.  The following day: return to full activity including work.  DIET:  Eat and drink normally unless instructed otherwise.     TREATMENT FOR COMMON SIDE EFFECTS:  - Mild abdominal pain, nausea, belching, bloating or excessive gas:  rest,   eat lightly and use a heating pad.  - Sore Throat: treat with throat lozenges and/or gargle with warm salt   water.  - Because air was used during the procedure, expelling large amounts of air   from your rectum or belching is normal.  - If a bowel prep was taken, you may not have a bowel movement for 1-3 days.    This is normal.  SYMPTOMS TO WATCH FOR AND REPORT TO YOUR PHYSICIAN:  1. Abdominal pain or bloating, other than gas cramps.  2. Chest pain.  3. Back pain.  4. Signs of infection such as: chills or fever occurring within 24 hours   after the procedure.  5. Rectal bleeding, which would show as bright red, maroon, or black stools.   (A tablespoon of blood from the rectum is not serious, especially if   hemorrhoids are present.)  6. Vomiting.  7. Weakness or dizziness.  GO DIRECTLY TO THE NEAREST EMERGENCY ROOM IF YOU HAVE ANY OF THE FOLLOWING:      Difficulty breathing              Chills and/or fever over 101 F   Persistent vomiting and/or vomiting blood   Severe abdominal pain   Severe chest pain   Black, tarry stools   Bleeding- more than one  tablespoon   Any other symptom or condition that you feel may need urgent attention  Your doctor recommends these additional instructions:  If any biopsies were taken, your doctors clinic will contact you in 1 to 2   weeks with any results.  - Discharge patient to home.   - High fiber diet.   - Continue present medications.   - Await pathology results.   - Repeat colonoscopy in 5 years for surveillance.   - Return to primary care physician PRN.  For questions, problems or results please call your physician Forrest Roy MD at Work:  [phone]  If you have any questions about the above instructions, call the GI   department at [phone] or call the endoscopy unit at [phone]   from 7am until 3 pm.  [facility], EMERGENCY ROOM PHONE NUMBER:   [phone]  IF A COMPLICATION OR EMERGENCY SITUATION ARISES AND YOU ARE UNABLE TO REACH   YOUR PHYSICIAN - GO DIRECTLY TO THE EMERGENCY ROOM.  I have read or have had read to me these discharge instructions for my   procedure and have received a written copy.  I understand these   instructions and will follow-up with my physician if I have any questions.     __________________________________       _____________________________________  Nurse Signature                                          Patient/Designated   Responsible Party Signature  MD Forrest Gillespie MD  12/20/2019 11:52:58 AM  This report has been verified and signed electronically.  [employer]

## 2019-12-20 NOTE — TRANSFER OF CARE
"Anesthesia Transfer of Care Note    Patient: Héctor Desir    Procedure(s) Performed: Procedure(s) (LRB):  COLONOSCOPY (N/A)    Patient location: GI    Anesthesia Type: MAC    Transport from OR: Transported from OR on room air with adequate spontaneous ventilation    Post pain: adequate analgesia    Post assessment: no apparent anesthetic complications    Post vital signs: stable    Level of consciousness: awake, alert and oriented    Nausea/Vomiting: no nausea/vomiting    Complications: none    Transfer of care protocol was followed      Last vitals:   Visit Vitals  /68 (BP Location: Left arm, Patient Position: Lying)   Pulse 85   Temp 36.7 °C (98.1 °F) (Temporal)   Resp 18   Ht 5' 8" (1.727 m)   Wt 100.7 kg (222 lb 0.1 oz)   SpO2 97%   BMI 33.76 kg/m²     "

## 2019-12-20 NOTE — BRIEF OP NOTE
[facility] -   Brief Operative Note     SUMMARY     Surgery Date: 12/20/2019     Surgeon(s) and Role:     * Forrest Roy MD - Primary    Assisting Surgeon: None    Pre-op Diagnosis:  Colon cancer screening [Z12.11]    Post-op Diagnosis:  Post-Op Diagnosis Codes:     * Colon cancer screening [Z12.11]    Procedure(s) (LRB):  COLONOSCOPY (N/A)    Anesthesia: Local MAC    Description of the findings of the procedure: See Op Note    Findings/Key Components: See Op Note    Estimated Blood Loss: * No values recorded between 12/20/2019 12:00 AM and 12/20/2019 11:49 AM *         Specimens:   Specimen (12h ago, onward)    None          Discharge Note    SUMMARY     Admit Date: 12/20/2019    Discharge Date and Time: 12/20/2019 11:53 AM    [facility] Course Patient was seen in the preoperative area by both myself and anesthesia. All consents were verified and all questions appropriately answered. All risks, benefits and alternatives explained to patient. Patient proceeded to endoscopy suite for colonoscopy and was discharged home postoperative once cleared by anesthesia.    Final Diagnosis: Post-Op Diagnosis Codes:     * Colon cancer screening [Z12.11]    Disposition: Home or Self Care    Follow Up/Patient Instructions: See Provation report    Medications:  Reconciled Home Medications:      Medication List      CONTINUE taking these medications    atorvastatin 40 MG tablet  Commonly known as:  LIPITOR  Take 1 tablet (40 mg total) by mouth once daily.     blood sugar diagnostic Strp  Use daily- Freestyle lite     blood-glucose meter kit  Use before meals and before bed when the glucoses are not in control.  Otherwise, test it daily once a day before meals randomly to ensure stability of the gluocse.     DULoxetine 60 MG capsule  Commonly known as:  CYMBALTA  Take 1 capsule (60 mg total) by mouth once daily.     furosemide 40 MG tablet  Commonly known as:  LASIX  Take 1 tablet (40 mg total) by mouth once  daily.     glimepiride 4 MG tablet  Commonly known as:  AMARYL  Take 1 tablet (4 mg total) by mouth 2 (two) times daily before meals.     lisinopril 20 MG tablet  Commonly known as:  PRINIVIL,ZESTRIL  Take 1 tablet (20 mg total) by mouth 2 (two) times daily.     metoprolol succinate 50 MG 24 hr tablet  Commonly known as:  TOPROL-XL  Take 1 tablet (50 mg total) by mouth once daily.     nateglinide 120 MG tablet  Commonly known as:  STARLIX  Take 1 tablet (120 mg total) by mouth 3 (three) times daily before meals. If you skip a meal, do not take dose     omeprazole 20 MG capsule  Commonly known as:  PRILOSEC  Take 1 capsule (20 mg total) by mouth once daily.     rivaroxaban 15 mg Tab  Commonly known as:  XARELTO  Take 1 tablet (15 mg total) by mouth daily with dinner or evening meal.     terbinafine HCl 250 mg tablet  Commonly known as:  LamISIL  Take 1 tablet (250 mg total) by mouth once daily. Take 1 po q day for 3 months then stop.     VITAMIN D ORAL  Take by mouth once daily.          Discharge Procedure Orders   Diet general     Call MD for:  temperature >100.4     Call MD for:  persistent nausea and vomiting     Call MD for:  severe uncontrolled pain     Call MD for:  difficulty breathing, headache or visual disturbances     Call MD for:  redness, tenderness, or signs of infection (pain, swelling, redness, odor or green/yellow discharge around incision site)     Call MD for:  hives     Call MD for:  persistent dizziness or light-headedness     Call MD for:  extreme fatigue     Activity as tolerated     Follow-up Information     Tae Goel MD.    Specialty:  Family Medicine  Why:  As needed  Contact information:  [address] [facility] [place] [address] [phone]

## 2019-12-20 NOTE — H&P
[facility]  Colon and Rectal Surgery  History & Physical    Patient Name: Héctor Desir  MRN: [id number]  Admission Date: 12/20/2019  Attending Physician: Forrest Roy MD  Primary Care Provider: Tae Goel MD    Patient information was obtained from patient and medical records.    Subjective:     Chief Complaint/Reason for Admission: Here for Colonoscopy    History of Present Illness:  Patient is a 84 y.o. male presents for colonoscopy. Last cscope 5yrs ago and has personal hx of polyps. No current hematochezia, melena or change in bowel habits. No personal or fam hx of CRC or IBD. On Xarelto and held x 2 days.    No current facility-administered medications on file prior to encounter.      Current Outpatient Medications on File Prior to Encounter   Medication Sig    atorvastatin (LIPITOR) 40 MG tablet Take 1 tablet (40 mg total) by mouth once daily.    blood sugar diagnostic Strp Use daily- Freestyle lite    blood-glucose meter kit Use before meals and before bed when the glucoses are not in control.  Otherwise, test it daily once a day before meals randomly to ensure stability of the gluocse.    DULoxetine (CYMBALTA) 60 MG capsule Take 1 capsule (60 mg total) by mouth once daily.    ERGOCALCIFEROL, VITAMIN D2, (VITAMIN D ORAL) Take by mouth once daily.     furosemide (LASIX) 40 MG tablet Take 1 tablet (40 mg total) by mouth once daily.    glimepiride (AMARYL) 4 MG tablet Take 1 tablet (4 mg total) by mouth 2 (two) times daily before meals.    lisinopril (PRINIVIL,ZESTRIL) 20 MG tablet Take 1 tablet (20 mg total) by mouth 2 (two) times daily.    metoprolol succinate (TOPROL-XL) 50 MG 24 hr tablet Take 1 tablet (50 mg total) by mouth once daily.    nateglinide (STARLIX) 120 MG tablet Take 1 tablet (120 mg total) by mouth 3 (three) times daily before meals. If you skip a meal, do not take dose    omeprazole (PRILOSEC) 20 MG capsule Take 1 capsule (20 mg total) by mouth once daily.     terbinafine HCl (LAMISIL) 250 mg tablet Take 1 tablet (250 mg total) by mouth once daily. Take 1 po q day for 3 months then stop.    rivaroxaban (XARELTO) 15 mg Tab Take 1 tablet (15 mg total) by mouth daily with dinner or evening meal.       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     DM (diabetes mellitus) type II controlled, neurological manifestation 7/5/2013    GERD (gastroesophageal reflux disease)     History of prostate cancer 2006    Hypertension associated with diabetes     Hypertension goal BP (blood pressure) < 130/80     Obesity     Prostate cancer     TIA (transient ischemic attack)     Trouble in sleeping      Past Surgical History:   Procedure Laterality Date    CARDIAC CATHETERIZATION      CARDIOVERSION N/A 8/10/2018    Procedure: CARDIOVERSION;  Surgeon: Israel Cardoza MD;  Location[facility] CATH LAB;  Service: Cardiology;  Laterality: N/A;  Dr. Talamantes pt    CATARACT EXTRACTION W/  INTRAOCULAR LENS IMPLANT  11/2013    Bilateral Cataract     COLONOSCOPY W/ POLYPECTOMY      EYE SURGERY      PROSTATECTOMY  2006    TONSILLECTOMY      TONSILLECTOMY, ADENOIDECTOMY, BILATERAL MYRINGOTOMY AND TUBES       Family History     Problem Relation (Age of Onset)    Cancer Father    Heart attack Mother    Melanoma Father        Tobacco Use    Smoking status: Never Smoker    Smokeless tobacco: Never Used   Substance and Sexual Activity    Alcohol use: No     Comment: He used to drink but quit in 1990    Drug use: No    Sexual activity: Not on file     Review of Systems   Constitutional: Negative for activity  change, appetite change, chills, fatigue, fever and unexpected weight change.   HENT: Negative for congestion, ear pain, sore throat and trouble swallowing.    Eyes: Negative for pain, redness and itching.   Respiratory: Negative for cough, shortness of breath and wheezing.    Cardiovascular: Negative for chest pain, palpitations and leg swelling.   Gastrointestinal: Negative for abdominal distention, abdominal pain, anal bleeding, blood in stool, constipation, diarrhea, nausea, rectal pain and vomiting.   Endocrine: Negative for cold intolerance, heat intolerance and polyuria.   Genitourinary: Negative for dysuria, flank pain, frequency and hematuria.   Musculoskeletal: Negative for gait problem, joint swelling and neck pain.   Skin: Negative for color change, rash and wound.   Allergic/Immunologic: Negative for environmental allergies and immunocompromised state.   Neurological: Negative for dizziness, speech difficulty, weakness and numbness.   Psychiatric/Behavioral: Negative for agitation, confusion and hallucinations.     Objective:       Physical Exam   Constitutional: He is oriented to person, place, and time. He appears well-developed.   HENT:   Head: Normocephalic and atraumatic.   Eyes: Conjunctivae and EOM are normal.   Neck: Normal range of motion. No thyromegaly present.   Cardiovascular: Normal rate and regular rhythm.   Pulmonary/Chest: Effort normal. No respiratory distress.   Abdominal: Soft. He exhibits no distension and no mass. There is no tenderness.   Musculoskeletal: Normal range of motion. He exhibits no edema or tenderness.   Neurological: He is alert and oriented to person, place, and time.   Skin: Skin is warm and dry. Capillary refill takes less than 2 seconds. No rash noted.   Psychiatric: He has a normal mood and affect.         Assessment/Plan:     Patient is a 84 y.o. male who presents for colonoscopy     - Ok to proceed to endoscopy suite for colonoscopy  - Consent obtained. All  risks, benefits and alternatives fully explained to patient, including but not limited to bleeding, infection, perforation, and missed polyps. All questions appropriately answered to patient's satisfaction. Consent signed and placed on chart.    There are no hospital problems to display for this patient.    VTE Risk Mitigation (From admission, onward)    None          Forrest Roy MD  Colon and Rectal Surgery  [facility] -

## 2019-12-20 NOTE — ANESTHESIA POSTPROCEDURE EVALUATION
Anesthesia Post Evaluation    Patient: Héctor Desir    Procedure(s) Performed: Procedure(s) (LRB):  COLONOSCOPY (N/A)    Final Anesthesia Type: MAC    Patient location during evaluation: GI PACU  Patient participation: Yes- Able to Participate  Level of consciousness: awake and alert and oriented  Post-procedure vital signs: reviewed and stable  Pain management: adequate  Airway patency: patent    PONV status at discharge: No PONV  Anesthetic complications: no      Cardiovascular status: blood pressure returned to baseline  Respiratory status: unassisted, room air and spontaneous ventilation  Hydration status: euvolemic  Follow-up not needed.          Vitals Value Taken Time   /71 12/20/2019 12:05 PM   Temp 36.7 °C (98.1 °F) 12/20/2019 11:12 AM   Pulse 88 12/20/2019 12:05 PM   Resp 20 12/20/2019 12:05 PM   SpO2 96 % 12/20/2019 12:05 PM         No case tracking events are documented in the log.      Pain/Sharifa Score: Sharifa Score: 10 (12/20/2019 12:05 PM)

## 2019-12-27 LAB
FINAL PATHOLOGIC DIAGNOSIS: NORMAL
GROSS: NORMAL

## 2020-01-02 NOTE — PROGRESS NOTES
Please call this patient let him know that all the polyps removed were benign on recent colonoscopy, but 2 of them were pre cancerous.  Nothing further needs to be done at this time.  Recommend keeping his repeat surveillance colonoscopy in 5 years as previously discussed.

## 2020-01-09 ENCOUNTER — OFFICE VISIT (OUTPATIENT)
Dept: DIABETES | Facility: CLINIC | Age: 85
End: 2020-01-09
Payer: MEDICARE

## 2020-01-09 VITALS — WEIGHT: 229 LBS | SYSTOLIC BLOOD PRESSURE: 126 MMHG | BODY MASS INDEX: 34.82 KG/M2 | DIASTOLIC BLOOD PRESSURE: 80 MMHG

## 2020-01-09 DIAGNOSIS — I15.2 HYPERTENSION ASSOCIATED WITH DIABETES: ICD-10-CM

## 2020-01-09 DIAGNOSIS — E11.59 HYPERTENSION ASSOCIATED WITH DIABETES: ICD-10-CM

## 2020-01-09 DIAGNOSIS — E78.2 COMBINED HYPERLIPIDEMIA ASSOCIATED WITH TYPE 2 DIABETES MELLITUS: ICD-10-CM

## 2020-01-09 DIAGNOSIS — E11.69 COMBINED HYPERLIPIDEMIA ASSOCIATED WITH TYPE 2 DIABETES MELLITUS: ICD-10-CM

## 2020-01-09 LAB — GLUCOSE SERPL-MCNC: 141 MG/DL (ref 70–110)

## 2020-01-09 PROCEDURE — 1159F MED LIST DOCD IN RCRD: CPT | Mod: ,,, | Performed by: NURSE PRACTITIONER

## 2020-01-09 PROCEDURE — 99999 PR PBB SHADOW E&M-EST. PATIENT-LVL II: ICD-10-PCS | Mod: PBBFAC,,, | Performed by: NURSE PRACTITIONER

## 2020-01-09 PROCEDURE — 1159F PR MEDICATION LIST DOCUMENTED IN MEDICAL RECORD: ICD-10-PCS | Mod: ,,, | Performed by: NURSE PRACTITIONER

## 2020-01-09 PROCEDURE — 99214 OFFICE O/P EST MOD 30 MIN: CPT | Mod: S$PBB,,, | Performed by: NURSE PRACTITIONER

## 2020-01-09 PROCEDURE — 99214 PR OFFICE/OUTPT VISIT, EST, LEVL IV, 30-39 MIN: ICD-10-PCS | Mod: S$PBB,,, | Performed by: NURSE PRACTITIONER

## 2020-01-09 PROCEDURE — 99999 PR PBB SHADOW E&M-EST. PATIENT-LVL II: CPT | Mod: PBBFAC,,, | Performed by: NURSE PRACTITIONER

## 2020-01-09 PROCEDURE — 1126F AMNT PAIN NOTED NONE PRSNT: CPT | Mod: ,,, | Performed by: NURSE PRACTITIONER

## 2020-01-09 PROCEDURE — 1126F PR PAIN SEVERITY QUANTIFIED, NO PAIN PRESENT: ICD-10-PCS | Mod: ,,, | Performed by: NURSE PRACTITIONER

## 2020-01-09 PROCEDURE — 82962 GLUCOSE BLOOD TEST: CPT | Mod: PBBFAC,PO | Performed by: NURSE PRACTITIONER

## 2020-01-09 PROCEDURE — 99212 OFFICE O/P EST SF 10 MIN: CPT | Mod: PBBFAC,PO | Performed by: NURSE PRACTITIONER

## 2020-01-09 NOTE — PATIENT INSTRUCTIONS
PATIENT INSTRUCTIONS  - Follow up as scheduled.   - Carb Count: 30-45G per meal and 15G per snack  - Exercise: Goal is 150 minutes or more per week  - Bring meter and blood sugar log to each appointment.     Medication instructions:   Continue medications    - Blood Sugar Goals:  1. The goal for fasting blood sugars is 80 -130 mg/dl.   2. The goal for the 2 hour after meal blood sugars is below 180 mg/dl.  3. Blood sugars below 70 are considered LOW and you must eat or drink 15G of carbohydrates immediately, then recheck blood sugar after 15 minutes to ensure blood sugar has returned to normal range, (70 or above)

## 2020-01-09 NOTE — PROGRESS NOTES
Subjective:         Patient ID: Héctor Desir is a 84 y.o. male.  Patient's current PCP is Tae Goel MD.       Chief Complaint: Diabetes Mellitus    HPI  Héctor Desir is a 83 y.o. White male presenting for a follow up for diabetes. Patient has been diagnosed with diabetes for several years and has the following complications from diabetes: nephropathy, peripheral neuropathy, cardiovascular disease and cerebrovascular disease, HTN, Hyperlipidemia. Blood glucose testing is performed regularly. The patient reports medication compliance all of the time and diet compliance much of the time. Patient had been controlled in the past but after the death of 3 grandchildrens and 2 wives, had to move out of home onto his son's property his (this situation has improved) He denies recent hospital admissions, denies emergency room visits, reports hypoglycemia- improved. At the last visit patient advised to:  Continue Starlix and Glimepiride        //  Weight: 103.9 kg (229 lb), Body mass index is 34.82 kg/m².  His blood sugar in clinic today is:   Lab Results   Component Value Date    POCGLU 141 (A) 01/09/2020       Labs reviewed and are noted below.  His most recent A1C is:  Lab Results   Component Value Date    HGBA1C 7.5 (H) 10/14/2019    HGBA1C 6.6 (H) 07/22/2019    HGBA1C 7.2 (H) 04/25/2019     Lab Results   Component Value Date    CPEPTIDE 8.85 (H) 04/25/2019     Lab Results   Component Value Date    GLUTAMICACID 0.00 04/25/2019     Glucose   Date Value Ref Range Status   10/14/2019 155 (H) 70 - 110 mg/dL Final     Anion Gap   Date Value Ref Range Status   10/14/2019 9 8 - 16 mmol/L Final     eGFR if    Date Value Ref Range Status   10/14/2019 45.7 (A) >60 mL/min/1.73 m^2 Final     eGFR if non    Date Value Ref Range Status   10/14/2019 39.5 (A) >60 mL/min/1.73 m^2 Final     Comment:     Calculation used to obtain the estimated glomerular filtration  rate (eGFR) is the CKD-EPI  equation.            CURRENT DM MEDICATIONS:   Diabetes Medications             nateglinide (STARLIX) 120 MG tablet Take 1 tablet (120 mg total) by mouth 3 (three) times daily before meals. If you skip a meal, do not take dose    glimepiride (AMARYL) 4 MG tablet Take 1 tablet (4 mg total) by mouth 2 (two) times daily before meals.              Diabetes Management Status    Statin: Taking  ACE/ARB: Taking    Screening or Prevention Patient's value Goal Complete/Controlled?   HgA1C Testing and Control   Lab Results   Component Value Date    HGBA1C 7.5 (H) 10/14/2019      Annually/Less than 8% Yes   Lipid profile : 10/14/2019 Annually Yes   LDL control Lab Results   Component Value Date    LDLCALC 57.0 (L) 10/14/2019    Annually/Less than 100 mg/dl  Yes   Nephropathy screening Lab Results   Component Value Date    LABMICR 54.0 10/14/2019     Lab Results   Component Value Date    PROTEINUA Trace (A) 06/20/2018    Annually Yes   Blood pressure BP Readings from Last 1 Encounters:   01/09/20 126/80    Less than 140/90 Yes   Dilated retinal exam : 04/17/2019 Annually Yes   Foot exam   : 02/14/2019 Annually Yes     LIFESTYLE:  ACTIVITY LEVEL: Sedentary  EXERCISE: none  MEAL PLANNING: Patient reports number of meals per day to be 3 and number of snacks per day to be 2  BLOOD GLUCOSE TESTING: Patient is testing 2 times per day          Review of Systems   Constitutional: Negative for activity change and appetite change.   Eyes: Negative for visual disturbance.   Gastrointestinal: Negative for constipation and diarrhea.   Endocrine: Negative for polydipsia, polyphagia and polyuria.   Neurological: Negative for syncope and weakness.   Psychiatric/Behavioral: Negative for confusion.         Objective:      Physical Exam   Constitutional: He is oriented to person, place, and time. He appears well-developed and well-nourished. No distress.   Neck: Normal range of motion.   Cardiovascular: Normal rate.   Pulmonary/Chest: Effort  normal.   Musculoskeletal: Normal range of motion.   Neurological: He is alert and oriented to person, place, and time.   Skin: Skin is warm and dry. He is not diaphoretic.   Psychiatric: He has a normal mood and affect. His behavior is normal. Judgment and thought content normal.       Assessment:       1. Uncontrolled type 2 diabetes mellitus with stage 3 chronic kidney disease, without long-term current use of insulin    2. Hypertension associated with diabetes    3. Combined hyperlipidemia associated with type 2 diabetes mellitus        Plan:   Uncontrolled type 2 diabetes mellitus with stage 3 chronic kidney disease, without long-term current use of insulin  -     POCT Glucose, Hand-Held Device  -     Discontinue: dulaglutide (TRULICITY) 0.75 mg/0.5 mL PnIj; Inject 0.5 mLs (0.75 mg total) into the skin once a week.  Dispense: 4 Syringe; Refill: 2  -     Discontinue: liraglutide 0.6 mg/0.1 mL, 18 mg/3 mL, subq PNIJ (VICTOZA 2-BOYD) 0.6 mg/0.1 mL (18 mg/3 mL) PnIj; Inject 0.6 mg once daily for 1 week, then increase to 1.2 mg once daily  Dispense: 2 Syringe; Refill: 1    Hypertension associated with diabetes  Comments:  controlled    Combined hyperlipidemia associated with type 2 diabetes mellitus  Comments:  controlled         PLAN:   - Condition: uncontrolled    - Monitor blood glucose 2x daily, fasting and ac dinner or at bedtime.   - Diet reviewed  - Medication Changes: Continue Glimepiride and Starlix. I did discuss replacing an oral agent with GLP-1, Victoza preferred but not covered, Trulicity - $33 dollars. He prefers to wait on A1C results 1/23 before making changes. Benefits of GLP-1 reviewed.   - Hypoglycemia- denies at this time  - Labs ordered as above  - Follow up: 3 months    A total of 30 minutes was spent in face to face time, of which over 50% was spent in counseling patient on disease process, complications, treatment, and side effects of medications.

## 2020-01-15 RX ORDER — GLIMEPIRIDE 4 MG/1
4 TABLET ORAL
Qty: 180 TABLET | Refills: 0 | Status: SHIPPED | OUTPATIENT
Start: 2020-01-15 | End: 2020-01-16 | Stop reason: SDUPTHER

## 2020-01-15 NOTE — TELEPHONE ENCOUNTER
----- Message from Marialuisa Marino sent at 1/15/2020  9:23 AM CST -----  Contact: self/[phone]  Type:  RX Refill Request    Who Called: Héctor Desir  Refill or New Rx:refill  RX Name and Strength:Glimepirde 40 mg  How is the patient currently taking it? (ex. 1XDay):twicw a day  Is this a 30 day or 90 day RX:90  Preferred Pharmacy with phone number:  [facility] [address] [address]  Phone: [phone] Fax: [phone]  Local or Mail Order:local  Ordering Provider:Dr Goel  Would the patient rather a call back or a response via [facility]? Call back  Best Call Back Number:[phone]  Additional Information:

## 2020-01-16 RX ORDER — GLIMEPIRIDE 4 MG/1
4 TABLET ORAL
Qty: 180 TABLET | Refills: 0 | Status: SHIPPED | OUTPATIENT
Start: 2020-01-16 | End: 2020-02-19 | Stop reason: SDUPTHER

## 2020-01-16 NOTE — TELEPHONE ENCOUNTER
Spoke with patient and scheduled annual exam. Pt requested that this be sent to [facility] and not [facility].

## 2020-01-16 NOTE — TELEPHONE ENCOUNTER
The patient has requested medicine refills and is a diabetic. Book aN a1c       Lab Results   Component Value Date    HGBA1C 7.5 (H) 10/14/2019     Lab Results   Component Value Date    CHOL 102 (L) 10/14/2019    CHOL 114 (L) 01/15/2019    CHOL 102 (L) 03/28/2018     Lab Results   Component Value Date    HDL 30 (L) 10/14/2019    HDL 29 (L) 01/15/2019    HDL 25 (L) 03/28/2018     Lab Results   Component Value Date    LDLCALC 57.0 (L) 10/14/2019    LDLCALC 61.0 (L) 01/15/2019    LDLCALC 60.6 (L) 03/28/2018     Lab Results   Component Value Date    TRIG 75 10/14/2019    TRIG 120 01/15/2019    TRIG 82 03/28/2018     Lab Results   Component Value Date    CHOLHDL 29.4 10/14/2019    CHOLHDL 25.4 01/15/2019    CHOLHDL 24.5 03/28/2018     No results found for: DONNA MARTINEZ  BP Readings from Last 3 Encounters:   01/09/20 126/80   12/20/19 109/66   12/05/19 (!) 140/62

## 2020-01-20 ENCOUNTER — OFFICE VISIT (OUTPATIENT)
Dept: CARDIOLOGY | Facility: CLINIC | Age: 85
End: 2020-01-20
Payer: MEDICARE

## 2020-01-20 VITALS
HEIGHT: 68 IN | BODY MASS INDEX: 35.01 KG/M2 | DIASTOLIC BLOOD PRESSURE: 72 MMHG | HEART RATE: 84 BPM | SYSTOLIC BLOOD PRESSURE: 136 MMHG | WEIGHT: 231 LBS

## 2020-01-20 DIAGNOSIS — I48.20 CHRONIC ATRIAL FIBRILLATION: ICD-10-CM

## 2020-01-20 DIAGNOSIS — E11.59 HYPERTENSION ASSOCIATED WITH DIABETES: ICD-10-CM

## 2020-01-20 DIAGNOSIS — Z86.73 H/O TIA (TRANSIENT ISCHEMIC ATTACK) AND STROKE: Primary | ICD-10-CM

## 2020-01-20 DIAGNOSIS — I48.91 ATRIAL FIBRILLATION, UNSPECIFIED TYPE: ICD-10-CM

## 2020-01-20 DIAGNOSIS — I48.19 PERSISTENT ATRIAL FIBRILLATION: ICD-10-CM

## 2020-01-20 DIAGNOSIS — I15.2 HYPERTENSION ASSOCIATED WITH DIABETES: ICD-10-CM

## 2020-01-20 DIAGNOSIS — E78.2 COMBINED HYPERLIPIDEMIA ASSOCIATED WITH TYPE 2 DIABETES MELLITUS: ICD-10-CM

## 2020-01-20 DIAGNOSIS — R60.9 EDEMA, UNSPECIFIED TYPE: ICD-10-CM

## 2020-01-20 DIAGNOSIS — E11.69 COMBINED HYPERLIPIDEMIA ASSOCIATED WITH TYPE 2 DIABETES MELLITUS: ICD-10-CM

## 2020-01-20 PROCEDURE — 1159F MED LIST DOCD IN RCRD: CPT | Mod: ,,, | Performed by: INTERNAL MEDICINE

## 2020-01-20 PROCEDURE — 99214 OFFICE O/P EST MOD 30 MIN: CPT | Mod: S$PBB,,, | Performed by: INTERNAL MEDICINE

## 2020-01-20 PROCEDURE — 1126F AMNT PAIN NOTED NONE PRSNT: CPT | Mod: ,,, | Performed by: INTERNAL MEDICINE

## 2020-01-20 PROCEDURE — 1159F PR MEDICATION LIST DOCUMENTED IN MEDICAL RECORD: ICD-10-PCS | Mod: ,,, | Performed by: INTERNAL MEDICINE

## 2020-01-20 PROCEDURE — 99213 OFFICE O/P EST LOW 20 MIN: CPT | Mod: PBBFAC,PO | Performed by: INTERNAL MEDICINE

## 2020-01-20 PROCEDURE — 99999 PR PBB SHADOW E&M-EST. PATIENT-LVL III: ICD-10-PCS | Mod: PBBFAC,,, | Performed by: INTERNAL MEDICINE

## 2020-01-20 PROCEDURE — 99214 PR OFFICE/OUTPT VISIT, EST, LEVL IV, 30-39 MIN: ICD-10-PCS | Mod: S$PBB,,, | Performed by: INTERNAL MEDICINE

## 2020-01-20 PROCEDURE — 1126F PR PAIN SEVERITY QUANTIFIED, NO PAIN PRESENT: ICD-10-PCS | Mod: ,,, | Performed by: INTERNAL MEDICINE

## 2020-01-20 PROCEDURE — 99999 PR PBB SHADOW E&M-EST. PATIENT-LVL III: CPT | Mod: PBBFAC,,, | Performed by: INTERNAL MEDICINE

## 2020-01-20 RX ORDER — DULAGLUTIDE 0.75 MG/.5ML
INJECTION, SOLUTION SUBCUTANEOUS
COMMUNITY
Start: 2020-01-09 | End: 2020-02-19

## 2020-01-20 RX ORDER — ATORVASTATIN CALCIUM 40 MG/1
40 TABLET, FILM COATED ORAL DAILY
Qty: 90 TABLET | Refills: 3 | Status: SHIPPED | OUTPATIENT
Start: 2020-01-20 | End: 2020-02-19 | Stop reason: SDUPTHER

## 2020-01-20 RX ORDER — LISINOPRIL 20 MG/1
20 TABLET ORAL 2 TIMES DAILY
Qty: 180 TABLET | Refills: 3 | Status: SHIPPED | OUTPATIENT
Start: 2020-01-20 | End: 2020-02-19 | Stop reason: SDUPTHER

## 2020-01-20 RX ORDER — FUROSEMIDE 40 MG/1
40 TABLET ORAL DAILY
Qty: 90 TABLET | Refills: 3 | Status: SHIPPED | OUTPATIENT
Start: 2020-01-20 | End: 2020-02-19 | Stop reason: SDUPTHER

## 2020-01-20 RX ORDER — METOPROLOL SUCCINATE 50 MG/1
50 TABLET, EXTENDED RELEASE ORAL DAILY
Qty: 90 TABLET | Refills: 3 | Status: SHIPPED | OUTPATIENT
Start: 2020-01-20 | End: 2020-02-19 | Stop reason: SDUPTHER

## 2020-01-20 RX ORDER — POLYETHYLENE GLYCOL-3350 AND ELECTROLYTES 236; 6.74; 5.86; 2.97; 22.74 G/274.31G; G/274.31G; G/274.31G; G/274.31G; G/274.31G
POWDER, FOR SOLUTION ORAL
COMMUNITY
Start: 2019-12-17 | End: 2023-03-29 | Stop reason: ALTCHOICE

## 2020-01-20 NOTE — PROGRESS NOTES
Subjective:    Patient ID:  Héctor Desir is a 84 y.o. male who presents for evaluation of Follow-up (follow up )      HPI 83 yo with HTN and chronic AF ACE inhibitor therapy was not prescribed due to . No change in symptoms. Will get tired but does not exercise regularly.    Impression: NORMAL MYOCARDIAL PERFUSION  1. The perfusion scan is free of evidence for myocardial ischemia or injury.   2. Resting wall motion is physiologic.   3. Resting LV function is normal.   4. The ventricular volumes are normal at rest and stress.   5. The extracardiac distribution of radioactivity is normal.           This document has been electronically    SIGNED BY: Israel Cardoza MD On: 01/25/2019 08:39    Review of Systems   Constitution: Positive for malaise/fatigue. Negative for decreased appetite, fever, weight gain and weight loss.   HENT: Negative for hearing loss and nosebleeds.    Eyes: Negative for visual disturbance.   Cardiovascular: Negative for chest pain, claudication, cyanosis, dyspnea on exertion, irregular heartbeat, leg swelling, near-syncope, orthopnea, palpitations, paroxysmal nocturnal dyspnea and syncope.   Respiratory: Negative for cough, hemoptysis, shortness of breath, sleep disturbances due to breathing, snoring and wheezing.    Endocrine: Negative for cold intolerance, heat intolerance, polydipsia and polyuria.   Hematologic/Lymphatic: Negative for adenopathy and bleeding problem. Does not bruise/bleed easily.   Skin: Negative for color change, itching, poor wound healing, rash and suspicious lesions.   Musculoskeletal: Negative for arthritis, back pain, falls, joint pain, joint swelling, muscle cramps, muscle weakness and myalgias.   Gastrointestinal: Negative for bloating, abdominal pain, change in bowel habit, constipation, flatus, heartburn, hematemesis, hematochezia, hemorrhoids, jaundice, melena, nausea and vomiting.   Genitourinary: Negative for bladder incontinence, decreased libido, frequency,  "hematuria, hesitancy and urgency.   Neurological: Negative for brief paralysis, difficulty with concentration, excessive daytime sleepiness, dizziness, focal weakness, headaches, light-headedness, loss of balance, numbness, vertigo and weakness.   Psychiatric/Behavioral: Negative for altered mental status, depression and memory loss. The patient does not have insomnia and is not nervous/anxious.    Allergic/Immunologic: Negative for environmental allergies, hives and persistent infections.        Objective:    Physical Exam   Constitutional: He is oriented to person, place, and time. He appears well-developed and well-nourished. No distress.   /72   Pulse 84   Ht 5' 8" (1.727 m)   Wt 104.8 kg (231 lb)   BMI 35.12 kg/m²      HENT:   Head: Normocephalic and atraumatic.   Eyes: Pupils are equal, round, and reactive to light. Conjunctivae and lids are normal. Right eye exhibits no discharge. No scleral icterus.   Neck: Normal range of motion. Neck supple. No JVD present. No tracheal deviation present. No thyromegaly present.   Cardiovascular: Normal rate, S1 normal, S2 normal, normal heart sounds and intact distal pulses. An irregularly irregular rhythm present. Exam reveals no gallop and no friction rub.   No murmur heard.  Pulses:       Carotid pulses are 2+ on the right side, and 2+ on the left side.       Radial pulses are 2+ on the right side, and 2+ on the left side.        Femoral pulses are 2+ on the right side, and 2+ on the left side.       Popliteal pulses are 2+ on the right side, and 2+ on the left side.        Dorsalis pedis pulses are 2+ on the right side, and 2+ on the left side.        Posterior tibial pulses are 2+ on the right side, and 2+ on the left side.   Pulmonary/Chest: Effort normal and breath sounds normal. No respiratory distress. He has no wheezes. He has no rales. He exhibits no tenderness.   Abdominal: Soft. Bowel sounds are normal. He exhibits no distension and no mass. There is " no hepatosplenomegaly or hepatomegaly. There is no tenderness. There is no rebound and no guarding.   Musculoskeletal: Normal range of motion. He exhibits no edema or tenderness.   Lymphadenopathy:     He has no cervical adenopathy.   Neurological: He is alert and oriented to person, place, and time. He has normal reflexes. No cranial nerve deficit. Coordination normal.   Skin: Skin is warm and dry. No rash noted. He is not diaphoretic. No erythema. No pallor.   Psychiatric: He has a normal mood and affect. His speech is normal and behavior is normal. Judgment and thought content normal. Cognition and memory are normal.         Assessment:       1. H/O TIA (transient ischemic attack) and stroke    2. Atrial fibrillation, unspecified type    3. Hypertension associated with diabetes    4. Persistent atrial fibrillation    5. Chronic atrial fibrillation    6.    7. Combined hyperlipidemia associated with type 2 diabetes mellitus         Plan:     The current medical regimen is effective;  continue present plan and medications.     Reviewed meds and refilled Rx's     Risk of stroke from atrial fib has been discussed as well as bleeding risk from alternative anticoagulation regiments as well as risk and benefits of rate control vs cardioversion    No orders of the defined types were placed in this encounter.    Follow up in about 6 months (around 7/20/2020).

## 2020-01-23 ENCOUNTER — LAB VISIT (OUTPATIENT)
Dept: LAB | Facility: HOSPITAL | Age: 85
End: 2020-01-23
Attending: FAMILY MEDICINE
Payer: MEDICARE

## 2020-01-23 PROCEDURE — 83036 HEMOGLOBIN GLYCOSYLATED A1C: CPT

## 2020-01-23 PROCEDURE — 36415 COLL VENOUS BLD VENIPUNCTURE: CPT | Mod: PO

## 2020-01-24 LAB
ESTIMATED AVG GLUCOSE: 171 MG/DL (ref 68–131)
HBA1C MFR BLD HPLC: 7.6 % (ref 4–5.6)

## 2020-02-04 DIAGNOSIS — K21.9 GASTROESOPHAGEAL REFLUX DISEASE, ESOPHAGITIS PRESENCE NOT SPECIFIED: ICD-10-CM

## 2020-02-04 RX ORDER — OMEPRAZOLE 20 MG/1
CAPSULE, DELAYED RELEASE ORAL
Qty: 90 CAPSULE | Refills: 4 | Status: SHIPPED | OUTPATIENT
Start: 2020-02-04 | End: 2020-02-19 | Stop reason: SDUPTHER

## 2020-02-05 ENCOUNTER — TELEPHONE (OUTPATIENT)
Dept: CARDIOLOGY | Facility: CLINIC | Age: 85
End: 2020-02-05

## 2020-02-05 NOTE — TELEPHONE ENCOUNTER
Wrong doctor calling for labs----- Message from Renetta Brown sent at 2/5/2020 10:02 AM CST -----  Contact: pt  Type:  Patient Returning Call    Who Called:Patient  Who Left Message for Patient:nurse  Does the patient know what this is regarding?:Lab resutls  Would the patient rather a call back or a response via [facility]? Call back  Best Call Back Number:[phone]  Additional Information: na

## 2020-02-05 NOTE — TELEPHONE ENCOUNTER
----- Message from Meena Huber sent at 2/5/2020 10:12 AM CST -----  Contact: pt   Type:  Patient Returning Call    Who Called:pt   Who Left Message for Patient:na  Does the patient know what this is regarding?results   Would the patient rather a call back or a response via [facility]? callback  Best Call Back Number:[phone]   Additional Information:

## 2020-02-05 NOTE — TELEPHONE ENCOUNTER
Returned call to pt and gave lab results. Lab appointment scheduled. Med added to med card and pended for refill.

## 2020-02-12 ENCOUNTER — PATIENT OUTREACH (OUTPATIENT)
Dept: ADMINISTRATIVE | Facility: HOSPITAL | Age: 85
End: 2020-02-12

## 2020-02-19 ENCOUNTER — OFFICE VISIT (OUTPATIENT)
Dept: FAMILY MEDICINE | Facility: CLINIC | Age: 85
End: 2020-02-19
Payer: MEDICARE

## 2020-02-19 VITALS
HEART RATE: 86 BPM | DIASTOLIC BLOOD PRESSURE: 80 MMHG | SYSTOLIC BLOOD PRESSURE: 132 MMHG | HEIGHT: 69 IN | WEIGHT: 226 LBS | TEMPERATURE: 98 F | BODY MASS INDEX: 33.47 KG/M2

## 2020-02-19 DIAGNOSIS — I15.2 HYPERTENSION ASSOCIATED WITH DIABETES: Primary | ICD-10-CM

## 2020-02-19 DIAGNOSIS — I48.91 ATRIAL FIBRILLATION, UNSPECIFIED TYPE: ICD-10-CM

## 2020-02-19 DIAGNOSIS — E78.2 COMBINED HYPERLIPIDEMIA ASSOCIATED WITH TYPE 2 DIABETES MELLITUS: ICD-10-CM

## 2020-02-19 DIAGNOSIS — I48.20 CHRONIC ATRIAL FIBRILLATION: ICD-10-CM

## 2020-02-19 DIAGNOSIS — N18.30 CKD (CHRONIC KIDNEY DISEASE) STAGE 3, GFR 30-59 ML/MIN: ICD-10-CM

## 2020-02-19 DIAGNOSIS — E11.69 COMBINED HYPERLIPIDEMIA ASSOCIATED WITH TYPE 2 DIABETES MELLITUS: ICD-10-CM

## 2020-02-19 DIAGNOSIS — R60.9 EDEMA, UNSPECIFIED TYPE: ICD-10-CM

## 2020-02-19 DIAGNOSIS — K21.9 GASTROESOPHAGEAL REFLUX DISEASE, ESOPHAGITIS PRESENCE NOT SPECIFIED: ICD-10-CM

## 2020-02-19 DIAGNOSIS — E11.59 HYPERTENSION ASSOCIATED WITH DIABETES: Primary | ICD-10-CM

## 2020-02-19 DIAGNOSIS — I48.19 PERSISTENT ATRIAL FIBRILLATION: ICD-10-CM

## 2020-02-19 DIAGNOSIS — E11.42 DIABETIC POLYNEUROPATHY ASSOCIATED WITH TYPE 2 DIABETES MELLITUS: ICD-10-CM

## 2020-02-19 DIAGNOSIS — F41.9 ANXIETY: ICD-10-CM

## 2020-02-19 PROCEDURE — 99214 PR OFFICE/OUTPT VISIT, EST, LEVL IV, 30-39 MIN: ICD-10-PCS | Mod: S$PBB,,, | Performed by: FAMILY MEDICINE

## 2020-02-19 PROCEDURE — 99999 PR PBB SHADOW E&M-EST. PATIENT-LVL III: ICD-10-PCS | Mod: PBBFAC,,, | Performed by: FAMILY MEDICINE

## 2020-02-19 PROCEDURE — 99214 OFFICE O/P EST MOD 30 MIN: CPT | Mod: S$PBB,,, | Performed by: FAMILY MEDICINE

## 2020-02-19 PROCEDURE — 99213 OFFICE O/P EST LOW 20 MIN: CPT | Mod: PBBFAC,PO | Performed by: FAMILY MEDICINE

## 2020-02-19 PROCEDURE — 99999 PR PBB SHADOW E&M-EST. PATIENT-LVL III: CPT | Mod: PBBFAC,,, | Performed by: FAMILY MEDICINE

## 2020-02-19 RX ORDER — FUROSEMIDE 40 MG/1
40 TABLET ORAL DAILY
Qty: 90 TABLET | Refills: 3 | Status: SHIPPED | OUTPATIENT
Start: 2020-02-19 | End: 2021-02-15

## 2020-02-19 RX ORDER — OMEPRAZOLE 20 MG/1
20 CAPSULE, DELAYED RELEASE ORAL DAILY
Qty: 90 CAPSULE | Refills: 4 | Status: SHIPPED | OUTPATIENT
Start: 2020-02-19 | End: 2021-11-04 | Stop reason: SDUPTHER

## 2020-02-19 RX ORDER — DULOXETIN HYDROCHLORIDE 60 MG/1
60 CAPSULE, DELAYED RELEASE ORAL DAILY
Qty: 90 CAPSULE | Refills: 3 | Status: SHIPPED | OUTPATIENT
Start: 2020-02-19 | End: 2021-04-24

## 2020-02-19 RX ORDER — LISINOPRIL 20 MG/1
20 TABLET ORAL 2 TIMES DAILY
Qty: 180 TABLET | Refills: 3 | Status: SHIPPED | OUTPATIENT
Start: 2020-02-19 | End: 2021-02-04 | Stop reason: SDUPTHER

## 2020-02-19 RX ORDER — GLIMEPIRIDE 4 MG/1
4 TABLET ORAL
Qty: 180 TABLET | Refills: 1 | Status: SHIPPED | OUTPATIENT
Start: 2020-02-19 | End: 2020-03-23 | Stop reason: SDUPTHER

## 2020-02-19 RX ORDER — ATORVASTATIN CALCIUM 40 MG/1
40 TABLET, FILM COATED ORAL DAILY
Qty: 90 TABLET | Refills: 3 | Status: SHIPPED | OUTPATIENT
Start: 2020-02-19 | End: 2021-02-04 | Stop reason: SDUPTHER

## 2020-02-19 RX ORDER — NATEGLINIDE 120 MG/1
120 TABLET ORAL
Qty: 270 TABLET | Refills: 1
Start: 2020-02-19 | End: 2020-03-23 | Stop reason: SDUPTHER

## 2020-02-19 RX ORDER — METOPROLOL SUCCINATE 50 MG/1
50 TABLET, EXTENDED RELEASE ORAL DAILY
Qty: 90 TABLET | Refills: 3 | Status: SHIPPED | OUTPATIENT
Start: 2020-02-19 | End: 2021-01-14

## 2020-02-19 NOTE — PROGRESS NOTES
Subjective:      Patient ID: Héctor Desir is a 84 y.o. male.    Chief Complaint: Annual Exam    Problem List Items Addressed This Visit     Anxiety    Overview     He has anxiety that has been going on for a couple of years and he is now dealing with his wife's cancer so this has been helpful with using the cymbalta.   He also had neuropathy and this has helped it.           Atrial fibrillation    Overview     He has chronic afib.  He has had a cardioversion but this did not help. He is following with Dr. Talamantes for this and he is on xarelto for this and is on toprol for rate control.  It is controlled. HE IS ON XARELTO FOR THIS ALSO.    RATE IS CONTROLLED.   Pulse Readings from Last 3 Encounters:   02/19/20 86   01/20/20 84   12/20/19 86              CKD (chronic kidney disease) stage 3, GFR 30-59 ml/min    Overview     He has CKD and is seeing Dr. Sims.  He has not seen her in a while and we need to get this done.   CMP  Sodium   Date Value Ref Range Status   10/14/2019 141 136 - 145 mmol/L Final     Potassium   Date Value Ref Range Status   10/14/2019 4.1 3.5 - 5.1 mmol/L Final     Chloride   Date Value Ref Range Status   10/14/2019 107 95 - 110 mmol/L Final     CO2   Date Value Ref Range Status   10/14/2019 25 23 - 29 mmol/L Final     Glucose   Date Value Ref Range Status   10/14/2019 155 (H) 70 - 110 mg/dL Final     BUN, Bld   Date Value Ref Range Status   10/14/2019 26 (H) 8 - 23 mg/dL Final     Creatinine   Date Value Ref Range Status   10/14/2019 1.6 (H) 0.5 - 1.4 mg/dL Final     Calcium   Date Value Ref Range Status   10/14/2019 8.6 (L) 8.7 - 10.5 mg/dL Final     Total Protein   Date Value Ref Range Status   10/14/2019 6.5 6.0 - 8.4 g/dL Final     Albumin   Date Value Ref Range Status   10/14/2019 3.8 3.5 - 5.2 g/dL Final     Total Bilirubin   Date Value Ref Range Status   10/14/2019 0.8 0.1 - 1.0 mg/dL Final     Comment:     For infants and newborns, interpretation of results should be based  on  gestational age, weight and in agreement with clinical  observations.  Premature Infant recommended reference ranges:  Up to 24 hours.............<8.0 mg/dL  Up to 48 hours............<12.0 mg/dL  3-5 days..................<15.0 mg/dL  6-29 days.................<15.0 mg/dL       Alkaline Phosphatase   Date Value Ref Range Status   10/14/2019 110 55 - 135 U/L Final     AST   Date Value Ref Range Status   10/14/2019 20 10 - 40 U/L Final     ALT   Date Value Ref Range Status   10/14/2019 24 10 - 44 U/L Final     Anion Gap   Date Value Ref Range Status   10/14/2019 9 8 - 16 mmol/L Final     eGFR if    Date Value Ref Range Status   10/14/2019 45.7 (A) >60 mL/min/1.73 m^2 Final     eGFR if non    Date Value Ref Range Status   10/14/2019 39.5 (A) >60 mL/min/1.73 m^2 Final     Comment:     Calculation used to obtain the estimated glomerular filtration  rate (eGFR) is the CKD-EPI equation.                 Combined hyperlipidemia associated with type 2 diabetes mellitus    Overview     The patient presents with hyperlipidemia.  The patient reports tolerating the medication well and is in excellent compliance.  There have been no medication side effects.  The patient denies chest pain, neuropathy, and myalgias.  The patient has reduced fat intake and has been exercising.  Current treatment has included the medications listed in the med card.    Lab Results   Component Value Date    CHOL 102 (L) 10/14/2019    CHOL 114 (L) 01/15/2019    CHOL 102 (L) 03/28/2018       Lab Results   Component Value Date    HDL 30 (L) 10/14/2019    HDL 29 (L) 01/15/2019    HDL 25 (L) 03/28/2018       Lab Results   Component Value Date    LDLCALC 57.0 (L) 10/14/2019    LDLCALC 61.0 (L) 01/15/2019    LDLCALC 60.6 (L) 03/28/2018       Lab Results   Component Value Date    TRIG 75 10/14/2019    TRIG 120 01/15/2019    TRIG 82 03/28/2018       Lab Results   Component Value Date    CHOLHDL 29.4 10/14/2019    CHOLHDL 25.4  01/15/2019    CHOLHDL 24.5 03/28/2018     Lab Results   Component Value Date    ALT 24 10/14/2019    AST 20 10/14/2019    ALKPHOS 110 10/14/2019    BILITOT 0.8 10/14/2019              Hypertension associated with diabetes - Primary    Overview     The patient presents with essential hypertension.  The patient is tolerating the medication well and is in excellent compliance.  The patient is experiencing no side effects.  Counseling was offered regarding low salt diets.  The patient has a reduced salt intake.  The patient denies chest pain, palpitations, shortness of breath, dyspnea on exertion, left or murmur neck pain, nausea, vomiting, diaphoresis, paroxysmal nocturnal dyspnea, and orthopnea.   Hypertension Medications             furosemide (LASIX) 40 MG tablet Take 1 tablet (40 mg total) by mouth once daily.    lisinopril (PRINIVIL,ZESTRIL) 20 MG tablet Take 1 tablet (20 mg total) by mouth 2 (two) times daily.    metoprolol succinate (TOPROL-XL) 50 MG 24 hr tablet Take 1 tablet (50 mg total) by mouth once daily.                   Type II diabetes mellitus with renal manifestations, uncontrolled    Overview     The patient presents with diabetes.  The patient denies polyuria, polydipsia, polyphagia, hypoglycemia and paresthesias.  The patient's glucose control has been good.  Home glucose averages are routinely checked.  The patient is without retinopathy currently.  The patient has no history of neuropathy.  The patient currently complains of no podiatric problems.  The patient has excellent compliance. I ADDED JARDIANCE 10 MG FOR THE INCREASED A1C.  Hemoglobin A1C   Date Value Ref Range Status   01/23/2020 7.6 (H) 4.0 - 5.6 % Final     Comment:     ADA Screening Guidelines:  5.7-6.4%  Consistent with prediabetes  >or=6.5%  Consistent with diabetes  High levels of fetal hemoglobin interfere with the HbA1C  assay. Heterozygous hemoglobin variants (HbS, HgC, etc)do  not significantly interfere with this assay.    However, presence of multiple variants may affect accuracy.     10/14/2019 7.5 (H) 4.0 - 5.6 % Final     Comment:     ADA Screening Guidelines:  5.7-6.4%  Consistent with prediabetes  >or=6.5%  Consistent with diabetes  High levels of fetal hemoglobin interfere with the HbA1C  assay. Heterozygous hemoglobin variants (HbS, HgC, etc)do  not significantly interfere with this assay.   However, presence of multiple variants may affect accuracy.     07/22/2019 6.6 (H) 4.0 - 5.6 % Final     Comment:     ADA Screening Guidelines:  5.7-6.4%  Consistent with prediabetes  >or=6.5%  Consistent with diabetes  High levels of fetal hemoglobin interfere with the HbA1C  assay. Heterozygous hemoglobin variants (HbS, HgC, etc)do  not significantly interfere with this assay.   However, presence of multiple variants may affect accuracy.       No results found for: MICROALBUR, [id number]  Diabetes Management Status    Statin: Taking  ACE/ARB: Taking    Screening or Prevention Patient's value Goal Complete/Controlled?   HgA1C Testing and Control   Lab Results   Component Value Date    HGBA1C 7.6 (H) 01/23/2020      Annually/Less than 8% Yes   Lipid profile : 10/14/2019 Annually No   LDL control Lab Results   Component Value Date    LDLCALC 57.0 (L) 10/14/2019    Annually/Less than 100 mg/dl  No   Nephropathy screening Lab Results   Component Value Date    LABMICR 54.0 10/14/2019     Lab Results   Component Value Date    PROTEINUA Trace (A) 06/20/2018    Annually No   Blood pressure BP Readings from Last 1 Encounters:   02/19/20 132/80    Less than 140/90 Yes   Dilated retinal exam : 04/17/2019 Annually No   Foot exam   : 02/14/2019 Annually Yes                    Past Medical History:  Past Medical History:   Diagnosis Date    Anxiety     Atrial fibrillation     CKD (chronic kidney disease) stage 3, GFR 30-59 ml/min     Colon polyps 2011    due again in 2019    Combined hyperlipidemia associated with type 2 diabetes mellitus  7/5/2013    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     DM (diabetes mellitus) type II controlled, neurological manifestation 7/5/2013    GERD (gastroesophageal reflux disease)     History of prostate cancer 2006    Hypertension associated with diabetes     Hypertension goal BP (blood pressure) < 130/80     Obesity     Prostate cancer     TIA (transient ischemic attack)     Trouble in sleeping      Past Surgical History:   Procedure Laterality Date    CARDIAC CATHETERIZATION      CARDIOVERSION N/A 8/10/2018    Procedure: CARDIOVERSION;  Surgeon: Israel Cardoza MD;  Location: [facility] CATH LAB;  Service: Cardiology;  Laterality: N/A;  Dr. Talamantes pt    CATARACT EXTRACTION W/  INTRAOCULAR LENS IMPLANT  11/2013    Bilateral Cataract     COLONOSCOPY N/A 12/20/2019    Procedure: COLONOSCOPY;  Surgeon: Forrest Roy MD;  Location: [facility] ENDO;  Service: Endoscopy;  Laterality: N/A;    COLONOSCOPY W/ POLYPECTOMY      EYE SURGERY      PROSTATECTOMY  2006    TONSILLECTOMY      TONSILLECTOMY, ADENOIDECTOMY, BILATERAL MYRINGOTOMY AND TUBES       Review of patient's allergies indicates:   Allergen Reactions    Amlodipine      swelling     Current Outpatient Medications on File Prior to Visit   Medication Sig Dispense Refill    atorvastatin (LIPITOR) 40 MG tablet Take 1 tablet (40 mg total) by mouth once daily. 90 tablet 3    DULoxetine (CYMBALTA) 60 MG capsule Take 1 capsule (60 mg total) by mouth once daily. 90 capsule 3    ERGOCALCIFEROL, VITAMIN D2, (VITAMIN D ORAL) Take by mouth once daily.       furosemide (LASIX) 40 MG tablet Take 1 tablet (40 mg total) by mouth once daily. 90 tablet 3    GAVILYTE-G 236-22.74-6.74 -5.86 gram suspension USE AS DIRECTED      glimepiride (AMARYL) 4 MG tablet Take 1 tablet (4 mg total) by mouth 2 (two) times daily before meals. 180 tablet 0    lisinopril (PRINIVIL,ZESTRIL) 20 MG tablet Take 1 tablet (20 mg total) by mouth 2 (two) times  daily. 180 tablet 3    metoprolol succinate (TOPROL-XL) 50 MG 24 hr tablet Take 1 tablet (50 mg total) by mouth once daily. 90 tablet 3    nateglinide (STARLIX) 120 MG tablet Take 1 tablet (120 mg total) by mouth 3 (three) times daily before meals. If you skip a meal, do not take dose 270 tablet 1    omeprazole (PRILOSEC) 20 MG capsule TAKE 1 CAPSULE DAILY 90 capsule 4    rivaroxaban (XARELTO) 15 mg Tab Take 1 tablet (15 mg total) by mouth daily with dinner or evening meal. 90 tablet 3    TRULICITY 0.75 mg/0.5 mL PnIj       blood sugar diagnostic Strp Use daily- Freestyle lite 300 strip 3    blood-glucose meter kit Use before meals and before bed when the glucoses are not in control.  Otherwise, test it daily once a day before meals randomly to ensure stability of the gluocse. 1 each 0    empagliflozin (JARDIANCE) 10 mg Tab Take 10 mg by mouth once daily. 90 tablet 1     No current facility-administered medications on file prior to visit.      Social History     Socioeconomic History    Marital status:      Spouse name: Not on file    Number of children: Not on file    Years of education: Not on file    Highest education level: Not on file   Occupational History    Not on file   Social Needs    Financial resource strain: Not on file    Food insecurity:     Worry: Not on file     Inability: Not on file    Transportation needs:     Medical: Not on file     Non-medical: Not on file   Tobacco Use    Smoking status: Never Smoker    Smokeless tobacco: Never Used   Substance and Sexual Activity    Alcohol use: No     Comment: He used to drink but quit in 1990    Drug use: No    Sexual activity: Not on file   Lifestyle    Physical activity:     Days per week: Not on file     Minutes per session: Not on file    Stress: Not on file   Relationships    Social connections:     Talks on phone: Not on file     Gets together: Not on file     Attends Orthodox service: Not on file     Active member of  "club or organization: Not on file     Attends meetings of clubs or organizations: Not on file     Relationship status: Not on file   Other Topics Concern    Not on file   Social History Narrative    Not on file     Family History   Problem Relation Age of Onset    Heart attack Mother     Melanoma Father     Cancer Father     Anesthesia problems Neg Hx     Clotting disorder Neg Hx     Kidney disease Neg Hx        Review of Systems   Constitutional: Negative.  Negative for chills, diaphoresis and fever.   HENT: Negative for congestion, hearing loss, mouth sores, postnasal drip and sore throat.    Eyes: Negative for pain and visual disturbance.   Respiratory: Negative for cough, chest tightness, shortness of breath and wheezing.    Cardiovascular: Negative for chest pain.   Gastrointestinal: Negative for abdominal pain, anal bleeding, blood in stool, constipation, diarrhea, nausea and vomiting.   Genitourinary: Negative for dysuria and hematuria.   Musculoskeletal: Negative for back pain, neck pain and neck stiffness.   Skin: Negative for rash.   Neurological: Negative for dizziness and weakness.       Objective:     /80   Pulse 86   Temp 97.6 °F (36.4 °C) (Oral)   Ht 5' 9" (1.753 m)   Wt 102.5 kg (226 lb)   BMI 33.37 kg/m²     Physical Exam   Constitutional: He is oriented to person, place, and time. He appears well-developed and well-nourished.   HENT:   Head: Normocephalic and atraumatic.   Right Ear: External ear normal.   Left Ear: External ear normal.   Nose: Nose normal.   Mouth/Throat: Oropharynx is clear and moist. No oropharyngeal exudate.   Eyes: Pupils are equal, round, and reactive to light. Conjunctivae and EOM are normal. Right eye exhibits no discharge. Left eye exhibits no discharge. No scleral icterus.   Neck: Normal range of motion. Neck supple. No JVD present. No thyromegaly present.   Cardiovascular: Normal rate, regular rhythm, normal heart sounds and intact distal pulses. Exam " reveals no gallop and no friction rub.   No murmur heard.  Pulses:       Dorsalis pedis pulses are 2+ on the right side, and 2+ on the left side.        Posterior tibial pulses are 2+ on the right side, and 2+ on the left side.   Pulmonary/Chest: Effort normal and breath sounds normal. No respiratory distress. He has no wheezes. He has no rales. He exhibits no tenderness.   Abdominal: Soft. Bowel sounds are normal. He exhibits no distension and no mass. There is no tenderness. There is no rebound and no guarding.   Musculoskeletal: Normal range of motion. He exhibits no edema or tenderness.        Right foot: There is normal range of motion and no deformity.        Left foot: There is normal range of motion and no deformity.   Feet:   Right Foot:   Protective Sensation: 10 sites tested. 10 sites sensed.   Skin Integrity: Negative for ulcer, blister, skin breakdown, erythema, warmth, callus or dry skin.   Left Foot:   Protective Sensation: 10 sites tested. 10 sites sensed.   Skin Integrity: Negative for ulcer, blister, skin breakdown, erythema, warmth, callus or dry skin.   Lymphadenopathy:     He has no cervical adenopathy.   Neurological: He is alert and oriented to person, place, and time. No cranial nerve deficit. Coordination normal.   Skin: Skin is warm and dry. He is not diaphoretic.   Psychiatric: He has a normal mood and affect.     Assessment:     1. Hypertension associated with diabetes    2. Combined hyperlipidemia associated with type 2 diabetes mellitus    3. CKD (chronic kidney disease) stage 3, GFR 30-59 ml/min    4. Atrial fibrillation, unspecified type    5. Anxiety    6. Type II diabetes mellitus with renal manifestations, uncontrolled        Plan:     Problem List Items Addressed This Visit     Anxiety    Atrial fibrillation    CKD (chronic kidney disease) stage 3, GFR 30-59 ml/min    Combined hyperlipidemia associated with type 2 diabetes mellitus    Hypertension associated with diabetes -  Primary    Type II diabetes mellitus with renal manifestations, uncontrolled        No follow-ups on file.      I am having Héctor Desir maintain his blood-glucose meter, (ERGOCALCIFEROL, VITAMIN D2, (VITAMIN D ORAL)), blood sugar diagnostic, DULoxetine, nateglinide, glimepiride, Trulicity, GaviLyte-G, rivaroxaban, metoprolol succinate, lisinopril, furosemide, atorvastatin, omeprazole, and empagliflozin.    Héctor was seen today for annual exam.    Diagnoses and all orders for this visit:    Hypertension associated with diabetes  -     metoprolol succinate (TOPROL-XL) 50 MG 24 hr tablet; Take 1 tablet (50 mg total) by mouth once daily.  -     furosemide (LASIX) 40 MG tablet; Take 1 tablet (40 mg total) by mouth once daily.  -     lisinopril (PRINIVIL,ZESTRIL) 20 MG tablet; Take 1 tablet (20 mg total) by mouth 2 (two) times daily.    Combined hyperlipidemia associated with type 2 diabetes mellitus  -     atorvastatin (LIPITOR) 40 MG tablet; Take 1 tablet (40 mg total) by mouth once daily.    CKD (chronic kidney disease) stage 3, GFR 30-59 ml/min    Atrial fibrillation, unspecified type  -     metoprolol succinate (TOPROL-XL) 50 MG 24 hr tablet; Take 1 tablet (50 mg total) by mouth once daily.    Anxiety  -     DULoxetine (CYMBALTA) 60 MG capsule; Take 1 capsule (60 mg total) by mouth once daily.    Type II diabetes mellitus with renal manifestations, uncontrolled  -     Hemoglobin A1c; Standing    Diabetic polyneuropathy associated with type 2 diabetes mellitus  -     DULoxetine (CYMBALTA) 60 MG capsule; Take 1 capsule (60 mg total) by mouth once daily.    Chronic atrial fibrillation  -     metoprolol succinate (TOPROL-XL) 50 MG 24 hr tablet; Take 1 tablet (50 mg total) by mouth once daily.  -     rivaroxaban (XARELTO) 15 mg Tab; Take 1 tablet (15 mg total) by mouth daily with dinner or evening meal.    Persistent atrial fibrillation  -     rivaroxaban (XARELTO) 15 mg Tab; Take 1 tablet (15 mg total) by mouth daily  with dinner or evening meal.    Edema, unspecified type  -     furosemide (LASIX) 40 MG tablet; Take 1 tablet (40 mg total) by mouth once daily.    Gastroesophageal reflux disease, esophagitis presence not specified  -     omeprazole (PRILOSEC) 20 MG capsule; Take 1 capsule (20 mg total) by mouth once daily.    Uncontrolled type 2 diabetes mellitus with stage 3 chronic kidney disease, without long-term current use of insulin  -     glimepiride (AMARYL) 4 MG tablet; Take 1 tablet (4 mg total) by mouth 2 (two) times daily before meals.  -     nateglinide (STARLIX) 120 MG tablet; Take 1 tablet (120 mg total) by mouth 3 (three) times daily before meals. If you skip a meal, do not take dose  -     empagliflozin (JARDIANCE) 10 mg tablet; Take 1 tablet (10 mg total) by mouth once daily.         The patient was instructed to stop the following meds:  Medications Discontinued During This Encounter   Medication Reason    TRULICITY 0.75 mg/0.5 mL PnIj     DULoxetine (CYMBALTA) 60 MG capsule Reorder    metoprolol succinate (TOPROL-XL) 50 MG 24 hr tablet Reorder    atorvastatin (LIPITOR) 40 MG tablet Reorder    rivaroxaban (XARELTO) 15 mg Tab Reorder    furosemide (LASIX) 40 MG tablet Reorder    omeprazole (PRILOSEC) 20 MG capsule Reorder    lisinopril (PRINIVIL,ZESTRIL) 20 MG tablet Reorder    glimepiride (AMARYL) 4 MG tablet Reorder    nateglinide (STARLIX) 120 MG tablet Reorder    empagliflozin (JARDIANCE) 10 mg Tab Reorder     Orders Placed This Encounter   Procedures    Hemoglobin A1c     Standing Status:   Standing     Number of Occurrences:   99     Standing Expiration Date:   2/14/2040     GET EYES CHECKED SOON.   START JARDIANCE.   GET A SHINGLES VACCINE.

## 2020-03-23 ENCOUNTER — TELEPHONE (OUTPATIENT)
Dept: DIABETES | Facility: CLINIC | Age: 85
End: 2020-03-23

## 2020-03-23 RX ORDER — GLIMEPIRIDE 4 MG/1
4 TABLET ORAL
Qty: 180 TABLET | Refills: 1 | Status: SHIPPED | OUTPATIENT
Start: 2020-03-23 | End: 2020-04-17 | Stop reason: SDUPTHER

## 2020-03-23 RX ORDER — NATEGLINIDE 120 MG/1
120 TABLET ORAL
Qty: 270 TABLET | Refills: 1 | Status: SHIPPED | OUTPATIENT
Start: 2020-03-23 | End: 2020-10-06 | Stop reason: SDUPTHER

## 2020-04-17 RX ORDER — GLIMEPIRIDE 4 MG/1
4 TABLET ORAL
Qty: 180 TABLET | Refills: 1 | Status: SHIPPED | OUTPATIENT
Start: 2020-04-17 | End: 2020-05-21 | Stop reason: SDUPTHER

## 2020-04-17 NOTE — TELEPHONE ENCOUNTER
----- Message from Shaina Magdaleno sent at 4/17/2020  9:22 AM CDT -----  Contact: pt  1. What is the name of the medication you are requesting? Glimepirde  2. What is the dose? 4mg  3. How do you take the medication? Orally, topically, etc? n/a  4. How often do you take this medication? 2x daily   5. Do you need a 30 day or 90 day supply? 90 day  6. How many refills are you requesting? n/a  7. What is your preferred pharmacy and location of the pharmacy? [facility]  8. Who can we contact with further questions? The pt at [phone]

## 2020-04-28 ENCOUNTER — TELEPHONE (OUTPATIENT)
Dept: NEPHROLOGY | Facility: CLINIC | Age: 85
End: 2020-04-28

## 2020-04-28 NOTE — TELEPHONE ENCOUNTER
I spoke with Mr Desir about changing his appointment to virtual visit.      I need to fix his lab debacle.  He is scheduled for nephro labs today, and A1c for someone else on May 5 and again on May 19.   I will consolidate these and discard the duplicates.     My question is:  Do you want him to keep his lab appointment now and his virtual visit on Monday or postpone his lab and visit?

## 2020-04-28 NOTE — TELEPHONE ENCOUNTER
I spoke with him and he agreed May 5 is good for labs.  I rescheduled visit after.  He may need assistance signing up or checking in. He was working on a bush hog when I called, so I told him we could talk again this evening.

## 2020-04-29 NOTE — TELEPHONE ENCOUNTER
Spoke with Mr Thang who states his family is going to help him set up mychart today. Informed Manasa sent out t[facility] to [facility] 4.17.20.

## 2020-05-03 ENCOUNTER — PATIENT MESSAGE (OUTPATIENT)
Dept: FAMILY MEDICINE | Facility: CLINIC | Age: 85
End: 2020-05-03

## 2020-05-05 ENCOUNTER — LAB VISIT (OUTPATIENT)
Dept: LAB | Facility: HOSPITAL | Age: 85
End: 2020-05-05
Attending: FAMILY MEDICINE
Payer: MEDICARE

## 2020-05-05 DIAGNOSIS — N18.30 CKD (CHRONIC KIDNEY DISEASE) STAGE 3, GFR 30-59 ML/MIN: ICD-10-CM

## 2020-05-05 LAB
ALBUMIN SERPL BCP-MCNC: 3.7 G/DL (ref 3.5–5.2)
ANION GAP SERPL CALC-SCNC: 9 MMOL/L (ref 8–16)
BUN SERPL-MCNC: 35 MG/DL (ref 8–23)
CALCIUM SERPL-MCNC: 8.6 MG/DL (ref 8.7–10.5)
CHLORIDE SERPL-SCNC: 107 MMOL/L (ref 95–110)
CO2 SERPL-SCNC: 24 MMOL/L (ref 23–29)
CREAT SERPL-MCNC: 1.7 MG/DL (ref 0.5–1.4)
EST. GFR  (AFRICAN AMERICAN): 42.2 ML/MIN/1.73 M^2
EST. GFR  (NON AFRICAN AMERICAN): 36.5 ML/MIN/1.73 M^2
ESTIMATED AVG GLUCOSE: 157 MG/DL (ref 68–131)
ESTIMATED AVG GLUCOSE: 157 MG/DL (ref 68–131)
GLUCOSE SERPL-MCNC: 133 MG/DL (ref 70–110)
HBA1C MFR BLD HPLC: 7.1 % (ref 4–5.6)
HBA1C MFR BLD HPLC: 7.1 % (ref 4–5.6)
PHOSPHATE SERPL-MCNC: 3.5 MG/DL (ref 2.7–4.5)
POTASSIUM SERPL-SCNC: 3.8 MMOL/L (ref 3.5–5.1)
PTH-INTACT SERPL-MCNC: 180 PG/ML (ref 9–77)
SODIUM SERPL-SCNC: 140 MMOL/L (ref 136–145)

## 2020-05-05 PROCEDURE — 83036 HEMOGLOBIN GLYCOSYLATED A1C: CPT

## 2020-05-05 PROCEDURE — 80069 RENAL FUNCTION PANEL: CPT

## 2020-05-05 PROCEDURE — 83970 ASSAY OF PARATHORMONE: CPT

## 2020-05-05 PROCEDURE — 36415 COLL VENOUS BLD VENIPUNCTURE: CPT | Mod: PO

## 2020-05-06 ENCOUNTER — PATIENT OUTREACH (OUTPATIENT)
Dept: ADMINISTRATIVE | Facility: OTHER | Age: 85
End: 2020-05-06

## 2020-05-06 RX ORDER — GLIMEPIRIDE 4 MG/1
4 TABLET ORAL
Qty: 180 TABLET | Refills: 0 | Status: CANCELLED | OUTPATIENT
Start: 2020-05-06 | End: 2020-08-04

## 2020-05-06 RX ORDER — NATEGLINIDE 120 MG/1
120 TABLET ORAL
Qty: 270 TABLET | Refills: 1 | Status: CANCELLED | OUTPATIENT
Start: 2020-05-06

## 2020-05-06 NOTE — TELEPHONE ENCOUNTER
----- Message from Tae Goel MD sent at 5/5/2020  4:59 PM CDT -----  The patient has an abnormal a1c.  Book the patient for an a1c in 3 months and send the patient the appointment for this. The patient will need to have a change in the medicine to include jardiance 25 mg a day instead of 10 mg a day.    Please make the change in the medcard to reflect this change and send a prescription for all of the patient's diabetes medications to the pharmacy for 3 months.    Can you see if you can get him to go to the eye doctor in [place] at [facility] to get his eyes examined?

## 2020-05-07 ENCOUNTER — OFFICE VISIT (OUTPATIENT)
Dept: NEPHROLOGY | Facility: CLINIC | Age: 85
End: 2020-05-07
Payer: MEDICARE

## 2020-05-07 ENCOUNTER — TELEPHONE (OUTPATIENT)
Dept: FAMILY MEDICINE | Facility: CLINIC | Age: 85
End: 2020-05-07

## 2020-05-07 DIAGNOSIS — E78.2 COMBINED HYPERLIPIDEMIA ASSOCIATED WITH TYPE 2 DIABETES MELLITUS: ICD-10-CM

## 2020-05-07 DIAGNOSIS — I48.19 PERSISTENT ATRIAL FIBRILLATION: ICD-10-CM

## 2020-05-07 DIAGNOSIS — N18.30 CKD (CHRONIC KIDNEY DISEASE) STAGE 3, GFR 30-59 ML/MIN: Primary | ICD-10-CM

## 2020-05-07 DIAGNOSIS — E11.69 COMBINED HYPERLIPIDEMIA ASSOCIATED WITH TYPE 2 DIABETES MELLITUS: ICD-10-CM

## 2020-05-07 DIAGNOSIS — E11.59 HYPERTENSION ASSOCIATED WITH DIABETES: ICD-10-CM

## 2020-05-07 DIAGNOSIS — I15.2 HYPERTENSION ASSOCIATED WITH DIABETES: ICD-10-CM

## 2020-05-07 DIAGNOSIS — Z86.73 H/O TIA (TRANSIENT ISCHEMIC ATTACK) AND STROKE: ICD-10-CM

## 2020-05-07 DIAGNOSIS — N25.81 SECONDARY HYPERPARATHYROIDISM, RENAL: ICD-10-CM

## 2020-05-07 PROCEDURE — 99214 PR OFFICE/OUTPT VISIT, EST, LEVL IV, 30-39 MIN: ICD-10-PCS | Mod: 95,,, | Performed by: INTERNAL MEDICINE

## 2020-05-07 PROCEDURE — 99214 OFFICE O/P EST MOD 30 MIN: CPT | Mod: 95,,, | Performed by: INTERNAL MEDICINE

## 2020-05-07 NOTE — TELEPHONE ENCOUNTER
----- Message from Renee Shelby sent at 5/7/2020 12:14 PM CDT -----  Contact: pt  Returning a call to William

## 2020-05-07 NOTE — TELEPHONE ENCOUNTER
----- Message from Ashley Fuentes sent at 5/7/2020 12:33 PM CDT -----  Contact: pt   Pt is returning a call from the office pt can be reached at [phone]

## 2020-05-07 NOTE — PROGRESS NOTES
Subjective:       Patient ID: Héctor Desir is a 85 y.o. White male who presents for follow-up evaluation of No chief complaint on file.    HPI     He reports ongoing stress due to his wife's illness. She is no longer on hospice--but he needs to interact with her daughter who can be challenging. He feels overall well and notes his BS are running better, last Hba1c was 7.1. No LE edema nor SOB. Nocturia is 1-2X.      Interval history Feb 2019: Lost to follow up. Wife passed. He's in a new place, doing better since his Hbac1 went up. Saw Endocrine.     Interval history Dec 2019: doing well. No LE edema and no SOB. NO new medications. Got flu shot.     Interval history May 2020:  The patient location is: Home  The chief complaint leading to consultation is: CKD  Visit type: audiovisual  Total time spent with patient: 23 minutes  Each patient to whom he or she provides medical services by telemedicine is:  (1) informed of the relationship between the physician and patient and the respective role of any other health care provider with respect to management of the patient; and (2) notified that he or she may decline to receive medical services by telemedicine and may withdraw from such care at any time.    Notes: He feels well, no change in medical status. No LE edema. Last Hba1c was 7.1      Review of Systems   Constitutional: Negative for activity change, appetite change, fatigue and unexpected weight change.   HENT: Negative for facial swelling.    Respiratory: Negative for shortness of breath.    Cardiovascular: Negative for chest pain and leg swelling.   Gastrointestinal: Positive for constipation.   Genitourinary: Positive for frequency. Negative for difficulty urinating, dysuria and hematuria.   Musculoskeletal: Negative for arthralgias.   Neurological: Negative for weakness and headaches.   Psychiatric/Behavioral: Negative for decreased concentration.       Objective:      Physical Exam   Constitutional: He  is oriented to person, place, and time. He appears well-developed and well-nourished. No distress.   Neck: No JVD present.   Cardiovascular: S1 normal, S2 normal and normal heart sounds. Exam reveals no friction rub.   Pulmonary/Chest: Breath sounds normal. He has no wheezes. He has no rales.   Abdominal: Soft.   Musculoskeletal: He exhibits edema.   Neurological: He is alert and oriented to person, place, and time.   Skin: Skin is warm and dry.   Psychiatric: He has a normal mood and affect.   Vitals reviewed.      Assessment:       1. CKD (chronic kidney disease) stage 3, GFR 30-59 ml/min    2. Persistent atrial fibrillation    3. 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 Secondary hyperparathyroidism, renal    5. Hypertension associated with diabetes    6. Uncontrolled type 2 diabetes mellitus with stage 3 chronic kidney disease, without long-term current use of insulin    7. H/O TIA (transient ischemic attack) and stroke    8. Type II diabetes mellitus with renal manifestations, uncontrolled          Plan:             CKD stage 3 with stable kidney function.  Continue RAAS blockade (lisinopril) for renal preservation    HTN is controlled    DM--is controlled  (Hbac 7.1)    Mineral and Bone Disease--continue D2 but may need calcitriol       RTC 4 months

## 2020-05-19 ENCOUNTER — PATIENT OUTREACH (OUTPATIENT)
Dept: ADMINISTRATIVE | Facility: OTHER | Age: 85
End: 2020-05-19

## 2020-05-21 ENCOUNTER — OFFICE VISIT (OUTPATIENT)
Dept: DIABETES | Facility: CLINIC | Age: 85
End: 2020-05-21
Payer: MEDICARE

## 2020-05-21 VITALS — DIASTOLIC BLOOD PRESSURE: 80 MMHG | BODY MASS INDEX: 32.93 KG/M2 | WEIGHT: 223 LBS | SYSTOLIC BLOOD PRESSURE: 132 MMHG

## 2020-05-21 DIAGNOSIS — E11.69 COMBINED HYPERLIPIDEMIA ASSOCIATED WITH TYPE 2 DIABETES MELLITUS: ICD-10-CM

## 2020-05-21 DIAGNOSIS — E78.2 COMBINED HYPERLIPIDEMIA ASSOCIATED WITH TYPE 2 DIABETES MELLITUS: ICD-10-CM

## 2020-05-21 DIAGNOSIS — I15.2 HYPERTENSION ASSOCIATED WITH DIABETES: ICD-10-CM

## 2020-05-21 DIAGNOSIS — E11.59 HYPERTENSION ASSOCIATED WITH DIABETES: ICD-10-CM

## 2020-05-21 LAB — GLUCOSE SERPL-MCNC: 128 MG/DL (ref 70–110)

## 2020-05-21 PROCEDURE — 99999 PR PBB SHADOW E&M-EST. PATIENT-LVL II: CPT | Mod: PBBFAC,,, | Performed by: NURSE PRACTITIONER

## 2020-05-21 PROCEDURE — 99214 OFFICE O/P EST MOD 30 MIN: CPT | Mod: S$PBB,,, | Performed by: NURSE PRACTITIONER

## 2020-05-21 PROCEDURE — 99212 OFFICE O/P EST SF 10 MIN: CPT | Mod: PBBFAC,PO | Performed by: NURSE PRACTITIONER

## 2020-05-21 PROCEDURE — 99999 PR PBB SHADOW E&M-EST. PATIENT-LVL II: ICD-10-PCS | Mod: PBBFAC,,, | Performed by: NURSE PRACTITIONER

## 2020-05-21 PROCEDURE — 99214 PR OFFICE/OUTPT VISIT, EST, LEVL IV, 30-39 MIN: ICD-10-PCS | Mod: S$PBB,,, | Performed by: NURSE PRACTITIONER

## 2020-05-21 PROCEDURE — 82962 GLUCOSE BLOOD TEST: CPT | Mod: PBBFAC,PO | Performed by: NURSE PRACTITIONER

## 2020-05-21 RX ORDER — GLIMEPIRIDE 4 MG/1
4 TABLET ORAL
Qty: 180 TABLET | Refills: 1 | Status: SHIPPED | OUTPATIENT
Start: 2020-05-21 | End: 2021-02-04 | Stop reason: SDUPTHER

## 2020-05-21 NOTE — PROGRESS NOTES
Subjective:         Patient ID: Héctor Desir is a 85 y.o. male.  Patient's current PCP is Tae Goel MD.       Chief Complaint: Diabetes Mellitus    HPI  Héctor Desir is a 83 y.o. White male presenting for a follow up for diabetes. Patient has been diagnosed with diabetes for several years and has the following complications from diabetes: nephropathy, peripheral neuropathy, cardiovascular disease and cerebrovascular disease, HTN, Hyperlipidemia. Blood glucose testing is performed regularly. The patient reports medication compliance all of the time and diet compliance much of the time. Since the last visit Jardiance added by PCP. Patient had been controlled in the past but after the death of 3 grandchildrens and 2 wives, had to move out of home onto his son's property his (this situation has improved) He denies recent hospital admissions, denies emergency room visits, reports hypoglycemia- improved.       //  Weight: 101.2 kg (223 lb), Body mass index is 32.93 kg/m².  His blood sugar in clinic today is:   Lab Results   Component Value Date    POCGLU 128 (A) 05/21/2020       Labs reviewed and are noted below.  His most recent A1C is:  Lab Results   Component Value Date    HGBA1C 7.1 (H) 05/05/2020    HGBA1C 7.1 (H) 05/05/2020    HGBA1C 7.6 (H) 01/23/2020     Lab Results   Component Value Date    CPEPTIDE 8.85 (H) 04/25/2019     Lab Results   Component Value Date    GLUTAMICACID 0.00 04/25/2019     Glucose   Date Value Ref Range Status   05/05/2020 133 (H) 70 - 110 mg/dL Final     Anion Gap   Date Value Ref Range Status   05/05/2020 9 8 - 16 mmol/L Final     eGFR if    Date Value Ref Range Status   05/05/2020 42.2 (A) >60 mL/min/1.73 m^2 Final     eGFR if non    Date Value Ref Range Status   05/05/2020 36.5 (A) >60 mL/min/1.73 m^2 Final     Comment:     Calculation used to obtain the estimated glomerular filtration  rate (eGFR) is the CKD-EPI equation.            CURRENT DM  MEDICATIONS:   Diabetes Medications             empagliflozin (JARDIANCE) 25 mg Tab Take 25 mg by mouth once daily.    glimepiride (AMARYL) 4 MG tablet Take 1 tablet (4 mg total) by mouth 2 (two) times daily before meals.    nateglinide (STARLIX) 120 MG tablet Take 1 tablet (120 mg total) by mouth 3 (three) times daily before meals. If you skip a meal, do not take dose            Diabetes Management Status    Statin: Taking  ACE/ARB: Taking    Screening or Prevention Patient's value Goal Complete/Controlled?   HgA1C Testing and Control   Lab Results   Component Value Date    HGBA1C 7.1 (H) 05/05/2020    HGBA1C 7.1 (H) 05/05/2020      Annually/Less than 8% Yes   Lipid profile : 10/14/2019 Annually Yes   LDL control Lab Results   Component Value Date    LDLCALC 57.0 (L) 10/14/2019    Annually/Less than 100 mg/dl  Yes   Nephropathy screening Lab Results   Component Value Date    LABMICR 54.0 10/14/2019     Lab Results   Component Value Date    PROTEINUA Trace (A) 06/20/2018    Annually Yes   Blood pressure BP Readings from Last 1 Encounters:   05/21/20 132/80    Less than 140/90 Yes   Dilated retinal exam : 04/17/2019 Annually Yes   Foot exam   : 02/19/2020 Annually Yes     LIFESTYLE:  ACTIVITY LEVEL: Sedentary  EXERCISE: none  MEAL PLANNING: Patient reports number of meals per day to be 3 and number of snacks per day to be 2  BLOOD GLUCOSE TESTING: Patient is testing 2 times per day          Review of Systems   Constitutional: Negative for activity change and appetite change.   Eyes: Negative for visual disturbance.   Gastrointestinal: Negative for constipation and diarrhea.   Endocrine: Negative for polydipsia, polyphagia and polyuria.   Neurological: Negative for syncope and weakness.   Psychiatric/Behavioral: Negative for confusion.         Objective:      Physical Exam   Constitutional: He is oriented to person, place, and time. He appears well-developed and well-nourished. No distress.   Neck: Normal range of  motion.   Cardiovascular: Normal rate.   Pulmonary/Chest: Effort normal.   Musculoskeletal: Normal range of motion.   Neurological: He is alert and oriented to person, place, and time.   Skin: Skin is warm and dry. He is not diaphoretic.   Psychiatric: He has a normal mood and affect. His behavior is normal. Judgment and thought content normal.       Assessment:       1. Uncontrolled type 2 diabetes mellitus with stage 3 chronic kidney disease, without long-term current use of insulin    2. Hypertension associated with diabetes    3. Combined hyperlipidemia associated with type 2 diabetes mellitus        Plan:   Uncontrolled type 2 diabetes mellitus with stage 3 chronic kidney disease, without long-term current use of insulin  -     POCT Glucose, Hand-Held Device  -     glimepiride (AMARYL) 4 MG tablet; Take 1 tablet (4 mg total) by mouth 2 (two) times daily before meals.  Dispense: 180 tablet; Refill: 1    Hypertension associated with diabetes  Comments:  controlled    Combined hyperlipidemia associated with type 2 diabetes mellitus  Comments:  controlled      PLAN:   - Condition: uncontrolled    - Monitor blood glucose 2x daily, fasting and ac dinner or at bedtime.   - Diet reviewed  - Medication Changes: Continue Medications  - Hypoglycemia- denies at this time  - Labs ordered as above  - Follow up: 3 months    A total of 30 minutes was spent in face to face time, of which over 50% was spent in counseling patient on disease process, complications, treatment, and side effects of medications.

## 2020-06-24 ENCOUNTER — TELEPHONE (OUTPATIENT)
Dept: OPTOMETRY | Facility: CLINIC | Age: 85
End: 2020-06-24

## 2020-06-24 ENCOUNTER — PATIENT OUTREACH (OUTPATIENT)
Dept: ADMINISTRATIVE | Facility: OTHER | Age: 85
End: 2020-06-24

## 2020-06-24 NOTE — TELEPHONE ENCOUNTER
----- Message from Merlyn Hartman sent at 6/24/2020 11:49 AM CDT -----  Pt is requesting a call back regarding rescheduling an appt. Please call back at [phone]

## 2020-07-20 ENCOUNTER — PATIENT OUTREACH (OUTPATIENT)
Dept: ADMINISTRATIVE | Facility: OTHER | Age: 85
End: 2020-07-20

## 2020-07-20 NOTE — PROGRESS NOTES
Chart reviewed.   Immunizations: Triggered Imm Registry     Orders placed: n/a  Upcoming appts to satisfy [facility]IE topics: n/a

## 2020-07-21 ENCOUNTER — OFFICE VISIT (OUTPATIENT)
Dept: CARDIOLOGY | Facility: CLINIC | Age: 85
End: 2020-07-21
Payer: MEDICARE

## 2020-07-21 VITALS
WEIGHT: 225 LBS | SYSTOLIC BLOOD PRESSURE: 128 MMHG | HEART RATE: 83 BPM | HEIGHT: 69 IN | DIASTOLIC BLOOD PRESSURE: 78 MMHG | BODY MASS INDEX: 33.33 KG/M2

## 2020-07-21 DIAGNOSIS — Z86.73 H/O TIA (TRANSIENT ISCHEMIC ATTACK) AND STROKE: Primary | ICD-10-CM

## 2020-07-21 DIAGNOSIS — R60.9 EDEMA, UNSPECIFIED TYPE: ICD-10-CM

## 2020-07-21 DIAGNOSIS — I48.91 ATRIAL FIBRILLATION, UNSPECIFIED TYPE: ICD-10-CM

## 2020-07-21 DIAGNOSIS — I15.2 HYPERTENSION ASSOCIATED WITH DIABETES: ICD-10-CM

## 2020-07-21 DIAGNOSIS — R06.02 SOB (SHORTNESS OF BREATH): ICD-10-CM

## 2020-07-21 DIAGNOSIS — E11.59 HYPERTENSION ASSOCIATED WITH DIABETES: ICD-10-CM

## 2020-07-21 PROBLEM — R79.89 ELEVATED BRAIN NATRIURETIC PEPTIDE (BNP) LEVEL: Status: RESOLVED | Noted: 2018-03-28 | Resolved: 2020-07-21

## 2020-07-21 PROCEDURE — 99214 PR OFFICE/OUTPT VISIT, EST, LEVL IV, 30-39 MIN: ICD-10-PCS | Mod: S$PBB,,, | Performed by: INTERNAL MEDICINE

## 2020-07-21 PROCEDURE — 99214 OFFICE O/P EST MOD 30 MIN: CPT | Mod: S$PBB,,, | Performed by: INTERNAL MEDICINE

## 2020-07-21 PROCEDURE — 99213 OFFICE O/P EST LOW 20 MIN: CPT | Mod: PBBFAC,PO | Performed by: INTERNAL MEDICINE

## 2020-07-21 PROCEDURE — 99999 PR PBB SHADOW E&M-EST. PATIENT-LVL III: ICD-10-PCS | Mod: PBBFAC,,, | Performed by: INTERNAL MEDICINE

## 2020-07-21 PROCEDURE — 99999 PR PBB SHADOW E&M-EST. PATIENT-LVL III: CPT | Mod: PBBFAC,,, | Performed by: INTERNAL MEDICINE

## 2020-07-21 NOTE — PROGRESS NOTES
Subjective:    Patient ID:  Héctor Desir is a 85 y.o. male who presents for follow-up of Follow-up (follow up)      HPI  86 yo with HTN and chronic AF . Doing well. No change in SOB. Does not feel the AF. No unusual bleeding    Impression: NORMAL MYOCARDIAL PERFUSION  1. The perfusion scan is free of evidence for myocardial ischemia or injury.   2. Resting wall motion is physiologic.   3. Resting LV function is normal.   4. The ventricular volumes are normal at rest and stress.   5. The extracardiac distribution of radioactivity is normal.           This document has been electronically    SIGNED BY: Israel Cardoza MD On: 01/25/2019 08:39    Review of Systems   Constitution: Negative for decreased appetite, fever, malaise/fatigue, weight gain and weight loss.   HENT: Negative for hearing loss and nosebleeds.    Eyes: Negative for visual disturbance.   Cardiovascular: Positive for dyspnea on exertion and leg swelling. Negative for chest pain, claudication, cyanosis, irregular heartbeat, near-syncope, orthopnea, palpitations, paroxysmal nocturnal dyspnea and syncope.   Respiratory: Negative for cough, hemoptysis, shortness of breath, sleep disturbances due to breathing, snoring and wheezing.    Endocrine: Negative for cold intolerance, heat intolerance, polydipsia and polyuria.   Hematologic/Lymphatic: Negative for adenopathy and bleeding problem. Does not bruise/bleed easily.   Skin: Negative for color change, itching, poor wound healing, rash and suspicious lesions.   Musculoskeletal: Negative for arthritis, back pain, falls, joint pain, joint swelling, muscle cramps, muscle weakness and myalgias.   Gastrointestinal: Negative for bloating, abdominal pain, change in bowel habit, constipation, flatus, heartburn, hematemesis, hematochezia, hemorrhoids, jaundice, melena, nausea and vomiting.   Genitourinary: Negative for bladder incontinence, decreased libido, frequency, hematuria, hesitancy and urgency.  "  Neurological: Negative for brief paralysis, difficulty with concentration, excessive daytime sleepiness, dizziness, focal weakness, headaches, light-headedness, loss of balance, numbness, vertigo and weakness.   Psychiatric/Behavioral: Negative for altered mental status, depression and memory loss. The patient does not have insomnia and is not nervous/anxious.    Allergic/Immunologic: Negative for environmental allergies, hives and persistent infections.        Objective:    Physical Exam   Constitutional: He is oriented to person, place, and time. He appears well-developed and well-nourished. No distress.   /78   Pulse 83   Ht 5' 9" (1.753 m)   Wt 102.1 kg (225 lb)   BMI 33.23 kg/m²      HENT:   Head: Normocephalic and atraumatic.   Eyes: Pupils are equal, round, and reactive to light. Conjunctivae and lids are normal. Right eye exhibits no discharge. No scleral icterus.   Neck: Normal range of motion. Neck supple. No JVD present. No tracheal deviation present. No thyromegaly present.   Cardiovascular: Normal rate, S1 normal, S2 normal, normal heart sounds and intact distal pulses. An irregularly irregular rhythm present. Exam reveals no gallop and no friction rub.   No murmur heard.  Pulses:       Carotid pulses are 2+ on the right side and 2+ on the left side.       Radial pulses are 2+ on the right side and 2+ on the left side.        Femoral pulses are 2+ on the right side and 2+ on the left side.       Popliteal pulses are 2+ on the right side and 2+ on the left side.        Dorsalis pedis pulses are 2+ on the right side and 2+ on the left side.        Posterior tibial pulses are 2+ on the right side and 2+ on the left side.   Pulmonary/Chest: Effort normal and breath sounds normal. No respiratory distress. He has no wheezes. He has no rales. He exhibits no tenderness.   Abdominal: Soft. Bowel sounds are normal. He exhibits no distension and no mass. There is no hepatosplenomegaly or hepatomegaly. " There is no abdominal tenderness. There is no rebound and no guarding.   Musculoskeletal: Normal range of motion.         General: Edema present. No tenderness.      Comments: Trace edema   Lymphadenopathy:     He has no cervical adenopathy.   Neurological: He is alert and oriented to person, place, and time. He has normal reflexes. No cranial nerve deficit. Coordination normal.   Skin: Skin is warm and dry. No rash noted. He is not diaphoretic. No erythema. No pallor.   Psychiatric: He has a normal mood and affect. His speech is normal and behavior is normal. Judgment and thought content normal. Cognition and memory are normal.       Reviewed meds and lab  Assessment:       1. H/O TIA (transient ischemic attack) and stroke    2. Atrial fibrillation, unspecified type    3. Hypertension associated with diabetes    4. Edema, unspecified type    5. SOB (shortness of breath)         Plan:     The current medical regimen is effective;  continue present plan and medications.       Orders Placed This Encounter   Procedures    Echo Color Flow Doppler? Yes     Follow up in about 6 months (around 1/21/2021).

## 2020-07-28 ENCOUNTER — OFFICE VISIT (OUTPATIENT)
Dept: FAMILY MEDICINE | Facility: CLINIC | Age: 85
End: 2020-07-28
Payer: MEDICARE

## 2020-07-28 VITALS
TEMPERATURE: 98 F | BODY MASS INDEX: 33.49 KG/M2 | HEIGHT: 68 IN | HEART RATE: 50 BPM | WEIGHT: 221 LBS | DIASTOLIC BLOOD PRESSURE: 81 MMHG | SYSTOLIC BLOOD PRESSURE: 132 MMHG

## 2020-07-28 DIAGNOSIS — F41.9 ANXIETY: ICD-10-CM

## 2020-07-28 DIAGNOSIS — K57.30 DIVERTICULOSIS OF COLON: ICD-10-CM

## 2020-07-28 DIAGNOSIS — I15.2 HYPERTENSION ASSOCIATED WITH DIABETES: ICD-10-CM

## 2020-07-28 DIAGNOSIS — K21.9 GASTROESOPHAGEAL REFLUX DISEASE, ESOPHAGITIS PRESENCE NOT SPECIFIED: ICD-10-CM

## 2020-07-28 DIAGNOSIS — Z86.010 HISTORY OF COLON POLYPS: ICD-10-CM

## 2020-07-28 DIAGNOSIS — E78.2 COMBINED HYPERLIPIDEMIA ASSOCIATED WITH TYPE 2 DIABETES MELLITUS: ICD-10-CM

## 2020-07-28 DIAGNOSIS — Z85.46 HISTORY OF PROSTATE CANCER: ICD-10-CM

## 2020-07-28 DIAGNOSIS — I48.91 ATRIAL FIBRILLATION, UNSPECIFIED TYPE: ICD-10-CM

## 2020-07-28 DIAGNOSIS — N18.30 CKD (CHRONIC KIDNEY DISEASE) STAGE 3, GFR 30-59 ML/MIN: ICD-10-CM

## 2020-07-28 DIAGNOSIS — Z76.0 MEDICATION REFILL: ICD-10-CM

## 2020-07-28 DIAGNOSIS — E11.69 COMBINED HYPERLIPIDEMIA ASSOCIATED WITH TYPE 2 DIABETES MELLITUS: ICD-10-CM

## 2020-07-28 DIAGNOSIS — I67.2 CEREBROVASCULAR DISEASE, ARTERIOSCLEROTIC, POST-STROKE: ICD-10-CM

## 2020-07-28 DIAGNOSIS — N25.81 SECONDARY HYPERPARATHYROIDISM, RENAL: ICD-10-CM

## 2020-07-28 DIAGNOSIS — Z86.73 CEREBROVASCULAR DISEASE, ARTERIOSCLEROTIC, POST-STROKE: ICD-10-CM

## 2020-07-28 DIAGNOSIS — E11.59 HYPERTENSION ASSOCIATED WITH DIABETES: ICD-10-CM

## 2020-07-28 PROCEDURE — 99999 PR PBB SHADOW E&M-EST. PATIENT-LVL IV: CPT | Mod: PBBFAC,,, | Performed by: NURSE PRACTITIONER

## 2020-07-28 PROCEDURE — 99214 OFFICE O/P EST MOD 30 MIN: CPT | Mod: PBBFAC,PO | Performed by: NURSE PRACTITIONER

## 2020-07-28 PROCEDURE — G0439 PPPS, SUBSEQ VISIT: HCPCS | Mod: S$GLB,,, | Performed by: NURSE PRACTITIONER

## 2020-07-28 PROCEDURE — G0439 PR MEDICARE ANNUAL WELLNESS SUBSEQUENT VISIT: ICD-10-PCS | Mod: S$GLB,,, | Performed by: NURSE PRACTITIONER

## 2020-07-28 PROCEDURE — 99999 PR PBB SHADOW E&M-EST. PATIENT-LVL IV: ICD-10-PCS | Mod: PBBFAC,,, | Performed by: NURSE PRACTITIONER

## 2020-07-28 NOTE — PROGRESS NOTES
"  Héctor Desir presented for a follow-up Medicare AWV today. The following components were reviewed and updated:    · Medical history  · Family History  · Social history  · Allergies and Current Medications  · Health Risk Assessment  · Health Maintenance  · Care Team    **See Completed Assessments for Annual Wellness visit with in the encounter summary    The following assessments were completed:  · Depression Screening  · Cognitive function Screening  · Timed Get Up Test  · Whisper Test  · PHQ-2  · Nutrition screen  · ADL  · PAQ  · Osteoporosis risk  · CAGE  · Living situation    Vitals:    07/28/20 1050   BP: 132/81   Pulse: (!) 50   Temp: 98.3 °F (36.8 °C)   Weight: 100.2 kg (221 lb)   Height: 5' 8" (1.727 m)     Body mass index is 33.6 kg/m².   ]        Diagnoses and health risks identified today and associated recommendations/orders:  1. Type II diabetes mellitus with neurological manifestations, uncontrolled  Stable; uncontrolled  Diabetes managed by diabetes education  Declines medication for neuropathy at present  Continue current medications/plan of care  Eye exam due; pt to schedule    2. Type II diabetes mellitus with renal manifestations, uncontrolled  Stable  CKD managed by nephrology  Continue current plan of care    3. Hypertension associated with diabetes  Stable  Continue current medications    4. Combined hyperlipidemia associated with type 2 diabetes mellitus  Stable  Low cholesterol diet recommended  Continue current medication    5. Atrial fibrillation, unspecified type  Stable; controlled  Managed by cardiology; sees q6m  Continue current medications/plan of care    6. Cerebrovascular disease, arteriosclerotic, post-stroke  Stable; controlled  No ROM limitations    7. CKD (chronic kidney disease) stage 3, GFR 30-59 ml/min  Stable  CKD managed by nephrology  Continue current plan of care      8. Secondary hyperparathyroidism, renal  Stable  CKD managed by nephrology  Continue current plan of " care    9. Gastroesophageal reflux disease, esophagitis presence not specified  Stable; controlled  Continue current medication    10. Diverticulosis of colon  Stable  Colonoscopy UTD  Colonoscopy at recommended interval    11. History of prostate cancer  Prostate cancer resolved  Prostatectomy in 2006    12. History of colon polyps  Stable  Colonoscopy UTD  Colonoscopy at recommended interval    13. Anxiety  Stable  Continue current plan of care    14. Medication refill  Jardiance refill sent to "[id number]    Provided Héctor with a 5-10 year written screening schedule and personal prevention plan. Recommendations were developed using the USPSTF age appropriate recommendations. Education, counseling, and referrals were provided as needed.  After Visit Summary printed and given to patient which includes a list of additional screenings\tests needed.    No follow-ups on file.      Mickie Merrill NP

## 2020-08-06 ENCOUNTER — TELEPHONE (OUTPATIENT)
Dept: CARDIOLOGY | Facility: CLINIC | Age: 85
End: 2020-08-06

## 2020-08-06 ENCOUNTER — HOSPITAL ENCOUNTER (OUTPATIENT)
Dept: CARDIOLOGY | Facility: HOSPITAL | Age: 85
Discharge: HOME | End: 2020-08-06
Attending: INTERNAL MEDICINE
Payer: MEDICARE

## 2020-08-06 VITALS
DIASTOLIC BLOOD PRESSURE: 81 MMHG | HEART RATE: 70 BPM | BODY MASS INDEX: 33.49 KG/M2 | SYSTOLIC BLOOD PRESSURE: 132 MMHG | WEIGHT: 221 LBS | HEIGHT: 68 IN

## 2020-08-06 LAB
ASCENDING AORTA: 2.71 CM
AV INDEX (PROSTH): 0.71
AV LVOT PEAK GRADIENT: 2 MMHG
AV MEAN GRADIENT: 2 MMHG
AV PEAK GRADIENT: 4 MMHG
AV REGURGITATION PRESSURE HALF TIME: 599.17 MS
AV VALVE AREA: 2.64 CM2
AV VELOCITY RATIO: 0.62
BSA FOR ECHO PROCEDURE: 2.19 M2
CV ECHO LV RWT: 0.62 CM
DOP CALC AO PEAK VEL: 1 M/S
DOP CALC AO VTI: 19.74 CM
DOP CALC LVOT AREA: 3.7 CM2
DOP CALC LVOT DIAMETER: 2.17 CM
DOP CALC LVOT PEAK VEL: 0.62 M/S
DOP CALC LVOT STROKE VOLUME: 52.16 CM3
DOP CALC RVOT PEAK VEL: 0.46 M/S
DOP CALC RVOT VTI: 9.32 CM
DOP CALCLVOT PEAK VEL VTI: 14.11 CM
ECHO EF ESTIMATED: 78 %
ECHO LV POSTERIOR WALL: 1.35 CM (ref 0.6–1.1)
FRACTIONAL SHORTENING: 46 % (ref 28–44)
INTERVENTRICULAR SEPTUM: 1.43 CM (ref 0.6–1.1)
IVRT: 72.66 MS
LA MAJOR: 6.3 CM
LA MINOR: 6.3 CM
LA WIDTH: 4.6 CM
LEFT ATRIUM SIZE: 4.79 CM
LEFT ATRIUM VOLUME INDEX: 55.3 ML/M2
LEFT ATRIUM VOLUME: 117.99 CM3
LEFT INTERNAL DIMENSION IN SYSTOLE: 2.36 CM (ref 2.1–4)
LEFT VENTRICLE DIASTOLIC VOLUME INDEX: 40.8 ML/M2
LEFT VENTRICLE DIASTOLIC VOLUME: 87 ML
LEFT VENTRICLE MASS INDEX: 111 G/M2
LEFT VENTRICLE SYSTOLIC VOLUME INDEX: 8.9 ML/M2
LEFT VENTRICLE SYSTOLIC VOLUME: 19 ML
LEFT VENTRICULAR INTERNAL DIMENSION IN DIASTOLE: 4.39 CM (ref 3.5–6)
LEFT VENTRICULAR MASS: 236.89 G
MV STENOSIS PRESSURE HALF TIME: 57 MS
MV VALVE AREA P 1/2 METHOD: 3.86 CM2
PISA TR MAX VEL: 2.39 M/S
PROX AORTA: 3.71 CM
PULM VEIN S/D RATIO: 1.23
PV MEAN GRADIENT: 1 MMHG
PV PEAK D VEL: 0.39 M/S
PV PEAK S VEL: 0.48 M/S
PV PEAK VELOCITY: 0.82 M/S
RA MAJOR: 6.2 CM
RA PRESSURE: 3 MMHG
RA WIDTH: 4.1 CM
RIGHT VENTRICULAR END-DIASTOLIC DIMENSION: 2.74 CM
SINUS: 3 CM
STJ: 2.9 CM
TDI LATERAL: 0.1 M/S
TDI SEPTAL: 0.1 M/S
TDI: 0.1 M/S
TR MAX PG: 23 MMHG
TRICUSPID ANNULAR PLANE SYSTOLIC EXCURSION: 2.4 CM
TV REST PULMONARY ARTERY PRESSURE: 26 MMHG

## 2020-08-06 PROCEDURE — 93306 TTE W/DOPPLER COMPLETE: CPT | Mod: PO

## 2020-08-06 PROCEDURE — 93306 TTE W/DOPPLER COMPLETE: CPT | Mod: 26,,, | Performed by: INTERNAL MEDICINE

## 2020-08-06 PROCEDURE — 93306 ECHO (CUPID ONLY): ICD-10-PCS | Mod: 26,,, | Performed by: INTERNAL MEDICINE

## 2020-08-06 NOTE — TELEPHONE ENCOUNTER
Message sent to dr galvan ----- Message from Jennifer Tavarez sent at 8/6/2020  9:58 AM CDT -----  Contact: self  Type:  Patient Returning Call    Who Called:  patient  Who Left Message for Patient:  Dr Galvan  Does the patient know what this is regarding?:  has a echo complete this morning so maybe results  Best Call Back Number:  [phone] (home)   Additional Information:  na

## 2020-08-07 ENCOUNTER — TELEPHONE (OUTPATIENT)
Dept: DIABETES | Facility: CLINIC | Age: 85
End: 2020-08-07

## 2020-08-07 ENCOUNTER — TELEPHONE (OUTPATIENT)
Dept: FAMILY MEDICINE | Facility: CLINIC | Age: 85
End: 2020-08-07

## 2020-08-07 RX ORDER — EMPAGLIFLOZIN 10 MG/1
10 TABLET, FILM COATED ORAL DAILY
COMMUNITY
End: 2020-08-07 | Stop reason: SDUPTHER

## 2020-08-07 RX ORDER — EMPAGLIFLOZIN 10 MG/1
10 TABLET, FILM COATED ORAL DAILY
Qty: 90 TABLET | Refills: 2 | Status: SHIPPED | OUTPATIENT
Start: 2020-08-07 | End: 2020-08-07

## 2020-08-07 NOTE — TELEPHONE ENCOUNTER
----- Message from Tae Goel MD sent at 8/6/2020 10:39 PM CDT -----  The a1c is high. Start JARDIANCE 10 mg a day and recheck the A-1 C in 6 months.

## 2020-08-11 RX ORDER — EMPAGLIFLOZIN 25 MG/1
25 TABLET, FILM COATED ORAL DAILY
Qty: 90 TABLET | Refills: 0 | Status: SHIPPED | OUTPATIENT
Start: 2020-08-11 | End: 2020-12-01

## 2020-08-12 ENCOUNTER — PATIENT OUTREACH (OUTPATIENT)
Dept: ADMINISTRATIVE | Facility: OTHER | Age: 85
End: 2020-08-12

## 2020-08-12 NOTE — PROGRESS NOTES
LINKS immunization registry updated  Care Everywhere updated  Health Maintenance updated  Chart reviewed for overdue [facility] health maintenance testing  Chart/Media searched: N/A   Orders entered:N/A

## 2020-08-13 ENCOUNTER — TELEPHONE (OUTPATIENT)
Dept: NEPHROLOGY | Facility: CLINIC | Age: 85
End: 2020-08-13

## 2020-08-13 ENCOUNTER — OFFICE VISIT (OUTPATIENT)
Dept: NEPHROLOGY | Facility: CLINIC | Age: 85
End: 2020-08-13
Payer: MEDICARE

## 2020-08-13 VITALS
WEIGHT: 222.69 LBS | SYSTOLIC BLOOD PRESSURE: 132 MMHG | DIASTOLIC BLOOD PRESSURE: 80 MMHG | HEART RATE: 70 BPM | HEIGHT: 68 IN | OXYGEN SATURATION: 97 % | BODY MASS INDEX: 33.75 KG/M2

## 2020-08-13 DIAGNOSIS — E11.69 COMBINED HYPERLIPIDEMIA ASSOCIATED WITH TYPE 2 DIABETES MELLITUS: ICD-10-CM

## 2020-08-13 DIAGNOSIS — N18.30 CKD (CHRONIC KIDNEY DISEASE) STAGE 3, GFR 30-59 ML/MIN: Primary | ICD-10-CM

## 2020-08-13 DIAGNOSIS — N25.81 SECONDARY HYPERPARATHYROIDISM, RENAL: ICD-10-CM

## 2020-08-13 DIAGNOSIS — I48.19 PERSISTENT ATRIAL FIBRILLATION: ICD-10-CM

## 2020-08-13 DIAGNOSIS — E11.59 HYPERTENSION ASSOCIATED WITH DIABETES: ICD-10-CM

## 2020-08-13 DIAGNOSIS — E78.2 COMBINED HYPERLIPIDEMIA ASSOCIATED WITH TYPE 2 DIABETES MELLITUS: ICD-10-CM

## 2020-08-13 DIAGNOSIS — I15.2 HYPERTENSION ASSOCIATED WITH DIABETES: ICD-10-CM

## 2020-08-13 PROCEDURE — 99999 PR PBB SHADOW E&M-EST. PATIENT-LVL III: CPT | Mod: PBBFAC,,, | Performed by: INTERNAL MEDICINE

## 2020-08-13 PROCEDURE — 99214 PR OFFICE/OUTPT VISIT, EST, LEVL IV, 30-39 MIN: ICD-10-PCS | Mod: S$PBB,,, | Performed by: INTERNAL MEDICINE

## 2020-08-13 PROCEDURE — 99999 PR PBB SHADOW E&M-EST. PATIENT-LVL III: ICD-10-PCS | Mod: PBBFAC,,, | Performed by: INTERNAL MEDICINE

## 2020-08-13 PROCEDURE — 99214 OFFICE O/P EST MOD 30 MIN: CPT | Mod: S$PBB,,, | Performed by: INTERNAL MEDICINE

## 2020-08-13 PROCEDURE — 99213 OFFICE O/P EST LOW 20 MIN: CPT | Mod: PBBFAC,PO | Performed by: INTERNAL MEDICINE

## 2020-08-14 ENCOUNTER — TELEPHONE (OUTPATIENT)
Dept: OPTOMETRY | Facility: CLINIC | Age: 85
End: 2020-08-14

## 2020-08-14 NOTE — TELEPHONE ENCOUNTER
----- Message from Iraida Ca sent at 8/14/2020 10:28 AM CDT -----  Regarding: Patient  The patient would like to reschedule his appt. Please call back at [phone] (home)

## 2020-08-19 NOTE — TELEPHONE ENCOUNTER
----- Message from Renee Shelby sent at 1/29/2019 10:00 AM CST -----  Contact: pt  Returning a call regarding test results  
Returned call to pt and discussed lab results and recommendations.  
Yes

## 2020-08-20 ENCOUNTER — TELEPHONE (OUTPATIENT)
Dept: DIABETES | Facility: CLINIC | Age: 85
End: 2020-08-20

## 2020-08-20 NOTE — TELEPHONE ENCOUNTER
Appointment scheduled.  ----- Message from Lauri Maki LPN sent at 8/19/2020  2:45 PM CDT -----  Contact: self    ----- Message -----  From: Chad Elam  Sent: 8/19/2020   2:37 PM CDT  To: [facility] Staff    Type:  Sooner Apoointment Request    Caller is requesting a sooner appointment.  Caller declined first available appointment listed below.  Caller will not accept being placed on the waitlist and is requesting a message be sent to doctor.  Name of Caller:Héctor Desir   When is the first available appointment?2020  Symptoms:rescheule  Would the patient rather a call back or a response via [facility]? Call back  Best Call Back Number:[phone]  Additional Information:

## 2020-08-24 NOTE — PROGRESS NOTES
Subjective:       Patient ID: Héctor Desir is a 85 y.o. White male who presents for follow-up evaluation of Chronic Kidney Disease    HPI     He reports ongoing stress due to his wife's illness. She is no longer on hospice--but he needs to interact with her daughter who can be challenging. He feels overall well and notes his BS are running better, last Hba1c was 7.1. No LE edema nor SOB. Nocturia is 1-2X.       Interval history Feb 2019: Lost to follow up. Wife passed. He's in a new place, doing better since his Hbac1 went up. Saw Endocrine.      Interval history Dec 2019: doing well. No LE edema and no SOB. NO new medications. Got flu shot.      Interval history May 2020:  The patient location is: Home  The chief complaint leading to consultation is: CKD  Visit type: audiovisual  Total time spent with patient: 23 minutes  Each patient to whom he or she provides medical services by telemedicine is:  (1) informed of the relationship between the physician and patient and the respective role of any other health care provider with respect to management of the patient; and (2) notified that he or she may decline to receive medical services by telemedicine and may withdraw from such care at any time.   Notes: He feels well, no change in medical status. No LE edema. Last Hba1c was 7.1       Interval history August 2020: He is feeling very well. He came back from a fishing trip today. No LE edema. He thinks his BS are running pretty well when he checks them, last Hba1c was 7.2      Review of Systems   Constitutional: Negative for activity change, appetite change, fatigue and unexpected weight change.   HENT: Negative for facial swelling.    Respiratory: Negative for shortness of breath.    Cardiovascular: Negative for chest pain and leg swelling.   Gastrointestinal: Positive for constipation.   Genitourinary: Positive for frequency. Negative for difficulty urinating, dysuria and hematuria.   Musculoskeletal: Negative for  arthralgias.   Neurological: Negative for weakness and headaches.   Psychiatric/Behavioral: Negative for decreased concentration.       Objective:      Physical Exam  Vitals signs reviewed.   Constitutional:       General: He is not in acute distress.     Appearance: He is well-developed.   Neck:      Vascular: No JVD.   Cardiovascular:      Heart sounds: Normal heart sounds, S1 normal and S2 normal. No friction rub.   Pulmonary:      Breath sounds: Normal breath sounds. No wheezing or rales.   Abdominal:      Palpations: Abdomen is soft.   Skin:     General: Skin is warm and dry.   Neurological:      Mental Status: He is alert and oriented to person, place, and time.            Assessment:       1. CKD (chronic kidney disease) stage 3, GFR 30-59 ml/min    2. Persistent atrial fibrillation    3. 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 Secondary hyperparathyroidism, renal    5. Type II diabetes mellitus with renal manifestations, uncontrolled    6. Uncontrolled type 2 diabetes mellitus with stage 3 chronic kidney disease, without long-term current use of insulin    7. Hypertension associated with diabetes          Plan:             CKD stage 3 with stable kidney function.  Continue RAAS blockade (lisinopril) for renal preservation    HTN is controlled    DM--is controlled, given age     Mineral and Bone Disease--continue D2. PTH with large spike in May, check with next set of labs      RTC 4 months

## 2020-10-06 ENCOUNTER — PATIENT OUTREACH (OUTPATIENT)
Dept: ADMINISTRATIVE | Facility: OTHER | Age: 85
End: 2020-10-06

## 2020-10-06 RX ORDER — NATEGLINIDE 120 MG/1
120 TABLET ORAL
Qty: 270 TABLET | Refills: 1 | Status: SHIPPED | OUTPATIENT
Start: 2020-10-06 | End: 2021-02-04 | Stop reason: SDUPTHER

## 2020-10-06 NOTE — PROGRESS NOTES
LINKS immunization registry updated  Care Everywhere updated  Health Maintenance updated  Chart reviewed for overdue [facility] ([facility]) health maintenance testing (CRS, Breast Ca, Diabetic Eye Exam)   Orders entered:N/A

## 2020-10-06 NOTE — TELEPHONE ENCOUNTER
----- Message from Marialuisa Marino sent at 10/6/2020  9:03 AM CDT -----  Contact: self/[phone]  Would like to consult with nurse regarding medication(Diabetes medication), please call back at [phone]. Thanks/ar

## 2020-10-07 ENCOUNTER — OFFICE VISIT (OUTPATIENT)
Dept: OPTOMETRY | Facility: CLINIC | Age: 85
End: 2020-10-07
Payer: MEDICARE

## 2020-10-07 DIAGNOSIS — H01.02A SQUAMOUS BLEPHARITIS OF UPPER AND LOWER EYELIDS OF BOTH EYES: ICD-10-CM

## 2020-10-07 DIAGNOSIS — E11.9 DIABETES MELLITUS TYPE 2 WITHOUT RETINOPATHY: Primary | ICD-10-CM

## 2020-10-07 DIAGNOSIS — H01.02B SQUAMOUS BLEPHARITIS OF UPPER AND LOWER EYELIDS OF BOTH EYES: ICD-10-CM

## 2020-10-07 DIAGNOSIS — Z96.1 BILATERAL PSEUDOPHAKIA: ICD-10-CM

## 2020-10-07 PROCEDURE — 99213 OFFICE O/P EST LOW 20 MIN: CPT | Mod: PBBFAC,PO | Performed by: OPTOMETRIST

## 2020-10-07 PROCEDURE — 92014 COMPRE OPH EXAM EST PT 1/>: CPT | Mod: S$PBB,,, | Performed by: OPTOMETRIST

## 2020-10-07 PROCEDURE — 99999 PR PBB SHADOW E&M-EST. PATIENT-LVL III: ICD-10-PCS | Mod: PBBFAC,,, | Performed by: OPTOMETRIST

## 2020-10-07 PROCEDURE — 99999 PR PBB SHADOW E&M-EST. PATIENT-LVL III: CPT | Mod: PBBFAC,,, | Performed by: OPTOMETRIST

## 2020-10-07 PROCEDURE — 92014 PR EYE EXAM, EST PATIENT,COMPREHESV: ICD-10-PCS | Mod: S$PBB,,, | Performed by: OPTOMETRIST

## 2020-10-07 NOTE — PROGRESS NOTES
HPI     DLS: 4/17/19- annual exam for DM. States no specs. Vision is well. Denies   pain/ FOL/ floaters. No gtts. States when waking up sometimes OS is stuck   shut with crust. States DM has been ok.   Happy with vision  Diabetic eye exam  Hemoglobin A1C       Date                     Value               Ref Range             Status                08/06/2020               7.2 (H)             4.0 - 5.6 %           Final                  05/05/2020               7.1 (H)             4.0 - 5.6 %           Final                  05/05/2020               7.1 (H)             4.0 - 5.6 %           Final                  Last edited by Richard Jackson, OD on 10/7/2020  1:55 PM. (History)            Assessment /Plan     For exam results, see Encounter Report.    Diabetes mellitus type 2 without retinopathy    Bilateral pseudophakia    Squamous blepharitis of upper and lower eyelids of both eyes      1. No diabetic retinopathy, no csme. Return in 1 year for dilated eye exam.  2. Monitor condition. Patient to report any changes. RTC 1 year recheck.  3. Ocusoft lid wipes

## 2020-12-01 RX ORDER — EMPAGLIFLOZIN 25 MG/1
TABLET, FILM COATED ORAL
Qty: 90 TABLET | Refills: 0 | Status: SHIPPED | OUTPATIENT
Start: 2020-12-01 | End: 2021-02-04 | Stop reason: SDUPTHER

## 2020-12-02 ENCOUNTER — TELEPHONE (OUTPATIENT)
Dept: FAMILY MEDICINE | Facility: CLINIC | Age: 85
End: 2020-12-02

## 2020-12-02 ENCOUNTER — CLINICAL SUPPORT (OUTPATIENT)
Dept: FAMILY MEDICINE | Facility: CLINIC | Age: 85
End: 2020-12-02
Payer: MEDICARE

## 2020-12-02 DIAGNOSIS — Z03.818 ENCOUNTER FOR OBSERVATION FOR SUSPECTED EXPOSURE TO OTHER BIOLOGICAL AGENTS RULED OUT: ICD-10-CM

## 2020-12-02 PROCEDURE — 99999 PR PBB SHADOW E&M-EST. PATIENT-LVL II: CPT | Mod: PBBFAC,,,

## 2020-12-02 PROCEDURE — 99212 OFFICE O/P EST SF 10 MIN: CPT | Mod: PBBFAC,PO

## 2020-12-02 PROCEDURE — 99999 PR PBB SHADOW E&M-EST. PATIENT-LVL II: ICD-10-PCS | Mod: PBBFAC,,,

## 2020-12-02 PROCEDURE — U0003 INFECTIOUS AGENT DETECTION BY NUCLEIC ACID (DNA OR RNA); SEVERE ACUTE RESPIRATORY SYNDROME CORONAVIRUS 2 (SARS-COV-2) (CORONAVIRUS DISEASE [COVID-19]), AMPLIFIED PROBE TECHNIQUE, MAKING USE OF HIGH THROUGHPUT TECHNOLOGIES AS DESCRIBED BY CMS-2020-01-R: HCPCS

## 2020-12-02 NOTE — TELEPHONE ENCOUNTER
If asymptomatic, there is no treatment.  If exposed significantly, he should consider quarantine from his family for 10 days.     I have signed for the following orders AND/OR meds.  Please call the patient and ask the patient to schedule the testing AND/OR inform about any medications that were sent.      Orders Placed This Encounter   Procedures    COVID-19 Routine Screening     Standing Status:   Future     Standing Expiration Date:   1/31/2022     Order Specific Question:   Is the patient symptomatic?     Answer:   No     Order Specific Question:   Is this needed for pre-procedure or pre-op testing?     Answer:   No     Order Specific Question:   Diagnosis:     Answer:   Encounter for observation for suspected exposure to other biological agents ruled out [[id number]]     Order Specific Question:   Does the patient have the ability to go to a drive thru location?     Answer:   Yes

## 2020-12-02 NOTE — TELEPHONE ENCOUNTER
----- Message from Ariella Gracia sent at 12/2/2020  7:58 AM CST -----  Pt states he has been exposed to Covid-19, would like return call to discuss treatment.  Please call back at [phone]. Md Barry

## 2020-12-03 LAB — SARS-COV-2 RNA RESP QL NAA+PROBE: NOT DETECTED

## 2021-01-07 ENCOUNTER — LAB VISIT (OUTPATIENT)
Dept: LAB | Facility: HOSPITAL | Age: 86
End: 2021-01-07
Attending: INTERNAL MEDICINE
Payer: MEDICARE

## 2021-01-07 DIAGNOSIS — N25.81 SECONDARY HYPERPARATHYROIDISM, RENAL: ICD-10-CM

## 2021-01-07 DIAGNOSIS — N18.30 CKD (CHRONIC KIDNEY DISEASE) STAGE 3, GFR 30-59 ML/MIN: ICD-10-CM

## 2021-01-07 DIAGNOSIS — E78.2 COMBINED HYPERLIPIDEMIA ASSOCIATED WITH TYPE 2 DIABETES MELLITUS: ICD-10-CM

## 2021-01-07 DIAGNOSIS — E11.69 COMBINED HYPERLIPIDEMIA ASSOCIATED WITH TYPE 2 DIABETES MELLITUS: ICD-10-CM

## 2021-01-07 LAB
ALBUMIN SERPL BCP-MCNC: 3.8 G/DL (ref 3.5–5.2)
ALBUMIN SERPL BCP-MCNC: 3.8 G/DL (ref 3.5–5.2)
ALP SERPL-CCNC: 102 U/L (ref 55–135)
ALT SERPL W/O P-5'-P-CCNC: 21 U/L (ref 10–44)
ANION GAP SERPL CALC-SCNC: 10 MMOL/L (ref 8–16)
AST SERPL-CCNC: 22 U/L (ref 10–40)
BASOPHILS # BLD AUTO: 0.05 K/UL (ref 0–0.2)
BASOPHILS NFR BLD: 0.9 % (ref 0–1.9)
BILIRUB DIRECT SERPL-MCNC: 0.4 MG/DL (ref 0.1–0.3)
BILIRUB SERPL-MCNC: 0.9 MG/DL (ref 0.1–1)
BUN SERPL-MCNC: 31 MG/DL (ref 8–23)
CALCIUM SERPL-MCNC: 8.4 MG/DL (ref 8.7–10.5)
CHLORIDE SERPL-SCNC: 110 MMOL/L (ref 95–110)
CHOLEST SERPL-MCNC: 114 MG/DL (ref 120–199)
CHOLEST/HDLC SERPL: 3.7 {RATIO} (ref 2–5)
CO2 SERPL-SCNC: 21 MMOL/L (ref 23–29)
CREAT SERPL-MCNC: 1.5 MG/DL (ref 0.5–1.4)
DIFFERENTIAL METHOD: ABNORMAL
EOSINOPHIL # BLD AUTO: 0.3 K/UL (ref 0–0.5)
EOSINOPHIL NFR BLD: 5.3 % (ref 0–8)
ERYTHROCYTE [DISTWIDTH] IN BLOOD BY AUTOMATED COUNT: 14.9 % (ref 11.5–14.5)
EST. GFR  (AFRICAN AMERICAN): 49.1 ML/MIN/1.73 M^2
EST. GFR  (NON AFRICAN AMERICAN): 42.4 ML/MIN/1.73 M^2
GLUCOSE SERPL-MCNC: 95 MG/DL (ref 70–110)
HCT VFR BLD AUTO: 48.2 % (ref 40–54)
HDLC SERPL-MCNC: 31 MG/DL (ref 40–75)
HDLC SERPL: 27.2 % (ref 20–50)
HGB BLD-MCNC: 15.6 G/DL (ref 14–18)
IMM GRANULOCYTES # BLD AUTO: 0.04 K/UL (ref 0–0.04)
IMM GRANULOCYTES NFR BLD AUTO: 0.7 % (ref 0–0.5)
LDLC SERPL CALC-MCNC: 67.2 MG/DL (ref 63–159)
LYMPHOCYTES # BLD AUTO: 1.1 K/UL (ref 1–4.8)
LYMPHOCYTES NFR BLD: 19.2 % (ref 18–48)
MAGNESIUM SERPL-MCNC: 2 MG/DL (ref 1.6–2.6)
MCH RBC QN AUTO: 28.2 PG (ref 27–31)
MCHC RBC AUTO-ENTMCNC: 32.4 G/DL (ref 32–36)
MCV RBC AUTO: 87 FL (ref 82–98)
MONOCYTES # BLD AUTO: 0.6 K/UL (ref 0.3–1)
MONOCYTES NFR BLD: 9.4 % (ref 4–15)
NEUTROPHILS # BLD AUTO: 3.8 K/UL (ref 1.8–7.7)
NEUTROPHILS NFR BLD: 64.5 % (ref 38–73)
NONHDLC SERPL-MCNC: 83 MG/DL
NRBC BLD-RTO: 0 /100 WBC
PHOSPHATE SERPL-MCNC: 2.7 MG/DL (ref 2.7–4.5)
PLATELET # BLD AUTO: 122 K/UL (ref 150–350)
PMV BLD AUTO: 12.9 FL (ref 9.2–12.9)
POTASSIUM SERPL-SCNC: 3.9 MMOL/L (ref 3.5–5.1)
PROT SERPL-MCNC: 6.3 G/DL (ref 6–8.4)
PTH-INTACT SERPL-MCNC: 161 PG/ML (ref 9–77)
RBC # BLD AUTO: 5.54 M/UL (ref 4.6–6.2)
SODIUM SERPL-SCNC: 141 MMOL/L (ref 136–145)
TRIGL SERPL-MCNC: 79 MG/DL (ref 30–150)
TSH SERPL DL<=0.005 MIU/L-ACNC: 1.61 UIU/ML (ref 0.4–4)
WBC # BLD AUTO: 5.84 K/UL (ref 3.9–12.7)

## 2021-01-07 PROCEDURE — 84443 ASSAY THYROID STIM HORMONE: CPT

## 2021-01-07 PROCEDURE — 36415 COLL VENOUS BLD VENIPUNCTURE: CPT | Mod: PO

## 2021-01-07 PROCEDURE — 85025 COMPLETE CBC W/AUTO DIFF WBC: CPT

## 2021-01-07 PROCEDURE — 83735 ASSAY OF MAGNESIUM: CPT

## 2021-01-07 PROCEDURE — 80061 LIPID PANEL: CPT

## 2021-01-07 PROCEDURE — 84075 ASSAY ALKALINE PHOSPHATASE: CPT

## 2021-01-07 PROCEDURE — 83970 ASSAY OF PARATHORMONE: CPT

## 2021-01-07 PROCEDURE — 80069 RENAL FUNCTION PANEL: CPT

## 2021-01-07 PROCEDURE — 82247 BILIRUBIN TOTAL: CPT

## 2021-01-07 PROCEDURE — 84460 ALANINE AMINO (ALT) (SGPT): CPT

## 2021-01-14 ENCOUNTER — OFFICE VISIT (OUTPATIENT)
Dept: NEPHROLOGY | Facility: CLINIC | Age: 86
End: 2021-01-14
Payer: MEDICARE

## 2021-01-14 VITALS
SYSTOLIC BLOOD PRESSURE: 138 MMHG | HEIGHT: 68 IN | TEMPERATURE: 98 F | DIASTOLIC BLOOD PRESSURE: 88 MMHG | OXYGEN SATURATION: 99 % | HEART RATE: 77 BPM | BODY MASS INDEX: 33.41 KG/M2 | WEIGHT: 220.44 LBS

## 2021-01-14 DIAGNOSIS — I48.19 PERSISTENT ATRIAL FIBRILLATION: ICD-10-CM

## 2021-01-14 DIAGNOSIS — N18.32 STAGE 3B CHRONIC KIDNEY DISEASE: Primary | ICD-10-CM

## 2021-01-14 DIAGNOSIS — E11.59 HYPERTENSION ASSOCIATED WITH DIABETES: ICD-10-CM

## 2021-01-14 DIAGNOSIS — N25.81 SECONDARY HYPERPARATHYROIDISM, RENAL: ICD-10-CM

## 2021-01-14 DIAGNOSIS — I15.2 HYPERTENSION ASSOCIATED WITH DIABETES: ICD-10-CM

## 2021-01-14 DIAGNOSIS — E11.42 DIABETIC POLYNEUROPATHY ASSOCIATED WITH TYPE 2 DIABETES MELLITUS: ICD-10-CM

## 2021-01-14 DIAGNOSIS — Z85.46 HISTORY OF PROSTATE CANCER: ICD-10-CM

## 2021-01-14 DIAGNOSIS — Z86.010 HISTORY OF COLON POLYPS: ICD-10-CM

## 2021-01-14 DIAGNOSIS — K21.9 GASTROESOPHAGEAL REFLUX DISEASE, UNSPECIFIED WHETHER ESOPHAGITIS PRESENT: ICD-10-CM

## 2021-01-14 DIAGNOSIS — E78.2 COMBINED HYPERLIPIDEMIA ASSOCIATED WITH TYPE 2 DIABETES MELLITUS: ICD-10-CM

## 2021-01-14 DIAGNOSIS — E11.69 COMBINED HYPERLIPIDEMIA ASSOCIATED WITH TYPE 2 DIABETES MELLITUS: ICD-10-CM

## 2021-01-14 DIAGNOSIS — Z86.73 H/O TIA (TRANSIENT ISCHEMIC ATTACK) AND STROKE: ICD-10-CM

## 2021-01-14 PROCEDURE — 99999 PR PBB SHADOW E&M-EST. PATIENT-LVL III: CPT | Mod: PBBFAC,,, | Performed by: INTERNAL MEDICINE

## 2021-01-14 PROCEDURE — 99214 PR OFFICE/OUTPT VISIT, EST, LEVL IV, 30-39 MIN: ICD-10-PCS | Mod: S$PBB,,, | Performed by: INTERNAL MEDICINE

## 2021-01-14 PROCEDURE — 99214 OFFICE O/P EST MOD 30 MIN: CPT | Mod: S$PBB,,, | Performed by: INTERNAL MEDICINE

## 2021-01-14 PROCEDURE — 99213 OFFICE O/P EST LOW 20 MIN: CPT | Mod: PBBFAC,PO | Performed by: INTERNAL MEDICINE

## 2021-01-14 PROCEDURE — 99999 PR PBB SHADOW E&M-EST. PATIENT-LVL III: ICD-10-PCS | Mod: PBBFAC,,, | Performed by: INTERNAL MEDICINE

## 2021-01-14 RX ORDER — METOPROLOL SUCCINATE 50 MG/1
TABLET, EXTENDED RELEASE ORAL
COMMUNITY
Start: 2021-01-08 | End: 2021-04-09

## 2021-01-26 ENCOUNTER — PATIENT MESSAGE (OUTPATIENT)
Dept: NEPHROLOGY | Facility: CLINIC | Age: 86
End: 2021-01-26

## 2021-02-04 ENCOUNTER — OFFICE VISIT (OUTPATIENT)
Dept: DIABETES | Facility: CLINIC | Age: 86
End: 2021-02-04
Payer: MEDICARE

## 2021-02-04 ENCOUNTER — OFFICE VISIT (OUTPATIENT)
Dept: CARDIOLOGY | Facility: CLINIC | Age: 86
End: 2021-02-04
Payer: MEDICARE

## 2021-02-04 ENCOUNTER — LAB VISIT (OUTPATIENT)
Dept: LAB | Facility: HOSPITAL | Age: 86
End: 2021-02-04
Attending: FAMILY MEDICINE
Payer: MEDICARE

## 2021-02-04 VITALS
DIASTOLIC BLOOD PRESSURE: 89 MMHG | HEIGHT: 68 IN | HEART RATE: 89 BPM | SYSTOLIC BLOOD PRESSURE: 146 MMHG | BODY MASS INDEX: 30.77 KG/M2 | WEIGHT: 203 LBS

## 2021-02-04 VITALS — WEIGHT: 226 LBS | BODY MASS INDEX: 34.36 KG/M2 | DIASTOLIC BLOOD PRESSURE: 90 MMHG | SYSTOLIC BLOOD PRESSURE: 148 MMHG

## 2021-02-04 DIAGNOSIS — I48.91 ATRIAL FIBRILLATION, UNSPECIFIED TYPE: ICD-10-CM

## 2021-02-04 DIAGNOSIS — I48.20 CHRONIC ATRIAL FIBRILLATION: ICD-10-CM

## 2021-02-04 DIAGNOSIS — I15.2 HYPERTENSION ASSOCIATED WITH DIABETES: ICD-10-CM

## 2021-02-04 DIAGNOSIS — Z86.73 H/O TIA (TRANSIENT ISCHEMIC ATTACK) AND STROKE: Primary | ICD-10-CM

## 2021-02-04 DIAGNOSIS — E78.2 COMBINED HYPERLIPIDEMIA ASSOCIATED WITH TYPE 2 DIABETES MELLITUS: ICD-10-CM

## 2021-02-04 DIAGNOSIS — E11.69 COMBINED HYPERLIPIDEMIA ASSOCIATED WITH TYPE 2 DIABETES MELLITUS: ICD-10-CM

## 2021-02-04 DIAGNOSIS — E11.59 HYPERTENSION ASSOCIATED WITH DIABETES: ICD-10-CM

## 2021-02-04 DIAGNOSIS — I48.19 PERSISTENT ATRIAL FIBRILLATION: ICD-10-CM

## 2021-02-04 PROCEDURE — 99214 PR OFFICE/OUTPT VISIT, EST, LEVL IV, 30-39 MIN: ICD-10-PCS | Mod: S$PBB,,, | Performed by: NURSE PRACTITIONER

## 2021-02-04 PROCEDURE — 99999 PR PBB SHADOW E&M-EST. PATIENT-LVL III: ICD-10-PCS | Mod: PBBFAC,,, | Performed by: NURSE PRACTITIONER

## 2021-02-04 PROCEDURE — 83036 HEMOGLOBIN GLYCOSYLATED A1C: CPT

## 2021-02-04 PROCEDURE — 36415 COLL VENOUS BLD VENIPUNCTURE: CPT | Mod: PO

## 2021-02-04 PROCEDURE — 99214 OFFICE O/P EST MOD 30 MIN: CPT | Mod: S$PBB,,, | Performed by: NURSE PRACTITIONER

## 2021-02-04 PROCEDURE — 99213 OFFICE O/P EST LOW 20 MIN: CPT | Mod: PBBFAC,27,PO | Performed by: NURSE PRACTITIONER

## 2021-02-04 PROCEDURE — 99214 OFFICE O/P EST MOD 30 MIN: CPT | Mod: S$PBB,,, | Performed by: INTERNAL MEDICINE

## 2021-02-04 PROCEDURE — 99213 OFFICE O/P EST LOW 20 MIN: CPT | Mod: PBBFAC,PO | Performed by: INTERNAL MEDICINE

## 2021-02-04 PROCEDURE — 99999 PR PBB SHADOW E&M-EST. PATIENT-LVL III: CPT | Mod: PBBFAC,,, | Performed by: INTERNAL MEDICINE

## 2021-02-04 PROCEDURE — 99999 PR PBB SHADOW E&M-EST. PATIENT-LVL III: CPT | Mod: PBBFAC,,, | Performed by: NURSE PRACTITIONER

## 2021-02-04 PROCEDURE — 99214 PR OFFICE/OUTPT VISIT, EST, LEVL IV, 30-39 MIN: ICD-10-PCS | Mod: S$PBB,,, | Performed by: INTERNAL MEDICINE

## 2021-02-04 PROCEDURE — 99999 PR PBB SHADOW E&M-EST. PATIENT-LVL III: ICD-10-PCS | Mod: PBBFAC,,, | Performed by: INTERNAL MEDICINE

## 2021-02-04 RX ORDER — GLIMEPIRIDE 4 MG/1
4 TABLET ORAL
Qty: 180 TABLET | Refills: 1 | Status: SHIPPED | OUTPATIENT
Start: 2021-02-04 | End: 2022-04-25 | Stop reason: SDUPTHER

## 2021-02-04 RX ORDER — LISINOPRIL 20 MG/1
20 TABLET ORAL 2 TIMES DAILY
Qty: 180 TABLET | Refills: 3 | Status: SHIPPED | OUTPATIENT
Start: 2021-02-04 | End: 2021-04-12 | Stop reason: SDUPTHER

## 2021-02-04 RX ORDER — NATEGLINIDE 120 MG/1
120 TABLET ORAL
Qty: 270 TABLET | Refills: 1 | Status: SHIPPED | OUTPATIENT
Start: 2021-02-04 | End: 2022-04-25 | Stop reason: SDUPTHER

## 2021-02-04 RX ORDER — ATORVASTATIN CALCIUM 40 MG/1
40 TABLET, FILM COATED ORAL DAILY
Qty: 90 TABLET | Refills: 3 | Status: SHIPPED | OUTPATIENT
Start: 2021-02-04 | End: 2022-02-01

## 2021-02-04 RX ORDER — EMPAGLIFLOZIN 25 MG/1
25 TABLET, FILM COATED ORAL DAILY
Qty: 90 TABLET | Refills: 1 | Status: SHIPPED | OUTPATIENT
Start: 2021-02-04 | End: 2021-09-28

## 2021-02-05 LAB
ESTIMATED AVG GLUCOSE: 148 MG/DL (ref 68–131)
HBA1C MFR BLD: 6.8 % (ref 4–5.6)

## 2021-03-27 ENCOUNTER — NURSE TRIAGE (OUTPATIENT)
Dept: ADMINISTRATIVE | Facility: CLINIC | Age: 86
End: 2021-03-27

## 2021-04-08 ENCOUNTER — PATIENT OUTREACH (OUTPATIENT)
Dept: ADMINISTRATIVE | Facility: HOSPITAL | Age: 86
End: 2021-04-08

## 2021-04-08 ENCOUNTER — TELEPHONE (OUTPATIENT)
Dept: FAMILY MEDICINE | Facility: CLINIC | Age: 86
End: 2021-04-08

## 2021-04-08 RX ORDER — CHLORTHALIDONE 25 MG/1
25 TABLET ORAL DAILY
Qty: 30 TABLET | Refills: 0 | Status: SHIPPED | OUTPATIENT
Start: 2021-04-08 | End: 2021-04-12 | Stop reason: SDUPTHER

## 2021-04-09 RX ORDER — METOPROLOL SUCCINATE 50 MG/1
TABLET, EXTENDED RELEASE ORAL
Qty: 90 TABLET | Refills: 0 | Status: SHIPPED | OUTPATIENT
Start: 2021-04-09 | End: 2021-04-12 | Stop reason: SDUPTHER

## 2021-04-12 ENCOUNTER — OFFICE VISIT (OUTPATIENT)
Dept: FAMILY MEDICINE | Facility: CLINIC | Age: 86
End: 2021-04-12
Payer: MEDICARE

## 2021-04-12 VITALS
SYSTOLIC BLOOD PRESSURE: 112 MMHG | DIASTOLIC BLOOD PRESSURE: 74 MMHG | BODY MASS INDEX: 33.34 KG/M2 | HEIGHT: 68 IN | WEIGHT: 220 LBS | HEART RATE: 48 BPM | RESPIRATION RATE: 18 BRPM | TEMPERATURE: 98 F

## 2021-04-12 DIAGNOSIS — R60.9 EDEMA, UNSPECIFIED TYPE: ICD-10-CM

## 2021-04-12 DIAGNOSIS — E11.59 HYPERTENSION ASSOCIATED WITH DIABETES: ICD-10-CM

## 2021-04-12 DIAGNOSIS — M25.561 CHRONIC PAIN OF BOTH KNEES: ICD-10-CM

## 2021-04-12 DIAGNOSIS — M25.562 CHRONIC PAIN OF BOTH KNEES: ICD-10-CM

## 2021-04-12 DIAGNOSIS — I15.2 HYPERTENSION ASSOCIATED WITH DIABETES: ICD-10-CM

## 2021-04-12 DIAGNOSIS — G89.29 CHRONIC PAIN OF BOTH KNEES: ICD-10-CM

## 2021-04-12 PROCEDURE — 99999 PR PBB SHADOW E&M-EST. PATIENT-LVL III: CPT | Mod: PBBFAC,,, | Performed by: FAMILY MEDICINE

## 2021-04-12 PROCEDURE — 99214 OFFICE O/P EST MOD 30 MIN: CPT | Mod: S$PBB,,, | Performed by: FAMILY MEDICINE

## 2021-04-12 PROCEDURE — 99213 OFFICE O/P EST LOW 20 MIN: CPT | Mod: PBBFAC,PO | Performed by: FAMILY MEDICINE

## 2021-04-12 PROCEDURE — 99214 PR OFFICE/OUTPT VISIT, EST, LEVL IV, 30-39 MIN: ICD-10-PCS | Mod: S$PBB,,, | Performed by: FAMILY MEDICINE

## 2021-04-12 PROCEDURE — 99999 PR PBB SHADOW E&M-EST. PATIENT-LVL III: ICD-10-PCS | Mod: PBBFAC,,, | Performed by: FAMILY MEDICINE

## 2021-04-12 RX ORDER — LISINOPRIL 20 MG/1
20 TABLET ORAL 2 TIMES DAILY
Qty: 180 TABLET | Refills: 3 | Status: SHIPPED | OUTPATIENT
Start: 2021-04-12 | End: 2022-02-14 | Stop reason: SDUPTHER

## 2021-04-12 RX ORDER — CHLORTHALIDONE 25 MG/1
25 TABLET ORAL DAILY
Qty: 90 TABLET | Refills: 3 | OUTPATIENT
Start: 2021-04-12 | End: 2021-04-15

## 2021-04-12 RX ORDER — METOPROLOL SUCCINATE 50 MG/1
50 TABLET, EXTENDED RELEASE ORAL DAILY
Qty: 90 TABLET | Refills: 3 | Status: SHIPPED | OUTPATIENT
Start: 2021-04-12 | End: 2022-02-14 | Stop reason: SDUPTHER

## 2021-04-12 RX ORDER — FUROSEMIDE 40 MG/1
40 TABLET ORAL DAILY
Qty: 90 TABLET | Refills: 3 | Status: SHIPPED | OUTPATIENT
Start: 2021-04-12 | End: 2022-02-14 | Stop reason: SDUPTHER

## 2021-04-15 ENCOUNTER — HOSPITAL ENCOUNTER (EMERGENCY)
Facility: HOSPITAL | Age: 86
Discharge: HOME | End: 2021-04-15
Attending: EMERGENCY MEDICINE
Payer: MEDICARE

## 2021-04-15 ENCOUNTER — NURSE TRIAGE (OUTPATIENT)
Dept: ADMINISTRATIVE | Facility: CLINIC | Age: 86
End: 2021-04-15

## 2021-04-15 VITALS
DIASTOLIC BLOOD PRESSURE: 67 MMHG | HEART RATE: 70 BPM | RESPIRATION RATE: 20 BRPM | BODY MASS INDEX: 33.28 KG/M2 | HEIGHT: 68 IN | TEMPERATURE: 98 F | SYSTOLIC BLOOD PRESSURE: 123 MMHG | WEIGHT: 219.56 LBS | OXYGEN SATURATION: 96 %

## 2021-04-15 DIAGNOSIS — I48.20 CHRONIC ATRIAL FIBRILLATION: ICD-10-CM

## 2021-04-15 DIAGNOSIS — T88.7XXA MEDICATION SIDE EFFECT: ICD-10-CM

## 2021-04-15 DIAGNOSIS — R42 DIZZINESS: ICD-10-CM

## 2021-04-15 DIAGNOSIS — I95.1 ORTHOSTATIC HYPOTENSION: Primary | ICD-10-CM

## 2021-04-15 DIAGNOSIS — Z79.01 CHRONIC ANTICOAGULATION: ICD-10-CM

## 2021-04-15 LAB
ALBUMIN SERPL BCP-MCNC: 4 G/DL (ref 3.5–5.2)
ALP SERPL-CCNC: 139 U/L (ref 55–135)
ALT SERPL W/O P-5'-P-CCNC: 48 U/L (ref 10–44)
ANION GAP SERPL CALC-SCNC: 12 MMOL/L (ref 8–16)
AST SERPL-CCNC: 29 U/L (ref 10–40)
BASOPHILS # BLD AUTO: 0.06 K/UL (ref 0–0.2)
BASOPHILS NFR BLD: 0.7 % (ref 0–1.9)
BILIRUB SERPL-MCNC: 0.8 MG/DL (ref 0.1–1)
BUN SERPL-MCNC: 44 MG/DL (ref 8–23)
CALCIUM SERPL-MCNC: 8.7 MG/DL (ref 8.7–10.5)
CHLORIDE SERPL-SCNC: 102 MMOL/L (ref 95–110)
CO2 SERPL-SCNC: 23 MMOL/L (ref 23–29)
CREAT SERPL-MCNC: 2.5 MG/DL (ref 0.5–1.4)
DIFFERENTIAL METHOD: ABNORMAL
EOSINOPHIL # BLD AUTO: 0.3 K/UL (ref 0–0.5)
EOSINOPHIL NFR BLD: 3.5 % (ref 0–8)
ERYTHROCYTE [DISTWIDTH] IN BLOOD BY AUTOMATED COUNT: 14.7 % (ref 11.5–14.5)
EST. GFR  (AFRICAN AMERICAN): 26 ML/MIN/1.73 M^2
EST. GFR  (NON AFRICAN AMERICAN): 23 ML/MIN/1.73 M^2
GLUCOSE SERPL-MCNC: 321 MG/DL (ref 70–110)
HCT VFR BLD AUTO: 51.9 % (ref 40–54)
HGB BLD-MCNC: 17.5 G/DL (ref 14–18)
IMM GRANULOCYTES # BLD AUTO: 0.04 K/UL (ref 0–0.04)
IMM GRANULOCYTES NFR BLD AUTO: 0.5 % (ref 0–0.5)
LYMPHOCYTES # BLD AUTO: 1.7 K/UL (ref 1–4.8)
LYMPHOCYTES NFR BLD: 20.8 % (ref 18–48)
MCH RBC QN AUTO: 27.3 PG (ref 27–31)
MCHC RBC AUTO-ENTMCNC: 33.7 G/DL (ref 32–36)
MCV RBC AUTO: 81 FL (ref 82–98)
MONOCYTES # BLD AUTO: 0.7 K/UL (ref 0.3–1)
MONOCYTES NFR BLD: 8.4 % (ref 4–15)
NEUTROPHILS # BLD AUTO: 5.5 K/UL (ref 1.8–7.7)
NEUTROPHILS NFR BLD: 66.1 % (ref 38–73)
NRBC BLD-RTO: 0 /100 WBC
PLATELET # BLD AUTO: 170 K/UL (ref 150–450)
PMV BLD AUTO: 11.7 FL (ref 9.2–12.9)
POCT GLUCOSE: 296 MG/DL (ref 70–110)
POTASSIUM SERPL-SCNC: 4.1 MMOL/L (ref 3.5–5.1)
PROT SERPL-MCNC: 7.2 G/DL (ref 6–8.4)
RBC # BLD AUTO: 6.41 M/UL (ref 4.6–6.2)
SODIUM SERPL-SCNC: 137 MMOL/L (ref 136–145)
WBC # BLD AUTO: 8.24 K/UL (ref 3.9–12.7)

## 2021-04-15 PROCEDURE — 96360 HYDRATION IV INFUSION INIT: CPT

## 2021-04-15 PROCEDURE — 93010 EKG 12-LEAD: ICD-10-PCS | Mod: ,,, | Performed by: INTERNAL MEDICINE

## 2021-04-15 PROCEDURE — 93005 ELECTROCARDIOGRAM TRACING: CPT

## 2021-04-15 PROCEDURE — 25000003 PHARM REV CODE 250: Performed by: EMERGENCY MEDICINE

## 2021-04-15 PROCEDURE — 82962 GLUCOSE BLOOD TEST: CPT

## 2021-04-15 PROCEDURE — 85025 COMPLETE CBC W/AUTO DIFF WBC: CPT | Performed by: PHYSICIAN ASSISTANT

## 2021-04-15 PROCEDURE — 93010 ELECTROCARDIOGRAM REPORT: CPT | Mod: ,,, | Performed by: INTERNAL MEDICINE

## 2021-04-15 PROCEDURE — 80053 COMPREHEN METABOLIC PANEL: CPT | Performed by: PHYSICIAN ASSISTANT

## 2021-04-15 PROCEDURE — 99284 EMERGENCY DEPT VISIT MOD MDM: CPT | Mod: 25

## 2021-04-15 RX ADMIN — SODIUM CHLORIDE 1000 ML: 0.9 INJECTION, SOLUTION INTRAVENOUS at 05:04

## 2021-04-16 ENCOUNTER — TELEPHONE (OUTPATIENT)
Dept: FAMILY MEDICINE | Facility: CLINIC | Age: 86
End: 2021-04-16

## 2021-04-28 ENCOUNTER — PATIENT MESSAGE (OUTPATIENT)
Dept: FAMILY MEDICINE | Facility: CLINIC | Age: 86
End: 2021-04-28

## 2021-05-03 ENCOUNTER — OFFICE VISIT (OUTPATIENT)
Dept: FAMILY MEDICINE | Facility: CLINIC | Age: 86
End: 2021-05-03
Payer: MEDICARE

## 2021-05-03 VITALS
BODY MASS INDEX: 33.49 KG/M2 | WEIGHT: 221 LBS | HEIGHT: 68 IN | DIASTOLIC BLOOD PRESSURE: 76 MMHG | HEART RATE: 80 BPM | SYSTOLIC BLOOD PRESSURE: 136 MMHG | TEMPERATURE: 98 F

## 2021-05-03 DIAGNOSIS — E11.59 HYPERTENSION ASSOCIATED WITH DIABETES: Primary | ICD-10-CM

## 2021-05-03 DIAGNOSIS — I15.2 HYPERTENSION ASSOCIATED WITH DIABETES: Primary | ICD-10-CM

## 2021-05-03 PROCEDURE — 99999 PR PBB SHADOW E&M-EST. PATIENT-LVL IV: ICD-10-PCS | Mod: PBBFAC,,, | Performed by: FAMILY MEDICINE

## 2021-05-03 PROCEDURE — 99213 PR OFFICE/OUTPT VISIT, EST, LEVL III, 20-29 MIN: ICD-10-PCS | Mod: S$PBB,,, | Performed by: FAMILY MEDICINE

## 2021-05-03 PROCEDURE — 99214 OFFICE O/P EST MOD 30 MIN: CPT | Mod: PBBFAC,PO | Performed by: FAMILY MEDICINE

## 2021-05-03 PROCEDURE — 99999 PR PBB SHADOW E&M-EST. PATIENT-LVL IV: CPT | Mod: PBBFAC,,, | Performed by: FAMILY MEDICINE

## 2021-05-03 PROCEDURE — 99213 OFFICE O/P EST LOW 20 MIN: CPT | Mod: S$PBB,,, | Performed by: FAMILY MEDICINE

## 2021-06-28 ENCOUNTER — PES CALL (OUTPATIENT)
Dept: ADMINISTRATIVE | Facility: CLINIC | Age: 86
End: 2021-06-28

## 2021-06-30 ENCOUNTER — TELEPHONE (OUTPATIENT)
Dept: FAMILY MEDICINE | Facility: CLINIC | Age: 86
End: 2021-06-30

## 2021-07-22 ENCOUNTER — OFFICE VISIT (OUTPATIENT)
Dept: DIABETES | Facility: CLINIC | Age: 86
End: 2021-07-22
Payer: MEDICARE

## 2021-07-22 VITALS
WEIGHT: 229.63 LBS | HEIGHT: 68 IN | BODY MASS INDEX: 34.8 KG/M2 | DIASTOLIC BLOOD PRESSURE: 90 MMHG | SYSTOLIC BLOOD PRESSURE: 148 MMHG

## 2021-07-22 LAB — GLUCOSE SERPL-MCNC: 162 MG/DL (ref 70–110)

## 2021-07-22 PROCEDURE — 99999 PR PBB SHADOW E&M-EST. PATIENT-LVL IV: ICD-10-PCS | Mod: PBBFAC,,, | Performed by: NURSE PRACTITIONER

## 2021-07-22 PROCEDURE — 82962 GLUCOSE BLOOD TEST: CPT | Mod: PBBFAC,PO | Performed by: NURSE PRACTITIONER

## 2021-07-22 PROCEDURE — 99214 OFFICE O/P EST MOD 30 MIN: CPT | Mod: S$PBB,,, | Performed by: NURSE PRACTITIONER

## 2021-07-22 PROCEDURE — 99999 PR PBB SHADOW E&M-EST. PATIENT-LVL IV: CPT | Mod: PBBFAC,,, | Performed by: NURSE PRACTITIONER

## 2021-07-22 PROCEDURE — 99214 PR OFFICE/OUTPT VISIT, EST, LEVL IV, 30-39 MIN: ICD-10-PCS | Mod: S$PBB,,, | Performed by: NURSE PRACTITIONER

## 2021-07-22 PROCEDURE — 99214 OFFICE O/P EST MOD 30 MIN: CPT | Mod: PBBFAC,PO | Performed by: NURSE PRACTITIONER

## 2021-07-28 ENCOUNTER — LAB VISIT (OUTPATIENT)
Dept: LAB | Facility: HOSPITAL | Age: 86
End: 2021-07-28
Attending: INTERNAL MEDICINE
Payer: MEDICARE

## 2021-07-28 DIAGNOSIS — N18.32 STAGE 3B CHRONIC KIDNEY DISEASE: ICD-10-CM

## 2021-07-28 LAB
ALBUMIN SERPL BCP-MCNC: 3.6 G/DL (ref 3.5–5.2)
ANION GAP SERPL CALC-SCNC: 10 MMOL/L (ref 8–16)
BASOPHILS # BLD AUTO: 0.05 K/UL (ref 0–0.2)
BASOPHILS NFR BLD: 0.8 % (ref 0–1.9)
BUN SERPL-MCNC: 26 MG/DL (ref 8–23)
CALCIUM SERPL-MCNC: 8.8 MG/DL (ref 8.7–10.5)
CHLORIDE SERPL-SCNC: 108 MMOL/L (ref 95–110)
CO2 SERPL-SCNC: 22 MMOL/L (ref 23–29)
CREAT SERPL-MCNC: 1.6 MG/DL (ref 0.5–1.4)
DIFFERENTIAL METHOD: ABNORMAL
EOSINOPHIL # BLD AUTO: 0.2 K/UL (ref 0–0.5)
EOSINOPHIL NFR BLD: 2.9 % (ref 0–8)
ERYTHROCYTE [DISTWIDTH] IN BLOOD BY AUTOMATED COUNT: 14.5 % (ref 11.5–14.5)
EST. GFR  (AFRICAN AMERICAN): 45.1 ML/MIN/1.73 M^2
EST. GFR  (NON AFRICAN AMERICAN): 39 ML/MIN/1.73 M^2
GLUCOSE SERPL-MCNC: 215 MG/DL (ref 70–110)
HCT VFR BLD AUTO: 45.7 % (ref 40–54)
HGB BLD-MCNC: 15.4 G/DL (ref 14–18)
IMM GRANULOCYTES # BLD AUTO: 0.08 K/UL (ref 0–0.04)
IMM GRANULOCYTES NFR BLD AUTO: 1.2 % (ref 0–0.5)
LYMPHOCYTES # BLD AUTO: 1 K/UL (ref 1–4.8)
LYMPHOCYTES NFR BLD: 15 % (ref 18–48)
MCH RBC QN AUTO: 28.1 PG (ref 27–31)
MCHC RBC AUTO-ENTMCNC: 33.7 G/DL (ref 32–36)
MCV RBC AUTO: 83 FL (ref 82–98)
MONOCYTES # BLD AUTO: 0.5 K/UL (ref 0.3–1)
MONOCYTES NFR BLD: 8 % (ref 4–15)
NEUTROPHILS # BLD AUTO: 4.7 K/UL (ref 1.8–7.7)
NEUTROPHILS NFR BLD: 72.1 % (ref 38–73)
NRBC BLD-RTO: 0 /100 WBC
PHOSPHATE SERPL-MCNC: 3 MG/DL (ref 2.7–4.5)
PLATELET # BLD AUTO: 119 K/UL (ref 150–450)
PMV BLD AUTO: 12.4 FL (ref 9.2–12.9)
POTASSIUM SERPL-SCNC: 3.9 MMOL/L (ref 3.5–5.1)
PTH-INTACT SERPL-MCNC: 170 PG/ML (ref 9–77)
RBC # BLD AUTO: 5.49 M/UL (ref 4.6–6.2)
SODIUM SERPL-SCNC: 140 MMOL/L (ref 136–145)
WBC # BLD AUTO: 6.52 K/UL (ref 3.9–12.7)

## 2021-07-28 PROCEDURE — 80069 RENAL FUNCTION PANEL: CPT | Performed by: INTERNAL MEDICINE

## 2021-07-28 PROCEDURE — 85025 COMPLETE CBC W/AUTO DIFF WBC: CPT | Performed by: INTERNAL MEDICINE

## 2021-07-28 PROCEDURE — 36415 COLL VENOUS BLD VENIPUNCTURE: CPT | Mod: PO | Performed by: INTERNAL MEDICINE

## 2021-07-28 PROCEDURE — 83970 ASSAY OF PARATHORMONE: CPT | Performed by: INTERNAL MEDICINE

## 2021-08-09 ENCOUNTER — OFFICE VISIT (OUTPATIENT)
Dept: CARDIOLOGY | Facility: CLINIC | Age: 86
End: 2021-08-09
Payer: MEDICARE

## 2021-08-09 ENCOUNTER — LAB VISIT (OUTPATIENT)
Dept: LAB | Facility: HOSPITAL | Age: 86
End: 2021-08-09
Attending: FAMILY MEDICINE
Payer: MEDICARE

## 2021-08-09 VITALS
HEIGHT: 68 IN | HEART RATE: 64 BPM | BODY MASS INDEX: 34.25 KG/M2 | SYSTOLIC BLOOD PRESSURE: 140 MMHG | DIASTOLIC BLOOD PRESSURE: 78 MMHG | WEIGHT: 226 LBS

## 2021-08-09 DIAGNOSIS — E78.2 COMBINED HYPERLIPIDEMIA ASSOCIATED WITH TYPE 2 DIABETES MELLITUS: ICD-10-CM

## 2021-08-09 DIAGNOSIS — E11.69 COMBINED HYPERLIPIDEMIA ASSOCIATED WITH TYPE 2 DIABETES MELLITUS: ICD-10-CM

## 2021-08-09 DIAGNOSIS — I48.20 CHRONIC ATRIAL FIBRILLATION: ICD-10-CM

## 2021-08-09 DIAGNOSIS — I48.19 PERSISTENT ATRIAL FIBRILLATION: ICD-10-CM

## 2021-08-09 DIAGNOSIS — I15.2 HYPERTENSION ASSOCIATED WITH DIABETES: Primary | ICD-10-CM

## 2021-08-09 DIAGNOSIS — E11.59 HYPERTENSION ASSOCIATED WITH DIABETES: Primary | ICD-10-CM

## 2021-08-09 PROCEDURE — 36415 COLL VENOUS BLD VENIPUNCTURE: CPT | Mod: PO | Performed by: FAMILY MEDICINE

## 2021-08-09 PROCEDURE — 99214 PR OFFICE/OUTPT VISIT, EST, LEVL IV, 30-39 MIN: ICD-10-PCS | Mod: S$PBB,,, | Performed by: INTERNAL MEDICINE

## 2021-08-09 PROCEDURE — 99213 OFFICE O/P EST LOW 20 MIN: CPT | Mod: PBBFAC,PO | Performed by: INTERNAL MEDICINE

## 2021-08-09 PROCEDURE — 99214 OFFICE O/P EST MOD 30 MIN: CPT | Mod: S$PBB,,, | Performed by: INTERNAL MEDICINE

## 2021-08-09 PROCEDURE — 83036 HEMOGLOBIN GLYCOSYLATED A1C: CPT | Performed by: FAMILY MEDICINE

## 2021-08-09 PROCEDURE — 99999 PR PBB SHADOW E&M-EST. PATIENT-LVL III: ICD-10-PCS | Mod: PBBFAC,,, | Performed by: INTERNAL MEDICINE

## 2021-08-09 PROCEDURE — 99999 PR PBB SHADOW E&M-EST. PATIENT-LVL III: CPT | Mod: PBBFAC,,, | Performed by: INTERNAL MEDICINE

## 2021-08-10 LAB
ESTIMATED AVG GLUCOSE: 194 MG/DL (ref 68–131)
HBA1C MFR BLD: 8.4 % (ref 4–5.6)

## 2021-08-17 ENCOUNTER — OFFICE VISIT (OUTPATIENT)
Dept: FAMILY MEDICINE | Facility: CLINIC | Age: 86
End: 2021-08-17
Payer: MEDICARE

## 2021-08-17 VITALS
OXYGEN SATURATION: 96 % | HEIGHT: 68 IN | DIASTOLIC BLOOD PRESSURE: 88 MMHG | TEMPERATURE: 98 F | BODY MASS INDEX: 33.89 KG/M2 | SYSTOLIC BLOOD PRESSURE: 136 MMHG | HEART RATE: 82 BPM | WEIGHT: 223.63 LBS

## 2021-08-17 DIAGNOSIS — E78.2 COMBINED HYPERLIPIDEMIA ASSOCIATED WITH TYPE 2 DIABETES MELLITUS: ICD-10-CM

## 2021-08-17 DIAGNOSIS — E11.69 COMBINED HYPERLIPIDEMIA ASSOCIATED WITH TYPE 2 DIABETES MELLITUS: ICD-10-CM

## 2021-08-17 DIAGNOSIS — Z85.46 HISTORY OF PROSTATE CANCER: ICD-10-CM

## 2021-08-17 DIAGNOSIS — Z86.73 H/O TIA (TRANSIENT ISCHEMIC ATTACK) AND STROKE: ICD-10-CM

## 2021-08-17 DIAGNOSIS — E11.59 HYPERTENSION ASSOCIATED WITH DIABETES: ICD-10-CM

## 2021-08-17 DIAGNOSIS — I48.91 ATRIAL FIBRILLATION, UNSPECIFIED TYPE: ICD-10-CM

## 2021-08-17 DIAGNOSIS — N25.81 SECONDARY HYPERPARATHYROIDISM, RENAL: ICD-10-CM

## 2021-08-17 DIAGNOSIS — I15.2 HYPERTENSION ASSOCIATED WITH DIABETES: ICD-10-CM

## 2021-08-17 DIAGNOSIS — E11.42 DIABETIC POLYNEUROPATHY ASSOCIATED WITH TYPE 2 DIABETES MELLITUS: ICD-10-CM

## 2021-08-17 DIAGNOSIS — Z00.00 ENCOUNTER FOR PREVENTIVE CARE: Primary | ICD-10-CM

## 2021-08-17 PROCEDURE — G0439 PPPS, SUBSEQ VISIT: HCPCS | Mod: ,,, | Performed by: NURSE PRACTITIONER

## 2021-08-17 PROCEDURE — G0439 PR MEDICARE ANNUAL WELLNESS SUBSEQUENT VISIT: ICD-10-PCS | Mod: ,,, | Performed by: NURSE PRACTITIONER

## 2021-08-17 PROCEDURE — 99215 OFFICE O/P EST HI 40 MIN: CPT | Mod: PBBFAC,PO | Performed by: NURSE PRACTITIONER

## 2021-08-17 PROCEDURE — 99999 PR PBB SHADOW E&M-EST. PATIENT-LVL V: ICD-10-PCS | Mod: PBBFAC,,, | Performed by: NURSE PRACTITIONER

## 2021-08-17 PROCEDURE — 99999 PR PBB SHADOW E&M-EST. PATIENT-LVL V: CPT | Mod: PBBFAC,,, | Performed by: NURSE PRACTITIONER

## 2021-08-23 ENCOUNTER — TELEPHONE (OUTPATIENT)
Dept: FAMILY MEDICINE | Facility: CLINIC | Age: 86
End: 2021-08-23

## 2021-08-26 ENCOUNTER — CLINICAL SUPPORT (OUTPATIENT)
Dept: DIABETES | Facility: CLINIC | Age: 86
End: 2021-08-26
Payer: MEDICARE

## 2021-09-30 ENCOUNTER — TELEPHONE (OUTPATIENT)
Dept: NEPHROLOGY | Facility: CLINIC | Age: 86
End: 2021-09-30

## 2021-09-30 DIAGNOSIS — N18.32 STAGE 3B CHRONIC KIDNEY DISEASE: Primary | ICD-10-CM

## 2021-10-15 ENCOUNTER — PATIENT OUTREACH (OUTPATIENT)
Dept: ADMINISTRATIVE | Facility: OTHER | Age: 86
End: 2021-10-15

## 2021-11-04 ENCOUNTER — OFFICE VISIT (OUTPATIENT)
Dept: DIABETES | Facility: CLINIC | Age: 86
End: 2021-11-04
Payer: MEDICARE

## 2021-11-04 ENCOUNTER — LAB VISIT (OUTPATIENT)
Dept: LAB | Facility: HOSPITAL | Age: 86
End: 2021-11-04
Attending: FAMILY MEDICINE
Payer: MEDICARE

## 2021-11-04 ENCOUNTER — TELEPHONE (OUTPATIENT)
Dept: OPTOMETRY | Facility: CLINIC | Age: 86
End: 2021-11-04
Payer: MEDICARE

## 2021-11-04 VITALS
SYSTOLIC BLOOD PRESSURE: 120 MMHG | HEART RATE: 79 BPM | WEIGHT: 220.88 LBS | DIASTOLIC BLOOD PRESSURE: 86 MMHG | BODY MASS INDEX: 33.59 KG/M2

## 2021-11-04 DIAGNOSIS — N18.32 STAGE 3B CHRONIC KIDNEY DISEASE: ICD-10-CM

## 2021-11-04 DIAGNOSIS — K21.9 GASTROESOPHAGEAL REFLUX DISEASE: ICD-10-CM

## 2021-11-04 LAB
ALBUMIN SERPL BCP-MCNC: 3.9 G/DL (ref 3.5–5.2)
ANION GAP SERPL CALC-SCNC: 14 MMOL/L (ref 8–16)
BASOPHILS # BLD AUTO: 0.03 K/UL (ref 0–0.2)
BASOPHILS NFR BLD: 0.4 % (ref 0–1.9)
BUN SERPL-MCNC: 27 MG/DL (ref 8–23)
CALCIUM SERPL-MCNC: 9.7 MG/DL (ref 8.7–10.5)
CHLORIDE SERPL-SCNC: 104 MMOL/L (ref 95–110)
CO2 SERPL-SCNC: 24 MMOL/L (ref 23–29)
CREAT SERPL-MCNC: 1.9 MG/DL (ref 0.5–1.4)
DIFFERENTIAL METHOD: ABNORMAL
EOSINOPHIL # BLD AUTO: 0.3 K/UL (ref 0–0.5)
EOSINOPHIL NFR BLD: 2.9 % (ref 0–8)
ERYTHROCYTE [DISTWIDTH] IN BLOOD BY AUTOMATED COUNT: 14.8 % (ref 11.5–14.5)
EST. GFR  (AFRICAN AMERICAN): 36.6 ML/MIN/1.73 M^2
EST. GFR  (NON AFRICAN AMERICAN): 31.7 ML/MIN/1.73 M^2
ESTIMATED AVG GLUCOSE: 146 MG/DL (ref 68–131)
GLUCOSE SERPL-MCNC: 128 MG/DL (ref 70–110)
GLUCOSE SERPL-MCNC: 163 MG/DL (ref 70–110)
HBA1C MFR BLD: 6.7 % (ref 4–5.6)
HCT VFR BLD AUTO: 50.2 % (ref 40–54)
HGB BLD-MCNC: 16.3 G/DL (ref 14–18)
IMM GRANULOCYTES # BLD AUTO: 0.04 K/UL (ref 0–0.04)
IMM GRANULOCYTES NFR BLD AUTO: 0.5 % (ref 0–0.5)
LYMPHOCYTES # BLD AUTO: 1.4 K/UL (ref 1–4.8)
LYMPHOCYTES NFR BLD: 16.1 % (ref 18–48)
MCH RBC QN AUTO: 28.1 PG (ref 27–31)
MCHC RBC AUTO-ENTMCNC: 32.5 G/DL (ref 32–36)
MCV RBC AUTO: 86 FL (ref 82–98)
MONOCYTES # BLD AUTO: 0.6 K/UL (ref 0.3–1)
MONOCYTES NFR BLD: 7.4 % (ref 4–15)
NEUTROPHILS # BLD AUTO: 6.2 K/UL (ref 1.8–7.7)
NEUTROPHILS NFR BLD: 72.7 % (ref 38–73)
NRBC BLD-RTO: 0 /100 WBC
PHOSPHATE SERPL-MCNC: 3.2 MG/DL (ref 2.7–4.5)
PLATELET # BLD AUTO: 158 K/UL (ref 150–450)
PMV BLD AUTO: 12.7 FL (ref 9.2–12.9)
POTASSIUM SERPL-SCNC: 3.7 MMOL/L (ref 3.5–5.1)
PTH-INTACT SERPL-MCNC: 87.2 PG/ML (ref 9–77)
RBC # BLD AUTO: 5.81 M/UL (ref 4.6–6.2)
SODIUM SERPL-SCNC: 142 MMOL/L (ref 136–145)
WBC # BLD AUTO: 8.51 K/UL (ref 3.9–12.7)

## 2021-11-04 PROCEDURE — 83036 HEMOGLOBIN GLYCOSYLATED A1C: CPT | Performed by: FAMILY MEDICINE

## 2021-11-04 PROCEDURE — 99999 PR PBB SHADOW E&M-EST. PATIENT-LVL IV: ICD-10-PCS | Mod: PBBFAC,,, | Performed by: NURSE PRACTITIONER

## 2021-11-04 PROCEDURE — 99214 PR OFFICE/OUTPT VISIT, EST, LEVL IV, 30-39 MIN: ICD-10-PCS | Mod: S$PBB,,, | Performed by: NURSE PRACTITIONER

## 2021-11-04 PROCEDURE — 85025 COMPLETE CBC W/AUTO DIFF WBC: CPT | Performed by: INTERNAL MEDICINE

## 2021-11-04 PROCEDURE — 83970 ASSAY OF PARATHORMONE: CPT | Performed by: INTERNAL MEDICINE

## 2021-11-04 PROCEDURE — 99214 OFFICE O/P EST MOD 30 MIN: CPT | Mod: PBBFAC,PO | Performed by: NURSE PRACTITIONER

## 2021-11-04 PROCEDURE — 99214 OFFICE O/P EST MOD 30 MIN: CPT | Mod: S$PBB,,, | Performed by: NURSE PRACTITIONER

## 2021-11-04 PROCEDURE — 80069 RENAL FUNCTION PANEL: CPT | Performed by: INTERNAL MEDICINE

## 2021-11-04 PROCEDURE — 82962 GLUCOSE BLOOD TEST: CPT | Mod: PBBFAC,PO | Performed by: NURSE PRACTITIONER

## 2021-11-04 PROCEDURE — 99999 PR PBB SHADOW E&M-EST. PATIENT-LVL IV: CPT | Mod: PBBFAC,,, | Performed by: NURSE PRACTITIONER

## 2021-11-04 PROCEDURE — 36415 COLL VENOUS BLD VENIPUNCTURE: CPT | Mod: PO | Performed by: INTERNAL MEDICINE

## 2021-11-04 RX ORDER — OMEPRAZOLE 20 MG/1
20 CAPSULE, DELAYED RELEASE ORAL DAILY
Qty: 90 CAPSULE | Refills: 4 | Status: SHIPPED | OUTPATIENT
Start: 2021-11-04 | End: 2021-12-21 | Stop reason: SDUPTHER

## 2021-12-07 ENCOUNTER — OFFICE VISIT (OUTPATIENT)
Dept: FAMILY MEDICINE | Facility: CLINIC | Age: 86
End: 2021-12-07
Payer: MEDICARE

## 2021-12-07 VITALS
TEMPERATURE: 98 F | DIASTOLIC BLOOD PRESSURE: 70 MMHG | HEART RATE: 59 BPM | SYSTOLIC BLOOD PRESSURE: 132 MMHG | OXYGEN SATURATION: 95 % | BODY MASS INDEX: 32.71 KG/M2 | HEIGHT: 68 IN | WEIGHT: 215.81 LBS

## 2021-12-07 DIAGNOSIS — J06.9 UPPER RESPIRATORY TRACT INFECTION, UNSPECIFIED TYPE: Primary | ICD-10-CM

## 2021-12-07 LAB
CTP QC/QA: YES
CTP QC/QA: YES
FLUAV AG NPH QL: NEGATIVE
FLUBV AG NPH QL: NEGATIVE
SARS-COV-2 RDRP RESP QL NAA+PROBE: NEGATIVE

## 2021-12-07 PROCEDURE — 99214 OFFICE O/P EST MOD 30 MIN: CPT | Mod: S$PBB,,, | Performed by: NURSE PRACTITIONER

## 2021-12-07 PROCEDURE — 99999 PR PBB SHADOW E&M-EST. PATIENT-LVL IV: CPT | Mod: PBBFAC,,, | Performed by: NURSE PRACTITIONER

## 2021-12-07 PROCEDURE — 96372 THER/PROPH/DIAG INJ SC/IM: CPT | Mod: PBBFAC,PO

## 2021-12-07 PROCEDURE — 99999 PR PBB SHADOW E&M-EST. PATIENT-LVL IV: ICD-10-PCS | Mod: PBBFAC,,, | Performed by: NURSE PRACTITIONER

## 2021-12-07 PROCEDURE — 99214 OFFICE O/P EST MOD 30 MIN: CPT | Mod: PBBFAC,PO | Performed by: NURSE PRACTITIONER

## 2021-12-07 PROCEDURE — 99214 PR OFFICE/OUTPT VISIT, EST, LEVL IV, 30-39 MIN: ICD-10-PCS | Mod: S$PBB,,, | Performed by: NURSE PRACTITIONER

## 2021-12-07 PROCEDURE — 87804 INFLUENZA ASSAY W/OPTIC: CPT | Mod: PBBFAC,PO | Performed by: NURSE PRACTITIONER

## 2021-12-07 PROCEDURE — U0002 COVID-19 LAB TEST NON-CDC: HCPCS | Mod: PBBFAC,PO | Performed by: NURSE PRACTITIONER

## 2021-12-07 RX ORDER — FLUTICASONE PROPIONATE 50 MCG
2 SPRAY, SUSPENSION (ML) NASAL DAILY
Qty: 16 G | Refills: 0 | Status: SHIPPED | OUTPATIENT
Start: 2021-12-07 | End: 2022-04-25

## 2021-12-07 RX ORDER — METHYLPREDNISOLONE ACETATE 40 MG/ML
40 INJECTION, SUSPENSION INTRA-ARTICULAR; INTRALESIONAL; INTRAMUSCULAR; SOFT TISSUE
Status: COMPLETED | OUTPATIENT
Start: 2021-12-07 | End: 2021-12-07

## 2021-12-07 RX ORDER — LORATADINE 10 MG
10 TABLET,DISINTEGRATING ORAL DAILY
Qty: 30 TABLET | Refills: 1 | Status: SHIPPED | OUTPATIENT
Start: 2021-12-07 | End: 2022-04-25

## 2021-12-07 RX ADMIN — METHYLPREDNISOLONE ACETATE 40 MG: 40 INJECTION, SUSPENSION INTRA-ARTICULAR; INTRALESIONAL; INTRAMUSCULAR; INTRASYNOVIAL; SOFT TISSUE at 02:12

## 2021-12-10 DIAGNOSIS — N18.32 STAGE 3B CHRONIC KIDNEY DISEASE: Primary | ICD-10-CM

## 2021-12-17 ENCOUNTER — LAB VISIT (OUTPATIENT)
Dept: LAB | Facility: HOSPITAL | Age: 86
End: 2021-12-17
Attending: INTERNAL MEDICINE
Payer: MEDICARE

## 2021-12-17 DIAGNOSIS — N18.32 STAGE 3B CHRONIC KIDNEY DISEASE: ICD-10-CM

## 2021-12-17 LAB
ALBUMIN SERPL BCP-MCNC: 3.9 G/DL (ref 3.5–5.2)
ANION GAP SERPL CALC-SCNC: 11 MMOL/L (ref 8–16)
BASOPHILS # BLD AUTO: 0.05 K/UL (ref 0–0.2)
BASOPHILS NFR BLD: 0.7 % (ref 0–1.9)
BUN SERPL-MCNC: 24 MG/DL (ref 8–23)
CALCIUM SERPL-MCNC: 8.8 MG/DL (ref 8.7–10.5)
CHLORIDE SERPL-SCNC: 106 MMOL/L (ref 95–110)
CO2 SERPL-SCNC: 22 MMOL/L (ref 23–29)
CREAT SERPL-MCNC: 1.6 MG/DL (ref 0.5–1.4)
DIFFERENTIAL METHOD: ABNORMAL
EOSINOPHIL # BLD AUTO: 0.2 K/UL (ref 0–0.5)
EOSINOPHIL NFR BLD: 2.4 % (ref 0–8)
ERYTHROCYTE [DISTWIDTH] IN BLOOD BY AUTOMATED COUNT: 14.9 % (ref 11.5–14.5)
EST. GFR  (AFRICAN AMERICAN): 45.1 ML/MIN/1.73 M^2
EST. GFR  (NON AFRICAN AMERICAN): 39 ML/MIN/1.73 M^2
GLUCOSE SERPL-MCNC: 215 MG/DL (ref 70–110)
HCT VFR BLD AUTO: 49.4 % (ref 40–54)
HGB BLD-MCNC: 16.2 G/DL (ref 14–18)
IMM GRANULOCYTES # BLD AUTO: 0.07 K/UL (ref 0–0.04)
IMM GRANULOCYTES NFR BLD AUTO: 1 % (ref 0–0.5)
LYMPHOCYTES # BLD AUTO: 1.1 K/UL (ref 1–4.8)
LYMPHOCYTES NFR BLD: 15.6 % (ref 18–48)
MCH RBC QN AUTO: 27.8 PG (ref 27–31)
MCHC RBC AUTO-ENTMCNC: 32.8 G/DL (ref 32–36)
MCV RBC AUTO: 85 FL (ref 82–98)
MONOCYTES # BLD AUTO: 0.5 K/UL (ref 0.3–1)
MONOCYTES NFR BLD: 7.6 % (ref 4–15)
NEUTROPHILS # BLD AUTO: 5.1 K/UL (ref 1.8–7.7)
NEUTROPHILS NFR BLD: 72.7 % (ref 38–73)
NRBC BLD-RTO: 0 /100 WBC
PHOSPHATE SERPL-MCNC: 2.9 MG/DL (ref 2.7–4.5)
PLATELET # BLD AUTO: 150 K/UL (ref 150–450)
PMV BLD AUTO: 11.9 FL (ref 9.2–12.9)
POTASSIUM SERPL-SCNC: 4 MMOL/L (ref 3.5–5.1)
PTH-INTACT SERPL-MCNC: 190.3 PG/ML (ref 9–77)
RBC # BLD AUTO: 5.82 M/UL (ref 4.6–6.2)
SODIUM SERPL-SCNC: 139 MMOL/L (ref 136–145)
WBC # BLD AUTO: 6.98 K/UL (ref 3.9–12.7)

## 2021-12-17 PROCEDURE — 83970 ASSAY OF PARATHORMONE: CPT | Performed by: INTERNAL MEDICINE

## 2021-12-17 PROCEDURE — 85025 COMPLETE CBC W/AUTO DIFF WBC: CPT | Performed by: INTERNAL MEDICINE

## 2021-12-17 PROCEDURE — 80069 RENAL FUNCTION PANEL: CPT | Performed by: INTERNAL MEDICINE

## 2021-12-17 PROCEDURE — 36415 COLL VENOUS BLD VENIPUNCTURE: CPT | Mod: PO | Performed by: INTERNAL MEDICINE

## 2021-12-21 ENCOUNTER — OFFICE VISIT (OUTPATIENT)
Dept: NEPHROLOGY | Facility: CLINIC | Age: 86
End: 2021-12-21
Payer: MEDICARE

## 2021-12-21 VITALS
SYSTOLIC BLOOD PRESSURE: 142 MMHG | DIASTOLIC BLOOD PRESSURE: 60 MMHG | BODY MASS INDEX: 33.28 KG/M2 | HEART RATE: 74 BPM | OXYGEN SATURATION: 98 % | WEIGHT: 219.56 LBS | HEIGHT: 68 IN

## 2021-12-21 DIAGNOSIS — I48.19 PERSISTENT ATRIAL FIBRILLATION: ICD-10-CM

## 2021-12-21 DIAGNOSIS — K21.9 GASTROESOPHAGEAL REFLUX DISEASE: ICD-10-CM

## 2021-12-21 DIAGNOSIS — E11.42 DIABETIC POLYNEUROPATHY ASSOCIATED WITH TYPE 2 DIABETES MELLITUS: ICD-10-CM

## 2021-12-21 DIAGNOSIS — K21.9 GASTROESOPHAGEAL REFLUX DISEASE, UNSPECIFIED WHETHER ESOPHAGITIS PRESENT: ICD-10-CM

## 2021-12-21 DIAGNOSIS — Z86.73 H/O TIA (TRANSIENT ISCHEMIC ATTACK) AND STROKE: ICD-10-CM

## 2021-12-21 DIAGNOSIS — E11.69 COMBINED HYPERLIPIDEMIA ASSOCIATED WITH TYPE 2 DIABETES MELLITUS: ICD-10-CM

## 2021-12-21 DIAGNOSIS — Z85.46 HISTORY OF PROSTATE CANCER: ICD-10-CM

## 2021-12-21 DIAGNOSIS — N18.32 STAGE 3B CHRONIC KIDNEY DISEASE: Primary | ICD-10-CM

## 2021-12-21 DIAGNOSIS — I15.2 HYPERTENSION ASSOCIATED WITH DIABETES: ICD-10-CM

## 2021-12-21 DIAGNOSIS — Z86.010 HISTORY OF COLON POLYPS: ICD-10-CM

## 2021-12-21 DIAGNOSIS — E78.2 COMBINED HYPERLIPIDEMIA ASSOCIATED WITH TYPE 2 DIABETES MELLITUS: ICD-10-CM

## 2021-12-21 DIAGNOSIS — E11.59 HYPERTENSION ASSOCIATED WITH DIABETES: ICD-10-CM

## 2021-12-21 DIAGNOSIS — N25.81 SECONDARY HYPERPARATHYROIDISM, RENAL: ICD-10-CM

## 2021-12-21 PROCEDURE — 99214 OFFICE O/P EST MOD 30 MIN: CPT | Mod: S$PBB,,, | Performed by: INTERNAL MEDICINE

## 2021-12-21 PROCEDURE — 99214 PR OFFICE/OUTPT VISIT, EST, LEVL IV, 30-39 MIN: ICD-10-PCS | Mod: S$PBB,,, | Performed by: INTERNAL MEDICINE

## 2021-12-21 PROCEDURE — 99213 OFFICE O/P EST LOW 20 MIN: CPT | Mod: PBBFAC,PO | Performed by: INTERNAL MEDICINE

## 2021-12-21 PROCEDURE — 99999 PR PBB SHADOW E&M-EST. PATIENT-LVL III: ICD-10-PCS | Mod: PBBFAC,,, | Performed by: INTERNAL MEDICINE

## 2021-12-21 PROCEDURE — 99999 PR PBB SHADOW E&M-EST. PATIENT-LVL III: CPT | Mod: PBBFAC,,, | Performed by: INTERNAL MEDICINE

## 2021-12-21 RX ORDER — OMEPRAZOLE 20 MG/1
20 CAPSULE, DELAYED RELEASE ORAL DAILY
Qty: 90 CAPSULE | Refills: 3 | Status: SHIPPED | OUTPATIENT
Start: 2021-12-21 | End: 2022-09-26 | Stop reason: SDUPTHER

## 2021-12-23 ENCOUNTER — PATIENT MESSAGE (OUTPATIENT)
Dept: NEPHROLOGY | Facility: CLINIC | Age: 86
End: 2021-12-23
Payer: MEDICARE

## 2022-02-14 ENCOUNTER — OFFICE VISIT (OUTPATIENT)
Dept: CARDIOLOGY | Facility: CLINIC | Age: 87
End: 2022-02-14
Payer: MEDICARE

## 2022-02-14 VITALS
WEIGHT: 219.13 LBS | DIASTOLIC BLOOD PRESSURE: 87 MMHG | HEART RATE: 88 BPM | SYSTOLIC BLOOD PRESSURE: 141 MMHG | BODY MASS INDEX: 33.31 KG/M2

## 2022-02-14 DIAGNOSIS — N18.32 STAGE 3B CHRONIC KIDNEY DISEASE: ICD-10-CM

## 2022-02-14 DIAGNOSIS — I48.20 CHRONIC ATRIAL FIBRILLATION: ICD-10-CM

## 2022-02-14 DIAGNOSIS — E11.59 HYPERTENSION ASSOCIATED WITH DIABETES: Primary | ICD-10-CM

## 2022-02-14 DIAGNOSIS — E78.2 COMBINED HYPERLIPIDEMIA ASSOCIATED WITH TYPE 2 DIABETES MELLITUS: ICD-10-CM

## 2022-02-14 DIAGNOSIS — I15.2 HYPERTENSION ASSOCIATED WITH DIABETES: Primary | ICD-10-CM

## 2022-02-14 DIAGNOSIS — I48.19 PERSISTENT ATRIAL FIBRILLATION: ICD-10-CM

## 2022-02-14 DIAGNOSIS — E11.69 COMBINED HYPERLIPIDEMIA ASSOCIATED WITH TYPE 2 DIABETES MELLITUS: ICD-10-CM

## 2022-02-14 DIAGNOSIS — R60.9 EDEMA, UNSPECIFIED TYPE: ICD-10-CM

## 2022-02-14 DIAGNOSIS — I48.91 ATRIAL FIBRILLATION, UNSPECIFIED TYPE: ICD-10-CM

## 2022-02-14 PROCEDURE — 99999 PR PBB SHADOW E&M-EST. PATIENT-LVL III: CPT | Mod: PBBFAC,,, | Performed by: INTERNAL MEDICINE

## 2022-02-14 PROCEDURE — 99213 OFFICE O/P EST LOW 20 MIN: CPT | Mod: PBBFAC,PO | Performed by: INTERNAL MEDICINE

## 2022-02-14 PROCEDURE — 99214 PR OFFICE/OUTPT VISIT, EST, LEVL IV, 30-39 MIN: ICD-10-PCS | Mod: S$PBB,,, | Performed by: INTERNAL MEDICINE

## 2022-02-14 PROCEDURE — 99214 OFFICE O/P EST MOD 30 MIN: CPT | Mod: S$PBB,,, | Performed by: INTERNAL MEDICINE

## 2022-02-14 PROCEDURE — 99999 PR PBB SHADOW E&M-EST. PATIENT-LVL III: ICD-10-PCS | Mod: PBBFAC,,, | Performed by: INTERNAL MEDICINE

## 2022-02-14 RX ORDER — METOPROLOL SUCCINATE 50 MG/1
50 TABLET, EXTENDED RELEASE ORAL DAILY
Qty: 90 TABLET | Refills: 3 | Status: SHIPPED | OUTPATIENT
Start: 2022-02-14 | End: 2022-04-25 | Stop reason: SDUPTHER

## 2022-02-14 RX ORDER — ATORVASTATIN CALCIUM 40 MG/1
40 TABLET, FILM COATED ORAL DAILY
Qty: 90 TABLET | Refills: 3 | Status: SHIPPED | OUTPATIENT
Start: 2022-02-14 | End: 2022-04-25 | Stop reason: SDUPTHER

## 2022-02-14 RX ORDER — FUROSEMIDE 40 MG/1
40 TABLET ORAL DAILY
Qty: 90 TABLET | Refills: 3 | Status: SHIPPED | OUTPATIENT
Start: 2022-02-14 | End: 2022-04-25 | Stop reason: SDUPTHER

## 2022-02-14 RX ORDER — LISINOPRIL 20 MG/1
20 TABLET ORAL 2 TIMES DAILY
Qty: 180 TABLET | Refills: 3 | Status: SHIPPED | OUTPATIENT
Start: 2022-02-14 | End: 2022-04-25 | Stop reason: SDUPTHER

## 2022-02-14 NOTE — PROGRESS NOTES
Subjective:    Patient ID:  Héctor Desir is a 86 y.o. male who presents for follow-up of Follow-up      HPI 87 yo with HTN and chronic AF . States doing well. Stays active. Does not feel the AF.     Review of Systems   Constitutional: Negative for decreased appetite, fever, malaise/fatigue, weight gain and weight loss.   HENT: Negative for hearing loss and nosebleeds.    Eyes: Negative for visual disturbance.   Cardiovascular: Negative for chest pain, claudication, cyanosis, dyspnea on exertion, irregular heartbeat, leg swelling, near-syncope, orthopnea, palpitations, paroxysmal nocturnal dyspnea and syncope.   Respiratory: Negative for cough, hemoptysis, shortness of breath, sleep disturbances due to breathing, snoring and wheezing.    Endocrine: Negative for cold intolerance, heat intolerance, polydipsia and polyuria.   Hematologic/Lymphatic: Negative for adenopathy and bleeding problem. Does not bruise/bleed easily.   Skin: Negative for color change, itching, poor wound healing, rash and suspicious lesions.   Musculoskeletal: Negative for arthritis, back pain, falls, joint pain, joint swelling, muscle cramps, muscle weakness and myalgias.   Gastrointestinal: Negative for bloating, abdominal pain, change in bowel habit, constipation, flatus, heartburn, hematemesis, hematochezia, hemorrhoids, jaundice, melena, nausea and vomiting.   Genitourinary: Negative for bladder incontinence, decreased libido, frequency, hematuria, hesitancy and urgency.   Neurological: Negative for brief paralysis, difficulty with concentration, excessive daytime sleepiness, dizziness, focal weakness, headaches, light-headedness, loss of balance, numbness, vertigo and weakness.   Psychiatric/Behavioral: Negative for altered mental status, depression and memory loss. The patient does not have insomnia and is not nervous/anxious.    Allergic/Immunologic: Negative for environmental allergies, hives and persistent infections.        Objective:     Physical Exam  Constitutional:       General: He is not in acute distress.     Appearance: He is well-developed and well-nourished. He is not diaphoretic.      Comments: BP (!) 141/87 (BP Location: Right arm, Patient Position: Sitting, BP Method: Large (Automatic))   Pulse 88   Wt 99.4 kg (219 lb 1.6 oz)   BMI 33.31 kg/m²      HENT:      Head: Normocephalic and atraumatic.   Eyes:      General: Lids are normal. No scleral icterus.        Right eye: No discharge.      Conjunctiva/sclera: Conjunctivae normal.      Pupils: Pupils are equal, round, and reactive to light.   Neck:      Thyroid: No thyromegaly.      Vascular: No JVD.      Trachea: No tracheal deviation.   Cardiovascular:      Rate and Rhythm: Normal rate. Rhythm irregularly irregular.      Pulses: Intact distal pulses.           Carotid pulses are 2+ on the right side and 2+ on the left side.       Radial pulses are 2+ on the right side and 2+ on the left side.        Femoral pulses are 2+ on the right side and 2+ on the left side.       Popliteal pulses are 2+ on the right side and 2+ on the left side.        Dorsalis pedis pulses are 2+ on the right side and 2+ on the left side.        Posterior tibial pulses are 2+ on the right side and 2+ on the left side.      Heart sounds: Normal heart sounds, S1 normal and S2 normal. No murmur heard.  No friction rub. No gallop.    Pulmonary:      Effort: Pulmonary effort is normal. No respiratory distress.      Breath sounds: Normal breath sounds. No wheezing or rales.   Chest:      Chest wall: No tenderness.   Abdominal:      General: Bowel sounds are normal. There is no distension.      Palpations: Abdomen is soft. There is no hepatomegaly, hepatosplenomegaly or mass.      Tenderness: There is no abdominal tenderness. There is no guarding or rebound.   Musculoskeletal:         General: No tenderness or edema. Normal range of motion.      Cervical back: Normal range of motion and neck supple.    Lymphadenopathy:      Cervical: No cervical adenopathy.   Skin:     General: Skin is warm and dry.      Coloration: Skin is not pale.      Findings: No erythema or rash.   Neurological:      Mental Status: He is alert and oriented to person, place, and time.      Cranial Nerves: No cranial nerve deficit.      Coordination: Coordination normal.      Deep Tendon Reflexes: Reflexes are normal and symmetric.   Psychiatric:         Mood and Affect: Mood and affect normal.         Speech: Speech normal.         Behavior: Behavior normal.         Thought Content: Thought content normal.         Cognition and Memory: Cognition and memory normal.         Judgment: Judgment normal.           Assessment:       1. Hypertension associated with diabetes    2. Combined hyperlipidemia associated with type 2 diabetes mellitus    3. Atrial fibrillation, unspecified type    4. Persistent atrial fibrillation    5. Chronic atrial fibrillation    6. Edema, unspecified type    7. Stage 3b chronic kidney disease         Plan:     The current medical regimen is effective;  continue present plan and medications.     Risk of stroke from atrial fib has been discussed as well as bleeding risk from alternative anticoagulation regiments as well as risk and benefits of rate control vs cardioversion    No orders of the defined types were placed in this encounter.    Follow up in about 6 months (around 8/14/2022).

## 2022-03-24 ENCOUNTER — OFFICE VISIT (OUTPATIENT)
Dept: DIABETES | Facility: CLINIC | Age: 87
End: 2022-03-24
Payer: MEDICARE

## 2022-03-24 ENCOUNTER — LAB VISIT (OUTPATIENT)
Dept: LAB | Facility: HOSPITAL | Age: 87
End: 2022-03-24
Attending: NURSE PRACTITIONER
Payer: MEDICARE

## 2022-03-24 VITALS — SYSTOLIC BLOOD PRESSURE: 141 MMHG | HEART RATE: 89 BPM | DIASTOLIC BLOOD PRESSURE: 86 MMHG

## 2022-03-24 LAB
CHOLEST SERPL-MCNC: 129 MG/DL (ref 120–199)
CHOLEST/HDLC SERPL: 3.7 {RATIO} (ref 2–5)
ESTIMATED AVG GLUCOSE: 146 MG/DL (ref 68–131)
HBA1C MFR BLD: 6.7 % (ref 4–5.6)
HDLC SERPL-MCNC: 35 MG/DL (ref 40–75)
HDLC SERPL: 27.1 % (ref 20–50)
LDLC SERPL CALC-MCNC: 65.6 MG/DL (ref 63–159)
NONHDLC SERPL-MCNC: 94 MG/DL
TRIGL SERPL-MCNC: 142 MG/DL (ref 30–150)
TSH SERPL DL<=0.005 MIU/L-ACNC: 2.16 UIU/ML (ref 0.4–4)

## 2022-03-24 PROCEDURE — 99213 OFFICE O/P EST LOW 20 MIN: CPT | Mod: PBBFAC,PO | Performed by: NURSE PRACTITIONER

## 2022-03-24 PROCEDURE — 84443 ASSAY THYROID STIM HORMONE: CPT | Performed by: NURSE PRACTITIONER

## 2022-03-24 PROCEDURE — 99214 PR OFFICE/OUTPT VISIT, EST, LEVL IV, 30-39 MIN: ICD-10-PCS | Mod: S$PBB,,, | Performed by: NURSE PRACTITIONER

## 2022-03-24 PROCEDURE — 36415 COLL VENOUS BLD VENIPUNCTURE: CPT | Mod: PO | Performed by: NURSE PRACTITIONER

## 2022-03-24 PROCEDURE — 99214 OFFICE O/P EST MOD 30 MIN: CPT | Mod: S$PBB,,, | Performed by: NURSE PRACTITIONER

## 2022-03-24 PROCEDURE — 99999 PR PBB SHADOW E&M-EST. PATIENT-LVL III: CPT | Mod: PBBFAC,,, | Performed by: NURSE PRACTITIONER

## 2022-03-24 PROCEDURE — 83036 HEMOGLOBIN GLYCOSYLATED A1C: CPT | Performed by: NURSE PRACTITIONER

## 2022-03-24 PROCEDURE — 99999 PR PBB SHADOW E&M-EST. PATIENT-LVL III: ICD-10-PCS | Mod: PBBFAC,,, | Performed by: NURSE PRACTITIONER

## 2022-03-24 PROCEDURE — 80061 LIPID PANEL: CPT | Performed by: NURSE PRACTITIONER

## 2022-03-24 NOTE — PROGRESS NOTES
Subjective:         Patient ID: Héctor Desir is a 86 y.o. male.  Patient's current PCP is Tae Goel MD.       Chief Complaint: No chief complaint on file.    HPI  Héctor Desir is a 83 y.o. White male presenting for a follow up for diabetes. Patient has been diagnosed with diabetes for several years and has the following complications from diabetes: nephropathy, peripheral neuropathy, cardiovascular disease and cerebrovascular disease, HTN, Hyperlipidemia. Blood glucose testing is not performed. Tired of sticking finger, does not qualify for CGM. Denies symptoms of hypoglycemia.        //   , There is no height or weight on file to calculate BMI.  His blood sugar in clinic today is:   Lab Results   Component Value Date    POCGLU 163 (A) 11/04/2021       Labs reviewed and are noted below.  His most recent A1C is:  Lab Results   Component Value Date    HGBA1C 6.7 (H) 11/04/2021    HGBA1C 8.4 (H) 08/09/2021    HGBA1C 6.8 (H) 02/04/2021     Lab Results   Component Value Date    CPEPTIDE 8.85 (H) 04/25/2019     Lab Results   Component Value Date    GLUTAMICACID 0.00 04/25/2019     Glucose   Date Value Ref Range Status   12/17/2021 215 (H) 70 - 110 mg/dL Final     Anion Gap   Date Value Ref Range Status   12/17/2021 11 8 - 16 mmol/L Final     eGFR if    Date Value Ref Range Status   12/17/2021 45.1 (A) >60 mL/min/1.73 m^2 Final     eGFR if non    Date Value Ref Range Status   12/17/2021 39.0 (A) >60 mL/min/1.73 m^2 Final     Comment:     Calculation used to obtain the estimated glomerular filtration  rate (eGFR) is the CKD-EPI equation.          CURRENT DM MEDICATIONS:   Diabetes Medications             dulaglutide (TRULICITY) 4.5 mg/0.5 mL pen injector Inject 4.5 mg into the skin every 7 days.    glimepiride (AMARYL) 4 MG tablet Take 1 tablet (4 mg total) by mouth 2 (two) times daily before meals.    JARDIANCE 25 mg tablet TAKE 1 TABLET DAILY    nateglinide (STARLIX) 120 MG tablet  Take 1 tablet (120 mg total) by mouth 3 (three) times daily before meals. If you skip a meal, do not take dose          Diabetes Management Status    Statin: Taking  ACE/ARB: Taking    Screening or Prevention Patient's value Goal Complete/Controlled?   HgA1C Testing and Control   Lab Results   Component Value Date    HGBA1C 6.7 (H) 11/04/2021      Annually/Less than 8% Yes   Lipid profile : 01/07/2021 Annually Yes   LDL control Lab Results   Component Value Date    LDLCALC 67.2 01/07/2021    Annually/Less than 100 mg/dl  Yes   Nephropathy screening Lab Results   Component Value Date    LABMICR 54.0 10/14/2019     Lab Results   Component Value Date    PROTEINUA Trace (A) 06/20/2018    Annually Yes   Blood pressure BP Readings from Last 1 Encounters:   02/14/22 (!) 141/87    Less than 140/90 Yes   Dilated retinal exam : 10/07/2020 Annually Yes   Foot exam   : 11/04/2021 Annually Yes     Review of Systems   Constitutional: Negative for activity change and appetite change.   HENT: Positive for postnasal drip and rhinorrhea. Negative for sore throat, trouble swallowing and voice change.    Eyes: Negative for visual disturbance.   Gastrointestinal: Negative for abdominal pain, constipation, diarrhea, nausea and vomiting.   Endocrine: Positive for polyuria (WNL for pt). Negative for polydipsia and polyphagia.   Genitourinary: Negative for dysuria.   Skin: Negative for rash.   Neurological: Negative for syncope and weakness.   Psychiatric/Behavioral: Negative for confusion.         Objective:      Physical Exam  Constitutional:       General: He is not in acute distress.     Appearance: He is well-developed. He is not ill-appearing, toxic-appearing or diaphoretic.   Neck:      Thyroid: No thyroid mass.   Musculoskeletal:         General: Normal range of motion.      Cervical back: Normal range of motion.   Lymphadenopathy:      Cervical: Cervical adenopathy present.      Right cervical: Superficial cervical adenopathy  present.   Skin:     General: Skin is warm and dry.   Neurological:      Mental Status: He is alert and oriented to person, place, and time.   Psychiatric:         Behavior: Behavior normal.         Thought Content: Thought content normal.         Judgment: Judgment normal.         Assessment:       1. Uncontrolled type 2 diabetes mellitus with stage 3 chronic kidney disease, without long-term current use of insulin        Plan:   Uncontrolled type 2 diabetes mellitus with stage 3 chronic kidney disease, without long-term current use of insulin      PLAN:   - Condition: good control based on the last A1c   - Monitor blood glucose 2x daily, fasting and ac dinner or at bedtime.   - Hypoglycemia protocol reviewed  - Diet reviewed  - Medication Changes: Continue Medications  - Discussed monitoring for neck masses, stay hydrated, sinus symptoms are mild, pt advised to follow up with PCP if no improvement   - Labs ordered as above  - Follow up: 3 months    A total of 30 minutes was spent in face to face time, of which over 50% was spent in counseling patient on disease process, complications, treatment, and side effects of medications.

## 2022-03-25 ENCOUNTER — TELEPHONE (OUTPATIENT)
Dept: DIABETES | Facility: CLINIC | Age: 87
End: 2022-03-25
Payer: MEDICARE

## 2022-03-25 NOTE — TELEPHONE ENCOUNTER
Called patient and notified him of his results----- Message from Judith Howard NP sent at 3/25/2022  9:33 AM CDT -----  Call pt  - thyroid normal  - a1c controlled  - lipids normal

## 2022-04-25 ENCOUNTER — OFFICE VISIT (OUTPATIENT)
Dept: FAMILY MEDICINE | Facility: CLINIC | Age: 87
End: 2022-04-25
Payer: MEDICARE

## 2022-04-25 VITALS
HEART RATE: 92 BPM | OXYGEN SATURATION: 98 % | HEIGHT: 68 IN | SYSTOLIC BLOOD PRESSURE: 134 MMHG | BODY MASS INDEX: 33.29 KG/M2 | RESPIRATION RATE: 18 BRPM | WEIGHT: 219.63 LBS | DIASTOLIC BLOOD PRESSURE: 88 MMHG | TEMPERATURE: 98 F

## 2022-04-25 DIAGNOSIS — F41.9 ANXIETY: ICD-10-CM

## 2022-04-25 DIAGNOSIS — E11.42 DIABETIC POLYNEUROPATHY ASSOCIATED WITH TYPE 2 DIABETES MELLITUS: ICD-10-CM

## 2022-04-25 DIAGNOSIS — R60.9 EDEMA, UNSPECIFIED TYPE: ICD-10-CM

## 2022-04-25 DIAGNOSIS — E78.2 COMBINED HYPERLIPIDEMIA ASSOCIATED WITH TYPE 2 DIABETES MELLITUS: ICD-10-CM

## 2022-04-25 DIAGNOSIS — E11.59 HYPERTENSION ASSOCIATED WITH DIABETES: ICD-10-CM

## 2022-04-25 DIAGNOSIS — N18.32 STAGE 3B CHRONIC KIDNEY DISEASE: ICD-10-CM

## 2022-04-25 DIAGNOSIS — I15.2 HYPERTENSION ASSOCIATED WITH DIABETES: ICD-10-CM

## 2022-04-25 DIAGNOSIS — I48.19 PERSISTENT ATRIAL FIBRILLATION: ICD-10-CM

## 2022-04-25 DIAGNOSIS — I48.20 CHRONIC ATRIAL FIBRILLATION: ICD-10-CM

## 2022-04-25 DIAGNOSIS — E11.69 COMBINED HYPERLIPIDEMIA ASSOCIATED WITH TYPE 2 DIABETES MELLITUS: ICD-10-CM

## 2022-04-25 PROCEDURE — 99215 OFFICE O/P EST HI 40 MIN: CPT | Mod: PBBFAC,PO,25 | Performed by: FAMILY MEDICINE

## 2022-04-25 PROCEDURE — 99214 OFFICE O/P EST MOD 30 MIN: CPT | Mod: S$PBB,,, | Performed by: FAMILY MEDICINE

## 2022-04-25 PROCEDURE — 99999 PR PBB SHADOW E&M-EST. PATIENT-LVL V: CPT | Mod: PBBFAC,,, | Performed by: FAMILY MEDICINE

## 2022-04-25 PROCEDURE — 99214 PR OFFICE/OUTPT VISIT, EST, LEVL IV, 30-39 MIN: ICD-10-PCS | Mod: S$PBB,,, | Performed by: FAMILY MEDICINE

## 2022-04-25 PROCEDURE — G0008 ADMIN INFLUENZA VIRUS VAC: HCPCS | Mod: PBBFAC,PO

## 2022-04-25 PROCEDURE — 99999 PR PBB SHADOW E&M-EST. PATIENT-LVL V: ICD-10-PCS | Mod: PBBFAC,,, | Performed by: FAMILY MEDICINE

## 2022-04-25 RX ORDER — ATORVASTATIN CALCIUM 40 MG/1
40 TABLET, FILM COATED ORAL DAILY
Qty: 90 TABLET | Refills: 3 | Status: SHIPPED | OUTPATIENT
Start: 2022-04-25 | End: 2022-12-26 | Stop reason: SDUPTHER

## 2022-04-25 RX ORDER — EMPAGLIFLOZIN 25 MG/1
25 TABLET, FILM COATED ORAL DAILY
Qty: 90 TABLET | Refills: 1 | Status: SHIPPED | OUTPATIENT
Start: 2022-04-25 | End: 2022-09-26 | Stop reason: SDUPTHER

## 2022-04-25 RX ORDER — LISINOPRIL 20 MG/1
20 TABLET ORAL 2 TIMES DAILY
Qty: 180 TABLET | Refills: 3 | Status: SHIPPED | OUTPATIENT
Start: 2022-04-25 | End: 2022-12-26 | Stop reason: SDUPTHER

## 2022-04-25 RX ORDER — GLIMEPIRIDE 4 MG/1
4 TABLET ORAL
Qty: 180 TABLET | Refills: 1 | Status: SHIPPED | OUTPATIENT
Start: 2022-04-25 | End: 2022-12-26 | Stop reason: SDUPTHER

## 2022-04-25 RX ORDER — NATEGLINIDE 120 MG/1
120 TABLET ORAL
Qty: 270 TABLET | Refills: 1 | Status: SHIPPED | OUTPATIENT
Start: 2022-04-25 | End: 2022-11-26 | Stop reason: SDUPTHER

## 2022-04-25 RX ORDER — DULOXETIN HYDROCHLORIDE 60 MG/1
60 CAPSULE, DELAYED RELEASE ORAL DAILY
Qty: 90 CAPSULE | Refills: 3 | Status: SHIPPED | OUTPATIENT
Start: 2022-04-25 | End: 2022-12-26 | Stop reason: SDUPTHER

## 2022-04-25 RX ORDER — FUROSEMIDE 40 MG/1
40 TABLET ORAL DAILY
Qty: 90 TABLET | Refills: 3 | Status: SHIPPED | OUTPATIENT
Start: 2022-04-25 | End: 2022-12-26 | Stop reason: SDUPTHER

## 2022-04-25 RX ORDER — METOPROLOL SUCCINATE 50 MG/1
50 TABLET, EXTENDED RELEASE ORAL DAILY
Qty: 90 TABLET | Refills: 3 | Status: SHIPPED | OUTPATIENT
Start: 2022-04-25 | End: 2022-12-26 | Stop reason: SDUPTHER

## 2022-04-25 NOTE — PROGRESS NOTES
Subjective:      Patient ID: Héctor Desir is a 86 y.o. male.    Chief Complaint: Hypertension (Pt here for a follow-up on his blood pressure and diabetes )    Problem List Items Addressed This Visit     Anxiety    Overview     He has anxiety that has been going on for a couple of years and he is now dealing with his wife's cancer so this has been helpful with using the cymbalta.   He also had neuropathy and this has helped it.             Relevant Medications    DULoxetine (CYMBALTA) 60 MG capsule    Atrial fibrillation    Overview     He has chronic afib.  He has had a cardioversion but this did not help. He is following with Dr. Talamantes for this and he is on xarelto for this and is on toprol for rate control.  It is controlled. HE IS ON XARELTO FOR THIS ALSO.    RATE IS CONTROLLED.   Pulse Readings from Last 3 Encounters:   02/19/20 86   01/20/20 84   12/20/19 86                Relevant Medications    rivaroxaban (XARELTO) 15 mg Tab    CKD (chronic kidney disease) stage 3, GFR 30-59 ml/min    Overview     He has CKD and is seeing Dr. Sims.  He has not seen her in a while and we need to get this done.   CMP  Sodium   Date Value Ref Range Status   12/17/2021 139 136 - 145 mmol/L Final     Potassium   Date Value Ref Range Status   12/17/2021 4.0 3.5 - 5.1 mmol/L Final     Chloride   Date Value Ref Range Status   12/17/2021 106 95 - 110 mmol/L Final     CO2   Date Value Ref Range Status   12/17/2021 22 (L) 23 - 29 mmol/L Final     Glucose   Date Value Ref Range Status   12/17/2021 215 (H) 70 - 110 mg/dL Final     BUN   Date Value Ref Range Status   12/17/2021 24 (H) 8 - 23 mg/dL Final     Creatinine   Date Value Ref Range Status   12/17/2021 1.6 (H) 0.5 - 1.4 mg/dL Final     Calcium   Date Value Ref Range Status   12/17/2021 8.8 8.7 - 10.5 mg/dL Final     Total Protein   Date Value Ref Range Status   04/15/2021 7.2 6.0 - 8.4 g/dL Final     Albumin   Date Value Ref Range Status   12/17/2021 3.9 3.5 - 5.2 g/dL  Final     Total Bilirubin   Date Value Ref Range Status   04/15/2021 0.8 0.1 - 1.0 mg/dL Final     Comment:     For infants and newborns, interpretation of results should be based  on gestational age, weight and in agreement with clinical  observations.    Premature Infant recommended reference ranges:  Up to 24 hours.............<8.0 mg/dL  Up to 48 hours............<12.0 mg/dL  3-5 days..................<15.0 mg/dL  6-29 days.................<15.0 mg/dL       Alkaline Phosphatase   Date Value Ref Range Status   04/15/2021 139 (H) 55 - 135 U/L Final     AST   Date Value Ref Range Status   04/15/2021 29 10 - 40 U/L Final     ALT   Date Value Ref Range Status   04/15/2021 48 (H) 10 - 44 U/L Final     Anion Gap   Date Value Ref Range Status   12/17/2021 11 8 - 16 mmol/L Final     eGFR if    Date Value Ref Range Status   12/17/2021 45.1 (A) >60 mL/min/1.73 m^2 Final     eGFR if non    Date Value Ref Range Status   12/17/2021 39.0 (A) >60 mL/min/1.73 m^2 Final     Comment:     Calculation used to obtain the estimated glomerular filtration  rate (eGFR) is the CKD-EPI equation.                   Combined hyperlipidemia associated with type 2 diabetes mellitus    Overview     The patient presents with hyperlipidemia.  The patient reports tolerating the medication well and is in excellent compliance.  There have been no medication side effects.  The patient denies chest pain, neuropathy, and myalgias.  The patient has reduced fat intake and has been exercising.  Current treatment has included the medications listed in the med card.    Lab Results   Component Value Date    CHOL 129 03/24/2022    CHOL 114 (L) 01/07/2021    CHOL 102 (L) 10/14/2019       Lab Results   Component Value Date    HDL 35 (L) 03/24/2022    HDL 31 (L) 01/07/2021    HDL 30 (L) 10/14/2019       Lab Results   Component Value Date    LDLCALC 65.6 03/24/2022    LDLCALC 67.2 01/07/2021    LDLCALC 57.0 (L) 10/14/2019       Lab  Results   Component Value Date    TRIG 142 03/24/2022    TRIG 79 01/07/2021    TRIG 75 10/14/2019       Lab Results   Component Value Date    CHOLHDL 27.1 03/24/2022    CHOLHDL 27.2 01/07/2021    CHOLHDL 29.4 10/14/2019     Lab Results   Component Value Date    ALT 48 (H) 04/15/2021    AST 29 04/15/2021    ALKPHOS 139 (H) 04/15/2021    BILITOT 0.8 04/15/2021                Relevant Medications    atorvastatin (LIPITOR) 40 MG tablet    dulaglutide (TRULICITY) 4.5 mg/0.5 mL pen injector    nateglinide (STARLIX) 120 MG tablet    glimepiride (AMARYL) 4 MG tablet    Diabetic neuropathy    Overview     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  It does help it most of the time.           Relevant Medications    dulaglutide (TRULICITY) 4.5 mg/0.5 mL pen injector    nateglinide (STARLIX) 120 MG tablet    glimepiride (AMARYL) 4 MG tablet    DULoxetine (CYMBALTA) 60 MG capsule    Edema    Overview     He has had swelling of the legs since early 2018.  He has not had a change in his meds but is on amlodipine 10 mg po q day.  He has not had trauma noted.           Relevant Medications    furosemide (LASIX) 40 MG tablet    Hypertension associated with diabetes    Overview     The patient presents with essential hypertension.  The patient is tolerating the medication well and is in excellent compliance.  The patient is experiencing no side effects.  Counseling was offered regarding low salt diets.  The patient has a reduced salt intake.  The patient denies chest pain, palpitations, shortness of breath, dyspnea on exertion, left or murmur neck pain, nausea, vomiting, diaphoresis, paroxysmal nocturnal dyspnea, and orthopnea.             Relevant Medications    dulaglutide (TRULICITY) 4.5 mg/0.5 mL pen injector    nateglinide (STARLIX) 120 MG tablet    glimepiride (AMARYL) 4 MG tablet    lisinopriL (PRINIVIL,ZESTRIL) 20 MG tablet    furosemide (LASIX) 40 MG tablet    Type II diabetes mellitus  with renal manifestations, uncontrolled - Primary    Overview     The patient presents with diabetes.  The patient denies polyuria, polydipsia, polyphagia, hypoglycemia and paresthesias.  The patient's glucose control has been good.  Home glucose averages are routinely checked.  The patient is without retinopathy currently.  The patient has no history of neuropathy.  The patient currently complains of no podiatric problems.  The patient has excellent compliance.  Hemoglobin A1C   Date Value Ref Range Status   03/24/2022 6.7 (H) 4.0 - 5.6 % Final     Comment:     ADA Screening Guidelines:  5.7-6.4%  Consistent with prediabetes  >or=6.5%  Consistent with diabetes    High levels of fetal hemoglobin interfere with the HbA1C  assay. Heterozygous hemoglobin variants (HbS, HgC, etc)do  not significantly interfere with this assay.   However, presence of multiple variants may affect accuracy.     11/04/2021 6.7 (H) 4.0 - 5.6 % Final     Comment:     ADA Screening Guidelines:  5.7-6.4%  Consistent with prediabetes  >or=6.5%  Consistent with diabetes    High levels of fetal hemoglobin interfere with the HbA1C  assay. Heterozygous hemoglobin variants (HbS, HgC, etc)do  not significantly interfere with this assay.   However, presence of multiple variants may affect accuracy.     08/09/2021 8.4 (H) 4.0 - 5.6 % Final     Comment:     ADA Screening Guidelines:  5.7-6.4%  Consistent with prediabetes  >or=6.5%  Consistent with diabetes    High levels of fetal hemoglobin interfere with the HbA1C  assay. Heterozygous hemoglobin variants (HbS, HgC, etc)do  not significantly interfere with this assay.   However, presence of multiple variants may affect accuracy.       No results found for: MICROALBUR, [id number]  Diabetes Management Status    Statin: Taking  ACE/ARB: Taking    Screening or Prevention Patient's value Goal Complete/Controlled?   HgA1C Testing and Control   Lab Results   Component Value Date    HGBA1C 6.7 (H) 03/24/2022       Annually/Less than 8% Yes   Lipid profile : 03/24/2022 Annually No   LDL control Lab Results   Component Value Date    LDLCALC 65.6 03/24/2022    Annually/Less than 100 mg/dl  No   Nephropathy screening Lab Results   Component Value Date    LABMICR 10.0 03/24/2022     Lab Results   Component Value Date    PROTEINUA Trace (A) 06/20/2018    Annually No   Blood pressure BP Readings from Last 1 Encounters:   04/25/22 134/88    Less than 140/90 Yes   Dilated retinal exam : 10/07/2020 Annually No   Foot exam   : 11/04/2021 Annually Yes                Relevant Medications    dulaglutide (TRULICITY) 4.5 mg/0.5 mL pen injector    nateglinide (STARLIX) 120 MG tablet    empagliflozin (JARDIANCE) 25 mg tablet    glimepiride (AMARYL) 4 MG tablet    Other Relevant Orders    Ambulatory referral/consult to Optometry      Other Visit Diagnoses     Uncontrolled type 2 diabetes mellitus with stage 3 chronic kidney disease, without long-term current use of insulin        Relevant Medications    dulaglutide (TRULICITY) 4.5 mg/0.5 mL pen injector    nateglinide (STARLIX) 120 MG tablet    empagliflozin (JARDIANCE) 25 mg tablet    glimepiride (AMARYL) 4 MG tablet          The patient's Health Maintenance was reviewed and the following appears to be due:   Health Maintenance Due   Topic Date Due    Shingles Vaccine (1 of 2) Never done    Influenza Vaccine (1) 09/01/2021    Eye Exam  10/07/2021       Past Medical History:  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     DM (diabetes mellitus) type II controlled, neurological manifestation 7/5/2013    GERD (gastroesophageal reflux disease)     History of prostate cancer 2006    Hypertension associated with  diabetes     Hypertension goal BP (blood pressure) < 130/80     Obesity     Prostate cancer     TIA (transient ischemic attack)     Trouble in sleeping      Past Surgical History:   Procedure Laterality Date    CARDIAC CATHETERIZATION      CARDIOVERSION N/A 8/10/2018    Procedure: CARDIOVERSION;  Surgeon: Israel Cardoza MD;  Location: [facility] CATH LAB;  Service: Cardiology;  Laterality: N/A;  Dr. Talamantes pt    CATARACT EXTRACTION W/  INTRAOCULAR LENS IMPLANT  11/2013    Bilateral Cataract     COLONOSCOPY N/A 12/20/2019    Procedure: COLONOSCOPY;  Surgeon: Forrest Roy MD;  Location: [facility] ENDO;  Service: Endoscopy;  Laterality: N/A;    COLONOSCOPY W/ POLYPECTOMY      EYE SURGERY      PROSTATECTOMY  2006    TONSILLECTOMY      TONSILLECTOMY, ADENOIDECTOMY, BILATERAL MYRINGOTOMY AND TUBES       Review of patient's allergies indicates:   Allergen Reactions    Amlodipine      swelling     Current Outpatient Medications on File Prior to Visit   Medication Sig Dispense Refill    blood sugar diagnostic Strp Use daily- Freestyle lite 300 strip 3    blood-glucose meter kit Use before meals and before bed when the glucoses are not in control.  Otherwise, test it daily once a day before meals randomly to ensure stability of the gluocse. 1 each 0    ERGOCALCIFEROL, VITAMIN D2, (VITAMIN D ORAL) Take by mouth once daily.       omeprazole (PRILOSEC) 20 MG capsule Take 1 capsule (20 mg total) by mouth once daily. 90 capsule 3    [DISCONTINUED] atorvastatin (LIPITOR) 40 MG tablet Take 1 tablet (40 mg total) by mouth once daily. 90 tablet 3    [DISCONTINUED] dulaglutide (TRULICITY) 4.5 mg/0.5 mL pen injector Inject 4.5 mg into the skin every 7 days. 12 pen 3    [DISCONTINUED] DULoxetine (CYMBALTA) 60 MG capsule TAKE 1 CAPSULE DAILY 90 capsule 3    [DISCONTINUED] furosemide (LASIX) 40 MG tablet Take 1 tablet (40 mg total) by mouth once daily. 90 tablet 3    [DISCONTINUED] glimepiride (AMARYL) 4 MG tablet  Take 1 tablet (4 mg total) by mouth 2 (two) times daily before meals. 180 tablet 1    [DISCONTINUED] JARDIANCE 25 mg tablet TAKE 1 TABLET DAILY 90 tablet 0    [DISCONTINUED] lisinopriL (PRINIVIL,ZESTRIL) 20 MG tablet Take 1 tablet (20 mg total) by mouth 2 (two) times daily. 180 tablet 3    [DISCONTINUED] metoprolol succinate (TOPROL-XL) 50 MG 24 hr tablet Take 1 tablet (50 mg total) by mouth once daily. 90 tablet 3    [DISCONTINUED] nateglinide (STARLIX) 120 MG tablet Take 1 tablet (120 mg total) by mouth 3 (three) times daily before meals. If you skip a meal, do not take dose 270 tablet 1    [DISCONTINUED] rivaroxaban (XARELTO) 15 mg Tab Take 1 tablet (15 mg total) by mouth daily with dinner or evening meal. 90 tablet 3    GAVILYTE-G 236-22.74-6.74 -5.86 gram suspension USE AS DIRECTED      [DISCONTINUED] chlorthalidone (HYGROTEN) 25 MG Tab Take 1 tablet (25 mg total) by mouth once daily. 90 tablet 3    [DISCONTINUED] fluticasone propionate (FLONASE) 50 mcg/actuation nasal spray 2 sprays (100 mcg total) by Each Nostril route once daily. (Patient not taking: No sig reported) 16 g 0    [DISCONTINUED] loratadine (CLARITIN REDITABS) 10 mg dissolvable tablet Take 1 tablet (10 mg total) by mouth once daily. (Patient not taking: No sig reported) 30 tablet 1     No current facility-administered medications on file prior to visit.     Social History     Socioeconomic History    Marital status:    Tobacco Use    Smoking status: Never Smoker    Smokeless tobacco: Never Used   Substance and Sexual Activity    Alcohol use: No     Comment: He used to drink but quit in 1990    Drug use: No     Family History   Problem Relation Age of Onset    Heart attack Mother     Melanoma Father     Cancer Father     Anesthesia problems Neg Hx     Clotting disorder Neg Hx     Kidney disease Neg Hx        Review of Systems   Constitutional: Negative.  Negative for chills, diaphoresis and fever.   HENT: Negative for  "congestion, hearing loss, mouth sores, postnasal drip and sore throat.    Eyes: Negative for pain and visual disturbance.   Respiratory: Negative for cough, chest tightness, shortness of breath and wheezing.    Cardiovascular: Negative for chest pain.   Gastrointestinal: Negative for abdominal pain, anal bleeding, blood in stool, constipation, diarrhea, nausea and vomiting.   Genitourinary: Negative for dysuria and hematuria.   Musculoskeletal: Positive for arthralgias. Negative for back pain, neck pain and neck stiffness.   Skin: Negative for rash.   Neurological: Negative for dizziness and weakness.       Objective:   /88 (BP Location: Left arm, Patient Position: Sitting)   Pulse 92   Temp 98.1 °F (36.7 °C) (Oral)   Resp 18   Ht 5' 8" (1.727 m)   Wt 99.6 kg (219 lb 9.6 oz)   SpO2 98%   BMI 33.39 kg/m²     Physical Exam  Constitutional:       Appearance: He is well-developed. He is not diaphoretic.   HENT:      Head: Normocephalic and atraumatic.      Right Ear: External ear normal.      Left Ear: External ear normal.      Nose: Nose normal.      Mouth/Throat:      Pharynx: No oropharyngeal exudate.   Eyes:      General: No scleral icterus.        Right eye: No discharge.         Left eye: No discharge.      Conjunctiva/sclera: Conjunctivae normal.      Pupils: Pupils are equal, round, and reactive to light.   Neck:      Thyroid: No thyromegaly.      Vascular: No JVD.   Cardiovascular:      Rate and Rhythm: Normal rate and regular rhythm.      Heart sounds: Normal heart sounds. No murmur heard.    No friction rub. No gallop.   Pulmonary:      Effort: Pulmonary effort is normal. No respiratory distress.      Breath sounds: Normal breath sounds. No wheezing or rales.   Chest:      Chest wall: No tenderness.   Abdominal:      General: Bowel sounds are normal. There is no distension.      Palpations: Abdomen is soft. There is no mass.      Tenderness: There is no abdominal tenderness. There is no guarding " or rebound.   Musculoskeletal:         General: No tenderness. Normal range of motion.      Cervical back: Normal range of motion and neck supple.   Lymphadenopathy:      Cervical: No cervical adenopathy.   Skin:     General: Skin is warm and dry.   Neurological:      Mental Status: He is alert and oriented to person, place, and time.      Cranial Nerves: No cranial nerve deficit.      Coordination: Coordination normal.       Assessment:     1. Type II diabetes mellitus with renal manifestations, uncontrolled    2. Hypertension associated with diabetes    3. Stage 3b chronic kidney disease    4. Combined hyperlipidemia associated with type 2 diabetes mellitus    5. Uncontrolled type 2 diabetes mellitus with stage 3 chronic kidney disease, without long-term current use of insulin    6. Edema, unspecified type    7. Anxiety    8. Diabetic polyneuropathy associated with type 2 diabetes mellitus    9. Persistent atrial fibrillation    10. Chronic atrial fibrillation      Plan:   I have changed Héctor Desir's JARDIANCE and DULoxetine. I am also having him maintain his blood-glucose meter, (ERGOCALCIFEROL, VITAMIN D2, (VITAMIN D ORAL)), blood sugar diagnostic, GAVILYTE-G, omeprazole, atorvastatin, dulaglutide, nateglinide, glimepiride, metoprolol succinate, lisinopriL, furosemide, and rivaroxaban.  Problem List Items Addressed This Visit     Anxiety    Relevant Medications    DULoxetine (CYMBALTA) 60 MG capsule    Atrial fibrillation    Relevant Medications    rivaroxaban (XARELTO) 15 mg Tab    CKD (chronic kidney disease) stage 3, GFR 30-59 ml/min    Combined hyperlipidemia associated with type 2 diabetes mellitus    Relevant Medications    atorvastatin (LIPITOR) 40 MG tablet    dulaglutide (TRULICITY) 4.5 mg/0.5 mL pen injector    nateglinide (STARLIX) 120 MG tablet    glimepiride (AMARYL) 4 MG tablet    Diabetic neuropathy    Relevant Medications    dulaglutide (TRULICITY) 4.5 mg/0.5 mL pen injector    nateglinide  (STARLIX) 120 MG tablet    glimepiride (AMARYL) 4 MG tablet    DULoxetine (CYMBALTA) 60 MG capsule    Edema    Relevant Medications    furosemide (LASIX) 40 MG tablet    Hypertension associated with diabetes    Relevant Medications    dulaglutide (TRULICITY) 4.5 mg/0.5 mL pen injector    nateglinide (STARLIX) 120 MG tablet    glimepiride (AMARYL) 4 MG tablet    lisinopriL (PRINIVIL,ZESTRIL) 20 MG tablet    furosemide (LASIX) 40 MG tablet    Type II diabetes mellitus with renal manifestations, uncontrolled - Primary    Relevant Medications    dulaglutide (TRULICITY) 4.5 mg/0.5 mL pen injector    nateglinide (STARLIX) 120 MG tablet    empagliflozin (JARDIANCE) 25 mg tablet    glimepiride (AMARYL) 4 MG tablet    Other Relevant Orders    Ambulatory referral/consult to Optometry      Other Visit Diagnoses     Uncontrolled type 2 diabetes mellitus with stage 3 chronic kidney disease, without long-term current use of insulin        Relevant Medications    dulaglutide (TRULICITY) 4.5 mg/0.5 mL pen injector    nateglinide (STARLIX) 120 MG tablet    empagliflozin (JARDIANCE) 25 mg tablet    glimepiride (AMARYL) 4 MG tablet        No follow-ups on file.    Héctor was seen today for hypertension.    Diagnoses and all orders for this visit:    Type II diabetes mellitus with renal manifestations, uncontrolled  -     Ambulatory referral/consult to Optometry; Future  -     dulaglutide (TRULICITY) 4.5 mg/0.5 mL pen injector; Inject 4.5 mg into the skin every 7 days.    Hypertension associated with diabetes  -     lisinopriL (PRINIVIL,ZESTRIL) 20 MG tablet; Take 1 tablet (20 mg total) by mouth 2 (two) times daily.  -     furosemide (LASIX) 40 MG tablet; Take 1 tablet (40 mg total) by mouth once daily.    Stage 3b chronic kidney disease    Combined hyperlipidemia associated with type 2 diabetes mellitus  -     atorvastatin (LIPITOR) 40 MG tablet; Take 1 tablet (40 mg total) by mouth once daily.    Uncontrolled type 2 diabetes mellitus  with stage 3 chronic kidney disease, without long-term current use of insulin  -     nateglinide (STARLIX) 120 MG tablet; Take 1 tablet (120 mg total) by mouth 3 (three) times daily before meals. If you skip a meal, do not take dose  -     empagliflozin (JARDIANCE) 25 mg tablet; Take 1 tablet (25 mg total) by mouth once daily.  -     glimepiride (AMARYL) 4 MG tablet; Take 1 tablet (4 mg total) by mouth 2 (two) times daily before meals.    Edema, unspecified type  -     furosemide (LASIX) 40 MG tablet; Take 1 tablet (40 mg total) by mouth once daily.    Anxiety  -     DULoxetine (CYMBALTA) 60 MG capsule; Take 1 capsule (60 mg total) by mouth once daily.    Diabetic polyneuropathy associated with type 2 diabetes mellitus  -     DULoxetine (CYMBALTA) 60 MG capsule; Take 1 capsule (60 mg total) by mouth once daily.    Persistent atrial fibrillation  -     rivaroxaban (XARELTO) 15 mg Tab; Take 1 tablet (15 mg total) by mouth daily with dinner or evening meal.    Chronic atrial fibrillation  -     rivaroxaban (XARELTO) 15 mg Tab; Take 1 tablet (15 mg total) by mouth daily with dinner or evening meal.    Other orders  -     metoprolol succinate (TOPROL-XL) 50 MG 24 hr tablet; Take 1 tablet (50 mg total) by mouth once daily.  -     Influenza - Quadrivalent (Adjuvanted); Future      Medications Ordered This Encounter   Medications    atorvastatin (LIPITOR) 40 MG tablet     Sig: Take 1 tablet (40 mg total) by mouth once daily.     Dispense:  90 tablet     Refill:  3    dulaglutide (TRULICITY) 4.5 mg/0.5 mL pen injector     Sig: Inject 4.5 mg into the skin every 7 days.     Dispense:  12 pen     Refill:  3    DULoxetine (CYMBALTA) 60 MG capsule     Sig: Take 1 capsule (60 mg total) by mouth once daily.     Dispense:  90 capsule     Refill:  3    empagliflozin (JARDIANCE) 25 mg tablet     Sig: Take 1 tablet (25 mg total) by mouth once daily.     Dispense:  90 tablet     Refill:  1    furosemide (LASIX) 40 MG tablet      Sig: Take 1 tablet (40 mg total) by mouth once daily.     Dispense:  90 tablet     Refill:  3    glimepiride (AMARYL) 4 MG tablet     Sig: Take 1 tablet (4 mg total) by mouth 2 (two) times daily before meals.     Dispense:  180 tablet     Refill:  1    lisinopriL (PRINIVIL,ZESTRIL) 20 MG tablet     Sig: Take 1 tablet (20 mg total) by mouth 2 (two) times daily.     Dispense:  180 tablet     Refill:  3     .    metoprolol succinate (TOPROL-XL) 50 MG 24 hr tablet     Sig: Take 1 tablet (50 mg total) by mouth once daily.     Dispense:  90 tablet     Refill:  3     .    nateglinide (STARLIX) 120 MG tablet     Sig: Take 1 tablet (120 mg total) by mouth 3 (three) times daily before meals. If you skip a meal, do not take dose     Dispense:  270 tablet     Refill:  1    rivaroxaban (XARELTO) 15 mg Tab     Sig: Take 1 tablet (15 mg total) by mouth daily with dinner or evening meal.     Dispense:  90 tablet     Refill:  3     The patient was instructed to stop the following meds:  Medications Discontinued During This Encounter   Medication Reason    loratadine (CLARITIN REDITABS) 10 mg dissolvable tablet Patient no longer taking    fluticasone propionate (FLONASE) 50 mcg/actuation nasal spray Patient no longer taking    glimepiride (AMARYL) 4 MG tablet Reorder    nateglinide (STARLIX) 120 MG tablet Reorder    DULoxetine (CYMBALTA) 60 MG capsule Reorder    dulaglutide (TRULICITY) 4.5 mg/0.5 mL pen injector Reorder    JARDIANCE 25 mg tablet Reorder    metoprolol succinate (TOPROL-XL) 50 MG 24 hr tablet Reorder    rivaroxaban (XARELTO) 15 mg Tab Reorder    lisinopriL (PRINIVIL,ZESTRIL) 20 MG tablet Reorder    furosemide (LASIX) 40 MG tablet Reorder    atorvastatin (LIPITOR) 40 MG tablet Reorder     Orders Placed This Encounter   Procedures    Influenza - Quadrivalent (Adjuvanted)     Administer the flu vaccine to the patient in the office.     Standing Status:   Future     Standing Expiration Date:   4/25/2023     Ambulatory referral/consult to Optometry     Standing Status:   Future     Standing Expiration Date:   5/25/2023     Referral Priority:   Routine     Referral Type:   [facility] (Optometry)     Referral Reason:   Specialty Services Required     Requested Specialty:   Optometry       Medication List with Changes/Refills   Current Medications    BLOOD SUGAR DIAGNOSTIC STRP    Use daily- Freestyle lite    BLOOD-GLUCOSE METER KIT    Use before meals and before bed when the glucoses are not in control.  Otherwise, test it daily once a day before meals randomly to ensure stability of the gluocse.    ERGOCALCIFEROL, VITAMIN D2, (VITAMIN D ORAL)    Take by mouth once daily.     GAVILYTE-G 236-22.74-6.74 -5.86 GRAM SUSPENSION    USE AS DIRECTED    OMEPRAZOLE (PRILOSEC) 20 MG CAPSULE    Take 1 capsule (20 mg total) by mouth once daily.   Changed and/or Refilled Medications    Modified Medication Previous Medication    ATORVASTATIN (LIPITOR) 40 MG TABLET atorvastatin (LIPITOR) 40 MG tablet       Take 1 tablet (40 mg total) by mouth once daily.    Take 1 tablet (40 mg total) by mouth once daily.    DULAGLUTIDE (TRULICITY) 4.5 MG/0.5 ML PEN INJECTOR dulaglutide (TRULICITY) 4.5 mg/0.5 mL pen injector       Inject 4.5 mg into the skin every 7 days.    Inject 4.5 mg into the skin every 7 days.    DULOXETINE (CYMBALTA) 60 MG CAPSULE DULoxetine (CYMBALTA) 60 MG capsule       Take 1 capsule (60 mg total) by mouth once daily.    TAKE 1 CAPSULE DAILY    EMPAGLIFLOZIN (JARDIANCE) 25 MG TABLET JARDIANCE 25 mg tablet       Take 1 tablet (25 mg total) by mouth once daily.    TAKE 1 TABLET DAILY    FUROSEMIDE (LASIX) 40 MG TABLET furosemide (LASIX) 40 MG tablet       Take 1 tablet (40 mg total) by mouth once daily.    Take 1 tablet (40 mg total) by mouth once daily.    GLIMEPIRIDE (AMARYL) 4 MG TABLET glimepiride (AMARYL) 4 MG tablet       Take 1 tablet (4 mg total) by mouth 2 (two) times daily before meals.    Take 1 tablet (4 mg total) by  mouth 2 (two) times daily before meals.    LISINOPRIL (PRINIVIL,ZESTRIL) 20 MG TABLET lisinopriL (PRINIVIL,ZESTRIL) 20 MG tablet       Take 1 tablet (20 mg total) by mouth 2 (two) times daily.    Take 1 tablet (20 mg total) by mouth 2 (two) times daily.    METOPROLOL SUCCINATE (TOPROL-XL) 50 MG 24 HR TABLET metoprolol succinate (TOPROL-XL) 50 MG 24 hr tablet       Take 1 tablet (50 mg total) by mouth once daily.    Take 1 tablet (50 mg total) by mouth once daily.    NATEGLINIDE (STARLIX) 120 MG TABLET nateglinide (STARLIX) 120 MG tablet       Take 1 tablet (120 mg total) by mouth 3 (three) times daily before meals. If you skip a meal, do not take dose    Take 1 tablet (120 mg total) by mouth 3 (three) times daily before meals. If you skip a meal, do not take dose    RIVAROXABAN (XARELTO) 15 MG TAB rivaroxaban (XARELTO) 15 mg Tab       Take 1 tablet (15 mg total) by mouth daily with dinner or evening meal.    Take 1 tablet (15 mg total) by mouth daily with dinner or evening meal.   Discontinued Medications    FLUTICASONE PROPIONATE (FLONASE) 50 MCG/ACTUATION NASAL SPRAY    2 sprays (100 mcg total) by Each Nostril route once daily.    LORATADINE (CLARITIN REDITABS) 10 MG DISSOLVABLE TABLET    Take 1 tablet (10 mg total) by mouth once daily.      Medication List with Changes/Refills   Current Medications    BLOOD SUGAR DIAGNOSTIC STRP    Use daily- Freestyle lite       Start Date: 8/18/2017 End Date: --    BLOOD-GLUCOSE METER KIT    Use before meals and before bed when the glucoses are not in control.  Otherwise, test it daily once a day before meals randomly to ensure stability of the gluocse.       Start Date: 1/13/2016 End Date: --    ERGOCALCIFEROL, VITAMIN D2, (VITAMIN D ORAL)    Take by mouth once daily.        Start Date: --        End Date: --    GAVILYTE-G 236-22.74-6.74 -5.86 GRAM SUSPENSION    USE AS DIRECTED       Start Date: 12/17/2019End Date: --    OMEPRAZOLE (PRILOSEC) 20 MG CAPSULE    Take 1 capsule  (20 mg total) by mouth once daily.       Start Date: 12/21/2021End Date: --   Changed and/or Refilled Medications    Modified Medication Previous Medication    ATORVASTATIN (LIPITOR) 40 MG TABLET atorvastatin (LIPITOR) 40 MG tablet       Take 1 tablet (40 mg total) by mouth once daily.    Take 1 tablet (40 mg total) by mouth once daily.       Start Date: 4/25/2022 End Date: --    Start Date: 2/14/2022 End Date: 4/25/2022    DULAGLUTIDE (TRULICITY) 4.5 MG/0.5 ML PEN INJECTOR dulaglutide (TRULICITY) 4.5 mg/0.5 mL pen injector       Inject 4.5 mg into the skin every 7 days.    Inject 4.5 mg into the skin every 7 days.       Start Date: 4/25/2022 End Date: 4/25/2023    Start Date: 8/12/2021 End Date: 4/25/2022    DULOXETINE (CYMBALTA) 60 MG CAPSULE DULoxetine (CYMBALTA) 60 MG capsule       Take 1 capsule (60 mg total) by mouth once daily.    TAKE 1 CAPSULE DAILY       Start Date: 4/25/2022 End Date: --    Start Date: 4/24/2021 End Date: 4/25/2022    EMPAGLIFLOZIN (JARDIANCE) 25 MG TABLET JARDIANCE 25 mg tablet       Take 1 tablet (25 mg total) by mouth once daily.    TAKE 1 TABLET DAILY       Start Date: 4/25/2022 End Date: --    Start Date: 1/10/2022 End Date: 4/25/2022    FUROSEMIDE (LASIX) 40 MG TABLET furosemide (LASIX) 40 MG tablet       Take 1 tablet (40 mg total) by mouth once daily.    Take 1 tablet (40 mg total) by mouth once daily.       Start Date: 4/25/2022 End Date: --    Start Date: 2/14/2022 End Date: 4/25/2022    GLIMEPIRIDE (AMARYL) 4 MG TABLET glimepiride (AMARYL) 4 MG tablet       Take 1 tablet (4 mg total) by mouth 2 (two) times daily before meals.    Take 1 tablet (4 mg total) by mouth 2 (two) times daily before meals.       Start Date: 4/25/2022 End Date: 7/24/2022    Start Date: 2/4/2021  End Date: 4/25/2022    LISINOPRIL (PRINIVIL,ZESTRIL) 20 MG TABLET lisinopriL (PRINIVIL,ZESTRIL) 20 MG tablet       Take 1 tablet (20 mg total) by mouth 2 (two) times daily.    Take 1 tablet (20 mg total) by mouth  2 (two) times daily.       Start Date: 4/25/2022 End Date: 4/25/2023    Start Date: 2/14/2022 End Date: 4/25/2022    METOPROLOL SUCCINATE (TOPROL-XL) 50 MG 24 HR TABLET metoprolol succinate (TOPROL-XL) 50 MG 24 hr tablet       Take 1 tablet (50 mg total) by mouth once daily.    Take 1 tablet (50 mg total) by mouth once daily.       Start Date: 4/25/2022 End Date: --    Start Date: 2/14/2022 End Date: 4/25/2022    NATEGLINIDE (STARLIX) 120 MG TABLET nateglinide (STARLIX) 120 MG tablet       Take 1 tablet (120 mg total) by mouth 3 (three) times daily before meals. If you skip a meal, do not take dose    Take 1 tablet (120 mg total) by mouth 3 (three) times daily before meals. If you skip a meal, do not take dose       Start Date: 4/25/2022 End Date: --    Start Date: 2/4/2021  End Date: 4/25/2022    RIVAROXABAN (XARELTO) 15 MG TAB rivaroxaban (XARELTO) 15 mg Tab       Take 1 tablet (15 mg total) by mouth daily with dinner or evening meal.    Take 1 tablet (15 mg total) by mouth daily with dinner or evening meal.       Start Date: 4/25/2022 End Date: --    Start Date: 2/14/2022 End Date: 4/25/2022   Discontinued Medications    FLUTICASONE PROPIONATE (FLONASE) 50 MCG/ACTUATION NASAL SPRAY    2 sprays (100 mcg total) by Each Nostril route once daily.       Start Date: 12/7/2021 End Date: 4/25/2022    LORATADINE (CLARITIN REDITABS) 10 MG DISSOLVABLE TABLET    Take 1 tablet (10 mg total) by mouth once daily.       Start Date: 12/7/2021 End Date: 4/25/2022

## 2022-05-23 ENCOUNTER — OFFICE VISIT (OUTPATIENT)
Dept: OPTOMETRY | Facility: CLINIC | Age: 87
End: 2022-05-23
Payer: MEDICARE

## 2022-05-23 DIAGNOSIS — Z96.1 BILATERAL PSEUDOPHAKIA: ICD-10-CM

## 2022-05-23 DIAGNOSIS — E11.9 DIABETES MELLITUS TYPE 2 WITHOUT RETINOPATHY: Primary | ICD-10-CM

## 2022-05-23 PROCEDURE — 99999 PR PBB SHADOW E&M-EST. PATIENT-LVL III: ICD-10-PCS | Mod: PBBFAC,,, | Performed by: OPTOMETRIST

## 2022-05-23 PROCEDURE — 99999 PR PBB SHADOW E&M-EST. PATIENT-LVL III: CPT | Mod: PBBFAC,,, | Performed by: OPTOMETRIST

## 2022-05-23 PROCEDURE — 99213 OFFICE O/P EST LOW 20 MIN: CPT | Mod: PBBFAC,PO | Performed by: OPTOMETRIST

## 2022-05-23 PROCEDURE — 92014 COMPRE OPH EXAM EST PT 1/>: CPT | Mod: S$PBB,,, | Performed by: OPTOMETRIST

## 2022-05-23 PROCEDURE — 92014 PR EYE EXAM, EST PATIENT,COMPREHESV: ICD-10-PCS | Mod: S$PBB,,, | Performed by: OPTOMETRIST

## 2022-05-23 NOTE — PROGRESS NOTES
HPI     Annual DM2 exam    Pt. States no changes in VA that he has noticed and no problems.  + floaters x's a few years with no change  Denies flashes, pain, or use of gtts.    Hemoglobin A1C       Date                     Value               Ref Range             Status                03/24/2022               6.7 (H)             4.0 - 5.6 %           Final                   11/04/2021               6.7 (H)             4.0 - 5.6 %           Final                   08/09/2021               8.4 (H)             4.0 - 5.6 %           Final             Last edited by Samantha Todd on 5/23/2022 10:35 AM. (History)            Assessment /Plan     For exam results, see Encounter Report.    Diabetes mellitus type 2 without retinopathy    Type II diabetes mellitus with renal manifestations, uncontrolled  -     Ambulatory referral/consult to Optometry    Bilateral pseudophakia      1,2. No diabetic retinopathy, no csme. Return in 1 year for dilated eye exam.  3. Monitor condition. Patient to report any changes. RTC 1 year recheck.

## 2022-05-26 ENCOUNTER — PATIENT MESSAGE (OUTPATIENT)
Dept: FAMILY MEDICINE | Facility: CLINIC | Age: 87
End: 2022-05-26
Payer: MEDICARE

## 2022-06-30 ENCOUNTER — OFFICE VISIT (OUTPATIENT)
Dept: DIABETES | Facility: CLINIC | Age: 87
End: 2022-06-30
Payer: MEDICARE

## 2022-06-30 VITALS
HEIGHT: 68 IN | DIASTOLIC BLOOD PRESSURE: 73 MMHG | HEART RATE: 74 BPM | BODY MASS INDEX: 33.19 KG/M2 | SYSTOLIC BLOOD PRESSURE: 125 MMHG | WEIGHT: 219 LBS

## 2022-06-30 LAB — GLUCOSE SERPL-MCNC: 133 MG/DL (ref 70–110)

## 2022-06-30 PROCEDURE — 82962 GLUCOSE BLOOD TEST: CPT | Mod: PBBFAC,PO | Performed by: NURSE PRACTITIONER

## 2022-06-30 PROCEDURE — 99999 PR PBB SHADOW E&M-EST. PATIENT-LVL IV: CPT | Mod: PBBFAC,,, | Performed by: NURSE PRACTITIONER

## 2022-06-30 PROCEDURE — 99214 OFFICE O/P EST MOD 30 MIN: CPT | Mod: S$PBB,,, | Performed by: NURSE PRACTITIONER

## 2022-06-30 PROCEDURE — 99214 OFFICE O/P EST MOD 30 MIN: CPT | Mod: PBBFAC,PO | Performed by: NURSE PRACTITIONER

## 2022-06-30 PROCEDURE — 99999 PR PBB SHADOW E&M-EST. PATIENT-LVL IV: ICD-10-PCS | Mod: PBBFAC,,, | Performed by: NURSE PRACTITIONER

## 2022-06-30 PROCEDURE — 99214 PR OFFICE/OUTPT VISIT, EST, LEVL IV, 30-39 MIN: ICD-10-PCS | Mod: S$PBB,,, | Performed by: NURSE PRACTITIONER

## 2022-06-30 NOTE — PROGRESS NOTES
"Subjective:         Patient ID: Héctor Desir is a 87 y.o. male.  Patient's current PCP is Tae Goel MD.       Chief Complaint: No chief complaint on file.    HPI  Héctor Desir is a 87 y.o. White male presenting for a follow up for diabetes. Patient has been diagnosed with diabetes for several years and has the following complications from diabetes: nephropathy, peripheral neuropathy, cardiovascular disease and cerebrovascular disease, HTN, Hyperlipidemia. Blood glucose testing is not performed. Tired of sticking finger, does not qualify for CGM. Denies symptoms of hypoglycemia. Reports adequate hydration.    Height: 5' 8" (172.7 cm)  //  Weight: 99.3 kg (219 lb), Body mass index is 33.3 kg/m².  His blood sugar in clinic today is:   Lab Results   Component Value Date    POCGLU 133 (A) 06/30/2022       Labs reviewed and are noted below.  His most recent A1C is:  Lab Results   Component Value Date    HGBA1C 6.7 (H) 03/24/2022    HGBA1C 6.7 (H) 11/04/2021    HGBA1C 8.4 (H) 08/09/2021     Lab Results   Component Value Date    CPEPTIDE 8.85 (H) 04/25/2019     Lab Results   Component Value Date    GLUTAMICACID 0.00 04/25/2019     Glucose   Date Value Ref Range Status   12/17/2021 215 (H) 70 - 110 mg/dL Final     Anion Gap   Date Value Ref Range Status   12/17/2021 11 8 - 16 mmol/L Final     eGFR if    Date Value Ref Range Status   12/17/2021 45.1 (A) >60 mL/min/1.73 m^2 Final     eGFR if non    Date Value Ref Range Status   12/17/2021 39.0 (A) >60 mL/min/1.73 m^2 Final     Comment:     Calculation used to obtain the estimated glomerular filtration  rate (eGFR) is the CKD-EPI equation.          CURRENT DM MEDICATIONS:   Diabetes Medications             dulaglutide (TRULICITY) 4.5 mg/0.5 mL pen injector Inject 4.5 mg into the skin every 7 days.    empagliflozin (JARDIANCE) 25 mg tablet Take 1 tablet (25 mg total) by mouth once daily.    glimepiride (AMARYL) 4 MG tablet Take 1 tablet " (4 mg total) by mouth 2 (two) times daily before meals.    nateglinide (STARLIX) 120 MG tablet Take 1 tablet (120 mg total) by mouth 3 (three) times daily before meals. If you skip a meal, do not take dose          Diabetes Management Status    Statin: Taking  ACE/ARB: Taking    Screening or Prevention Patient's value Goal Complete/Controlled?   HgA1C Testing and Control   Lab Results   Component Value Date    HGBA1C 6.7 (H) 03/24/2022      Annually/Less than 8% Yes   Lipid profile : 03/24/2022 Annually Yes   LDL control Lab Results   Component Value Date    LDLCALC 65.6 03/24/2022    Annually/Less than 100 mg/dl  Yes   Nephropathy screening Lab Results   Component Value Date    LABMICR 10.0 03/24/2022     Lab Results   Component Value Date    PROTEINUA Trace (A) 06/20/2018    Annually Yes   Blood pressure BP Readings from Last 1 Encounters:   06/30/22 125/73    Less than 140/90 Yes   Dilated retinal exam : 05/23/2022 Annually Yes   Foot exam   : 11/04/2021 Annually Yes     Review of Systems   Constitutional: Negative for activity change and appetite change.   HENT: Negative for sore throat, trouble swallowing and voice change.    Eyes: Negative for visual disturbance.   Gastrointestinal: Positive for constipation. Negative for abdominal pain, diarrhea, nausea and vomiting.   Endocrine: Positive for polyuria (WNL for pt). Negative for polydipsia and polyphagia.   Genitourinary: Negative for dysuria.   Skin: Negative for rash.   Neurological: Negative for syncope and weakness.   Psychiatric/Behavioral: Negative for confusion.         Objective:      Physical Exam  Constitutional:       General: He is not in acute distress.     Appearance: He is well-developed. He is not ill-appearing, toxic-appearing or diaphoretic.   Neck:      Thyroid: No thyroid mass.   Musculoskeletal:         General: Normal range of motion.      Cervical back: Normal range of motion.   Skin:     General: Skin is warm and dry.   Neurological:       Mental Status: He is alert and oriented to person, place, and time.   Psychiatric:         Behavior: Behavior normal.         Thought Content: Thought content normal.         Judgment: Judgment normal.         Assessment:       1. Uncontrolled type 2 diabetes mellitus with stage 3 chronic kidney disease, without long-term current use of insulin        Plan:   Uncontrolled type 2 diabetes mellitus with stage 3 chronic kidney disease, without long-term current use of insulin  -     POCT Glucose, Hand-Held Device      PLAN:   - Condition: good control based on the last A1c   - Monitor blood glucose 2x daily, fasting and ac dinner or at bedtime.   - Hypoglycemia protocol reviewed  - Diet reviewed  - Medication Changes: Continue Medications  - Home tx of constipation reviewed  - Discussed monitoring for neck masses, stay hydrated,  - Labs ordered as above  - Follow up: 3 months    A total of 30 minutes was spent in face to face time, of which over 50% was spent in counseling patient on disease process, complications, treatment, and side effects of medications.

## 2022-06-30 NOTE — PATIENT INSTRUCTIONS
- Continue medications  - Try Miralax for constipation (may take 3 days to start working)  - Increase water, fruit and vegetables intake

## 2022-07-08 ENCOUNTER — PATIENT MESSAGE (OUTPATIENT)
Dept: FAMILY MEDICINE | Facility: CLINIC | Age: 87
End: 2022-07-08
Payer: MEDICARE

## 2022-08-17 ENCOUNTER — LAB VISIT (OUTPATIENT)
Dept: LAB | Facility: HOSPITAL | Age: 87
End: 2022-08-17
Attending: INTERNAL MEDICINE
Payer: MEDICARE

## 2022-08-17 DIAGNOSIS — N18.32 STAGE 3B CHRONIC KIDNEY DISEASE: ICD-10-CM

## 2022-08-17 LAB
CREAT UR-MCNC: 109 MG/DL (ref 23–375)
PROT UR-MCNC: <7 MG/DL (ref 0–15)
PROT/CREAT UR: NORMAL MG/G{CREAT} (ref 0–0.2)

## 2022-08-17 PROCEDURE — 82570 ASSAY OF URINE CREATININE: CPT | Performed by: INTERNAL MEDICINE

## 2022-08-18 ENCOUNTER — TELEPHONE (OUTPATIENT)
Dept: DIABETES | Facility: CLINIC | Age: 87
End: 2022-08-18
Payer: MEDICARE

## 2022-08-18 NOTE — TELEPHONE ENCOUNTER
Left message for pt to return call to reschedule appt with Judith Howard due to provider no longer seeing pts in clinic.

## 2022-09-12 ENCOUNTER — OFFICE VISIT (OUTPATIENT)
Dept: NEPHROLOGY | Facility: CLINIC | Age: 87
End: 2022-09-12
Payer: MEDICARE

## 2022-09-12 VITALS
BODY MASS INDEX: 33.33 KG/M2 | DIASTOLIC BLOOD PRESSURE: 62 MMHG | HEIGHT: 68 IN | SYSTOLIC BLOOD PRESSURE: 122 MMHG | HEART RATE: 108 BPM | OXYGEN SATURATION: 98 % | WEIGHT: 219.88 LBS

## 2022-09-12 DIAGNOSIS — N25.81 SECONDARY HYPERPARATHYROIDISM, RENAL: ICD-10-CM

## 2022-09-12 DIAGNOSIS — E11.59 HYPERTENSION ASSOCIATED WITH DIABETES: ICD-10-CM

## 2022-09-12 DIAGNOSIS — K21.9 GASTROESOPHAGEAL REFLUX DISEASE, UNSPECIFIED WHETHER ESOPHAGITIS PRESENT: ICD-10-CM

## 2022-09-12 DIAGNOSIS — N18.32 STAGE 3B CHRONIC KIDNEY DISEASE: Primary | ICD-10-CM

## 2022-09-12 DIAGNOSIS — E11.69 COMBINED HYPERLIPIDEMIA ASSOCIATED WITH TYPE 2 DIABETES MELLITUS: ICD-10-CM

## 2022-09-12 DIAGNOSIS — I15.2 HYPERTENSION ASSOCIATED WITH DIABETES: ICD-10-CM

## 2022-09-12 DIAGNOSIS — I48.19 PERSISTENT ATRIAL FIBRILLATION: ICD-10-CM

## 2022-09-12 DIAGNOSIS — E11.42 DIABETIC POLYNEUROPATHY ASSOCIATED WITH TYPE 2 DIABETES MELLITUS: ICD-10-CM

## 2022-09-12 DIAGNOSIS — E78.2 COMBINED HYPERLIPIDEMIA ASSOCIATED WITH TYPE 2 DIABETES MELLITUS: ICD-10-CM

## 2022-09-12 PROCEDURE — 99999 PR PBB SHADOW E&M-EST. PATIENT-LVL III: ICD-10-PCS | Mod: PBBFAC,,, | Performed by: INTERNAL MEDICINE

## 2022-09-12 PROCEDURE — 99214 OFFICE O/P EST MOD 30 MIN: CPT | Mod: S$PBB,,, | Performed by: INTERNAL MEDICINE

## 2022-09-12 PROCEDURE — 99999 PR PBB SHADOW E&M-EST. PATIENT-LVL III: CPT | Mod: PBBFAC,,, | Performed by: INTERNAL MEDICINE

## 2022-09-12 PROCEDURE — 99214 PR OFFICE/OUTPT VISIT, EST, LEVL IV, 30-39 MIN: ICD-10-PCS | Mod: S$PBB,,, | Performed by: INTERNAL MEDICINE

## 2022-09-12 PROCEDURE — 99213 OFFICE O/P EST LOW 20 MIN: CPT | Mod: PBBFAC,PO | Performed by: INTERNAL MEDICINE

## 2022-09-12 NOTE — PROGRESS NOTES
Subjective:       Patient ID: Héctor Desir is a 87 y.o. White male who presents for follow-up evaluation of Chronic Kidney Disease    HPI   He reports ongoing stress due to his wife's illness. She is no longer on hospice--but he needs to interact with her daughter who can be challenging. He feels overall well and notes his BS are running better, last Hba1c was 7.1. No LE edema nor SOB. Nocturia is 1-2X.       Interval history Feb 2019: Lost to follow up. Wife passed. He's in a new place, doing better since his Hbac1 went up. Saw Endocrine.      Interval history Dec 2019: doing well. No LE edema and no SOB. NO new medications. Got flu shot.      Interval history May 2020:  The patient location is: Home  The chief complaint leading to consultation is: CKD  Visit type: audiovisual  Total time spent with patient: 23 minutes  Each patient to whom he or she provides medical services by telemedicine is:  (1) informed of the relationship between the physician and patient and the respective role of any other health care provider with respect to management of the patient; and (2) notified that he or she may decline to receive medical services by telemedicine and may withdraw from such care at any time.   Notes: He feels well, no change in medical status. No LE edema. Last Hba1c was 7.1       Interval history August 2020: He is feeling very well. He came back from a fishing trip today. No LE edema. He thinks his BS are running pretty well when he checks them, last Hba1c was 7.2    Interval history Jan 2021: Son had COVID, thus he quarantined. He wants COVID vaccine. Overall feels well. Did not get a dog yet    Interval history Dec 2021:  Fishing, staying active. Now on Trulicity--no side effects.      Sept 2022: He is doing well. Last HBa1c was 6.7. Some LE edema. Escaped COVID thus far (known) Has olive oil daily       Review of Systems   Constitutional:  Negative for activity change, appetite change, fatigue and unexpected  weight change.   HENT:  Negative for facial swelling.    Eyes:  Negative for visual disturbance.   Respiratory:  Negative for shortness of breath.    Cardiovascular:  Negative for chest pain and leg swelling.   Gastrointestinal:  Positive for constipation.   Endocrine: Negative for polydipsia and polyuria.   Genitourinary:  Positive for frequency. Negative for difficulty urinating, dysuria and hematuria.   Musculoskeletal:  Negative for arthralgias.   Skin:  Negative for rash.   Allergic/Immunologic: Positive for immunocompromised state.   Neurological:  Negative for weakness and headaches.   Psychiatric/Behavioral:  Negative for confusion and decreased concentration.      Objective:      Physical Exam  Vitals and nursing note reviewed.   Constitutional:       General: He is not in acute distress.     Appearance: Normal appearance. He is well-developed. He is not ill-appearing.   HENT:      Head: Atraumatic.   Eyes:      General: No scleral icterus.     Conjunctiva/sclera: Conjunctivae normal.   Neck:      Vascular: No JVD.   Cardiovascular:      Rate and Rhythm: Normal rate.      Heart sounds: Normal heart sounds, S1 normal and S2 normal.     No friction rub.   Pulmonary:      Breath sounds: Normal breath sounds. No wheezing or rales.   Abdominal:      Palpations: Abdomen is soft.   Musculoskeletal:      Cervical back: Neck supple.      Right lower leg: No edema.      Left lower leg: No edema.   Skin:     General: Skin is warm and dry.   Neurological:      Mental Status: He is alert and oriented to person, place, and time. Mental status is at baseline.   Psychiatric:         Mood and Affect: Mood normal.         Behavior: Behavior normal.         Thought Content: Thought content normal.         Judgment: Judgment normal.          Assessment:       1. Stage 3b chronic kidney disease    2. Hypertension associated with diabetes    3. Secondary hyperparathyroidism, renal    4. Persistent atrial fibrillation    5.  Uncontrolled type 2 diabetes mellitus with stage 3 chronic kidney disease, without long-term current use of insulin    6. Type II diabetes mellitus with neurological manifestations, uncontrolled    7. Gastroesophageal reflux disease, unspecified whether esophagitis present    8. Diabetic polyneuropathy associated with type 2 diabetes mellitus    9. Type II diabetes mellitus with renal manifestations, uncontrolled    10. Combined hyperlipidemia associated with type 2 diabetes mellitus            Plan:             CKD stage 3 with stable kidney function.  Continue RAAS blockade (lisinopril) for renal preservation    HTN is controlled    DM--improved with Ozempic, per PCP     Mineral and Bone Disease--continue D2. PTH increased, continue to trend      RTC 4 months  44 minutes of total time spent on the encounter, which includes face to face time and non-face to face time preparing to see the patient (eg, review of tests), Obtaining and/or reviewing separately obtained history, Documenting clinical information in the electronic or other health record, Independently interpreting results (not separately reported) and communicating results to the patient/family/caregiver, or Care coordination (not separately reported).

## 2022-09-29 ENCOUNTER — LAB VISIT (OUTPATIENT)
Dept: LAB | Facility: HOSPITAL | Age: 87
End: 2022-09-29
Payer: MEDICARE

## 2022-09-29 LAB
ALBUMIN SERPL BCP-MCNC: 3.8 G/DL (ref 3.5–5.2)
ALP SERPL-CCNC: 119 U/L (ref 55–135)
ALT SERPL W/O P-5'-P-CCNC: 22 U/L (ref 10–44)
ANION GAP SERPL CALC-SCNC: 11 MMOL/L (ref 8–16)
AST SERPL-CCNC: 19 U/L (ref 10–40)
BILIRUB SERPL-MCNC: 1 MG/DL (ref 0.1–1)
BUN SERPL-MCNC: 22 MG/DL (ref 8–23)
CALCIUM SERPL-MCNC: 8.6 MG/DL (ref 8.7–10.5)
CHLORIDE SERPL-SCNC: 107 MMOL/L (ref 95–110)
CO2 SERPL-SCNC: 25 MMOL/L (ref 23–29)
CREAT SERPL-MCNC: 1.3 MG/DL (ref 0.5–1.4)
EST. GFR  (NO RACE VARIABLE): 53.8 ML/MIN/1.73 M^2
ESTIMATED AVG GLUCOSE: 148 MG/DL (ref 68–131)
GLUCOSE SERPL-MCNC: 124 MG/DL (ref 70–110)
HBA1C MFR BLD: 6.8 % (ref 4–5.6)
POTASSIUM SERPL-SCNC: 4.1 MMOL/L (ref 3.5–5.1)
PROT SERPL-MCNC: 6.1 G/DL (ref 6–8.4)
SODIUM SERPL-SCNC: 143 MMOL/L (ref 136–145)

## 2022-09-29 PROCEDURE — 80053 COMPREHEN METABOLIC PANEL: CPT | Performed by: FAMILY MEDICINE

## 2022-09-29 PROCEDURE — 36415 COLL VENOUS BLD VENIPUNCTURE: CPT | Mod: PO | Performed by: FAMILY MEDICINE

## 2022-09-29 PROCEDURE — 83036 HEMOGLOBIN GLYCOSYLATED A1C: CPT | Performed by: NURSE PRACTITIONER

## 2022-11-26 ENCOUNTER — PATIENT MESSAGE (OUTPATIENT)
Dept: FAMILY MEDICINE | Facility: CLINIC | Age: 87
End: 2022-11-26
Payer: MEDICARE

## 2022-11-26 RX ORDER — NATEGLINIDE 120 MG/1
120 TABLET ORAL
Qty: 270 TABLET | Refills: 0 | Status: SHIPPED | OUTPATIENT
Start: 2022-11-26 | End: 2022-12-26 | Stop reason: SDUPTHER

## 2022-11-26 NOTE — TELEPHONE ENCOUNTER
No new care gaps identified.  [facility] Embedded Care Gaps. Reference number: [id number]. 11/26/2022   9:55:02 AM CST

## 2022-11-26 NOTE — TELEPHONE ENCOUNTER
The patient requested a medicine refill which I have done x 1.  However, the following health maintenance is due.  Please arrange for the patient to have this updated.  Health Maintenance Due   Topic Date Due    Shingles Vaccine (1 of 2) Never done    Influenza Vaccine (1) 09/01/2022    Foot Exam  11/04/2022

## 2022-12-09 ENCOUNTER — HOSPITAL ENCOUNTER (OUTPATIENT)
Dept: RADIOLOGY | Facility: HOSPITAL | Age: 87
Discharge: HOME | End: 2022-12-09
Attending: PHYSICIAN ASSISTANT
Payer: MEDICARE

## 2022-12-09 ENCOUNTER — OFFICE VISIT (OUTPATIENT)
Dept: FAMILY MEDICINE | Facility: CLINIC | Age: 87
End: 2022-12-09
Payer: MEDICARE

## 2022-12-09 VITALS
HEIGHT: 68 IN | TEMPERATURE: 98 F | DIASTOLIC BLOOD PRESSURE: 73 MMHG | HEART RATE: 78 BPM | WEIGHT: 218.5 LBS | SYSTOLIC BLOOD PRESSURE: 128 MMHG | BODY MASS INDEX: 33.12 KG/M2

## 2022-12-09 DIAGNOSIS — J98.8 BACTERIAL RESPIRATORY INFECTION: Primary | ICD-10-CM

## 2022-12-09 DIAGNOSIS — B96.89 BACTERIAL RESPIRATORY INFECTION: ICD-10-CM

## 2022-12-09 DIAGNOSIS — R05.9 COUGH, UNSPECIFIED TYPE: ICD-10-CM

## 2022-12-09 DIAGNOSIS — B96.89 BACTERIAL RESPIRATORY INFECTION: Primary | ICD-10-CM

## 2022-12-09 DIAGNOSIS — J98.8 BACTERIAL RESPIRATORY INFECTION: ICD-10-CM

## 2022-12-09 DIAGNOSIS — N18.30 TYPE 2 DIABETES MELLITUS WITH STAGE 3 CHRONIC KIDNEY DISEASE, WITHOUT LONG-TERM CURRENT USE OF INSULIN, UNSPECIFIED WHETHER STAGE 3A OR 3B CKD: ICD-10-CM

## 2022-12-09 DIAGNOSIS — E11.22 TYPE 2 DIABETES MELLITUS WITH STAGE 3 CHRONIC KIDNEY DISEASE, WITHOUT LONG-TERM CURRENT USE OF INSULIN, UNSPECIFIED WHETHER STAGE 3A OR 3B CKD: ICD-10-CM

## 2022-12-09 PROCEDURE — 99215 OFFICE O/P EST HI 40 MIN: CPT | Mod: PBBFAC,25,PO | Performed by: PHYSICIAN ASSISTANT

## 2022-12-09 PROCEDURE — 71046 X-RAY EXAM CHEST 2 VIEWS: CPT | Mod: 26,,, | Performed by: RADIOLOGY

## 2022-12-09 PROCEDURE — 99213 PR OFFICE/OUTPT VISIT, EST, LEVL III, 20-29 MIN: ICD-10-PCS | Mod: S$PBB,,, | Performed by: PHYSICIAN ASSISTANT

## 2022-12-09 PROCEDURE — 99999 PR PBB SHADOW E&M-EST. PATIENT-LVL V: ICD-10-PCS | Mod: PBBFAC,,, | Performed by: PHYSICIAN ASSISTANT

## 2022-12-09 PROCEDURE — 71046 X-RAY EXAM CHEST 2 VIEWS: CPT | Mod: TC,PO

## 2022-12-09 PROCEDURE — 99213 OFFICE O/P EST LOW 20 MIN: CPT | Mod: S$PBB,,, | Performed by: PHYSICIAN ASSISTANT

## 2022-12-09 PROCEDURE — 71046 XR CHEST PA AND LATERAL: ICD-10-PCS | Mod: 26,,, | Performed by: RADIOLOGY

## 2022-12-09 PROCEDURE — 99999 PR PBB SHADOW E&M-EST. PATIENT-LVL V: CPT | Mod: PBBFAC,,, | Performed by: PHYSICIAN ASSISTANT

## 2022-12-09 RX ORDER — AZITHROMYCIN 250 MG/1
TABLET, FILM COATED ORAL
Qty: 6 TABLET | Refills: 0 | Status: SHIPPED | OUTPATIENT
Start: 2022-12-09 | End: 2023-03-29 | Stop reason: ALTCHOICE

## 2022-12-09 RX ORDER — DULAGLUTIDE 4.5 MG/.5ML
4.5 INJECTION, SOLUTION SUBCUTANEOUS
Qty: 4 PEN | Refills: 0 | Status: SHIPPED | OUTPATIENT
Start: 2022-12-09 | End: 2022-12-26 | Stop reason: SDUPTHER

## 2022-12-09 RX ORDER — BENZONATATE 200 MG/1
200 CAPSULE ORAL 3 TIMES DAILY PRN
Qty: 30 CAPSULE | Refills: 0 | Status: SHIPPED | OUTPATIENT
Start: 2022-12-09 | End: 2022-12-19

## 2022-12-09 RX ORDER — METHYLPREDNISOLONE 4 MG/1
TABLET ORAL
Qty: 21 TABLET | Refills: 0 | Status: SHIPPED | OUTPATIENT
Start: 2022-12-09 | End: 2023-03-01 | Stop reason: CLARIF

## 2022-12-09 NOTE — PROGRESS NOTES
Assessment/Plan:    Problem List Items Addressed This Visit          Endocrine    Type 2 diabetes mellitus with kidney complication, without long-term current use of insulin    Overview     The patient presents with diabetes. The patient denies polyuria, polydipsia, polyphagia, hypoglycemia and paresthesias. The patient's glucose control has been good. Home glucose averages are routinely checked. The patient is without retinopathy currently. The patient has no history of neuropathy. The patient currently complains of no podiatric problems. The patient has excellent compliance.  Hemoglobin A1C   Date Value Ref Range Status   09/29/2022 6.8 (H) 4.0 - 5.6 % Final     Comment:     ADA Screening Guidelines:  5.7-6.4%  Consistent with prediabetes  >or=6.5%  Consistent with diabetes    High levels of fetal hemoglobin interfere with the HbA1C  assay. Heterozygous hemoglobin variants (HbS, HgC, etc)do  not significantly interfere with this assay.   However, presence of multiple variants may affect accuracy.     03/24/2022 6.7 (H) 4.0 - 5.6 % Final     Comment:     ADA Screening Guidelines:  5.7-6.4%  Consistent with prediabetes  >or=6.5%  Consistent with diabetes    High levels of fetal hemoglobin interfere with the HbA1C  assay. Heterozygous hemoglobin variants (HbS, HgC, etc)do  not significantly interfere with this assay.   However, presence of multiple variants may affect accuracy.     11/04/2021 6.7 (H) 4.0 - 5.6 % Final     Comment:     ADA Screening Guidelines:  5.7-6.4%  Consistent with prediabetes  >or=6.5%  Consistent with diabetes    High levels of fetal hemoglobin interfere with the HbA1C  assay. Heterozygous hemoglobin variants (HbS, HgC, etc)do  not significantly interfere with this assay.   However, presence of multiple variants may affect accuracy.       No results found for: MICROALBUR, [id number]  Diabetes Management Status    Statin: Taking  ACE/ARB: Taking    Screening or Prevention Patient's value Goal  Complete/Controlled?   HgA1C Testing and Control   Lab Results   Component Value Date    HGBA1C 6.8 (H) 09/29/2022      Annually/Less than 8% Yes   Lipid profile : 03/24/2022 Annually No   LDL control Lab Results   Component Value Date    LDLCALC 65.6 03/24/2022    Annually/Less than 100 mg/dl  No   Nephropathy screening Lab Results   Component Value Date    LABMICR 10.0 03/24/2022     Lab Results   Component Value Date    PROTEINUA Trace (A) 06/20/2018    Annually No   Blood pressure BP Readings from Last 1 Encounters:   12/09/22 128/73    Less than 140/90 Yes   Dilated retinal exam : 05/23/2022 Annually No   Foot exam   : 11/04/2021 Annually Yes              Relevant Medications    dulaglutide (TRULICITY) 4.5 mg/0.5 mL pen injector     Other Visit Diagnoses       Bacterial respiratory infection    -  Primary    Relevant Medications    methylPREDNISolone (MEDROL DOSEPACK) 4 mg tablet    azithromycin (Z-BOYD) 250 MG tablet    Other Relevant Orders    X-Ray Chest PA And Lateral    Cough, unspecified type        Relevant Medications    benzonatate (TESSALON) 200 MG capsule    Other Relevant Orders    X-Ray Chest PA And Lateral        -start antibiotics and steroids as prescribed  -will obtain CXR today  -recommend Flonase and Mucinex  -supportive care- rest, increase hydration with water, OTC Tylenol/Ibuprofen for pain/fever  -follow up if no improvement in symptoms   -ER precautions for severe or worsening of symptoms     Follow up if symptoms worsen or fail to improve.    Mary Solorio PA-C  _____________________________________________________________________________________________________________________________________________________    CC: cough    HPI: Patient is in clinic today as an established patient here for cough. This is a new problem. The current episode started 2 weeks ago. The problem is gradually worsening. Associated symptoms include congestion, sore throat, headache, and shortness of breath.  Pertinent negatives include no fever, chills, diaphoresis, ear pain, hoarse voice, neck pain, sneezing, or swollen glands. Past treatments include Mucinex. The treatment provided some relief. He has had no known sick contacts. No other complaints today.    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     DM (diabetes mellitus) type II controlled, neurological manifestation 7/5/2013    GERD (gastroesophageal reflux disease)     History of prostate cancer 2006    Hypertension associated with diabetes     Hypertension goal BP (blood pressure) < 130/80     Obesity     Prostate cancer     TIA (transient ischemic attack)     Trouble in sleeping      Past Surgical History:   Procedure Laterality Date    CARDIAC CATHETERIZATION      CARDIOVERSION N/A 8/10/2018    Procedure: CARDIOVERSION;  Surgeon: Israel Cardoza MD;  Location: [facility] CATH LAB;  Service: Cardiology;  Laterality: N/A;  Dr. Talamantes pt    CATARACT EXTRACTION W/  INTRAOCULAR LENS IMPLANT  11/2013    Bilateral Cataract     COLONOSCOPY N/A 12/20/2019    Procedure: COLONOSCOPY;  Surgeon: Forrest Roy MD;  Location: [facility] ENDO;  Service: Endoscopy;  Laterality: N/A;    COLONOSCOPY W/ POLYPECTOMY      EYE SURGERY      PROSTATECTOMY  2006    TONSILLECTOMY      TONSILLECTOMY, ADENOIDECTOMY, BILATERAL MYRINGOTOMY AND TUBES       Review of patient's allergies indicates:   Allergen Reactions    Amlodipine      swelling     Social History     Tobacco Use    Smoking status: Never    Smokeless tobacco: Never   Substance Use Topics    Alcohol use: No     Comment: He used to drink but quit in 1990    Drug use: No     Family History   Problem Relation Age of Onset    Heart attack Mother      Melanoma Father     Cancer Father     Anesthesia problems Neg Hx     Clotting disorder Neg Hx     Kidney disease Neg Hx     Amblyopia Neg Hx     Blindness Neg Hx     Cataracts Neg Hx     Glaucoma Neg Hx     Retinal detachment Neg Hx     Macular degeneration Neg Hx     Strabismus Neg Hx      Current Outpatient Medications on File Prior to Visit   Medication Sig Dispense Refill    atorvastatin (LIPITOR) 40 MG tablet Take 1 tablet (40 mg total) by mouth once daily. 90 tablet 3    blood sugar diagnostic Strp Use daily- Freestyle lite 300 strip 3    blood-glucose meter kit Use before meals and before bed when the glucoses are not in control.  Otherwise, test it daily once a day before meals randomly to ensure stability of the gluocse. 1 each 0    dulaglutide (TRULICITY) 4.5 mg/0.5 mL pen injector Inject 4.5 mg into the skin every 7 days. 12 pen 0    DULoxetine (CYMBALTA) 60 MG capsule Take 1 capsule (60 mg total) by mouth once daily. 90 capsule 3    empagliflozin (JARDIANCE) 25 mg tablet Take 1 tablet (25 mg total) by mouth once daily. 90 tablet 0    ERGOCALCIFEROL, VITAMIN D2, (VITAMIN D ORAL) Take by mouth once daily.       furosemide (LASIX) 40 MG tablet Take 1 tablet (40 mg total) by mouth once daily. 90 tablet 3    GAVILYTE-G 236-22.74-6.74 -5.86 gram suspension USE AS DIRECTED      glimepiride (AMARYL) 4 MG tablet Take 1 tablet (4 mg total) by mouth 2 (two) times daily before meals. 180 tablet 1    lisinopriL (PRINIVIL,ZESTRIL) 20 MG tablet Take 1 tablet (20 mg total) by mouth 2 (two) times daily. 180 tablet 3    metoprolol succinate (TOPROL-XL) 50 MG 24 hr tablet Take 1 tablet (50 mg total) by mouth once daily. 90 tablet 3    nateglinide (STARLIX) 120 MG tablet Take 1 tablet (120 mg total) by mouth 3 (three) times daily before meals. If you skip a meal, do not take dose 270 tablet 0    omeprazole (PRILOSEC) 20 MG capsule Take 1 capsule (20 mg total) by mouth once daily. 90 capsule 3    rivaroxaban (XARELTO) 15 mg  "Tab Take 1 tablet (15 mg total) by mouth daily with dinner or evening meal. 90 tablet 3    [DISCONTINUED] chlorthalidone (HYGROTEN) 25 MG Tab Take 1 tablet (25 mg total) by mouth once daily. 90 tablet 3     No current facility-administered medications on file prior to visit.       Review of Systems   Constitutional:  Negative for chills, diaphoresis, fatigue and fever.   HENT:  Positive for congestion and sore throat. Negative for ear pain, postnasal drip and sinus pain.    Eyes:  Negative for pain and redness.   Respiratory:  Positive for cough and shortness of breath. Negative for chest tightness.    Cardiovascular:  Negative for chest pain and leg swelling.   Gastrointestinal:  Negative for abdominal pain, constipation, diarrhea, nausea and vomiting.   Genitourinary:  Negative for dysuria and hematuria.   Musculoskeletal:  Negative for arthralgias and joint swelling.   Skin:  Negative for rash.   Neurological:  Positive for headaches. Negative for dizziness and syncope.   Psychiatric/Behavioral:  Negative for dysphoric mood. The patient is not nervous/anxious.      Vitals:    12/09/22 1242   BP: 128/73   Pulse: 78   Temp: 98 °F (36.7 °C)   TempSrc: Temporal   Weight: 99.1 kg (218 lb 7.6 oz)   Height: 5' 8" (1.727 m)       Wt Readings from Last 3 Encounters:   12/09/22 99.1 kg (218 lb 7.6 oz)   09/12/22 99.7 kg (219 lb 14.4 oz)   06/30/22 99.3 kg (219 lb)       Physical Exam  Constitutional:       General: He is not in acute distress.     Appearance: Normal appearance. He is well-developed.   HENT:      Head: Normocephalic and atraumatic.      Right Ear: Tympanic membrane normal.      Left Ear: Tympanic membrane normal.      Nose: Congestion present.      Mouth/Throat:      Mouth: Mucous membranes are moist.      Pharynx: Posterior oropharyngeal erythema present.   Eyes:      Conjunctiva/sclera: Conjunctivae normal.   Cardiovascular:      Rate and Rhythm: Normal rate and regular rhythm.      Pulses: Normal pulses. "      Heart sounds: Normal heart sounds. No murmur heard.  Pulmonary:      Effort: Pulmonary effort is normal. No respiratory distress.      Breath sounds: Normal breath sounds. No wheezing.   Abdominal:      General: Bowel sounds are normal. There is no distension.      Palpations: Abdomen is soft.      Tenderness: There is no abdominal tenderness.   Musculoskeletal:         General: Normal range of motion.      Cervical back: Normal range of motion and neck supple.   Skin:     General: Skin is warm and dry.      Findings: No rash.   Neurological:      General: No focal deficit present.      Mental Status: He is alert and oriented to person, place, and time.   Psychiatric:         Mood and Affect: Mood normal.         Behavior: Behavior normal.       Health Maintenance   Topic Date Due    Foot Exam  11/04/2022    Lipid Panel  03/24/2023    Hemoglobin A1c  03/29/2023    Eye Exam  05/23/2023    TETANUS VACCINE  07/05/2023    Colonoscopy  12/20/2024

## 2022-12-10 NOTE — PROGRESS NOTES
Results have been released via [employer]. Please verify that these have been viewed by patient. If not, please call patient with results.     I have sent a message to them with the following interpretation (see below).    I have reviewed your recent chest XR which was normal.     Please do not hesitate to call or message with any additional questions or concerns.    Mary Solorio PA-C
Left before triage

## 2022-12-12 ENCOUNTER — HOSPITAL ENCOUNTER (EMERGENCY)
Facility: HOSPITAL | Age: 87
Discharge: HOME | End: 2022-12-12
Attending: EMERGENCY MEDICINE
Payer: MEDICARE

## 2022-12-12 VITALS
HEIGHT: 68 IN | DIASTOLIC BLOOD PRESSURE: 77 MMHG | SYSTOLIC BLOOD PRESSURE: 140 MMHG | RESPIRATION RATE: 20 BRPM | BODY MASS INDEX: 33.48 KG/M2 | TEMPERATURE: 98 F | OXYGEN SATURATION: 97 % | HEART RATE: 85 BPM | WEIGHT: 220.88 LBS

## 2022-12-12 DIAGNOSIS — M79.606 LEG PAIN: ICD-10-CM

## 2022-12-12 DIAGNOSIS — S80.12XA CONTUSION OF LEFT LEG, INITIAL ENCOUNTER: Primary | ICD-10-CM

## 2022-12-12 DIAGNOSIS — S80.12XA HEMATOMA OF LEFT LOWER LEG: ICD-10-CM

## 2022-12-12 PROCEDURE — 99283 EMERGENCY DEPT VISIT LOW MDM: CPT

## 2022-12-23 ENCOUNTER — PATIENT MESSAGE (OUTPATIENT)
Dept: FAMILY MEDICINE | Facility: CLINIC | Age: 87
End: 2022-12-23
Payer: MEDICARE

## 2023-01-06 ENCOUNTER — PES CALL (OUTPATIENT)
Dept: ADMINISTRATIVE | Facility: CLINIC | Age: 88
End: 2023-01-06
Payer: MEDICARE

## 2023-01-10 ENCOUNTER — TELEPHONE (OUTPATIENT)
Dept: ADMINISTRATIVE | Facility: CLINIC | Age: 88
End: 2023-01-10
Payer: MEDICARE

## 2023-01-10 NOTE — TELEPHONE ENCOUNTER
Called pt, no answer; left message informing pt I was calling to remind pt of his in office EAWV on 1/12/23 and to return my call if he had any questions; left my name and number

## 2023-01-12 ENCOUNTER — OFFICE VISIT (OUTPATIENT)
Dept: FAMILY MEDICINE | Facility: CLINIC | Age: 88
End: 2023-01-12
Payer: MEDICARE

## 2023-01-12 VITALS
WEIGHT: 216.06 LBS | HEIGHT: 68 IN | DIASTOLIC BLOOD PRESSURE: 76 MMHG | OXYGEN SATURATION: 96 % | BODY MASS INDEX: 32.74 KG/M2 | SYSTOLIC BLOOD PRESSURE: 132 MMHG | HEART RATE: 60 BPM

## 2023-01-12 DIAGNOSIS — Z85.46 HISTORY OF PROSTATE CANCER: ICD-10-CM

## 2023-01-12 DIAGNOSIS — I67.2 CEREBROVASCULAR DISEASE, ARTERIOSCLEROTIC, POST-STROKE: ICD-10-CM

## 2023-01-12 DIAGNOSIS — E11.22 TYPE 2 DIABETES MELLITUS WITH STAGE 3 CHRONIC KIDNEY DISEASE, WITHOUT LONG-TERM CURRENT USE OF INSULIN, UNSPECIFIED WHETHER STAGE 3A OR 3B CKD: ICD-10-CM

## 2023-01-12 DIAGNOSIS — R60.9 EDEMA, UNSPECIFIED TYPE: ICD-10-CM

## 2023-01-12 DIAGNOSIS — K57.30 DIVERTICULOSIS OF COLON: ICD-10-CM

## 2023-01-12 DIAGNOSIS — E78.2 COMBINED HYPERLIPIDEMIA ASSOCIATED WITH TYPE 2 DIABETES MELLITUS: ICD-10-CM

## 2023-01-12 DIAGNOSIS — M25.561 CHRONIC PAIN OF BOTH KNEES: ICD-10-CM

## 2023-01-12 DIAGNOSIS — E11.59 HYPERTENSION ASSOCIATED WITH DIABETES: ICD-10-CM

## 2023-01-12 DIAGNOSIS — Z86.73 H/O TIA (TRANSIENT ISCHEMIC ATTACK) AND STROKE: ICD-10-CM

## 2023-01-12 DIAGNOSIS — F41.9 ANXIETY: ICD-10-CM

## 2023-01-12 DIAGNOSIS — E11.42 DIABETIC POLYNEUROPATHY ASSOCIATED WITH TYPE 2 DIABETES MELLITUS: ICD-10-CM

## 2023-01-12 DIAGNOSIS — Z86.010 HISTORY OF COLON POLYPS: ICD-10-CM

## 2023-01-12 DIAGNOSIS — N18.32 STAGE 3B CHRONIC KIDNEY DISEASE: ICD-10-CM

## 2023-01-12 DIAGNOSIS — I48.91 ATRIAL FIBRILLATION, UNSPECIFIED TYPE: ICD-10-CM

## 2023-01-12 DIAGNOSIS — M25.562 CHRONIC PAIN OF BOTH KNEES: ICD-10-CM

## 2023-01-12 DIAGNOSIS — K21.9 GASTROESOPHAGEAL REFLUX DISEASE, UNSPECIFIED WHETHER ESOPHAGITIS PRESENT: ICD-10-CM

## 2023-01-12 DIAGNOSIS — E11.69 COMBINED HYPERLIPIDEMIA ASSOCIATED WITH TYPE 2 DIABETES MELLITUS: ICD-10-CM

## 2023-01-12 DIAGNOSIS — N18.30 TYPE 2 DIABETES MELLITUS WITH STAGE 3 CHRONIC KIDNEY DISEASE, WITHOUT LONG-TERM CURRENT USE OF INSULIN, UNSPECIFIED WHETHER STAGE 3A OR 3B CKD: ICD-10-CM

## 2023-01-12 DIAGNOSIS — G89.29 CHRONIC PAIN OF BOTH KNEES: ICD-10-CM

## 2023-01-12 DIAGNOSIS — Z86.73 CEREBROVASCULAR DISEASE, ARTERIOSCLEROTIC, POST-STROKE: ICD-10-CM

## 2023-01-12 DIAGNOSIS — I15.2 HYPERTENSION ASSOCIATED WITH DIABETES: ICD-10-CM

## 2023-01-12 DIAGNOSIS — N25.81 SECONDARY HYPERPARATHYROIDISM, RENAL: ICD-10-CM

## 2023-01-12 DIAGNOSIS — Z00.00 ENCOUNTER FOR MEDICARE ANNUAL WELLNESS EXAM: Primary | ICD-10-CM

## 2023-01-12 PROBLEM — R79.89 ELEVATED PTHRP LEVEL: Status: RESOLVED | Noted: 2019-01-24 | Resolved: 2023-01-12

## 2023-01-12 PROBLEM — R06.02 SOB (SHORTNESS OF BREATH): Status: RESOLVED | Noted: 2018-06-21 | Resolved: 2023-01-12

## 2023-01-12 PROCEDURE — 99999 PR PBB SHADOW E&M-EST. PATIENT-LVL V: ICD-10-PCS | Mod: PBBFAC,,, | Performed by: NURSE PRACTITIONER

## 2023-01-12 PROCEDURE — G0439 PR MEDICARE ANNUAL WELLNESS SUBSEQUENT VISIT: ICD-10-PCS | Mod: ,,, | Performed by: NURSE PRACTITIONER

## 2023-01-12 PROCEDURE — G0439 PPPS, SUBSEQ VISIT: HCPCS | Mod: ,,, | Performed by: NURSE PRACTITIONER

## 2023-01-12 PROCEDURE — 99999 PR PBB SHADOW E&M-EST. PATIENT-LVL V: CPT | Mod: PBBFAC,,, | Performed by: NURSE PRACTITIONER

## 2023-01-12 PROCEDURE — 99215 OFFICE O/P EST HI 40 MIN: CPT | Mod: PBBFAC,PO | Performed by: NURSE PRACTITIONER

## 2023-01-12 NOTE — PROGRESS NOTES
"  Héctor Desir presented for a  Medicare AWV and comprehensive Health Risk Assessment today. The following components were reviewed and updated:    Medical history  Family History  Social history  Allergies and Current Medications  Health Risk Assessment  Health Maintenance  Care Team     ** See Completed Assessments for Annual Wellness Visit within the encounter summary.**     The following assessments were completed:  Living Situation  CAGE  Depression Screening  Timed Get Up and Go  Whisper Test  Cognitive Function Screening- 3 word recall and clock drawing completed correctly   Nutrition Screening  ADL Screening  PAQ Screening    Vitals:    01/12/23 1357   BP: 132/76   BP Location: Right arm   Pulse: 60   SpO2: 96%   Weight: 98 kg (216 lb 0.8 oz)   Height: 5' 8" (1.727 m)     Body mass index is 32.85 kg/m².  Physical Exam  Vitals reviewed.   Constitutional:       General: He is not in acute distress.     Appearance: Normal appearance. He is not ill-appearing.   HENT:      Head: Normocephalic and atraumatic.      Right Ear: External ear normal.      Left Ear: External ear normal.   Eyes:      Extraocular Movements: Extraocular movements intact.      Conjunctiva/sclera: Conjunctivae normal.   Cardiovascular:      Rate and Rhythm: Normal rate.      Heart sounds: Normal heart sounds.   Pulmonary:      Effort: Pulmonary effort is normal. No respiratory distress.      Breath sounds: Normal breath sounds.   Abdominal:      General: Abdomen is flat. There is no distension.   Musculoskeletal:         General: Normal range of motion.      Cervical back: Normal range of motion.   Skin:     General: Skin is warm and dry.      Coloration: Skin is not pale.      Findings: No rash.   Neurological:      Mental Status: He is alert and oriented to person, place, and time. Mental status is at baseline.   Psychiatric:         Mood and Affect: Mood normal.         Speech: Speech normal.         Behavior: Behavior normal. Behavior is " cooperative.         Diagnoses and health risks identified today and associated recommendations/orders:    1. Encounter for Medicare annual wellness exam  Complete history and physical was completed today.  Complete and thorough medication reconciliation was performed.  Discussed risks and benefits of medications.  Advised patient on orders and health maintenance.  We discussed old records and old labs if available.  Will request any records not available through epic.  Continue current medications listed on your summary sheet.    2. H/O TIA (transient ischemic attack) and stroke  Chronic. Stable  On Xarelto and statin  Following with neurology     3. Diabetic polyneuropathy associated with type 2 diabetes mellitus  Chronic. Stable  Improved with cymbalta  Continue plan of care per PCP    4. Cerebrovascular disease, arteriosclerotic, post-stroke  Chronic. Stable  On Xarelto and statin  Following with neurology     5. Anxiety  -chronic. Stable  -Continue plan of care per PCP  -patient will follow up routinely and notify us if having any side effects or worsening or persistent symptoms.  ER precautions were given.    6. Combined hyperlipidemia associated with type 2 diabetes mellitus  -chronic condition. Currently stable.    -reports compliance with hyperlipidemia treatment as prescribed  -denies any known adverse effects of medications  -Continue plan of care per PCP  -recent labs listed below:  Lab Results   Component Value Date    CHOL 129 03/24/2022     Lab Results   Component Value Date    HDL 35 (L) 03/24/2022     Lab Results   Component Value Date    LDLCALC 65.6 03/24/2022     Lab Results   Component Value Date    TRIG 142 03/24/2022     Lab Results   Component Value Date    ALT 22 09/29/2022    AST 19 09/29/2022    ALKPHOS 119 09/29/2022    BILITOT 1.0 09/29/2022     Hyperlipidemia Medications               atorvastatin (LIPITOR) 40 MG tablet Take 1 tablet (40 mg total) by mouth once daily.          7.  Hypertension associated with diabetes  -chronic. Stable  -at goal today  -Continue plan of care per PCP    Hypertension Medications               furosemide (LASIX) 40 MG tablet Take 1 tablet (40 mg total) by mouth once daily.    lisinopriL (PRINIVIL,ZESTRIL) 20 MG tablet Take 1 tablet (20 mg total) by mouth 2 (two) times daily.    metoprolol succinate (TOPROL-XL) 50 MG 24 hr tablet Take 1 tablet (50 mg total) by mouth once daily.          8. Atrial fibrillation, unspecified type  Chronic. Stable  Taking Xarelto and metoprolol  Following with cardiology     9. Stage 3b chronic kidney disease  Chronic, stable  Avoid anti-inflammatories  Closely monitor renal function  Following with nephrology    BMP  Lab Results   Component Value Date     09/29/2022    K 4.1 09/29/2022     09/29/2022    CO2 25 09/29/2022    BUN 22 09/29/2022    CREATININE 1.3 09/29/2022    CALCIUM 8.6 (L) 09/29/2022    ANIONGAP 11 09/29/2022    EGFRNORACEVR 53.8 (A) 09/29/2022     11. History of prostate cancer  Chronic. Stable  Hx of proctectomy  Following with urology, Dr. Patterson    12. Diverticulosis of colon  Chronic. Stable  Asymptomatic  Colonoscopy UTD  Continue plan of care per PCP    13. History of colon polyps  Chronic. Stable  Colonoscopy UTD  Continue plan of care per PCP    14. Gastroesophageal reflux disease, unspecified whether esophagitis present  -chronic. Stable  --symptoms controlled with daily PPI  -denies alarm symptoms, such as dysphagia, weight loss, chest pain, melena, hematemesis, or N/V  -continue lifestyle modification with avoidance of acidic foods, caffeine, late night eating, spicy foods, and NSAIDs  -Continue plan of care per PCP    15. Type 2 diabetes mellitus with stage 3 chronic kidney disease, without long-term current use of insulin, unspecified whether stage 3a or 3b CKD  Diabetes Medications               dulaglutide (TRULICITY) 4.5 mg/0.5 mL pen injector Inject 4.5 mg into the skin every 7 days.     empagliflozin (JARDIANCE) 25 mg tablet Take 1 tablet (25 mg total) by mouth once daily.    glimepiride (AMARYL) 4 MG tablet Take 1 tablet (4 mg total) by mouth 2 (two) times daily before meals.    nateglinide (STARLIX) 120 MG tablet Take 1 tablet (120 mg total) by mouth 3 (three) times daily before meals. If you skip a meal, do not take dose    dulaglutide (TRULICITY) 4.5 mg/0.5 mL pen injector Inject 4.5 mg into the skin every 7 days.          -condition is currently controlled   -see diabetic health maintenance listed below  -on statin: Yes  -on ACE-I/ARB: Yes  - Continue plan of care per PCP  Lab Results   Component Value Date    HGBA1C 6.8 (H) 09/29/2022     16. Secondary hyperparathyroidism, renal  Chronic. Stable  Renally induced  Following with nephrology    Lab Results   Component Value Date    .8 (H) 08/17/2022    CALCIUM 8.6 (L) 09/29/2022    PHOS 3.3 08/17/2022     17. Chronic pain of both knees  Chronic. Stable  Taking OTC medications with relief  Avoid NSAIDs due to renal function and chronic anticoagulation  Continue plan of care per PCP    18. Edema, unspecified type  Chronic. Stable  Improved after stopping amlodipine  Continue plan of care per PCP    I offered to discuss end of life issues, including information on how to make advance directives that the patient could use to name someone who would make medical decisions on their behalf if they became too ill to make themselves.    ___Patient declined - already done.    _x__Patient is interested, I provided paperwork and offered to discuss.    Provided Héctor with a 5-10 year written screening schedule and personal prevention plan. Recommendations were developed using the USPSTF age appropriate recommendations. Education, counseling, and referrals were provided as needed. After Visit Summary printed and given to patient which includes a list of additional screenings\tests needed.    Follow up in about 6 months (around 7/12/2023), or if symptoms  worsen or fail to improve.    Kaela Guzmán, NP

## 2023-03-01 ENCOUNTER — HOSPITAL ENCOUNTER (EMERGENCY)
Facility: HOSPITAL | Age: 88
Discharge: HOME | End: 2023-03-01
Attending: EMERGENCY MEDICINE
Payer: MEDICARE

## 2023-03-01 VITALS
TEMPERATURE: 99 F | OXYGEN SATURATION: 98 % | WEIGHT: 220.44 LBS | BODY MASS INDEX: 33.52 KG/M2 | RESPIRATION RATE: 20 BRPM | DIASTOLIC BLOOD PRESSURE: 97 MMHG | HEART RATE: 84 BPM | SYSTOLIC BLOOD PRESSURE: 169 MMHG

## 2023-03-01 DIAGNOSIS — U07.1 COVID-19 VIRUS INFECTION: Primary | ICD-10-CM

## 2023-03-01 DIAGNOSIS — N18.9 CHRONIC RENAL IMPAIRMENT, UNSPECIFIED CKD STAGE: ICD-10-CM

## 2023-03-01 DIAGNOSIS — R06.02 SOB (SHORTNESS OF BREATH): ICD-10-CM

## 2023-03-01 LAB
ALBUMIN SERPL BCP-MCNC: 3.7 G/DL (ref 3.5–5.2)
ALP SERPL-CCNC: 101 U/L (ref 55–135)
ALT SERPL W/O P-5'-P-CCNC: 19 U/L (ref 10–44)
ANION GAP SERPL CALC-SCNC: 12 MMOL/L (ref 8–16)
AST SERPL-CCNC: 21 U/L (ref 10–40)
BASOPHILS # BLD AUTO: 0.03 K/UL (ref 0–0.2)
BASOPHILS NFR BLD: 0.4 % (ref 0–1.9)
BILIRUB SERPL-MCNC: 1.2 MG/DL (ref 0.1–1)
BNP SERPL-MCNC: 529 PG/ML (ref 0–99)
BUN SERPL-MCNC: 18 MG/DL (ref 8–23)
CALCIUM SERPL-MCNC: 8.5 MG/DL (ref 8.7–10.5)
CHLORIDE SERPL-SCNC: 107 MMOL/L (ref 95–110)
CK SERPL-CCNC: 53 U/L (ref 20–200)
CO2 SERPL-SCNC: 21 MMOL/L (ref 23–29)
CREAT SERPL-MCNC: 1.5 MG/DL (ref 0.5–1.4)
CTP QC/QA: YES
DIFFERENTIAL METHOD: ABNORMAL
EOSINOPHIL # BLD AUTO: 0.1 K/UL (ref 0–0.5)
EOSINOPHIL NFR BLD: 1.4 % (ref 0–8)
ERYTHROCYTE [DISTWIDTH] IN BLOOD BY AUTOMATED COUNT: 15.2 % (ref 11.5–14.5)
EST. GFR  (NO RACE VARIABLE): 45 ML/MIN/1.73 M^2
GLUCOSE SERPL-MCNC: 141 MG/DL (ref 70–110)
HCT VFR BLD AUTO: 48.1 % (ref 40–54)
HGB BLD-MCNC: 15.7 G/DL (ref 14–18)
IMM GRANULOCYTES # BLD AUTO: 0.03 K/UL (ref 0–0.04)
IMM GRANULOCYTES NFR BLD AUTO: 0.4 % (ref 0–0.5)
LYMPHOCYTES # BLD AUTO: 0.7 K/UL (ref 1–4.8)
LYMPHOCYTES NFR BLD: 10.1 % (ref 18–48)
MCH RBC QN AUTO: 26.7 PG (ref 27–31)
MCHC RBC AUTO-ENTMCNC: 32.6 G/DL (ref 32–36)
MCV RBC AUTO: 82 FL (ref 82–98)
MONOCYTES # BLD AUTO: 1 K/UL (ref 0.3–1)
MONOCYTES NFR BLD: 14.1 % (ref 4–15)
NEUTROPHILS # BLD AUTO: 5.1 K/UL (ref 1.8–7.7)
NEUTROPHILS NFR BLD: 73.6 % (ref 38–73)
NRBC BLD-RTO: 0 /100 WBC
PLATELET # BLD AUTO: 106 K/UL (ref 150–450)
PMV BLD AUTO: 11.2 FL (ref 9.2–12.9)
POTASSIUM SERPL-SCNC: 4.1 MMOL/L (ref 3.5–5.1)
PROT SERPL-MCNC: 6.8 G/DL (ref 6–8.4)
RBC # BLD AUTO: 5.87 M/UL (ref 4.6–6.2)
SARS-COV-2 RDRP RESP QL NAA+PROBE: POSITIVE
SODIUM SERPL-SCNC: 140 MMOL/L (ref 136–145)
TROPONIN I SERPL DL<=0.01 NG/ML-MCNC: 0.03 NG/ML (ref 0–0.03)
WBC # BLD AUTO: 6.93 K/UL (ref 3.9–12.7)

## 2023-03-01 PROCEDURE — 93010 EKG 12-LEAD: ICD-10-PCS | Mod: ,,, | Performed by: INTERNAL MEDICINE

## 2023-03-01 PROCEDURE — 85025 COMPLETE CBC W/AUTO DIFF WBC: CPT | Performed by: NURSE PRACTITIONER

## 2023-03-01 PROCEDURE — 80053 COMPREHEN METABOLIC PANEL: CPT | Performed by: NURSE PRACTITIONER

## 2023-03-01 PROCEDURE — 83880 ASSAY OF NATRIURETIC PEPTIDE: CPT | Performed by: NURSE PRACTITIONER

## 2023-03-01 PROCEDURE — 82550 ASSAY OF CK (CPK): CPT | Performed by: NURSE PRACTITIONER

## 2023-03-01 PROCEDURE — 93005 ELECTROCARDIOGRAM TRACING: CPT

## 2023-03-01 PROCEDURE — 93010 ELECTROCARDIOGRAM REPORT: CPT | Mod: ,,, | Performed by: INTERNAL MEDICINE

## 2023-03-01 PROCEDURE — 84484 ASSAY OF TROPONIN QUANT: CPT | Performed by: NURSE PRACTITIONER

## 2023-03-01 PROCEDURE — 99285 EMERGENCY DEPT VISIT HI MDM: CPT | Mod: 25

## 2023-03-01 NOTE — ED PROVIDER NOTES
SCRIBE #1 NOTE: I, Hussain Martinez, am scribing for, and in the presence of, Precious Hendrickson DO. I have scribed the entire note.      History      Chief Complaint   Patient presents with    Shortness of Breath     Increasingly SOB with exertion beginning this am. Also c/o congestion, cough. Hx afib, states feels like his heart is racing, denies chest pain       Review of patient's allergies indicates:   Allergen Reactions    Amlodipine      swelling        HPI   HPI    3/1/2023, 12:27 PM   History obtained from the patient      History of Present Illness: Héctor Desir is a 87 y.o. male patient with a PMHx of Afib, DM2, HTN who presents to the Emergency Department for SOB, onset 1 day PTA. . Symptoms are constant and moderate in severity. Symptoms are worse with exertion. Associated sxs include productive cough and intermittent palpitations. Patient denies any fever, chills, n/v/d, chest pain, weakness, headache and all other sxs at this time. Pt is currently on Xarelto, and states he is compliant with his Lasix.. No further complaints or concerns at this time.        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     DM (diabetes mellitus) type II controlled, neurological manifestation 7/5/2013    GERD (gastroesophageal reflux disease)     History of prostate cancer 2006    Hypertension associated with diabetes     Hypertension goal BP (blood pressure) < 130/80     Obesity     Prostate cancer     TIA (transient ischemic attack)     Trouble in sleeping        Past Surgical History:  Past Surgical History:   Procedure Laterality Date    CARDIAC CATHETERIZATION       CARDIOVERSION N/A 8/10/2018    Procedure: CARDIOVERSION;  Surgeon: Israel Cardoza MD;  Location: [facility] CATH LAB;  Service: Cardiology;  Laterality: N/A;  Dr. Talamantes pt    CATARACT EXTRACTION W/  INTRAOCULAR LENS IMPLANT  11/2013    Bilateral Cataract     COLONOSCOPY N/A 12/20/2019    Procedure: COLONOSCOPY;  Surgeon: Forrest Roy MD;  Location: [facility] ENDO;  Service: Endoscopy;  Laterality: N/A;    COLONOSCOPY W/ POLYPECTOMY      EYE SURGERY      PROSTATECTOMY  2006    TONSILLECTOMY      TONSILLECTOMY, ADENOIDECTOMY, BILATERAL MYRINGOTOMY AND TUBES           Family History:  Family History   Problem Relation Age of Onset    Heart attack Mother     Melanoma Father     Cancer Father     Anesthesia problems Neg Hx     Clotting disorder Neg Hx     Kidney disease Neg Hx     Amblyopia Neg Hx     Blindness Neg Hx     Cataracts Neg Hx     Glaucoma Neg Hx     Retinal detachment Neg Hx     Macular degeneration Neg Hx     Strabismus Neg Hx        Social History:  Social History     Tobacco Use    Smoking status: Never    Smokeless tobacco: Never   Substance and Sexual Activity    Alcohol use: No     Comment: He used to drink but quit in 1990    Drug use: No    Sexual activity: Not on file       ROS   Review of Systems   Constitutional:  Negative for activity change, chills, diaphoresis and fever.   HENT:  Negative for congestion, drooling, ear pain, rhinorrhea, sneezing, sore throat and trouble swallowing.    Eyes:  Negative for pain.   Respiratory:  Positive for cough (productive) and shortness of breath. Negative for chest tightness, wheezing and stridor.    Cardiovascular:  Positive for palpitations (intermittent). Negative for chest pain and leg swelling.   Gastrointestinal:  Negative for abdominal distention, abdominal pain, constipation, diarrhea, nausea and vomiting.   Genitourinary:  Negative for difficulty urinating, dysuria, frequency and urgency.   Musculoskeletal:  Negative for arthralgias, back pain,  myalgias, neck pain and neck stiffness.   Skin:  Negative for pallor, rash and wound.   Neurological:  Negative for dizziness, syncope, weakness, light-headedness, numbness and headaches.   All other systems reviewed and are negative.    Physical Exam      Initial Vitals [03/01/23 0949]   BP Pulse Resp Temp SpO2   (!) 141/82 99 19 99.2 °F (37.3 °C) 97 %      MAP       --          Physical Exam  Nursing Notes and Vital Signs Reviewed.  Constitutional: Patient is in no acute distress. Well-developed and well-nourished.  Head: Atraumatic. Normocephalic.  Eyes: PERRL. EOM intact. Conjunctivae are not pale. No scleral icterus.  ENT: Mucous membranes are moist. Oropharynx is clear and symmetric.    Neck: Supple. Full ROM. No lymphadenopathy.  Cardiovascular: Regular rate. Regular rhythm. No murmurs, rubs, or gallops. Distal pulses are 2+ and symmetric.  Pulmonary/Chest: No respiratory distress. Clear to auscultation bilaterally. No wheezing or rales.  Abdominal: Soft and non-distended.  There is no tenderness.  No rebound, guarding, or rigidity. Good bowel sounds.  Genitourinary: No CVA tenderness  Musculoskeletal: Moves all extremities. No obvious deformities. No edema. No calf tenderness.  Skin: Warm and dry.  Neurological:  Alert, awake, and appropriate.  Normal speech.  No acute focal neurological deficits are appreciated.  Psychiatric: Normal affect. Good eye contact. Appropriate in content.    ED Course    Procedures  ED Vital Signs:  Vitals:    03/01/23 0947 03/01/23 0949 03/01/23 1205 03/01/23 1300   BP:  (!) 141/82 (!) 155/94 (!) 169/97   Pulse:  99 75 84   Resp:  19 20 20   Temp:  99.2 °F (37.3 °C)     TempSrc:  Oral     SpO2:  97% 97% 98%   Weight: 100 kg (220 lb 7.4 oz)          Abnormal Lab Results:  Labs Reviewed   CBC W/ AUTO DIFFERENTIAL - Abnormal; Notable for the following components:       Result Value    MCH 26.7 (*)     RDW 15.2 (*)     Platelets 106 (*)     Lymph # 0.7 (*)     Gran % 73.6 (*)      Lymph % 10.1 (*)     All other components within normal limits   COMPREHENSIVE METABOLIC PANEL - Abnormal; Notable for the following components:    CO2 21 (*)     Glucose 141 (*)     Creatinine 1.5 (*)     Calcium 8.5 (*)     Total Bilirubin 1.2 (*)     eGFR 45 (*)     All other components within normal limits   B-TYPE NATRIURETIC PEPTIDE - Abnormal; Notable for the following components:     (*)     All other components within normal limits   SARS-COV-2 RDRP GENE - Abnormal; Notable for the following components:    POC Rapid COVID Positive (*)     All other components within normal limits    Narrative:     This test utilizes isothermal nucleic acid amplification technology to detect the SARS-CoV-2 RdRp nucleic acid segment. The analytical sensitivity (limit of detection) is 500 copies/swab.     A POSITIVE result is indicative of the presence of SARS-CoV-2 RNA; clinical correlation with patient history and other diagnostic information is necessary to determine patient infection status.    A NEGATIVE result means that SARS-CoV-2 nucleic acids are not present above the limit of detection. A NEGATIVE result should be treated as presumptive. It does not rule out the possibility of COVID-19 and should not be the sole basis for treatment decisions. If COVID-19 is strongly suspected based on clinical and exposure history, re-testing using an alternate molecular assay should be considered.     This test is only for use under the Food and Drug Administration s Emergency Use Authorization (EUA).     Commercial kits are provided by [employer]. Performance characteristics of the EUA have been independently verified by [facility] Department of Pathology and Laboratory Medicine.   _________________________________________________________________   The authorized Fact Sheet for Healthcare Providers and the authorized Fact Sheet for Patients of the ID NOW COVID-19 are available on the FDA website:     https://www.fda.gov/media/068001/download      https://www.fda.gov/media/727951/download       CK   TROPONIN I        All Lab Results:  Results for orders placed or performed during the hospital encounter of 03/01/23   CBC auto differential   Result Value Ref Range    WBC 6.93 3.90 - 12.70 K/uL    RBC 5.87 4.60 - 6.20 M/uL    Hemoglobin 15.7 14.0 - 18.0 g/dL    Hematocrit 48.1 40.0 - 54.0 %    MCV 82 82 - 98 fL    MCH 26.7 (L) 27.0 - 31.0 pg    MCHC 32.6 32.0 - 36.0 g/dL    RDW 15.2 (H) 11.5 - 14.5 %    Platelets 106 (L) 150 - 450 K/uL    MPV 11.2 9.2 - 12.9 fL    Immature Granulocytes 0.4 0.0 - 0.5 %    Gran # (ANC) 5.1 1.8 - 7.7 K/uL    Immature Grans (Abs) 0.03 0.00 - 0.04 K/uL    Lymph # 0.7 (L) 1.0 - 4.8 K/uL    Mono # 1.0 0.3 - 1.0 K/uL    Eos # 0.1 0.0 - 0.5 K/uL    Baso # 0.03 0.00 - 0.20 K/uL    nRBC 0 0 /100 WBC    Gran % 73.6 (H) 38.0 - 73.0 %    Lymph % 10.1 (L) 18.0 - 48.0 %    Mono % 14.1 4.0 - 15.0 %    Eosinophil % 1.4 0.0 - 8.0 %    Basophil % 0.4 0.0 - 1.9 %    Differential Method Automated    Comprehensive metabolic panel   Result Value Ref Range    Sodium 140 136 - 145 mmol/L    Potassium 4.1 3.5 - 5.1 mmol/L    Chloride 107 95 - 110 mmol/L    CO2 21 (L) 23 - 29 mmol/L    Glucose 141 (H) 70 - 110 mg/dL    BUN 18 8 - 23 mg/dL    Creatinine 1.5 (H) 0.5 - 1.4 mg/dL    Calcium 8.5 (L) 8.7 - 10.5 mg/dL    Total Protein 6.8 6.0 - 8.4 g/dL    Albumin 3.7 3.5 - 5.2 g/dL    Total Bilirubin 1.2 (H) 0.1 - 1.0 mg/dL    Alkaline Phosphatase 101 55 - 135 U/L    AST 21 10 - 40 U/L    ALT 19 10 - 44 U/L    Anion Gap 12 8 - 16 mmol/L    eGFR 45 (A) >60 mL/min/1.73 m^2   CPK   Result Value Ref Range    CPK 53 20 - 200 U/L   Troponin I   Result Value Ref Range    Troponin I 0.026 0.000 - 0.026 ng/mL   Brain natriuretic peptide   Result Value Ref Range     (H) 0 - 99 pg/mL   POCT COVID-19 Rapid Screening   Result Value Ref Range    POC Rapid COVID Positive (A) Negative     Acceptable Yes          Imaging Results:  Imaging Results              X-Ray Chest 1 View (Final result)  Result time 03/01/23 11:13:07      Final result by JAQUELINE Jacobsen Sr., MD (03/01/23 11:13:07)                   Impression:      1. The lungs are clear.  2. There is mild cardiomegaly.  .      Electronically signed by: Barney Jacobsen MD  Date:    03/01/2023  Time:    11:13               Narrative:    EXAMINATION:  XR CHEST 1 VIEW    CLINICAL HISTORY:  Shortness of breath    COMPARISON:  12/09/2022    FINDINGS:  There is mild cardiomegaly.  The lungs are clear. There is no pneumothorax.  The costophrenic angles are sharp.                                     The EKG was ordered, reviewed, and independently interpreted by the ED provider.  Interpretation time: 09:51  Rate: 98 BPM  Rhythm: atrial fibrillation  Interpretation: Rightward axis. Nonspecific intraventricular block. T wave abnormality, consider inferior ischemia. No STEMI.           The Emergency Provider reviewed the vital signs and test results, which are outlined above.    ED Discussion     1:12 PM: Reassessed pt at this time. Discussed with pt all pertinent ED information and results. Discussed pt dx and plan of tx. Gave pt all f/u and return to the ED instructions. All questions and concerns were addressed at this time. Pt expresses understanding of information and instructions, and is comfortable with plan to discharge. Pt is stable for discharge.    I discussed with patient and/or family/caretaker that evaluation in the ED does not suggest any emergent or life threatening medical conditions requiring immediate intervention beyond what was provided in the ED, and I believe patient is safe for discharge.  Regardless, an unremarkable evaluation in the ED does not preclude the development or presence of a serious of life threatening condition. As such, patient was instructed to return immediately for any worsening or change in current symptoms.    ED Course as of  03/01/23 1314   Wed Mar 01, 2023   1212 Independent evaluation of chest x-ray: There is mild cardiomegaly.  No pneumothorax.  No infiltrate [LB]   1213 Echo 8/6/20 Reviewed:  Echocardiography Findings    Left Ventricle    Normal ejection fraction at 60%. Concentric remodeling observed. Normal left ventricular diastolic function.    Right Ventricle    Normal systolic function.    Left Atrium    There is severe left atrial enlargement. Left atrial volume index is 55.3 mL/m2.    Right Atrium    There is normal right atrial size.    Aortic Valve    The aortic valve appears structurally normal. Mild regurgitation. There is normal leaflet mobility.    Mitral Valve    The mitral valve appears structurally normal. There is normal leaflet mobility. Mild to moderate regurgitation.    Tricuspid Valve    The tricuspid valve appears structurally normal. Trace regurgitation. There is normal leaflet mobility. The estimated PA systolic pressure is 26 mmHg.    IVC/SVC    Normal central venous pressure (3 mm Hg).    Ascending Aorta    The aortic root and ascending aorta are normal in size.    Pericardium and Other Findings    No pericardial effusion. Pericardium is normal.     [LB]   1214 Creatinine stable:  08/17/22 08:02  BUN: 23  Creatinine: 1.6 (H)    09/29/22 07:48  BUN: 22  Creatinine: 1.3    03/01/23 10:30  BUN: 18  Creatinine: 1.5 (H)      (H): Data is abnormally high [LB]      ED Course User Index  [LB] Precious Hendrickson,        ED Medication(s):  Medications - No data to display     Follow-up Information       Tae Goel MD In 2 days.    Specialty: Family Medicine  Why: Return to emergency department for chest pain, chest pressure, shortness of breath, difficulty breathing, chest pain, chest pressure, or other concerns  Contact information:  [address] [facility] [address] [place] [address]  [phone]                           Discharge Medication List as of 3/1/2023 12:46 PM        START taking these medications    Details    molnupiravir 200 mg capsule (EUA) Take 4 capsules (800 mg total) by mouth every 12 (twelve) hours. for 5 days, Starting Wed 3/1/2023, Until Mon 3/6/2023, Print               Medical Decision Making    Medical Decision Making:   Clinical Tests:   Lab Tests: Ordered and Reviewed  Radiological Study: Ordered and Reviewed  Medical Tests: Ordered and Reviewed              Medical Decision Making     Discussed with Independent Historian/Old Records: [x] No.   [] Yes -  see prior documentation.    Comorbid Medical Conditions Considered:  Chronic kidney disease as      Differential Diagnoses: Based on the available information and the initial assessment, the working DIFFERENTIAL DIAGNOSIS considered during this evaluation included, but not limited to:              Acute Pulmonary Edema, Anemia, Covid-19, and Pneumonia              Xrays:  INDEPENDENT ORDERED and REVIEWED: [x] Yes       [] No     [] N/A                                INDEPENDENTLY INTERPRETED: [x] Yes, see prior documentation      [] No        Imaging (CT, Ultrasound, MRI etc):   INDEPENDENT ORDERED and REVIEWED: [] Yes      [] No       [x] N/A    EKG:    INDEPENDENT ORDERED, REVIEWED and INDEPENDENTLY INTERPRETED:  [x] Yes, see prior documentation      [] No       [] N/A    Notable Abnormal Lab:  Notable abnormal findings from INDEPENDENT REVIEW of ORDERED LABS include: Covid and Creatinine    Social Determinates: Factored in the Treatment and Disposition of patient included:      ED Course:   Patient tested positive for COVID.  Patient has chronic kidney disease as well as on anticoagulant medication.  This is factors in choice between Paxlovid and molnupiravir.    Paxlovid contraindicated secondary to patient's anticoagulant use.  Patient was signed up for COVID home monitoring system.  Patient's room air sats were 98%.  Discussed in detail indications to return to emergency department..    Discussed case with:N/A      Scribe Attestation:   Scribe #1: I  performed the above scribed service and the documentation accurately describes the services I performed. I attest to the accuracy of the note.    Attending:   Physician Attestation Statement for Scribe #1: I, Precious Hendrickson DO, personally performed the services described in this documentation, as scribed by Hussain Martinez, in my presence, and it is both accurate and complete.          Clinical Impression       ICD-10-CM ICD-9-CM   1. COVID-19 virus infection  U07.1 079.89   2. SOB (shortness of breath)  R06.02 786.05   3. Chronic renal impairment, unspecified CKD stage  N18.9 585.9       Disposition:   Disposition: Discharged  Condition: Stable      Precious Hendrickson DO  03/01/23 6006

## 2023-03-01 NOTE — FIRST PROVIDER EVALUATION
Medical screening examination initiated.  I have conducted a focused provider triage encounter, findings are as follows:    Brief history of present illness:  87-year-old male with complaint of shortness of breath over the past couple of days.    Vitals:    03/01/23 0947 03/01/23 0949   BP:  (!) 141/82   BP Location:  Right arm   Patient Position:  Sitting   Pulse:  99   Resp:  19   Temp:  99.2 °F (37.3 °C)   TempSrc:  Oral   SpO2:  97%   Weight: 100 kg (220 lb 7.4 oz)        Pertinent physical exam:  BBSCTA

## 2023-03-01 NOTE — DISCHARGE INSTRUCTIONS
Home Covid Therapies    Take a fever reducer:  Take a fever reducers.   Acetaminophen is what is usually recommended.  Do not exceed recommended daily dosages.    Stay hydrated. Fever usually causes sweating, which means los  of water from your body.   Drink lots of fluids.  Preferably water or pedialyte.   Sip on drinks throughout the day.   Not only will this keep your throat moist and comfortable, it will also help keep you hydrated.  Drink Warm beverages, like tea or broth.  The will heat up the airways, keep you hydrated and break up any mucus you might have in your throat and upper airway.  Try a teaspoon of honey in warm tea or or warm water.  A little bit of honey can soothe a sore throat.  However, children under 1 years of age should not try honey.  Gargle salt water.  Many people swear that salt water helps their sore throat.  There is no harm in trying and it might help you.  Use 1 tsp of salt in 8 oz of warm water.  Make sure you spit it out and disinfect the sink afterwards.  Breath in steam.  Use a hot shower, humidifier, vaporizer or other means of making steam.  Eat a frozen treat.  The coldness may numb the pain and soothe your throat if it is sore from coughing.  Get plenty of rest!  Take deep breaths.  Try to take 10 deep breaths per hour.  This help open up your airways and possibly prevent a severe pneumonia.  Try to sleep on your stomach.  There is some evidence that lying on the stomach might help people get move oxygen.   Try to sit upright, straight in a chair during the day. This will help with lung oxygenation.    SARS-CoV-2, which causes COVID-19, is a virus, and therefore antibiotics will not treat a coronavirus infection.    Steroids (Prednisone/Dexamethasone) have not been shown to be helpful in patients with mild covid infections.  Steroid treatments are recommended for hospitalized patients.    Stay isolated in one room, away from your family and other people as much as possible.   This includes eating in your room. Open windows to keep air circulating.  Use a separate bathroom, if possible.    Ending Isolation or quarantine  A minimum of at least 5 days since your symptoms started AND  At least 24 hours have passed with no fever without the use of fever-reducing medicine and other symptoms are improving -- loss of taste and smell might last for weeks or months after recovery but shouldn't delay ending isolation.  A mask is recommended for 5 days post  quarantine.        You may continue to take Mucinex

## 2023-03-02 ENCOUNTER — TELEPHONE (OUTPATIENT)
Dept: NEPHROLOGY | Facility: CLINIC | Age: 88
End: 2023-03-02
Payer: MEDICARE

## 2023-03-20 ENCOUNTER — OFFICE VISIT (OUTPATIENT)
Dept: CARDIOLOGY | Facility: CLINIC | Age: 88
End: 2023-03-20
Payer: MEDICARE

## 2023-03-20 VITALS
BODY MASS INDEX: 33.17 KG/M2 | OXYGEN SATURATION: 98 % | HEART RATE: 88 BPM | DIASTOLIC BLOOD PRESSURE: 72 MMHG | WEIGHT: 218.88 LBS | SYSTOLIC BLOOD PRESSURE: 130 MMHG | HEIGHT: 68 IN

## 2023-03-20 DIAGNOSIS — N18.31 STAGE 3A CHRONIC KIDNEY DISEASE: ICD-10-CM

## 2023-03-20 DIAGNOSIS — E11.59 HYPERTENSION ASSOCIATED WITH DIABETES: ICD-10-CM

## 2023-03-20 DIAGNOSIS — Z86.73 H/O TIA (TRANSIENT ISCHEMIC ATTACK) AND STROKE: ICD-10-CM

## 2023-03-20 DIAGNOSIS — E78.2 COMBINED HYPERLIPIDEMIA ASSOCIATED WITH TYPE 2 DIABETES MELLITUS: ICD-10-CM

## 2023-03-20 DIAGNOSIS — I48.91 ATRIAL FIBRILLATION, UNSPECIFIED TYPE: Primary | ICD-10-CM

## 2023-03-20 DIAGNOSIS — I15.2 HYPERTENSION ASSOCIATED WITH DIABETES: ICD-10-CM

## 2023-03-20 DIAGNOSIS — E11.69 COMBINED HYPERLIPIDEMIA ASSOCIATED WITH TYPE 2 DIABETES MELLITUS: ICD-10-CM

## 2023-03-20 PROCEDURE — 99214 PR OFFICE/OUTPT VISIT, EST, LEVL IV, 30-39 MIN: ICD-10-PCS | Mod: S$PBB,,, | Performed by: INTERNAL MEDICINE

## 2023-03-20 PROCEDURE — 99214 OFFICE O/P EST MOD 30 MIN: CPT | Mod: S$PBB,,, | Performed by: INTERNAL MEDICINE

## 2023-03-20 PROCEDURE — 99214 OFFICE O/P EST MOD 30 MIN: CPT | Mod: PBBFAC,PO | Performed by: INTERNAL MEDICINE

## 2023-03-20 PROCEDURE — 99999 PR PBB SHADOW E&M-EST. PATIENT-LVL IV: ICD-10-PCS | Mod: PBBFAC,,, | Performed by: INTERNAL MEDICINE

## 2023-03-20 PROCEDURE — 99999 PR PBB SHADOW E&M-EST. PATIENT-LVL IV: CPT | Mod: PBBFAC,,, | Performed by: INTERNAL MEDICINE

## 2023-03-20 NOTE — PROGRESS NOTES
Subjective:    Patient ID:  Héctor Desir is a 87 y.o. male who presents for follow-up of Follow-up      HPI87 yo WM with HTN and chronic AF. Patient had COVID earlier this month but feeling fine now. Denies chest pain, SOB, or edema  Denies palpitations, weak spells, and syncope  Not aware of his AF    Review of Systems   Constitutional: Negative for decreased appetite, fever, malaise/fatigue, weight gain and weight loss.   HENT:  Negative for hearing loss and nosebleeds.    Eyes:  Negative for visual disturbance.   Cardiovascular:  Negative for chest pain, claudication, cyanosis, dyspnea on exertion, irregular heartbeat, leg swelling, near-syncope, orthopnea, palpitations, paroxysmal nocturnal dyspnea and syncope.   Respiratory:  Negative for cough, hemoptysis, shortness of breath, sleep disturbances due to breathing, snoring and wheezing.    Endocrine: Negative for cold intolerance, heat intolerance, polydipsia and polyuria.   Hematologic/Lymphatic: Negative for adenopathy and bleeding problem. Does not bruise/bleed easily.   Skin:  Negative for color change, itching, poor wound healing, rash and suspicious lesions.   Musculoskeletal:  Negative for arthritis, back pain, falls, joint pain, joint swelling, muscle cramps, muscle weakness and myalgias.   Gastrointestinal:  Negative for bloating, abdominal pain, change in bowel habit, constipation, flatus, heartburn, hematemesis, hematochezia, hemorrhoids, jaundice, melena, nausea and vomiting.   Genitourinary:  Negative for bladder incontinence, decreased libido, frequency, hematuria, hesitancy and urgency.   Neurological:  Negative for brief paralysis, difficulty with concentration, excessive daytime sleepiness, dizziness, focal weakness, headaches, light-headedness, loss of balance, numbness, vertigo and weakness.   Psychiatric/Behavioral:  Negative for altered mental status, depression and memory loss. The patient does not have insomnia and is not  "nervous/anxious.    Allergic/Immunologic: Negative for environmental allergies, hives and persistent infections.      Objective:    Physical Exam  Constitutional:       General: He is not in acute distress.     Appearance: He is well-developed. He is not diaphoretic.      Comments: /72   Pulse 88   Ht 5' 8" (1.727 m)   Wt 99.3 kg (218 lb 14.4 oz)   SpO2 98%   BMI 33.28 kg/m²      HENT:      Head: Normocephalic and atraumatic.   Eyes:      General: Lids are normal. No scleral icterus.        Right eye: No discharge.      Conjunctiva/sclera: Conjunctivae normal.      Pupils: Pupils are equal, round, and reactive to light.   Neck:      Thyroid: No thyromegaly.      Vascular: No JVD.      Trachea: No tracheal deviation.   Cardiovascular:      Rate and Rhythm: Normal rate. Rhythm irregularly irregular.      Pulses: Intact distal pulses.           Carotid pulses are 2+ on the right side and 2+ on the left side.       Radial pulses are 2+ on the right side and 2+ on the left side.        Femoral pulses are 2+ on the right side and 2+ on the left side.       Popliteal pulses are 2+ on the right side and 2+ on the left side.        Dorsalis pedis pulses are 2+ on the right side and 2+ on the left side.        Posterior tibial pulses are 2+ on the right side and 2+ on the left side.      Heart sounds: Normal heart sounds, S1 normal and S2 normal. No murmur heard.    No friction rub. No gallop.   Pulmonary:      Effort: Pulmonary effort is normal. No respiratory distress.      Breath sounds: Normal breath sounds. No wheezing or rales.   Chest:      Chest wall: No tenderness.   Abdominal:      General: Bowel sounds are normal. There is no distension.      Palpations: Abdomen is soft. There is no hepatomegaly or mass.      Tenderness: There is no abdominal tenderness. There is no guarding or rebound.   Musculoskeletal:         General: No tenderness. Normal range of motion.      Cervical back: Normal range of motion " and neck supple.   Lymphadenopathy:      Cervical: No cervical adenopathy.   Skin:     General: Skin is warm and dry.      Coloration: Skin is not pale.      Findings: No erythema or rash.   Neurological:      Mental Status: He is alert and oriented to person, place, and time.      Cranial Nerves: No cranial nerve deficit.      Coordination: Coordination normal.      Deep Tendon Reflexes: Reflexes are normal and symmetric.   Psychiatric:         Speech: Speech normal.         Behavior: Behavior normal.         Thought Content: Thought content normal.         Judgment: Judgment normal.         Assessment:       1. Atrial fibrillation, unspecified type    2. Combined hyperlipidemia associated with type 2 diabetes mellitus    3. Hypertension associated with diabetes    4. Stage 3a chronic kidney disease    5. H/O TIA (transient ischemic attack) and stroke         Plan:     Doing well   Risk of stroke from atrial fib has been discussed as well as bleeding risk from alternative anticoagulation regiments as well as risk and benefits of rate control vs cardioversion    The current medical regimen is effective;  continue present plan and medications.    No orders of the defined types were placed in this encounter.    Follow up in about 6 months (around 9/20/2023).

## 2023-03-21 DIAGNOSIS — N18.30 STAGE 3 CHRONIC KIDNEY DISEASE, UNSPECIFIED WHETHER STAGE 3A OR 3B CKD: Primary | ICD-10-CM

## 2023-03-27 ENCOUNTER — LAB VISIT (OUTPATIENT)
Dept: LAB | Facility: HOSPITAL | Age: 88
End: 2023-03-27
Attending: NURSE PRACTITIONER
Payer: MEDICARE

## 2023-03-27 DIAGNOSIS — N18.30 STAGE 3 CHRONIC KIDNEY DISEASE, UNSPECIFIED WHETHER STAGE 3A OR 3B CKD: ICD-10-CM

## 2023-03-27 LAB
ALBUMIN SERPL BCP-MCNC: 3.7 G/DL (ref 3.5–5.2)
ALP SERPL-CCNC: 105 U/L (ref 55–135)
ALT SERPL W/O P-5'-P-CCNC: 18 U/L (ref 10–44)
ANION GAP SERPL CALC-SCNC: 8 MMOL/L (ref 8–16)
AST SERPL-CCNC: 19 U/L (ref 10–40)
BASOPHILS # BLD AUTO: 0.06 K/UL (ref 0–0.2)
BASOPHILS NFR BLD: 1.1 % (ref 0–1.9)
BILIRUB SERPL-MCNC: 0.8 MG/DL (ref 0.1–1)
BUN SERPL-MCNC: 18 MG/DL (ref 8–23)
CALCIUM SERPL-MCNC: 8.8 MG/DL (ref 8.7–10.5)
CHLORIDE SERPL-SCNC: 112 MMOL/L (ref 95–110)
CO2 SERPL-SCNC: 22 MMOL/L (ref 23–29)
CREAT SERPL-MCNC: 1.3 MG/DL (ref 0.5–1.4)
DIFFERENTIAL METHOD: ABNORMAL
EOSINOPHIL # BLD AUTO: 0.3 K/UL (ref 0–0.5)
EOSINOPHIL NFR BLD: 5.1 % (ref 0–8)
ERYTHROCYTE [DISTWIDTH] IN BLOOD BY AUTOMATED COUNT: 15.9 % (ref 11.5–14.5)
EST. GFR  (NO RACE VARIABLE): 53.8 ML/MIN/1.73 M^2
GLUCOSE SERPL-MCNC: 126 MG/DL (ref 70–110)
HCT VFR BLD AUTO: 47.2 % (ref 40–54)
HGB BLD-MCNC: 15.1 G/DL (ref 14–18)
IMM GRANULOCYTES # BLD AUTO: 0.04 K/UL (ref 0–0.04)
IMM GRANULOCYTES NFR BLD AUTO: 0.8 % (ref 0–0.5)
LYMPHOCYTES # BLD AUTO: 0.9 K/UL (ref 1–4.8)
LYMPHOCYTES NFR BLD: 17.5 % (ref 18–48)
MCH RBC QN AUTO: 26.5 PG (ref 27–31)
MCHC RBC AUTO-ENTMCNC: 32 G/DL (ref 32–36)
MCV RBC AUTO: 83 FL (ref 82–98)
MONOCYTES # BLD AUTO: 0.6 K/UL (ref 0.3–1)
MONOCYTES NFR BLD: 10.4 % (ref 4–15)
NEUTROPHILS # BLD AUTO: 3.5 K/UL (ref 1.8–7.7)
NEUTROPHILS NFR BLD: 65.1 % (ref 38–73)
NRBC BLD-RTO: 0 /100 WBC
PLATELET # BLD AUTO: 117 K/UL (ref 150–450)
PMV BLD AUTO: 11.3 FL (ref 9.2–12.9)
POTASSIUM SERPL-SCNC: 3.9 MMOL/L (ref 3.5–5.1)
PROT SERPL-MCNC: 6.4 G/DL (ref 6–8.4)
PTH-INTACT SERPL-MCNC: 186.4 PG/ML (ref 9–77)
RBC # BLD AUTO: 5.7 M/UL (ref 4.6–6.2)
SODIUM SERPL-SCNC: 142 MMOL/L (ref 136–145)
WBC # BLD AUTO: 5.3 K/UL (ref 3.9–12.7)

## 2023-03-27 PROCEDURE — 80053 COMPREHEN METABOLIC PANEL: CPT | Performed by: NURSE PRACTITIONER

## 2023-03-27 PROCEDURE — 85025 COMPLETE CBC W/AUTO DIFF WBC: CPT | Performed by: NURSE PRACTITIONER

## 2023-03-27 PROCEDURE — 83970 ASSAY OF PARATHORMONE: CPT | Performed by: NURSE PRACTITIONER

## 2023-03-27 PROCEDURE — 36415 COLL VENOUS BLD VENIPUNCTURE: CPT | Mod: PO | Performed by: NURSE PRACTITIONER

## 2023-03-28 NOTE — PROGRESS NOTES
"  Subjective:       Patient ID: Héctor Desir is a 87 y.o. male.    Chief Complaint: CKD    HPI    Patient presents to clinic today for routine follow-up.  Pt with significant PMHx of CKD, DM, HTN, Afib and CVA.  Pt was seen and examined today in clinic.  Pt denies taking NSAIDs, no GI losses, no abx use, no recent infections, no dysuria, no cardiopulmonary sx's.  Laboratory studies and medications were reviewed.  Since pt's last office visit, states had COVID earlier this month but doing well now with no specific or new complaints voiced.  All Nephrology related questions were answered to the pt's satisfaction.    The past medical, family and social histories were reviewed for this encounter.    Review of Systems   Constitutional: Negative.    HENT: Negative.     Respiratory:  Negative for shortness of breath.    Cardiovascular: Negative.  Negative for leg swelling.   Gastrointestinal: Negative.    Genitourinary: Negative.    Musculoskeletal: Negative.    Skin: Negative.    Neurological:  Negative for dizziness, light-headedness and headaches.   Psychiatric/Behavioral: Negative.          height is 5' 8" (1.727 m) and weight is 99.8 kg (220 lb). His blood pressure is 142/82 (abnormal) and his pulse is 65. His oxygen saturation is 97%.     Lab Results   Component Value Date    WBC 5.30 03/27/2023    HGB 15.1 03/27/2023    HCT 47.2 03/27/2023    MCV 83 03/27/2023     (L) 03/27/2023        CMP  Sodium   Date Value Ref Range Status   03/27/2023 142 136 - 145 mmol/L Final     Potassium   Date Value Ref Range Status   03/27/2023 3.9 3.5 - 5.1 mmol/L Final     Chloride   Date Value Ref Range Status   03/27/2023 112 (H) 95 - 110 mmol/L Final     CO2   Date Value Ref Range Status   03/27/2023 22 (L) 23 - 29 mmol/L Final     Glucose   Date Value Ref Range Status   03/27/2023 126 (H) 70 - 110 mg/dL Final     BUN   Date Value Ref Range Status   03/27/2023 18 8 - 23 mg/dL Final     Creatinine   Date Value Ref Range " Status   03/27/2023 1.3 0.5 - 1.4 mg/dL Final     Calcium   Date Value Ref Range Status   03/27/2023 8.8 8.7 - 10.5 mg/dL Final     Total Protein   Date Value Ref Range Status   03/27/2023 6.4 6.0 - 8.4 g/dL Final     Albumin   Date Value Ref Range Status   03/27/2023 3.7 3.5 - 5.2 g/dL Final     Total Bilirubin   Date Value Ref Range Status   03/27/2023 0.8 0.1 - 1.0 mg/dL Final     Comment:     For infants and newborns, interpretation of results should be based  on gestational age, weight and in agreement with clinical  observations.    Premature Infant recommended reference ranges:  Up to 24 hours.............<8.0 mg/dL  Up to 48 hours............<12.0 mg/dL  3-5 days..................<15.0 mg/dL  6-29 days.................<15.0 mg/dL       Alkaline Phosphatase   Date Value Ref Range Status   03/27/2023 105 55 - 135 U/L Final     AST   Date Value Ref Range Status   03/27/2023 19 10 - 40 U/L Final     ALT   Date Value Ref Range Status   03/27/2023 18 10 - 44 U/L Final     Anion Gap   Date Value Ref Range Status   03/27/2023 8 8 - 16 mmol/L Final     eGFR if    Date Value Ref Range Status   12/17/2021 45.1 (A) >60 mL/min/1.73 m^2 Final     eGFR if non    Date Value Ref Range Status   12/17/2021 39.0 (A) >60 mL/min/1.73 m^2 Final     Comment:     Calculation used to obtain the estimated glomerular filtration  rate (eGFR) is the CKD-EPI equation.          Current Outpatient Medications on File Prior to Visit   Medication Sig Dispense Refill    atorvastatin (LIPITOR) 40 MG tablet Take 1 tablet (40 mg total) by mouth once daily. 90 tablet 0    blood sugar diagnostic Strp Use daily- Freestyle lite 300 strip 3    blood-glucose meter kit Use before meals and before bed when the glucoses are not in control.  Otherwise, test it daily once a day before meals randomly to ensure stability of the gluocse. 1 each 0    dulaglutide (TRULICITY) 4.5 mg/0.5 mL pen injector Inject 4.5 mg into the skin  every 7 days. 12 pen 0    DULoxetine (CYMBALTA) 60 MG capsule Take 1 capsule (60 mg total) by mouth once daily. 90 capsule 0    empagliflozin (JARDIANCE) 25 mg tablet Take 1 tablet (25 mg total) by mouth once daily. 90 tablet 0    ERGOCALCIFEROL, VITAMIN D2, (VITAMIN D ORAL) Take by mouth once daily.       furosemide (LASIX) 40 MG tablet Take 1 tablet (40 mg total) by mouth once daily. 90 tablet 0    glimepiride (AMARYL) 4 MG tablet Take 1 tablet (4 mg total) by mouth 2 (two) times daily before meals. 180 tablet 0    lisinopriL (PRINIVIL,ZESTRIL) 20 MG tablet Take 1 tablet (20 mg total) by mouth 2 (two) times daily. 180 tablet 0    metoprolol succinate (TOPROL-XL) 50 MG 24 hr tablet Take 1 tablet (50 mg total) by mouth once daily. 90 tablet 0    nateglinide (STARLIX) 120 MG tablet Take 1 tablet (120 mg total) by mouth 3 (three) times daily before meals. If you skip a meal, do not take dose 270 tablet 0    omeprazole (PRILOSEC) 20 MG capsule Take 1 capsule (20 mg total) by mouth once daily. 90 capsule 3    rivaroxaban (XARELTO) 15 mg Tab Take 1 tablet (15 mg total) by mouth daily with dinner or evening meal. 90 tablet 0    [DISCONTINUED] azithromycin (Z-BOYD) 250 MG tablet Take 2 tablets on day 1, then 1 tablet daily on days 2 - 5. (Patient not taking: Reported on 1/12/2023) 6 tablet 0    [DISCONTINUED] chlorthalidone (HYGROTEN) 25 MG Tab Take 1 tablet (25 mg total) by mouth once daily. 90 tablet 3    [DISCONTINUED] GAVILYTE-G 236-22.74-6.74 -5.86 gram suspension USE AS DIRECTED       No current facility-administered medications on file prior to visit.       Objective:        Physical Exam  Vitals and nursing note reviewed.   Constitutional:       Appearance: Normal appearance.   HENT:      Head: Normocephalic and atraumatic.   Eyes:      Extraocular Movements: Extraocular movements intact.      Pupils: Pupils are equal, round, and reactive to light.   Cardiovascular:      Rate and Rhythm: Normal rate and regular  rhythm.   Pulmonary:      Effort: Pulmonary effort is normal.   Abdominal:      General: Bowel sounds are normal.      Palpations: Abdomen is soft.   Musculoskeletal:         General: Normal range of motion.      Right lower leg: No edema.      Left lower leg: No edema.   Skin:     General: Skin is warm and dry.   Neurological:      General: No focal deficit present.      Mental Status: He is alert and oriented to person, place, and time.   Psychiatric:         Mood and Affect: Mood normal.         Behavior: Behavior normal.         Thought Content: Thought content normal.         Judgment: Judgment normal.         Assessment:       1. Stage 3a chronic kidney disease    2. Hypertension associated with diabetes    3. Type 2 diabetes mellitus with stage 3a chronic kidney disease, without long-term current use of insulin    4. Secondary hyperparathyroidism, renal    5. Obesity (BMI 30-39.9)          Plan:         CKD stage 3 with stable kidney function; r/t age related nephron drop out with DM and HTN.  Pt eGFR of 53.8.  Most recent Cr stable 1.3.  Continue RAAS blockade (lisinopril) for renal preservation    2. HTN is stable and well controlled.  Continue RAAS blockade as prescribed.    3. DM stable and controlled with improved on Ozempic, per PCP.  Most recent Hgb A1c 6.8.  Continue with wt control, maintaining low sodium/carb diet.      4. Mineral and Bone Disease--continue D2. PTH increased, continue to trend down with Vit D supplementation as prescribed.    5. Lifestyle and wt control discussed with pt.  Advised pt on importance of BP and diabetes control, wt control, calorie restriction/ change in diet.  Maintain low sodium and low carb diet as directed.  Avoiding sodas/sweets and fried food/boxed foods, etc as discussed today in clinic.  Increase daily exercise as tolerated.  Pt verbalized understanding.    Return to clinic in 6 months    40 minutes of total time spent on the encounter, which includes face to  face time and non-face to face time preparing to see the patient (eg, review of tests), obtaining and/or reviewing separately obtained history, documenting clinical information in the electronic or other health record, Independently interpreting results and communicating results to the patient/family/caregiver, or care coordination.    Jazz Mccarty, [email or web address]

## 2023-03-29 ENCOUNTER — OFFICE VISIT (OUTPATIENT)
Dept: NEPHROLOGY | Facility: CLINIC | Age: 88
End: 2023-03-29
Payer: MEDICARE

## 2023-03-29 VITALS
HEIGHT: 68 IN | WEIGHT: 220 LBS | BODY MASS INDEX: 33.34 KG/M2 | OXYGEN SATURATION: 97 % | DIASTOLIC BLOOD PRESSURE: 82 MMHG | HEART RATE: 65 BPM | SYSTOLIC BLOOD PRESSURE: 142 MMHG

## 2023-03-29 DIAGNOSIS — N18.31 TYPE 2 DIABETES MELLITUS WITH STAGE 3A CHRONIC KIDNEY DISEASE, WITHOUT LONG-TERM CURRENT USE OF INSULIN: ICD-10-CM

## 2023-03-29 DIAGNOSIS — N25.81 SECONDARY HYPERPARATHYROIDISM, RENAL: ICD-10-CM

## 2023-03-29 DIAGNOSIS — E11.59 HYPERTENSION ASSOCIATED WITH DIABETES: ICD-10-CM

## 2023-03-29 DIAGNOSIS — I15.2 HYPERTENSION ASSOCIATED WITH DIABETES: ICD-10-CM

## 2023-03-29 DIAGNOSIS — N18.31 STAGE 3A CHRONIC KIDNEY DISEASE: Primary | ICD-10-CM

## 2023-03-29 DIAGNOSIS — E66.9 OBESITY (BMI 30-39.9): ICD-10-CM

## 2023-03-29 DIAGNOSIS — E11.22 TYPE 2 DIABETES MELLITUS WITH STAGE 3A CHRONIC KIDNEY DISEASE, WITHOUT LONG-TERM CURRENT USE OF INSULIN: ICD-10-CM

## 2023-03-29 PROCEDURE — 99999 PR PBB SHADOW E&M-EST. PATIENT-LVL IV: CPT | Mod: PBBFAC,,, | Performed by: NURSE PRACTITIONER

## 2023-03-29 PROCEDURE — 99215 OFFICE O/P EST HI 40 MIN: CPT | Mod: S$PBB,,, | Performed by: NURSE PRACTITIONER

## 2023-03-29 PROCEDURE — 99215 PR OFFICE/OUTPT VISIT, EST, LEVL V, 40-54 MIN: ICD-10-PCS | Mod: S$PBB,,, | Performed by: NURSE PRACTITIONER

## 2023-03-29 PROCEDURE — 99999 PR PBB SHADOW E&M-EST. PATIENT-LVL IV: ICD-10-PCS | Mod: PBBFAC,,, | Performed by: NURSE PRACTITIONER

## 2023-03-29 PROCEDURE — 99214 OFFICE O/P EST MOD 30 MIN: CPT | Mod: PBBFAC,PO | Performed by: NURSE PRACTITIONER

## 2023-03-29 NOTE — PATIENT INSTRUCTIONS
Continue all medications as reviewed today:    Keep blood pressure controlled  Low sodium diet: avoid/limit salt, processed foods (boxed or canned), fried foods, fast foods, etc as discussed    Keep blood sugar controlled  Low carbohydrate/sugar diet: avoid/limit sugary drinks, white breads, pasta, rice, etc as discussed    Drink water, flavored water/diluted juice (drink to thirst) daily as tolerated with no swelling/shortness of breath    Avoid NSAIDS: Advil, Ibuprofen, Aleve, Naproxen, Meloxicam, etc. (Aspirin is ok), Tylenol is Best    Exercise as tolerated     Follow up in 6 months and have labs drawn 1-2 weeks prior to visit

## 2023-04-05 ENCOUNTER — OFFICE VISIT (OUTPATIENT)
Dept: FAMILY MEDICINE | Facility: CLINIC | Age: 88
End: 2023-04-05
Payer: MEDICARE

## 2023-04-05 VITALS
BODY MASS INDEX: 33.65 KG/M2 | RESPIRATION RATE: 16 BRPM | SYSTOLIC BLOOD PRESSURE: 123 MMHG | WEIGHT: 222 LBS | HEART RATE: 65 BPM | TEMPERATURE: 98 F | HEIGHT: 68 IN | DIASTOLIC BLOOD PRESSURE: 76 MMHG

## 2023-04-05 DIAGNOSIS — E11.59 HYPERTENSION ASSOCIATED WITH DIABETES: Primary | ICD-10-CM

## 2023-04-05 DIAGNOSIS — I48.19 PERSISTENT ATRIAL FIBRILLATION: ICD-10-CM

## 2023-04-05 DIAGNOSIS — E11.69 COMBINED HYPERLIPIDEMIA ASSOCIATED WITH TYPE 2 DIABETES MELLITUS: ICD-10-CM

## 2023-04-05 DIAGNOSIS — N18.31 STAGE 3A CHRONIC KIDNEY DISEASE: ICD-10-CM

## 2023-04-05 DIAGNOSIS — F41.9 ANXIETY: ICD-10-CM

## 2023-04-05 DIAGNOSIS — Z85.46 HISTORY OF PROSTATE CANCER: ICD-10-CM

## 2023-04-05 DIAGNOSIS — Z12.5 ENCOUNTER FOR PROSTATE CANCER SCREENING: ICD-10-CM

## 2023-04-05 DIAGNOSIS — N25.81 SECONDARY HYPERPARATHYROIDISM, RENAL: ICD-10-CM

## 2023-04-05 DIAGNOSIS — E11.42 DIABETIC POLYNEUROPATHY ASSOCIATED WITH TYPE 2 DIABETES MELLITUS: ICD-10-CM

## 2023-04-05 DIAGNOSIS — E78.2 COMBINED HYPERLIPIDEMIA ASSOCIATED WITH TYPE 2 DIABETES MELLITUS: ICD-10-CM

## 2023-04-05 DIAGNOSIS — I15.2 HYPERTENSION ASSOCIATED WITH DIABETES: Primary | ICD-10-CM

## 2023-04-05 DIAGNOSIS — E11.22 TYPE 2 DIABETES MELLITUS WITH STAGE 3A CHRONIC KIDNEY DISEASE, WITHOUT LONG-TERM CURRENT USE OF INSULIN: ICD-10-CM

## 2023-04-05 DIAGNOSIS — I48.20 CHRONIC ATRIAL FIBRILLATION: ICD-10-CM

## 2023-04-05 DIAGNOSIS — K21.9 GASTROESOPHAGEAL REFLUX DISEASE WITHOUT ESOPHAGITIS: ICD-10-CM

## 2023-04-05 DIAGNOSIS — E11.649 UNCONTROLLED TYPE 2 DIABETES MELLITUS WITH HYPOGLYCEMIA WITHOUT COMA: ICD-10-CM

## 2023-04-05 DIAGNOSIS — R60.9 EDEMA, UNSPECIFIED TYPE: ICD-10-CM

## 2023-04-05 DIAGNOSIS — N18.31 TYPE 2 DIABETES MELLITUS WITH STAGE 3A CHRONIC KIDNEY DISEASE, WITHOUT LONG-TERM CURRENT USE OF INSULIN: ICD-10-CM

## 2023-04-05 PROCEDURE — 99999 PR PBB SHADOW E&M-EST. PATIENT-LVL III: CPT | Mod: PBBFAC,,, | Performed by: FAMILY MEDICINE

## 2023-04-05 PROCEDURE — 99214 PR OFFICE/OUTPT VISIT, EST, LEVL IV, 30-39 MIN: ICD-10-PCS | Mod: S$PBB,,, | Performed by: FAMILY MEDICINE

## 2023-04-05 PROCEDURE — 99999 PR PBB SHADOW E&M-EST. PATIENT-LVL III: ICD-10-PCS | Mod: PBBFAC,,, | Performed by: FAMILY MEDICINE

## 2023-04-05 PROCEDURE — 99214 OFFICE O/P EST MOD 30 MIN: CPT | Mod: S$PBB,,, | Performed by: FAMILY MEDICINE

## 2023-04-05 PROCEDURE — 99213 OFFICE O/P EST LOW 20 MIN: CPT | Mod: PBBFAC,PO | Performed by: FAMILY MEDICINE

## 2023-04-05 RX ORDER — GLIMEPIRIDE 4 MG/1
4 TABLET ORAL
Qty: 180 TABLET | Refills: 3 | Status: SHIPPED | OUTPATIENT
Start: 2023-04-05 | End: 2023-10-06 | Stop reason: SDUPTHER

## 2023-04-05 RX ORDER — OMEPRAZOLE 20 MG/1
20 CAPSULE, DELAYED RELEASE ORAL DAILY
Qty: 90 CAPSULE | Refills: 3 | Status: SHIPPED | OUTPATIENT
Start: 2023-04-05 | End: 2023-10-06 | Stop reason: SDUPTHER

## 2023-04-05 RX ORDER — NATEGLINIDE 120 MG/1
120 TABLET ORAL
Qty: 270 TABLET | Refills: 3 | Status: SHIPPED | OUTPATIENT
Start: 2023-04-05 | End: 2023-10-16 | Stop reason: SDUPTHER

## 2023-04-05 RX ORDER — LISINOPRIL 20 MG/1
20 TABLET ORAL 2 TIMES DAILY
Qty: 180 TABLET | Refills: 3 | Status: SHIPPED | OUTPATIENT
Start: 2023-04-05 | End: 2023-09-18 | Stop reason: SDUPTHER

## 2023-04-05 RX ORDER — ATORVASTATIN CALCIUM 40 MG/1
40 TABLET, FILM COATED ORAL DAILY
Qty: 90 TABLET | Refills: 3 | Status: SHIPPED | OUTPATIENT
Start: 2023-04-05 | End: 2023-09-18 | Stop reason: SDUPTHER

## 2023-04-05 RX ORDER — DULOXETIN HYDROCHLORIDE 60 MG/1
60 CAPSULE, DELAYED RELEASE ORAL DAILY
Qty: 90 CAPSULE | Refills: 3 | Status: SHIPPED | OUTPATIENT
Start: 2023-04-05

## 2023-04-05 RX ORDER — METOPROLOL SUCCINATE 50 MG/1
50 TABLET, EXTENDED RELEASE ORAL DAILY
Qty: 90 TABLET | Refills: 3 | Status: SHIPPED | OUTPATIENT
Start: 2023-04-05 | End: 2023-09-18 | Stop reason: SDUPTHER

## 2023-04-05 RX ORDER — FUROSEMIDE 40 MG/1
40 TABLET ORAL DAILY
Qty: 90 TABLET | Refills: 3 | Status: SHIPPED | OUTPATIENT
Start: 2023-04-05 | End: 2023-09-18 | Stop reason: SDUPTHER

## 2023-04-05 NOTE — PROGRESS NOTES
Subjective:      Patient ID: Héctor Desir is a 87 y.o. male.    Chief Complaint: Diabetes    Problem List Items Addressed This Visit       Anxiety    Overview     He has anxiety that has been going on for a couple of years and he is now dealing with his wife's cancer so this has been helpful with using the cymbalta.   He also had neuropathy and this has helped it.             Relevant Medications    DULoxetine (CYMBALTA) 60 MG capsule    Atrial fibrillation    Overview     He has chronic afib.  He has had a cardioversion but this did not help. He is following with Dr. Talamantes for this and he is on xarelto for this and is on toprol for rate control.  It is controlled. HE IS ON XARELTO FOR THIS ALSO.    RATE IS CONTROLLED.   Pulse Readings from Last 3 Encounters:   02/19/20 86   01/20/20 84   12/20/19 86            Relevant Medications    rivaroxaban (XARELTO) 15 mg Tab    CKD (chronic kidney disease) stage 3, GFR 30-59 ml/min    Overview     He has CKD and is seeing Dr. iSms.  He has not seen her in a while and we need to get this done.   CMP  Sodium   Date Value Ref Range Status   12/17/2021 139 136 - 145 mmol/L Final     Potassium   Date Value Ref Range Status   12/17/2021 4.0 3.5 - 5.1 mmol/L Final     Chloride   Date Value Ref Range Status   12/17/2021 106 95 - 110 mmol/L Final     CO2   Date Value Ref Range Status   12/17/2021 22 (L) 23 - 29 mmol/L Final     Glucose   Date Value Ref Range Status   12/17/2021 215 (H) 70 - 110 mg/dL Final     BUN   Date Value Ref Range Status   12/17/2021 24 (H) 8 - 23 mg/dL Final     Creatinine   Date Value Ref Range Status   12/17/2021 1.6 (H) 0.5 - 1.4 mg/dL Final     Calcium   Date Value Ref Range Status   12/17/2021 8.8 8.7 - 10.5 mg/dL Final     Total Protein   Date Value Ref Range Status   04/15/2021 7.2 6.0 - 8.4 g/dL Final     Albumin   Date Value Ref Range Status   12/17/2021 3.9 3.5 - 5.2 g/dL Final     Total Bilirubin   Date Value Ref Range Status   04/15/2021 0.8  0.1 - 1.0 mg/dL Final     Comment:     For infants and newborns, interpretation of results should be based  on gestational age, weight and in agreement with clinical  observations.    Premature Infant recommended reference ranges:  Up to 24 hours.............<8.0 mg/dL  Up to 48 hours............<12.0 mg/dL  3-5 days..................<15.0 mg/dL  6-29 days.................<15.0 mg/dL       Alkaline Phosphatase   Date Value Ref Range Status   04/15/2021 139 (H) 55 - 135 U/L Final     AST   Date Value Ref Range Status   04/15/2021 29 10 - 40 U/L Final     ALT   Date Value Ref Range Status   04/15/2021 48 (H) 10 - 44 U/L Final     Anion Gap   Date Value Ref Range Status   12/17/2021 11 8 - 16 mmol/L Final     eGFR if    Date Value Ref Range Status   12/17/2021 45.1 (A) >60 mL/min/1.73 m^2 Final     eGFR if non    Date Value Ref Range Status   12/17/2021 39.0 (A) >60 mL/min/1.73 m^2 Final     Comment:     Calculation used to obtain the estimated glomerular filtration  rate (eGFR) is the CKD-EPI equation.               Relevant Medications    glimepiride (AMARYL) 4 MG tablet    nateglinide (STARLIX) 120 MG tablet    empagliflozin (JARDIANCE) 25 mg tablet    Combined hyperlipidemia associated with type 2 diabetes mellitus    Overview     The patient presents with hyperlipidemia.  The patient reports tolerating the medication well and is in excellent compliance.  There have been no medication side effects.  The patient denies chest pain, neuropathy, and myalgias.  The patient has reduced fat intake and has been exercising.  Current treatment has included the medications listed in the med card.    Lab Results   Component Value Date    CHOL 129 03/24/2022    CHOL 114 (L) 01/07/2021    CHOL 102 (L) 10/14/2019       Lab Results   Component Value Date    HDL 35 (L) 03/24/2022    HDL 31 (L) 01/07/2021    HDL 30 (L) 10/14/2019       Lab Results   Component Value Date    LDLCALC 65.6 03/24/2022     LDLCALC 67.2 01/07/2021    LDLCALC 57.0 (L) 10/14/2019       Lab Results   Component Value Date    TRIG 142 03/24/2022    TRIG 79 01/07/2021    TRIG 75 10/14/2019       Lab Results   Component Value Date    CHOLHDL 27.1 03/24/2022    CHOLHDL 27.2 01/07/2021    CHOLHDL 29.4 10/14/2019     Lab Results   Component Value Date    ALT 18 03/27/2023    AST 19 03/27/2023    ALKPHOS 105 03/27/2023    BILITOT 0.8 03/27/2023            Relevant Medications    glimepiride (AMARYL) 4 MG tablet    nateglinide (STARLIX) 120 MG tablet    dulaglutide (TRULICITY) 4.5 mg/0.5 mL pen injector    atorvastatin (LIPITOR) 40 MG tablet    Other Relevant Orders    Lipid Panel    Diabetic neuropathy    Overview     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  It does help it most of the time.           Relevant Medications    glimepiride (AMARYL) 4 MG tablet    nateglinide (STARLIX) 120 MG tablet    dulaglutide (TRULICITY) 4.5 mg/0.5 mL pen injector    DULoxetine (CYMBALTA) 60 MG capsule    Edema    Overview     He has had swelling of the legs since early 2018.  He has not had a change in his meds but is on amlodipine 10 mg po q day.  He has not had trauma noted.           Relevant Medications    furosemide (LASIX) 40 MG tablet    History of prostate cancer    Overview     He had prostate cancer in 2006 and used to see Dr. Patterson. He would like to check a psa to f/u on this.  He has not had dysuria or hematuria or incontinence.           Hypertension associated with diabetes - Primary    Overview     The patient presents with essential hypertension.  The patient is tolerating the medication well and is in excellent compliance.  The patient is experiencing no side effects.  Counseling was offered regarding low salt diets.  The patient has a reduced salt intake.  The patient denies chest pain, palpitations, shortness of breath, dyspnea on exertion, left or murmur neck pain, nausea, vomiting, diaphoresis,  paroxysmal nocturnal dyspnea, and orthopnea.             Relevant Medications    glimepiride (AMARYL) 4 MG tablet    nateglinide (STARLIX) 120 MG tablet    dulaglutide (TRULICITY) 4.5 mg/0.5 mL pen injector    lisinopriL (PRINIVIL,ZESTRIL) 20 MG tablet    furosemide (LASIX) 40 MG tablet    Secondary hyperparathyroidism, renal    Overview     He has renally induced hyperparathyroidism.   Lab Results   Component Value Date    .4 (H) 03/27/2023    CALCIUM 8.8 03/27/2023    PHOS 2.9 03/01/2023   He has just seen DR. Sims.         Type 2 diabetes mellitus with kidney complication, without long-term current use of insulin    Overview     The patient presents with diabetes. The patient denies polyuria, polydipsia, polyphagia, hypoglycemia and paresthesias. The patient's glucose control has been good. Home glucose averages are routinely checked. The patient is without retinopathy currently. The patient has no history of neuropathy. The patient currently complains of no podiatric problems. The patient has excellent compliance.  Hemoglobin A1C   Date Value Ref Range Status   09/29/2022 6.8 (H) 4.0 - 5.6 % Final     Comment:     ADA Screening Guidelines:  5.7-6.4%  Consistent with prediabetes  >or=6.5%  Consistent with diabetes    High levels of fetal hemoglobin interfere with the HbA1C  assay. Heterozygous hemoglobin variants (HbS, HgC, etc)do  not significantly interfere with this assay.   However, presence of multiple variants may affect accuracy.     03/24/2022 6.7 (H) 4.0 - 5.6 % Final     Comment:     ADA Screening Guidelines:  5.7-6.4%  Consistent with prediabetes  >or=6.5%  Consistent with diabetes    High levels of fetal hemoglobin interfere with the HbA1C  assay. Heterozygous hemoglobin variants (HbS, HgC, etc)do  not significantly interfere with this assay.   However, presence of multiple variants may affect accuracy.     11/04/2021 6.7 (H) 4.0 - 5.6 % Final     Comment:     ADA Screening  Guidelines:  5.7-6.4%  Consistent with prediabetes  >or=6.5%  Consistent with diabetes    High levels of fetal hemoglobin interfere with the HbA1C  assay. Heterozygous hemoglobin variants (HbS, HgC, etc)do  not significantly interfere with this assay.   However, presence of multiple variants may affect accuracy.       No results found for: MICROALBUR, [id number]  Diabetes Management Status    Statin: Taking  ACE/ARB: Taking    Screening or Prevention Patient's value Goal Complete/Controlled?   HgA1C Testing and Control   Lab Results   Component Value Date    HGBA1C 6.8 (H) 09/29/2022      Annually/Less than 8% Yes   Lipid profile : 03/24/2022 Annually No   LDL control Lab Results   Component Value Date    LDLCALC 65.6 03/24/2022    Annually/Less than 100 mg/dl  No   Nephropathy screening Lab Results   Component Value Date    LABMICR 10.0 03/24/2022     Lab Results   Component Value Date    PROTEINUA Trace (A) 03/27/2023    Annually No   Blood pressure BP Readings from Last 1 Encounters:   04/05/23 123/76    Less than 140/90 Yes   Dilated retinal exam : 05/23/2022 Annually No   Foot exam   : 11/04/2021 Annually Yes              Relevant Medications    glimepiride (AMARYL) 4 MG tablet    nateglinide (STARLIX) 120 MG tablet    dulaglutide (TRULICITY) 4.5 mg/0.5 mL pen injector    Other Relevant Orders    Hemoglobin A1C     Other Visit Diagnoses       Encounter for prostate cancer screening        Relevant Orders    PSA, Screening    Uncontrolled type 2 diabetes mellitus with hypoglycemia without coma        Relevant Medications    glimepiride (AMARYL) 4 MG tablet    nateglinide (STARLIX) 120 MG tablet    dulaglutide (TRULICITY) 4.5 mg/0.5 mL pen injector    gerd        Relevant Medications    omeprazole (PRILOSEC) 20 MG capsule            The patient's Health Maintenance was reviewed and the following appears to be due:   Health Maintenance Due   Topic Date Due    Shingles Vaccine (1 of 2) Never done    Influenza  Vaccine (1) 09/01/2022    Foot Exam  11/04/2022    Lipid Panel  03/24/2023    Hemoglobin A1c  03/29/2023    Diabetes Urine Screening  03/24/2023       Past Medical History:  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     DM (diabetes mellitus) type II controlled, neurological manifestation 7/5/2013    GERD (gastroesophageal reflux disease)     History of prostate cancer 2006    Hypertension associated with diabetes     Hypertension goal BP (blood pressure) < 130/80     Obesity     Prostate cancer     TIA (transient ischemic attack)     Trouble in sleeping      Past Surgical History:   Procedure Laterality Date    CARDIAC CATHETERIZATION      CARDIOVERSION N/A 8/10/2018    Procedure: CARDIOVERSION;  Surgeon: Israel Cardoza MD;  Location: [facility] CATH LAB;  Service: Cardiology;  Laterality: N/A;  Dr. Talamantes pt    CATARACT EXTRACTION W/  INTRAOCULAR LENS IMPLANT  11/2013    Bilateral Cataract     COLONOSCOPY N/A 12/20/2019    Procedure: COLONOSCOPY;  Surgeon: Forrest Roy MD;  Location: [facility] ENDO;  Service: Endoscopy;  Laterality: N/A;    COLONOSCOPY W/ POLYPECTOMY      EYE SURGERY      PROSTATECTOMY  2006    TONSILLECTOMY      TONSILLECTOMY, ADENOIDECTOMY, BILATERAL MYRINGOTOMY AND TUBES       Review of patient's allergies indicates:   Allergen Reactions    Amlodipine      swelling     Current Outpatient Medications on File Prior to Visit   Medication Sig Dispense Refill    blood sugar diagnostic Strp Use daily- Freestyle lite 300 strip 3    blood-glucose meter kit Use before meals and before bed when the glucoses are not in control.  Otherwise, test it daily once a day before meals randomly to ensure  stability of the gluocse. 1 each 0    ERGOCALCIFEROL, VITAMIN D2, (VITAMIN D ORAL) Take by mouth once daily.       [DISCONTINUED] atorvastatin (LIPITOR) 40 MG tablet Take 1 tablet (40 mg total) by mouth once daily. 90 tablet 0    [DISCONTINUED] dulaglutide (TRULICITY) 4.5 mg/0.5 mL pen injector Inject 4.5 mg into the skin every 7 days. 12 pen 0    [DISCONTINUED] DULoxetine (CYMBALTA) 60 MG capsule Take 1 capsule (60 mg total) by mouth once daily. 90 capsule 0    [DISCONTINUED] empagliflozin (JARDIANCE) 25 mg tablet Take 1 tablet (25 mg total) by mouth once daily. 90 tablet 0    [DISCONTINUED] furosemide (LASIX) 40 MG tablet Take 1 tablet (40 mg total) by mouth once daily. 90 tablet 0    [DISCONTINUED] glimepiride (AMARYL) 4 MG tablet Take 1 tablet (4 mg total) by mouth 2 (two) times daily before meals. 180 tablet 0    [DISCONTINUED] lisinopriL (PRINIVIL,ZESTRIL) 20 MG tablet Take 1 tablet (20 mg total) by mouth 2 (two) times daily. 180 tablet 0    [DISCONTINUED] metoprolol succinate (TOPROL-XL) 50 MG 24 hr tablet Take 1 tablet (50 mg total) by mouth once daily. 90 tablet 0    [DISCONTINUED] nateglinide (STARLIX) 120 MG tablet Take 1 tablet (120 mg total) by mouth 3 (three) times daily before meals. If you skip a meal, do not take dose 270 tablet 0    [DISCONTINUED] omeprazole (PRILOSEC) 20 MG capsule Take 1 capsule (20 mg total) by mouth once daily. 90 capsule 3    [DISCONTINUED] rivaroxaban (XARELTO) 15 mg Tab Take 1 tablet (15 mg total) by mouth daily with dinner or evening meal. 90 tablet 0    [DISCONTINUED] chlorthalidone (HYGROTEN) 25 MG Tab Take 1 tablet (25 mg total) by mouth once daily. 90 tablet 3     No current facility-administered medications on file prior to visit.     Social History     Socioeconomic History    Marital status:    Tobacco Use    Smoking status: Never    Smokeless tobacco: Never   Substance and Sexual Activity    Alcohol use: No     Comment: He used to drink but  quit in 1990    Drug use: No     Social Determinants of Health     Financial Resource Strain: Low Risk     Difficulty of Paying Living Expenses: Not hard at all   Food Insecurity: No Food Insecurity    Worried About Running Out of Food in the Last Year: Never true    Ran Out of Food in the Last Year: Never true   Transportation Needs: No Transportation Needs    Lack of Transportation (Medical): No    Lack of Transportation (Non-Medical): No   Physical Activity: Inactive    Days of Exercise per Week: 0 days    Minutes of Exercise per Session: 0 min   Stress: No Stress Concern Present    Feeling of Stress : Not at all   Social Connections: Moderately Isolated    Frequency of Communication with Friends and Family: More than three times a week    Frequency of Social Gatherings with Friends and Family: Once a week    Attends Muslim Services: Never    Active Member of Clubs or Organizations: Yes    Attends Club or Organization Meetings: More than 4 times per year    Marital Status:    Housing Stability: Low Risk     Unable to Pay for Housing in the Last Year: No    Number of Places Lived in the Last Year: 1    Unstable Housing in the Last Year: No     Family History   Problem Relation Age of Onset    Heart attack Mother     Melanoma Father     Cancer Father     Anesthesia problems Neg Hx     Clotting disorder Neg Hx     Kidney disease Neg Hx     Amblyopia Neg Hx     Blindness Neg Hx     Cataracts Neg Hx     Glaucoma Neg Hx     Retinal detachment Neg Hx     Macular degeneration Neg Hx     Strabismus Neg Hx        Review of Systems   Constitutional: Negative.  Negative for chills, diaphoresis and fever.   HENT:  Negative for congestion, hearing loss, mouth sores, postnasal drip and sore throat.    Eyes:  Negative for pain and visual disturbance.   Respiratory:  Negative for cough, chest tightness, shortness of breath and wheezing.    Cardiovascular:  Negative for chest pain.  "  Gastrointestinal:  Negative for abdominal pain, anal bleeding, blood in stool, constipation, diarrhea, nausea and vomiting.   Genitourinary:  Negative for dysuria and hematuria.   Musculoskeletal:  Negative for back pain, neck pain and neck stiffness.   Skin:  Negative for rash.   Neurological:  Negative for dizziness and weakness.     Objective:   /76 (BP Location: Left arm, Patient Position: Sitting, BP Method: Medium (Automatic))   Pulse 65   Temp 98.2 °F (36.8 °C)   Resp 16   Ht 5' 8" (1.727 m)   Wt 100.7 kg (222 lb)   BMI 33.75 kg/m²     Physical Exam  Vitals reviewed.   Constitutional:       General: He is not in acute distress.     Appearance: Normal appearance. He is well-developed. He is not ill-appearing or diaphoretic.   HENT:      Head: Normocephalic and atraumatic.      Right Ear: Tympanic membrane, ear canal and external ear normal.      Left Ear: Tympanic membrane, ear canal and external ear normal.      Nose: Nose normal.      Mouth/Throat:      Pharynx: No oropharyngeal exudate.   Eyes:      General: No scleral icterus.        Right eye: No discharge.         Left eye: No discharge.      Conjunctiva/sclera: Conjunctivae normal.      Pupils: Pupils are equal, round, and reactive to light.   Neck:      Thyroid: No thyromegaly.      Vascular: No JVD.   Cardiovascular:      Rate and Rhythm: Normal rate and regular rhythm.      Heart sounds: Normal heart sounds. No murmur heard.    No friction rub. No gallop.   Pulmonary:      Effort: Pulmonary effort is normal. No respiratory distress.      Breath sounds: Normal breath sounds. No wheezing or rales.   Chest:      Chest wall: No tenderness.   Abdominal:      General: Bowel sounds are normal. There is no distension.      Palpations: Abdomen is soft. There is no mass.      Tenderness: There is no abdominal tenderness. There is no guarding or rebound.   Musculoskeletal:         General: No tenderness. Normal range of motion.      Cervical back: " Normal range of motion and neck supple.   Lymphadenopathy:      Cervical: No cervical adenopathy.   Skin:     General: Skin is warm and dry.   Neurological:      Mental Status: He is alert and oriented to person, place, and time.      Cranial Nerves: No cranial nerve deficit.      Coordination: Coordination normal.   Assessment:     1. Hypertension associated with diabetes    2. Combined hyperlipidemia associated with type 2 diabetes mellitus    3. Type 2 diabetes mellitus with stage 3a chronic kidney disease, without long-term current use of insulin    4. Secondary hyperparathyroidism, renal    5. History of prostate cancer    6. Encounter for prostate cancer screening    7. Stage 3a chronic kidney disease    8. Uncontrolled type 2 diabetes mellitus with hypoglycemia without coma    9. gerd    10. Edema, unspecified type    11. Persistent atrial fibrillation    12. Chronic atrial fibrillation    13. Anxiety    14. Diabetic polyneuropathy associated with type 2 diabetes mellitus      Plan:   I am having Héctor Desir maintain his blood-glucose meter, (ERGOCALCIFEROL, VITAMIN D2, (VITAMIN D ORAL)), blood sugar diagnostic, glimepiride, nateglinide, empagliflozin, metoprolol succinate, dulaglutide, lisinopriL, omeprazole, furosemide, atorvastatin, rivaroxaban, and DULoxetine.  No problem-specific Assessment & Plan notes found for this encounter.      Follow up in about 2 days (around 4/7/2023) for lab draw of labs ordered today.    Héctor was seen today for diabetes.    Diagnoses and all orders for this visit:    Hypertension associated with diabetes  -     lisinopriL (PRINIVIL,ZESTRIL) 20 MG tablet; Take 1 tablet (20 mg total) by mouth 2 (two) times daily.  -     furosemide (LASIX) 40 MG tablet; Take 1 tablet (40 mg total) by mouth once daily.    Combined hyperlipidemia associated with type 2 diabetes mellitus  -     Lipid Panel; Future  -     atorvastatin (LIPITOR) 40 MG tablet; Take 1 tablet (40 mg total) by mouth  once daily.    Type 2 diabetes mellitus with stage 3a chronic kidney disease, without long-term current use of insulin  -     Hemoglobin A1C; Future    Secondary hyperparathyroidism, renal    History of prostate cancer    Encounter for prostate cancer screening  -     PSA, Screening; Future    Stage 3a chronic kidney disease  -     glimepiride (AMARYL) 4 MG tablet; Take 1 tablet (4 mg total) by mouth 2 (two) times daily before meals.  -     nateglinide (STARLIX) 120 MG tablet; Take 1 tablet (120 mg total) by mouth 3 (three) times daily before meals. If you skip a meal, do not take dose  -     empagliflozin (JARDIANCE) 25 mg tablet; Take 1 tablet (25 mg total) by mouth once daily.    Uncontrolled type 2 diabetes mellitus with hypoglycemia without coma  -     dulaglutide (TRULICITY) 4.5 mg/0.5 mL pen injector; Inject 4.5 mg into the skin every 7 days.    gerd  -     omeprazole (PRILOSEC) 20 MG capsule; Take 1 capsule (20 mg total) by mouth once daily.    Edema, unspecified type  -     furosemide (LASIX) 40 MG tablet; Take 1 tablet (40 mg total) by mouth once daily.    Persistent atrial fibrillation  -     rivaroxaban (XARELTO) 15 mg Tab; Take 1 tablet (15 mg total) by mouth daily with dinner or evening meal.    Chronic atrial fibrillation  -     rivaroxaban (XARELTO) 15 mg Tab; Take 1 tablet (15 mg total) by mouth daily with dinner or evening meal.    Anxiety  -     DULoxetine (CYMBALTA) 60 MG capsule; Take 1 capsule (60 mg total) by mouth once daily.    Diabetic polyneuropathy associated with type 2 diabetes mellitus  -     DULoxetine (CYMBALTA) 60 MG capsule; Take 1 capsule (60 mg total) by mouth once daily.    Other orders  -     metoprolol succinate (TOPROL-XL) 50 MG 24 hr tablet; Take 1 tablet (50 mg total) by mouth once daily.      Medications Ordered This Encounter   Medications    atorvastatin (LIPITOR) 40 MG tablet     Sig: Take 1 tablet (40 mg total) by mouth once daily.     Dispense:  90 tablet     Refill:   3    dulaglutide (TRULICITY) 4.5 mg/0.5 mL pen injector     Sig: Inject 4.5 mg into the skin every 7 days.     Dispense:  12 pen     Refill:  3    DULoxetine (CYMBALTA) 60 MG capsule     Sig: Take 1 capsule (60 mg total) by mouth once daily.     Dispense:  90 capsule     Refill:  3    empagliflozin (JARDIANCE) 25 mg tablet     Sig: Take 1 tablet (25 mg total) by mouth once daily.     Dispense:  90 tablet     Refill:  3    furosemide (LASIX) 40 MG tablet     Sig: Take 1 tablet (40 mg total) by mouth once daily.     Dispense:  90 tablet     Refill:  3    glimepiride (AMARYL) 4 MG tablet     Sig: Take 1 tablet (4 mg total) by mouth 2 (two) times daily before meals.     Dispense:  180 tablet     Refill:  3    lisinopriL (PRINIVIL,ZESTRIL) 20 MG tablet     Sig: Take 1 tablet (20 mg total) by mouth 2 (two) times daily.     Dispense:  180 tablet     Refill:  3     .    metoprolol succinate (TOPROL-XL) 50 MG 24 hr tablet     Sig: Take 1 tablet (50 mg total) by mouth once daily.     Dispense:  90 tablet     Refill:  3     .    nateglinide (STARLIX) 120 MG tablet     Sig: Take 1 tablet (120 mg total) by mouth 3 (three) times daily before meals. If you skip a meal, do not take dose     Dispense:  270 tablet     Refill:  3    omeprazole (PRILOSEC) 20 MG capsule     Sig: Take 1 capsule (20 mg total) by mouth once daily.     Dispense:  90 capsule     Refill:  3    rivaroxaban (XARELTO) 15 mg Tab     Sig: Take 1 tablet (15 mg total) by mouth daily with dinner or evening meal.     Dispense:  90 tablet     Refill:  3     The patient was instructed to stop the following meds:  Medications Discontinued During This Encounter   Medication Reason    omeprazole (PRILOSEC) 20 MG capsule Reorder    atorvastatin (LIPITOR) 40 MG tablet Reorder    glimepiride (AMARYL) 4 MG tablet Reorder    metoprolol succinate (TOPROL-XL) 50 MG 24 hr tablet Reorder    lisinopriL (PRINIVIL,ZESTRIL) 20 MG tablet Reorder    furosemide (LASIX) 40  MG tablet Reorder    DULoxetine (CYMBALTA) 60 MG capsule Reorder    rivaroxaban (XARELTO) 15 mg Tab Reorder    empagliflozin (JARDIANCE) 25 mg tablet Reorder    dulaglutide (TRULICITY) 4.5 mg/0.5 mL pen injector Reorder    nateglinide (STARLIX) 120 MG tablet Reorder     Orders Placed This Encounter   Procedures    Hemoglobin A1C     Standing Status:   Future     Standing Expiration Date:   4/4/2024    Lipid Panel     Standing Status:   Future     Standing Expiration Date:   4/4/2024    PSA, Screening     Standing Status:   Future     Standing Expiration Date:   4/5/2024       Medication List with Changes/Refills   Current Medications    BLOOD SUGAR DIAGNOSTIC STRP    Use daily- Freestyle lite    BLOOD-GLUCOSE METER KIT    Use before meals and before bed when the glucoses are not in control.  Otherwise, test it daily once a day before meals randomly to ensure stability of the gluocse.    ERGOCALCIFEROL, VITAMIN D2, (VITAMIN D ORAL)    Take by mouth once daily.    Changed and/or Refilled Medications    Modified Medication Previous Medication    ATORVASTATIN (LIPITOR) 40 MG TABLET atorvastatin (LIPITOR) 40 MG tablet       Take 1 tablet (40 mg total) by mouth once daily.    Take 1 tablet (40 mg total) by mouth once daily.    DULAGLUTIDE (TRULICITY) 4.5 MG/0.5 ML PEN INJECTOR dulaglutide (TRULICITY) 4.5 mg/0.5 mL pen injector       Inject 4.5 mg into the skin every 7 days.    Inject 4.5 mg into the skin every 7 days.    DULOXETINE (CYMBALTA) 60 MG CAPSULE DULoxetine (CYMBALTA) 60 MG capsule       Take 1 capsule (60 mg total) by mouth once daily.    Take 1 capsule (60 mg total) by mouth once daily.    EMPAGLIFLOZIN (JARDIANCE) 25 MG TABLET empagliflozin (JARDIANCE) 25 mg tablet       Take 1 tablet (25 mg total) by mouth once daily.    Take 1 tablet (25 mg total) by mouth once daily.    FUROSEMIDE (LASIX) 40 MG TABLET furosemide (LASIX) 40 MG tablet       Take 1 tablet (40 mg total) by mouth once daily.    Take 1  tablet (40 mg total) by mouth once daily.    GLIMEPIRIDE (AMARYL) 4 MG TABLET glimepiride (AMARYL) 4 MG tablet       Take 1 tablet (4 mg total) by mouth 2 (two) times daily before meals.    Take 1 tablet (4 mg total) by mouth 2 (two) times daily before meals.    LISINOPRIL (PRINIVIL,ZESTRIL) 20 MG TABLET lisinopriL (PRINIVIL,ZESTRIL) 20 MG tablet       Take 1 tablet (20 mg total) by mouth 2 (two) times daily.    Take 1 tablet (20 mg total) by mouth 2 (two) times daily.    METOPROLOL SUCCINATE (TOPROL-XL) 50 MG 24 HR TABLET metoprolol succinate (TOPROL-XL) 50 MG 24 hr tablet       Take 1 tablet (50 mg total) by mouth once daily.    Take 1 tablet (50 mg total) by mouth once daily.    NATEGLINIDE (STARLIX) 120 MG TABLET nateglinide (STARLIX) 120 MG tablet       Take 1 tablet (120 mg total) by mouth 3 (three) times daily before meals. If you skip a meal, do not take dose    Take 1 tablet (120 mg total) by mouth 3 (three) times daily before meals. If you skip a meal, do not take dose    OMEPRAZOLE (PRILOSEC) 20 MG CAPSULE omeprazole (PRILOSEC) 20 MG capsule       Take 1 capsule (20 mg total) by mouth once daily.    Take 1 capsule (20 mg total) by mouth once daily.    RIVAROXABAN (XARELTO) 15 MG TAB rivaroxaban (XARELTO) 15 mg Tab       Take 1 tablet (15 mg total) by mouth daily with dinner or evening meal.    Take 1 tablet (15 mg total) by mouth daily with dinner or evening meal.      Medication List with Changes/Refills   Current Medications    BLOOD SUGAR DIAGNOSTIC STRP    Use daily- Freestyle lite       Start Date: 8/18/2017 End Date: --    BLOOD-GLUCOSE METER KIT    Use before meals and before bed when the glucoses are not in control.  Otherwise, test it daily once a day before meals randomly to ensure stability of the gluocse.       Start Date: 1/13/2016 End Date: --    ERGOCALCIFEROL, VITAMIN D2, (VITAMIN D ORAL)    Take by mouth once daily.        Start Date: --        End Date: --   Changed and/or Refilled  Medications    Modified Medication Previous Medication    ATORVASTATIN (LIPITOR) 40 MG TABLET atorvastatin (LIPITOR) 40 MG tablet       Take 1 tablet (40 mg total) by mouth once daily.    Take 1 tablet (40 mg total) by mouth once daily.       Start Date: 4/5/2023  End Date: --    Start Date: 3/20/2023 End Date: 4/5/2023    DULAGLUTIDE (TRULICITY) 4.5 MG/0.5 ML PEN INJECTOR dulaglutide (TRULICITY) 4.5 mg/0.5 mL pen injector       Inject 4.5 mg into the skin every 7 days.    Inject 4.5 mg into the skin every 7 days.       Start Date: 4/5/2023  End Date: 6/28/2023    Start Date: 3/20/2023 End Date: 4/5/2023    DULOXETINE (CYMBALTA) 60 MG CAPSULE DULoxetine (CYMBALTA) 60 MG capsule       Take 1 capsule (60 mg total) by mouth once daily.    Take 1 capsule (60 mg total) by mouth once daily.       Start Date: 4/5/2023  End Date: --    Start Date: 3/20/2023 End Date: 4/5/2023    EMPAGLIFLOZIN (JARDIANCE) 25 MG TABLET empagliflozin (JARDIANCE) 25 mg tablet       Take 1 tablet (25 mg total) by mouth once daily.    Take 1 tablet (25 mg total) by mouth once daily.       Start Date: 4/5/2023  End Date: --    Start Date: 3/20/2023 End Date: 4/5/2023    FUROSEMIDE (LASIX) 40 MG TABLET furosemide (LASIX) 40 MG tablet       Take 1 tablet (40 mg total) by mouth once daily.    Take 1 tablet (40 mg total) by mouth once daily.       Start Date: 4/5/2023  End Date: --    Start Date: 3/20/2023 End Date: 4/5/2023    GLIMEPIRIDE (AMARYL) 4 MG TABLET glimepiride (AMARYL) 4 MG tablet       Take 1 tablet (4 mg total) by mouth 2 (two) times daily before meals.    Take 1 tablet (4 mg total) by mouth 2 (two) times daily before meals.       Start Date: 4/5/2023  End Date: 7/4/2023    Start Date: 3/20/2023 End Date: 4/5/2023    LISINOPRIL (PRINIVIL,ZESTRIL) 20 MG TABLET lisinopriL (PRINIVIL,ZESTRIL) 20 MG tablet       Take 1 tablet (20 mg total) by mouth 2 (two) times daily.    Take 1 tablet (20 mg total) by mouth 2 (two) times daily.       Start  Date: 4/5/2023  End Date: 7/4/2023    Start Date: 3/20/2023 End Date: 4/5/2023    METOPROLOL SUCCINATE (TOPROL-XL) 50 MG 24 HR TABLET metoprolol succinate (TOPROL-XL) 50 MG 24 hr tablet       Take 1 tablet (50 mg total) by mouth once daily.    Take 1 tablet (50 mg total) by mouth once daily.       Start Date: 4/5/2023  End Date: --    Start Date: 3/20/2023 End Date: 4/5/2023    NATEGLINIDE (STARLIX) 120 MG TABLET nateglinide (STARLIX) 120 MG tablet       Take 1 tablet (120 mg total) by mouth 3 (three) times daily before meals. If you skip a meal, do not take dose    Take 1 tablet (120 mg total) by mouth 3 (three) times daily before meals. If you skip a meal, do not take dose       Start Date: 4/5/2023  End Date: --    Start Date: 3/20/2023 End Date: 4/5/2023    OMEPRAZOLE (PRILOSEC) 20 MG CAPSULE omeprazole (PRILOSEC) 20 MG capsule       Take 1 capsule (20 mg total) by mouth once daily.    Take 1 capsule (20 mg total) by mouth once daily.       Start Date: 4/5/2023  End Date: --    Start Date: 3/20/2023 End Date: 4/5/2023    RIVAROXABAN (XARELTO) 15 MG TAB rivaroxaban (XARELTO) 15 mg Tab       Take 1 tablet (15 mg total) by mouth daily with dinner or evening meal.    Take 1 tablet (15 mg total) by mouth daily with dinner or evening meal.       Start Date: 4/5/2023  End Date: --    Start Date: 3/20/2023 End Date: 4/5/2023

## 2023-04-10 ENCOUNTER — LAB VISIT (OUTPATIENT)
Dept: LAB | Facility: HOSPITAL | Age: 88
End: 2023-04-10
Attending: FAMILY MEDICINE
Payer: MEDICARE

## 2023-04-10 DIAGNOSIS — E78.2 COMBINED HYPERLIPIDEMIA ASSOCIATED WITH TYPE 2 DIABETES MELLITUS: ICD-10-CM

## 2023-04-10 DIAGNOSIS — Z12.5 ENCOUNTER FOR PROSTATE CANCER SCREENING: ICD-10-CM

## 2023-04-10 DIAGNOSIS — E11.22 TYPE 2 DIABETES MELLITUS WITH STAGE 3A CHRONIC KIDNEY DISEASE, WITHOUT LONG-TERM CURRENT USE OF INSULIN: ICD-10-CM

## 2023-04-10 DIAGNOSIS — E11.69 COMBINED HYPERLIPIDEMIA ASSOCIATED WITH TYPE 2 DIABETES MELLITUS: ICD-10-CM

## 2023-04-10 DIAGNOSIS — N18.31 TYPE 2 DIABETES MELLITUS WITH STAGE 3A CHRONIC KIDNEY DISEASE, WITHOUT LONG-TERM CURRENT USE OF INSULIN: ICD-10-CM

## 2023-04-10 LAB
CHOLEST SERPL-MCNC: 130 MG/DL (ref 120–199)
CHOLEST/HDLC SERPL: 3.6 {RATIO} (ref 2–5)
COMPLEXED PSA SERPL-MCNC: <0.01 NG/ML (ref 0–4)
ESTIMATED AVG GLUCOSE: 151 MG/DL (ref 68–131)
HBA1C MFR BLD: 6.9 % (ref 4–5.6)
HDLC SERPL-MCNC: 36 MG/DL (ref 40–75)
HDLC SERPL: 27.7 % (ref 20–50)
LDLC SERPL CALC-MCNC: 75.8 MG/DL (ref 63–159)
NONHDLC SERPL-MCNC: 94 MG/DL
TRIGL SERPL-MCNC: 91 MG/DL (ref 30–150)

## 2023-04-10 PROCEDURE — 83036 HEMOGLOBIN GLYCOSYLATED A1C: CPT | Performed by: FAMILY MEDICINE

## 2023-04-10 PROCEDURE — 80061 LIPID PANEL: CPT | Performed by: FAMILY MEDICINE

## 2023-04-10 PROCEDURE — 84153 ASSAY OF PSA TOTAL: CPT | Performed by: FAMILY MEDICINE

## 2023-04-10 PROCEDURE — 36415 COLL VENOUS BLD VENIPUNCTURE: CPT | Mod: PO | Performed by: FAMILY MEDICINE

## 2023-04-17 NOTE — PROGRESS NOTES
I have reviewed the labs and am recommending the following:  A1C NORMAL-The A1c is at goal.  Repeat an a1c in 6 months.     LIPID NORMAL ON MEDS-The cholesterol panel screening showed levels that are considered at target with the current medications. Continue meds & check annually.  PSA NORMAL-The PSA screening for prostate cancer is normal.  Recheck annually.      Health maintenance items that remain on your list that need to be arranged are listed below.   Please notify me if you are prepared to get them completed.    Shingles Vaccine(1 of 2) Never done  Influenza Vaccine(1) due on 09/01/2022  Foot Exam due on 11/04/2022  Diabetes Urine Screening due on 03/24/2023    Dr. Tae Goel

## 2023-07-14 ENCOUNTER — HOSPITAL ENCOUNTER (OUTPATIENT)
Facility: HOSPITAL | Age: 88
Discharge: HOME | End: 2023-07-15
Attending: EMERGENCY MEDICINE | Admitting: INTERNAL MEDICINE
Payer: MEDICARE

## 2023-07-14 DIAGNOSIS — I63.9 STROKE: ICD-10-CM

## 2023-07-14 DIAGNOSIS — R20.0 NUMBNESS: ICD-10-CM

## 2023-07-14 DIAGNOSIS — G45.9 TIA (TRANSIENT ISCHEMIC ATTACK): Primary | ICD-10-CM

## 2023-07-14 DIAGNOSIS — R73.9 HYPERGLYCEMIA WITHOUT KETOSIS: ICD-10-CM

## 2023-07-14 DIAGNOSIS — N18.31 STAGE 3A CHRONIC KIDNEY DISEASE: ICD-10-CM

## 2023-07-14 DIAGNOSIS — R00.0 TACHYCARDIA: ICD-10-CM

## 2023-07-14 PROBLEM — R29.90 EPISODE OF TRANSIENT NEUROLOGIC SYMPTOMS: Status: ACTIVE | Noted: 2023-07-14

## 2023-07-14 LAB
ALBUMIN SERPL BCP-MCNC: 3.7 G/DL (ref 3.5–5.2)
ALP SERPL-CCNC: 111 U/L (ref 55–135)
ALT SERPL W/O P-5'-P-CCNC: 19 U/L (ref 10–44)
ANION GAP SERPL CALC-SCNC: 11 MMOL/L (ref 8–16)
AORTIC ROOT ANNULUS: 3.65 CM
ASCENDING AORTA: 3.28 CM
AST SERPL-CCNC: 28 U/L (ref 10–40)
AV INDEX (PROSTH): 0.63
AV MEAN GRADIENT: 6 MMHG
AV PEAK GRADIENT: 9 MMHG
AV REGURGITATION PRESSURE HALF TIME: 1048.12 MS
AV VALVE AREA: 2.17 CM2
AV VELOCITY RATIO: 0.72
BACTERIA #/AREA URNS HPF: NORMAL /HPF
BASOPHILS # BLD AUTO: 0.05 K/UL (ref 0–0.2)
BASOPHILS NFR BLD: 0.8 % (ref 0–1.9)
BILIRUB SERPL-MCNC: 0.9 MG/DL (ref 0.1–1)
BILIRUB UR QL STRIP: NEGATIVE
BSA FOR ECHO PROCEDURE: 2.13 M2
BUN SERPL-MCNC: 26 MG/DL (ref 8–23)
CALCIUM SERPL-MCNC: 8.8 MG/DL (ref 8.7–10.5)
CHLORIDE SERPL-SCNC: 109 MMOL/L (ref 95–110)
CHOLEST SERPL-MCNC: 134 MG/DL (ref 120–199)
CHOLEST/HDLC SERPL: 4.5 {RATIO} (ref 2–5)
CLARITY UR: CLEAR
CO2 SERPL-SCNC: 20 MMOL/L (ref 23–29)
COLOR UR: YELLOW
CREAT SERPL-MCNC: 1.6 MG/DL (ref 0.5–1.4)
CV ECHO LV RWT: 0.44 CM
DIFFERENTIAL METHOD: ABNORMAL
DOP CALC AO PEAK VEL: 1.52 M/S
DOP CALC AO VTI: 27.7 CM
DOP CALC LVOT AREA: 3.4 CM2
DOP CALC LVOT DIAMETER: 2.09 CM
DOP CALC LVOT PEAK VEL: 1.09 M/S
DOP CALC LVOT STROKE VOLUME: 60.01 CM3
DOP CALC RVOT PEAK VEL: 0.82 M/S
DOP CALC RVOT VTI: 14.7 CM
DOP CALCLVOT PEAK VEL VTI: 17.5 CM
E/E' RATIO: 11.11 M/S
ECHO LV POSTERIOR WALL: 1.18 CM (ref 0.6–1.1)
EJECTION FRACTION: 40 %
EOSINOPHIL # BLD AUTO: 0.3 K/UL (ref 0–0.5)
EOSINOPHIL NFR BLD: 4.4 % (ref 0–8)
ERYTHROCYTE [DISTWIDTH] IN BLOOD BY AUTOMATED COUNT: 15.9 % (ref 11.5–14.5)
EST. GFR  (NO RACE VARIABLE): 42 ML/MIN/1.73 M^2
ESTIMATED AVG GLUCOSE: 143 MG/DL (ref 68–131)
FRACTIONAL SHORTENING: 21 % (ref 28–44)
GLUCOSE SERPL-MCNC: 219 MG/DL (ref 70–110)
GLUCOSE UR QL STRIP: ABNORMAL
HBA1C MFR BLD: 6.6 % (ref 4–5.6)
HCT VFR BLD AUTO: 48.1 % (ref 40–54)
HCV AB SERPL QL IA: NEGATIVE
HDLC SERPL-MCNC: 30 MG/DL (ref 40–75)
HDLC SERPL: 22.4 % (ref 20–50)
HEP C VIRUS HOLD SPECIMEN: NORMAL
HGB BLD-MCNC: 15.5 G/DL (ref 14–18)
HGB UR QL STRIP: NEGATIVE
HIV 1+2 AB+HIV1 P24 AG SERPL QL IA: NEGATIVE
IMM GRANULOCYTES # BLD AUTO: 0.04 K/UL (ref 0–0.04)
IMM GRANULOCYTES NFR BLD AUTO: 0.6 % (ref 0–0.5)
INR PPP: 1.3 (ref 0.8–1.2)
INTERVENTRICULAR SEPTUM: 1.36 CM (ref 0.6–1.1)
IVC DIAMETER: 1.4 CM
IVRT: 68.51 MSEC
KETONES UR QL STRIP: NEGATIVE
LA MAJOR: 5.93 CM
LA MINOR: 5.88 CM
LA WIDTH: 4.6 CM
LDLC SERPL CALC-MCNC: 77.2 MG/DL (ref 63–159)
LEFT ATRIUM SIZE: 4.6 CM
LEFT ATRIUM VOLUME INDEX: 51.1 ML/M2
LEFT ATRIUM VOLUME: 106.21 CM3
LEFT INTERNAL DIMENSION IN SYSTOLE: 4.22 CM (ref 2.1–4)
LEFT VENTRICLE DIASTOLIC VOLUME INDEX: 67.21 ML/M2
LEFT VENTRICLE DIASTOLIC VOLUME: 139.79 ML
LEFT VENTRICLE MASS INDEX: 136 G/M2
LEFT VENTRICLE SYSTOLIC VOLUME INDEX: 38.2 ML/M2
LEFT VENTRICLE SYSTOLIC VOLUME: 79.41 ML
LEFT VENTRICULAR INTERNAL DIMENSION IN DIASTOLE: 5.37 CM (ref 3.5–6)
LEFT VENTRICULAR MASS: 283.53 G
LEUKOCYTE ESTERASE UR QL STRIP: NEGATIVE
LV LATERAL E/E' RATIO: 10 M/S
LV SEPTAL E/E' RATIO: 12.5 M/S
LVOT MG: 2.71 MMHG
LVOT MV: 0.78 CM/S
LYMPHOCYTES # BLD AUTO: 1.2 K/UL (ref 1–4.8)
LYMPHOCYTES NFR BLD: 17.7 % (ref 18–48)
MCH RBC QN AUTO: 26.3 PG (ref 27–31)
MCHC RBC AUTO-ENTMCNC: 32.2 G/DL (ref 32–36)
MCV RBC AUTO: 82 FL (ref 82–98)
MICROSCOPIC COMMENT: NORMAL
MONOCYTES # BLD AUTO: 0.6 K/UL (ref 0.3–1)
MONOCYTES NFR BLD: 8.7 % (ref 4–15)
MV PEAK E VEL: 1 M/S
MV STENOSIS PRESSURE HALF TIME: 44.64 MS
MV VALVE AREA P 1/2 METHOD: 4.93 CM2
NEUTROPHILS # BLD AUTO: 4.4 K/UL (ref 1.8–7.7)
NEUTROPHILS NFR BLD: 67.8 % (ref 38–73)
NITRITE UR QL STRIP: NEGATIVE
NONHDLC SERPL-MCNC: 104 MG/DL
NRBC BLD-RTO: 0 /100 WBC
PH UR STRIP: 6 [PH] (ref 5–8)
PISA AR MAX VEL: 3.39 M/S
PISA MRMAX VEL: 5.69 M/S
PISA TR MAX VEL: 2.46 M/S
PLATELET # BLD AUTO: 136 K/UL (ref 150–450)
PMV BLD AUTO: 11.9 FL (ref 9.2–12.9)
POCT GLUCOSE: 124 MG/DL (ref 70–110)
POCT GLUCOSE: 150 MG/DL (ref 70–110)
POCT GLUCOSE: 156 MG/DL (ref 70–110)
POCT GLUCOSE: 191 MG/DL (ref 70–110)
POTASSIUM SERPL-SCNC: 4.5 MMOL/L (ref 3.5–5.1)
PROT SERPL-MCNC: 7 G/DL (ref 6–8.4)
PROT UR QL STRIP: ABNORMAL
PROTHROMBIN TIME: 14.2 SEC (ref 9–12.5)
PV MEAN GRADIENT: 1.76 MMHG
RA MAJOR: 5.62 CM
RA PRESSURE: 3 MMHG
RA WIDTH: 3.5 CM
RBC # BLD AUTO: 5.9 M/UL (ref 4.6–6.2)
SODIUM SERPL-SCNC: 140 MMOL/L (ref 136–145)
SP GR UR STRIP: >1.03 (ref 1–1.03)
STJ: 3.57 CM
TDI LATERAL: 0.1 M/S
TDI SEPTAL: 0.08 M/S
TDI: 0.09 M/S
TR MAX PG: 24 MMHG
TR MEAN GRADIENT: 23 MMHG
TRICUSPID ANNULAR PLANE SYSTOLIC EXCURSION: 1.9 CM
TRIGL SERPL-MCNC: 134 MG/DL (ref 30–150)
TSH SERPL DL<=0.005 MIU/L-ACNC: 2.93 UIU/ML (ref 0.4–4)
TV REST PULMONARY ARTERY PRESSURE: 27 MMHG
URN SPEC COLLECT METH UR: ABNORMAL
UROBILINOGEN UR STRIP-ACNC: NEGATIVE EU/DL
WBC # BLD AUTO: 6.55 K/UL (ref 3.9–12.7)
YEAST URNS QL MICRO: NORMAL

## 2023-07-14 PROCEDURE — 25500020 PHARM REV CODE 255: Performed by: EMERGENCY MEDICINE

## 2023-07-14 PROCEDURE — 99285 EMERGENCY DEPT VISIT HI MDM: CPT | Mod: 25

## 2023-07-14 PROCEDURE — 93005 ELECTROCARDIOGRAM TRACING: CPT

## 2023-07-14 PROCEDURE — 85610 PROTHROMBIN TIME: CPT | Performed by: EMERGENCY MEDICINE

## 2023-07-14 PROCEDURE — 97530 THERAPEUTIC ACTIVITIES: CPT

## 2023-07-14 PROCEDURE — 97161 PT EVAL LOW COMPLEX 20 MIN: CPT

## 2023-07-14 PROCEDURE — 92610 EVALUATE SWALLOWING FUNCTION: CPT

## 2023-07-14 PROCEDURE — 97166 OT EVAL MOD COMPLEX 45 MIN: CPT

## 2023-07-14 PROCEDURE — G0426 PR INPT TELEHEALTH CONSULT 50M: ICD-10-PCS | Mod: 95,,, | Performed by: STUDENT IN AN ORGANIZED HEALTH CARE EDUCATION/TRAINING PROGRAM

## 2023-07-14 PROCEDURE — 92523 SPEECH SOUND LANG COMPREHEN: CPT

## 2023-07-14 PROCEDURE — 84443 ASSAY THYROID STIM HORMONE: CPT | Performed by: EMERGENCY MEDICINE

## 2023-07-14 PROCEDURE — 83036 HEMOGLOBIN GLYCOSYLATED A1C: CPT | Performed by: INTERNAL MEDICINE

## 2023-07-14 PROCEDURE — 80053 COMPREHEN METABOLIC PANEL: CPT | Performed by: EMERGENCY MEDICINE

## 2023-07-14 PROCEDURE — 86803 HEPATITIS C AB TEST: CPT | Performed by: EMERGENCY MEDICINE

## 2023-07-14 PROCEDURE — 25000003 PHARM REV CODE 250: Performed by: INTERNAL MEDICINE

## 2023-07-14 PROCEDURE — G0426 INPT/ED TELECONSULT50: HCPCS | Mod: 95,,, | Performed by: STUDENT IN AN ORGANIZED HEALTH CARE EDUCATION/TRAINING PROGRAM

## 2023-07-14 PROCEDURE — 87389 HIV-1 AG W/HIV-1&-2 AB AG IA: CPT | Performed by: EMERGENCY MEDICINE

## 2023-07-14 PROCEDURE — 27000221 HC OXYGEN, UP TO 24 HOURS

## 2023-07-14 PROCEDURE — G0378 HOSPITAL OBSERVATION PER HR: HCPCS

## 2023-07-14 PROCEDURE — 81000 URINALYSIS NONAUTO W/SCOPE: CPT | Performed by: INTERNAL MEDICINE

## 2023-07-14 PROCEDURE — 97116 GAIT TRAINING THERAPY: CPT

## 2023-07-14 PROCEDURE — 85025 COMPLETE CBC W/AUTO DIFF WBC: CPT | Performed by: EMERGENCY MEDICINE

## 2023-07-14 PROCEDURE — 80061 LIPID PANEL: CPT | Performed by: EMERGENCY MEDICINE

## 2023-07-14 PROCEDURE — 93010 ELECTROCARDIOGRAM REPORT: CPT | Mod: ,,, | Performed by: STUDENT IN AN ORGANIZED HEALTH CARE EDUCATION/TRAINING PROGRAM

## 2023-07-14 PROCEDURE — 93010 EKG 12-LEAD: ICD-10-PCS | Mod: ,,, | Performed by: STUDENT IN AN ORGANIZED HEALTH CARE EDUCATION/TRAINING PROGRAM

## 2023-07-14 RX ORDER — METOPROLOL TARTRATE 1 MG/ML
5 INJECTION, SOLUTION INTRAVENOUS ONCE
Status: DISCONTINUED | OUTPATIENT
Start: 2023-07-14 | End: 2023-07-15 | Stop reason: HOSPADM

## 2023-07-14 RX ORDER — DULAGLUTIDE 4.5 MG/.5ML
4.5 INJECTION, SOLUTION SUBCUTANEOUS
COMMUNITY

## 2023-07-14 RX ORDER — METOPROLOL SUCCINATE 50 MG/1
50 TABLET, EXTENDED RELEASE ORAL DAILY
Status: DISCONTINUED | OUTPATIENT
Start: 2023-07-14 | End: 2023-07-15 | Stop reason: HOSPADM

## 2023-07-14 RX ORDER — IBUPROFEN 200 MG
24 TABLET ORAL
Status: DISCONTINUED | OUTPATIENT
Start: 2023-07-14 | End: 2023-07-15 | Stop reason: HOSPADM

## 2023-07-14 RX ORDER — GLUCAGON 1 MG
1 KIT INJECTION
Status: DISCONTINUED | OUTPATIENT
Start: 2023-07-14 | End: 2023-07-15 | Stop reason: HOSPADM

## 2023-07-14 RX ORDER — PANTOPRAZOLE SODIUM 40 MG/1
40 TABLET, DELAYED RELEASE ORAL DAILY
Status: DISCONTINUED | OUTPATIENT
Start: 2023-07-14 | End: 2023-07-15 | Stop reason: HOSPADM

## 2023-07-14 RX ORDER — INSULIN ASPART 100 [IU]/ML
0-5 INJECTION, SOLUTION INTRAVENOUS; SUBCUTANEOUS
Status: DISCONTINUED | OUTPATIENT
Start: 2023-07-14 | End: 2023-07-15 | Stop reason: HOSPADM

## 2023-07-14 RX ORDER — IBUPROFEN 200 MG
16 TABLET ORAL
Status: DISCONTINUED | OUTPATIENT
Start: 2023-07-14 | End: 2023-07-15 | Stop reason: HOSPADM

## 2023-07-14 RX ORDER — ONDANSETRON 2 MG/ML
4 INJECTION INTRAMUSCULAR; INTRAVENOUS EVERY 8 HOURS PRN
Status: DISCONTINUED | OUTPATIENT
Start: 2023-07-14 | End: 2023-07-15 | Stop reason: HOSPADM

## 2023-07-14 RX ORDER — DULOXETIN HYDROCHLORIDE 30 MG/1
60 CAPSULE, DELAYED RELEASE ORAL DAILY
Status: DISCONTINUED | OUTPATIENT
Start: 2023-07-14 | End: 2023-07-15 | Stop reason: HOSPADM

## 2023-07-14 RX ORDER — ATORVASTATIN CALCIUM 40 MG/1
40 TABLET, FILM COATED ORAL DAILY
Status: DISCONTINUED | OUTPATIENT
Start: 2023-07-14 | End: 2023-07-15 | Stop reason: HOSPADM

## 2023-07-14 RX ORDER — SODIUM CHLORIDE 0.9 % (FLUSH) 0.9 %
10 SYRINGE (ML) INJECTION
Status: DISCONTINUED | OUTPATIENT
Start: 2023-07-14 | End: 2023-07-15 | Stop reason: HOSPADM

## 2023-07-14 RX ORDER — HYDRALAZINE HYDROCHLORIDE 20 MG/ML
10 INJECTION INTRAMUSCULAR; INTRAVENOUS EVERY 6 HOURS PRN
Status: DISCONTINUED | OUTPATIENT
Start: 2023-07-14 | End: 2023-07-15 | Stop reason: HOSPADM

## 2023-07-14 RX ADMIN — PANTOPRAZOLE SODIUM 40 MG: 40 TABLET, DELAYED RELEASE ORAL at 11:07

## 2023-07-14 RX ADMIN — ATORVASTATIN CALCIUM 40 MG: 40 TABLET, FILM COATED ORAL at 11:07

## 2023-07-14 RX ADMIN — RIVAROXABAN 15 MG: 15 TABLET, FILM COATED ORAL at 04:07

## 2023-07-14 RX ADMIN — DULOXETINE HYDROCHLORIDE 60 MG: 30 CAPSULE, DELAYED RELEASE ORAL at 11:07

## 2023-07-14 RX ADMIN — METOPROLOL SUCCINATE 50 MG: 50 TABLET, EXTENDED RELEASE ORAL at 09:07

## 2023-07-14 RX ADMIN — IOHEXOL 100 ML: 350 INJECTION, SOLUTION INTRAVENOUS at 09:07

## 2023-07-14 NOTE — PT/OT/SLP EVAL
Speech Language Pathology Evaluation  Cognitive/Bedside Swallow    Patient Name:  Héctor Desir   MRN:  [id number]  Admitting Diagnosis: TIA (transient ischemic attack)    Recommendations:                  General Recommendations:  Follow-up not indicated  Diet recommendations:  Regular Diet - IDDSI Level 7, Thin liquids - IDDSI Level 0   Aspiration Precautions: Frequent oral care and Standard aspiration precautions   General Precautions: Standard, aspiration  Communication strategies:  none    History:     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     DM (diabetes mellitus) type II controlled, neurological manifestation 7/5/2013    GERD (gastroesophageal reflux disease)     History of prostate cancer 2006    Hypertension associated with diabetes     Hypertension goal BP (blood pressure) < 130/80     Obesity     Prostate cancer     TIA (transient ischemic attack)     Trouble in sleeping        Past Surgical History:   Procedure Laterality Date    CARDIAC CATHETERIZATION      CARDIOVERSION N/A 8/10/2018    Procedure: CARDIOVERSION;  Surgeon: Israel Cardoza MD;  Location: [facility] CATH LAB;  Service: Cardiology;  Laterality: N/A;  Dr. Talamantes pt    CATARACT EXTRACTION W/  INTRAOCULAR LENS IMPLANT  11/2013    Bilateral Cataract     COLONOSCOPY N/A 12/20/2019    Procedure: COLONOSCOPY;  Surgeon: Forrest Roy MD;  Location: [facility] ENDO;  Service: Endoscopy;  Laterality: N/A;    COLONOSCOPY W/ POLYPECTOMY      EYE SURGERY      PROSTATECTOMY  2006    TONSILLECTOMY      TONSILLECTOMY, ADENOIDECTOMY, BILATERAL MYRINGOTOMY AND TUBES         Social History: Patient lives at home in [place] and is independent with ADL's.  Retired .    Prior  Intubation HX:  N/A     Modified Barium Swallow: N/A     CT HEAD WITHOUT CONTRAST     CLINICAL HISTORY:  Neuro deficit, acute, stroke suspected;     TECHNIQUE:  Low dose axial images were obtained through the head.  Coronal and sagittal reformations were also performed. Contrast was not administered.     COMPARISON:  None.     FINDINGS:  Old lacunar infarction within the anterior right thalamus.  No intracranial mass, mass effect, hemorrhage, hydrocephalus, acute infarction or abnormal fluid collection.     Mild low attenuation in the periventricular and deep white matter, compatible with small vessel ischemic disease.     No depressed skull fracture or skull lesions.  Degenerative changes within the TMJ.  Mucosal thickening within the left sphenoid sinus.  Wall thickening of the left sphenoid sinus suggesting sequela of chronic sinusitis.     Impression:     No acute intracranial abnormality.     Old lacunar infarction within the right thalamus.     Senescent changes.     Aspect score is 10/10.        Electronically signed by: Jame Watt  Date:                                            07/14/2023  Time:                                           07:42    CTA HEAD AND NECK (XPD)     CLINICAL HISTORY:  Stroke/TIA, determine embolic source;     TECHNIQUE:  CTA of the intracranial and extracranial circulation was performed after the administration of intravenous contrast in the arterial phase. This examination was interpreted using multiplanar and 3D reconstructions.  100 mL of Omnipaque 350 was administered intravenously.     COMPARISON:  Prior CT of the head from 07/14/2023.     FINDINGS:  CTA neck:     There is a 4 vessel branching pattern of the aortic arch with the left vertebral artery originating from the arch.  The origins of the vessels from the arch are not significantly stenotic. The subclavian arteries are without hemodynamically significant stenosis.     The right common carotid artery is without  significant stenosis. There is calcific plaque within the right carotid bifurcation with focal, 60% stenosis of the origin of the right internal carotid artery.  The more cranial, ascending cervical right internal carotid artery is normal in course and caliber.     The left common carotid is without significant stenosis. Minimal calcific plaque within the carotid bulb without stenosis of the left internal carotid artery.  The more cranial, ascending cervical left internal carotid artery is normal in course and caliber.     Extracranial vertebral arteries: The vertebral arteries are codominant.  The vertebral arteries are without significant stenosis to their confluence into the basilar artery.     CTA head:     The cavernous internal carotid arteries are normal in course and caliber.  The right middle cerebral artery is normal.  The left middle cerebral artery is normal.  There is an aplastic left A1 segment.  The anterior communicating artery is normal. Right and left posterior cerebral arteries are normal.  There is a patent left-sided posterior communicating artery     The basilar artery is normal in caliber.  Intracranial vertebral arteries are patent to the basilar confluence.     The superior cerebellar arteries are normal. The anterior inferior cerebellar arteries are normal. The posterior inferior cerebellar arteries are normal.     Impression:     1. CT angiogram of the brain demonstrates no evidence of large vessel occlusion or hemodynamically significant stenosis.  2. 60% stenosis of the origin of the right internal carotid artery on CTA of the neck due to coarse calcific plaque.  No stenosis of the left carotid system.  All CT scans at this facility use dose modulation, iterative reconstruction, and/or weight base dosing when appropriate to reduce radiation dose to as low as reasonably achievable.        Electronically signed by: Ankur Guzman Jr., MD  Date:                                             07/14/2023  Time:                                           10:05    XR CHEST 1 VIEW     CLINICAL HISTORY:  , Weakness;     COMPARISON:  Chest 03/01/2023.     FINDINGS:  Stable heart size.  Clear lungs.     Impression:     Stable chest.  No infiltrate.        Electronically signed by: Timbo Wooten MD  Date:                                            07/14/2023  Time:                                           11:20    Prior diet: Pt consuming regular diet at home prior to admission.    Subjective     Pt seen bedside for ST evaluation.  No c/o pain.  Pt's son present.  Patient goals: To improve left sided numbness    Pain/Comfort:  Pain Rating 1: 0/10  Pain Rating Post-Intervention 1: 0/10  Pain Rating 2: 0/10  Pain Rating Post-Intervention 2: 0/10    Respiratory Status: Room air    Objective:     Cognitive Status:    WFL to meet complex communicative needs.       Receptive Language:   Comprehension:   WFL in conversation    Pragmatics:    WFL    Expressive Language:  Verbal:    WFL in conversation      Motor Speech:  WFL    Voice:   WFL    Oral Musculature Evaluation  Oral Musculature: WFL  Dentition: scattered dentition  Secretion Management: adequate  Mucosal Quality: good  Mandibular Strength and Mobility: WFL  Oral Labial Strength and Mobility: WFL  Lingual Strength and Mobility: WFL  Velar Elevation: WFL  Buccal Strength and Mobility: WFL  Volitional Cough: present  Volitional Swallow: present  Voice Prior to PO Intake: WFL    Bedside Clinical Swallow Evaluation:     [place] Swallow Protocol:  [place] Swallow Protocol dictates patient remain NPO if fail screener (Suiter et al. 2014).  The [place] Swallow Protocol was administered. The patient was alert and provided the instructions prior to the beginning of the protocol. The patient consumed 3 oz before putting the cup down. Patient drank with consecutive swallows. Patient without overt signs or symptoms of aspiration.     Clinical Swallow Examination:   Of note,  patient self-fed throughout evaluation. Patient presented with:     CONSISTENCY  NOTES   THIN (IDDSI 0) 3 oz water challenge    No overt s/s of dysphagia   PUREE (IDDSI 4/Extremely Thick)   TSP/TBSP bites of pudding No overt s/s of dysphagia   SOLID (IDDSI 7/Regular) [place]   No overt s/s of dysphagia     Thickened liquids were not used in this assessment. Jessicafe (2018) reported that thickened liquids have no sound evidence as reducing risk of pneumonia in patients with dysphagia and can cause harm by increasing risk of dehydration. It also presents an increased risk of UTI, electrolyte imbalance, constipation, fecal impaction, cognitive impairment, functional decline, and even death (Paula, 2002; [place], 2016).  This supports the assertion that we should confirm a patient requires thickened liquids with an instrumental swallow study prior to recommending them.  Thickened liquids are associated with risks including dehydration, increased pharyngeal residue, potential interference with medication absorption, and decreased quality of life (Sophie, 2013). Thickened liquids are also more likely to be silently aspirated than thin liquids (Kirt et al., 2018).     References:   Sophie MCLEAN. (2013). Thickening agents used for dysphagia management: Effect on bioavailability of water, medication and feelings of satiety. Nutrition Journal, 12, 54. https://doi.org/10.1186/9531-6420-51-54    ITZEL Moralez, JESUS ALBERTO White, NIK Solorio, & ITZEL Lorenz (2018). Cough response to aspiration in thin and thick fluids during FEES in hospitalized inpatients. International journal of language & communication disorders, 53(5), 909-918. https://doi.org/10.1111/7062-4589.95958    INTERPRETATION:  Clinical swallow evaluation (CSE) revealed oral phase characterized by lingual, labial, and buccal strength and range of motion adequate for lip closure, bolus preparation and propulsion. The patient had no anterior loss of the bolus with  complete closure of the lips around the utensils. No residue remained in the oral cavity following the swallow. Patient without overt clinical signs/symptoms of aspiration on any PO trials given.    Compensatory Strategies  Standard Aspiration precautions    Assessment:     Héctor Desir is an 88 y.o. male admitted to [facility] ED with c/o numbness, involving entire left side. He presents with functional OM, communication and swallowing with recommendation for IDDSI 7-regular solids and IDDSI 0-thin liquids.  No further acute SLP intervention indicated at this time.  MD to reconsult if needed.    Goals: N/A    Plan:     Plan of Care reviewed with:  patient, son   SLP Follow-Up:  No       Discharge recommendations:  Discharge Facility/Level of Care Needs: home   Barriers to Discharge:  None    Time Tracking:     SLP Treatment Date:   07/14/23  Speech Start Time:  1300  Speech Stop Time:  1330     Speech Total Time (min):  30 min    Billable Minutes: Eval 15 minutes  and Eval Swallow and Oral Function 15 minutes    07/14/2023

## 2023-07-14 NOTE — RESPIRATORY THERAPY
RT responded to Code Stroke, pt is alert and responding. Pt was placed on 2LNC, SpO2 98%, RT will continue to monitor pt. RN at bedside.

## 2023-07-14 NOTE — ASSESSMENT & PLAN NOTE
Patient's FSGs are uncontrolled due to hyperglycemia on current medication regimen.  Last A1c reviewed-   Lab Results   Component Value Date    HGBA1C 6.9 (H) 04/10/2023     Most recent fingerstick glucose reviewed-   Recent Labs   Lab 07/14/23  0730   POCTGLUCOSE 191*     Current correctional scale  Low  Maintain anti-hyperglycemic dose as follows-   Antihyperglycemics (From admission, onward)    Start     Stop Route Frequency Ordered    07/14/23 1222  insulin aspart U-100 pen 0-5 Units         -- SubQ Before meals & nightly PRN 07/14/23 1123        Hold Oral hypoglycemics while patient is in the hospital.

## 2023-07-14 NOTE — HPI
88M with a PMHx significant for DM, HTN, TIAs presenting with left-sided numbness involving the face, arm, and leg. LKN 0300 - woke up at the time and felt normal. Woke up again at 0600 with numbness to his left face, arm, and leg. Symptoms have since improved. NIHSS 0. He is on rivaroxaban for atrial fibrillation. Last dose yesterday evening.

## 2023-07-14 NOTE — ASSESSMENT & PLAN NOTE
Antithrombotics for secondary stroke prevention: Continue home rivaroxaban 15mg.      Statins for secondary stroke prevention and hyperlipidemia, if present:  atorvastatin 40 mg     Aggressive risk factor modification: HTN, DM, HLD, A-Fib     Rehab efforts: The patient has been evaluated by a stroke team provider and the therapy needs have been fully considered based off the presenting complaints and exam findings. The following therapy evaluations are needed: PT/OT     Diagnostics ordered:   - CTA head/neck with contrast  - MRI brain without contrast   - TTE without bubble study, monitor on telemetry while admitted  - Labs: hemoglobin A1c (goal <7%), lipid panel (LDL goal <70mg/dL), TSH  - Treat hyperglycemia to achieve a blood glucose level in a range of 140-180 mg/dL and to closely monitor to prevent hypoglycemia.     VTE prophylaxis: Mechanical prophylaxis: Place SCDs     BP parameters: TIA: SBP <220 until imaging confirmation of no infarct

## 2023-07-14 NOTE — ASSESSMENT & PLAN NOTE
Patient with Persistent (7 days or more) atrial fibrillation which is controlled currently with Beta Blocker. Patient is currently in atrial fibrillation.[id number] Score: 6. HASBLED Score: . Anticoagulation indicated. Anticoagulation done with Xarelto.

## 2023-07-14 NOTE — HPI
The patient is an 87 yo male with past medical history of atrial fibrillation, diabetes, CKD, GERD, hypertension, and prostate cancer who presented with left sided numbness. He reports his body is breaking down. He states he is hard of hearing. He woke up around 0300 and felt fine. He noticed left sided numbness from face to left leg. He came to the ED for further evaluation. He reports symptoms have improved. He denies weakness. Vascular neurology (Dr. Wallis) evaluated. Patient presented outside of the window for lytic therapy. Continued ischemic workup recommended with monitoring on telemetry. CTA negative for LVO. [facility] medicine consulted. Patient placed in observation.

## 2023-07-14 NOTE — PT/OT/SLP EVAL
Occupational Therapy   Evaluation and Discharge Note    Name: Héctor Desir  MRN: [id number]  Admitting Diagnosis: TIA (transient ischemic attack)  Recent Surgery: * No surgery found *      Recommendations:     Discharge Recommendations: home  Discharge Equipment Recommendations: none  Barriers to discharge:  None    Assessment:     Héctor Desir is a 88 y.o. male with a medical diagnosis of TIA (transient ischemic attack). At this time, patient is functioning at their prior level of function and does not require further acute OT services.     Plan:     During this hospitalization, patient does not require further acute OT services.  Please re-consult if situation changes.    Plan of Care Reviewed with: patient, son  D/C OT services at this time.  Subjective     Chief Complaint: L sided numbness  Patient/Family Comments/goals: get better, return home    Occupational Profile:  Living Environment: lives alone in a 1 story house with ramp to enter. Pt's son lives close by and can assist pt as needed.  Previous level of function: Pt (I) with ADLs and functional mobility.  Roles and Routines: drives  Equipment Used at home: none  Assistance upon Discharge: son    Pain/Comfort:  Pain Rating 1: 0/10  Objective:     Communicated with: Nurse and epic chart review prior to session.  Patient found HOB elevated with peripheral IV, telemetry, blood pressure cuff, pulse ox (continuous) upon OT entry to room.    General Precautions: Standard,    Orthopedic Precautions: N/A  Braces: N/A  Respiratory Status: Room air     Occupational Performance:    Bed Mobility:    Patient completed Rolling/Turning to Right with independence  Patient completed Scooting/Bridging with independence  Patient completed Supine to Sit with independence  Patient completed Sit to Supine with independence  Supine scoot to HOB with (I).    Functional Mobility/Transfers:  Patient completed Sit <> Stand Transfer with independence  with  no assistive device    Functional Mobility: Patient completed x250ft functional mobility with (I) and no AD to increase dynamic standing balance and activity tolerance needed for ADL completion.   Stand pivot t/f to EOB with (I) and no AD.    Activities of Daily Living:  Lower Body Dressing: independence doff/mich shoes EOB    Cognitive/Visual Perceptual:  Cognitive/Psychosocial Skills:     -       Oriented to: Person, Place, Time, and Situation   -       Follows Commands/attention:Follows multistep  commands  -       Communication: clear/fluent  -       Memory: No Deficits noted  -       Safety awareness/insight to disability: intact     Physical Exam:  Sensation:    -       Impaired  pt reports numbness on L side from face to toes (improved). Sensation to light touch intact.  Dominant hand:    -       right  Upper Extremity Range of Motion:     -       Right Upper Extremity: WNL  -       Left Upper Extremity: WNL  Upper Extremity Strength:    -       Right Upper Extremity: WNL  -       Left Upper Extremity: WNL   Strength:    -       Right Upper Extremity: WNL  -       Left Upper Extremity: WNL    AMPAC 6 Click ADL:  AMPAC Total Score: 24    Treatment & Education:  Patient educated on role of OT in acute setting and benefits of participation. Educated on techniques to use to increase independence and decrease fall risk with functional transfers. Educated on importance of OOB activity and calling for A to transfer back to bed. Encouraged completion of B UE AROM therex throughout the day to tolerance to increase functional strength and activity tolerance. Educated patient on importance of increased tolerance to upright position and direct impact on CV endurance and strength. Patient encouraged to sit up in chair for a minimum of 2 consecutive hours per day. Patient stated understanding and in agreement with POC.     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     DM (diabetes mellitus) type II controlled, neurological manifestation 7/5/2013    GERD (gastroesophageal reflux disease)     History of prostate cancer 2006    Hypertension associated with diabetes     Hypertension goal BP (blood pressure) < 130/80     Obesity     Prostate cancer     TIA (transient ischemic attack)     Trouble in sleeping          Past Surgical History:   Procedure Laterality Date    CARDIAC CATHETERIZATION      CARDIOVERSION N/A 8/10/2018    Procedure: CARDIOVERSION;  Surgeon: Israel Cardoza MD;  Location: [facility] CATH LAB;  Service: Cardiology;  Laterality: N/A;  Dr. Talamantes pt    CATARACT EXTRACTION W/  INTRAOCULAR LENS IMPLANT  11/2013    Bilateral Cataract     COLONOSCOPY N/A 12/20/2019    Procedure: COLONOSCOPY;  Surgeon: Forrest Roy MD;  Location: [facility] ENDO;  Service: Endoscopy;  Laterality: N/A;    COLONOSCOPY W/ POLYPECTOMY      EYE SURGERY      PROSTATECTOMY  2006    TONSILLECTOMY      TONSILLECTOMY, ADENOIDECTOMY, BILATERAL MYRINGOTOMY AND TUBES         Time Tracking:     OT Date of Treatment: 07/14/23  OT Start Time: 1135  OT Stop Time: 1200  OT Total Time (min): 25 min    Billable Minutes:Evaluation 15  Therapeutic Activity 10    7/14/2023  Beba Linda OT

## 2023-07-14 NOTE — TREATMENT PLAN
Response to Stroke Alert       Upon arrival to room, patient gone to CT for CT head.   Spoke with Dr. Gauthier. LKN ~9-10pm prior to going to bed. Woke up with left sided paresthesia and left facial droop. Patient reported compliance to Dr. Gauthier with Xarelto.     Not a lytic candidate 2/2 LKN + Xarelto.   CTA head/neck ordered. Tele- Vascular Neurology consult is pending.     Please call if critical care team can be of further assistance.     Ayse Corral NP   Critical Care/ Pulmonary Medicine

## 2023-07-14 NOTE — PLAN OF CARE
OT eval completed.  Pt (I) with ADLs and functional mobility. Good BUE strength.  Pt does not require skilled acute OT services at this time.  Discharge from OT. Recommends home

## 2023-07-14 NOTE — SUBJECTIVE & OBJECTIVE
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     DM (diabetes mellitus) type II controlled, neurological manifestation 7/5/2013    GERD (gastroesophageal reflux disease)     History of prostate cancer 2006    Hypertension associated with diabetes     Hypertension goal BP (blood pressure) < 130/80     Obesity     Prostate cancer     TIA (transient ischemic attack)     Trouble in sleeping        Past Surgical History:   Procedure Laterality Date    CARDIAC CATHETERIZATION      CARDIOVERSION N/A 8/10/2018    Procedure: CARDIOVERSION;  Surgeon: Israel Cardoza MD;  Location: [facility] CATH LAB;  Service: Cardiology;  Laterality: N/A;  Dr. Talamantes pt    CATARACT EXTRACTION W/  INTRAOCULAR LENS IMPLANT  11/2013    Bilateral Cataract     COLONOSCOPY N/A 12/20/2019    Procedure: COLONOSCOPY;  Surgeon: Forrest Roy MD;  Location: [facility] ENDO;  Service: Endoscopy;  Laterality: N/A;    COLONOSCOPY W/ POLYPECTOMY      EYE SURGERY      PROSTATECTOMY  2006    TONSILLECTOMY      TONSILLECTOMY, ADENOIDECTOMY, BILATERAL MYRINGOTOMY AND TUBES         Review of patient's allergies indicates:   Allergen Reactions    Amlodipine      swelling       No current facility-administered medications on file prior to encounter.     Current Outpatient Medications on File Prior to Encounter   Medication Sig    atorvastatin (LIPITOR) 40 MG tablet Take 1 tablet (40 mg total) by mouth once daily.    dulaglutide (TRULICITY) 4.5 mg/0.5 mL pen injector Inject 4.5 mg into the skin every 7 days.    DULoxetine (CYMBALTA) 60 MG capsule Take 1 capsule (60 mg total) by mouth once daily.    empagliflozin (JARDIANCE) 25 mg tablet Take 1 tablet (25 mg total) by mouth once daily.     furosemide (LASIX) 40 MG tablet Take 1 tablet (40 mg total) by mouth once daily.    glimepiride (AMARYL) 4 MG tablet Take 1 tablet (4 mg total) by mouth 2 (two) times daily before meals.    lisinopriL (PRINIVIL,ZESTRIL) 20 MG tablet Take 1 tablet (20 mg total) by mouth 2 (two) times daily.    metoprolol succinate (TOPROL-XL) 50 MG 24 hr tablet Take 1 tablet (50 mg total) by mouth once daily.    nateglinide (STARLIX) 120 MG tablet Take 1 tablet (120 mg total) by mouth 3 (three) times daily before meals. If you skip a meal, do not take dose    omeprazole (PRILOSEC) 20 MG capsule Take 1 capsule (20 mg total) by mouth once daily.    rivaroxaban (XARELTO) 15 mg Tab Take 1 tablet (15 mg total) by mouth daily with dinner or evening meal.    blood sugar diagnostic Strp Use daily- Freestyle lite    blood-glucose meter kit Use before meals and before bed when the glucoses are not in control.  Otherwise, test it daily once a day before meals randomly to ensure stability of the gluocse.    ERGOCALCIFEROL, VITAMIN D2, (VITAMIN D ORAL) Take by mouth once daily.     [DISCONTINUED] chlorthalidone (HYGROTEN) 25 MG Tab Take 1 tablet (25 mg total) by mouth once daily.     Family History       Problem Relation (Age of Onset)    Cancer Father    Heart attack Mother    Melanoma Father          Tobacco Use    Smoking status: Never    Smokeless tobacco: Never   Substance and Sexual Activity    Alcohol use: No     Comment: He used to drink but quit in 1990    Drug use: No    Sexual activity: Not on file     Review of Systems   Neurological:  Positive for numbness. Negative for dizziness, facial asymmetry and weakness.   All other systems reviewed and are negative.  Objective:     Vital Signs (Most Recent):  Temp: 98.3 °F (36.8 °C) (07/14/23 0727)  Pulse: 90 (07/14/23 0940)  Resp: (!) 22 (07/14/23 0940)  BP: 121/66 (07/14/23 0940)  SpO2: 98 % (07/14/23 0940) Vital Signs (24h Range):  Temp:  [98.3 °F (36.8 °C)] 98.3 °F (36.8 °C)  Pulse:   [] 90  Resp:  [16-22] 22  SpO2:  [96 %-98 %] 98 %  BP: (121-180)/() 121/66     Weight: 94.6 kg (208 lb 8.9 oz)  Body mass index is 31.71 kg/m².     Physical Exam  HENT:      Head: Normocephalic and atraumatic.   Cardiovascular:      Rate and Rhythm: Normal rate and regular rhythm.      Heart sounds: No murmur heard.  Pulmonary:      Effort: Pulmonary effort is normal. No respiratory distress.      Breath sounds: Normal breath sounds. No wheezing.   Abdominal:      General: Bowel sounds are normal. There is no distension.      Palpations: Abdomen is soft.      Tenderness: There is no abdominal tenderness.   Musculoskeletal:         General: No swelling.   Skin:     General: Skin is warm and dry.   Neurological:      Mental Status: He is alert and oriented to person, place, and time. Mental status is at baseline.      Comments: Moves all extremities, no pronator drift  Touch sensation intact bilaterally              Significant Labs: All pertinent labs within the past 24 hours have been reviewed.  CBC:   Recent Labs   Lab 07/14/23  0748   WBC 6.55   HGB 15.5   HCT 48.1   *     CMP:   Recent Labs   Lab 07/14/23  0748      K 4.5      CO2 20*   *   BUN 26*   CREATININE 1.6*   CALCIUM 8.8   PROT 7.0   ALBUMIN 3.7   BILITOT 0.9   ALKPHOS 111   AST 28   ALT 19   ANIONGAP 11         Significant Imaging: I have reviewed all pertinent imaging results/findings within the past 24 hours.

## 2023-07-14 NOTE — SUBJECTIVE & OBJECTIVE
Woke up with symptoms?: no    Recent bleeding noted: no     Does the patient take any Blood Thinners? yes     Medications: Anticoagulants:  rivaroxaban/Xarelto    Past Medical History: hypertension, diabetes, tia and Afib    Past Surgical History: no relevant surgical history    Family History: no relevant history    Social History: no smoking, no drinking, no drugs    Allergies: Amlodipine     Review of Systems   The following systems were reviewed with pertinent positives and negatives documented in the HPI: Constitutional, Eyes, CV, Respiratory, GI, , Musculoskeletal, Skin, Neurological, Psychiatric      Objective:   Vitals: Blood pressure (!) 162/92, pulse 90, temperature 98.3 °F (36.8 °C), resp. rate 20, weight 94.6 kg (208 lb 8.9 oz), SpO2 97 %.     CT READ: Yes  No hemmorhage. No mass effect. No early infarct signs.     Physical Exam  Vitals reviewed.   Constitutional:       Appearance: Normal appearance.   HENT:      Head: Normocephalic and atraumatic.   Eyes:      General: Lids are normal.      Extraocular Movements: Extraocular movements intact.   Cardiovascular:      Rate and Rhythm: Normal rate and regular rhythm.   Pulmonary:      Effort: Pulmonary effort is normal. No respiratory distress.   Abdominal:      General: Abdomen is flat. There is no distension.   Musculoskeletal:         General: No deformity or signs of injury. Normal range of motion.      Cervical back: Normal range of motion. No rigidity.   Neurological:      General: No focal deficit present.      Mental Status: He is alert and oriented to person, place, and time.      Sensory: No sensory deficit.      Motor: No weakness.   Psychiatric:         Mood and Affect: Mood normal.         Behavior: Behavior normal.

## 2023-07-14 NOTE — H&P
[facility] - Emergency Dept.  [facility] Medicine  History & Physical    Patient Name: Héctor Desir  MRN: [id number]  Patient Class: OP- Observation  Admission Date: 7/14/2023  Attending Physician: Verenice Silva MD   Primary Care Provider: Tae Goel MD         Patient information was obtained from patient, past medical records and ER records.     Subjective:     Principal Problem:TIA (transient ischemic attack)    Chief Complaint:   Chief Complaint   Patient presents with    Numbness     Left sided numbness after waking up at 6am.          HPI: The patient is an 89 yo male with past medical history of atrial fibrillation, diabetes, CKD, GERD, hypertension, and prostate cancer who presented with left sided numbness. He reports his body is breaking down. He states he is hard of hearing. He woke up around 0300 and felt fine. He noticed left sided numbness from face to left leg. He came to the ED for further evaluation. He reports symptoms have improved. He denies weakness. Vascular neurology (Dr. Wallis) evaluated. Patient presented outside of the window for lytic therapy. Continued ischemic workup recommended with monitoring on telemetry. CTA negative for LVO. [facility] medicine consulted. Patient placed in observation.      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     DM (diabetes mellitus) type II controlled, neurological manifestation 7/5/2013    GERD (gastroesophageal reflux disease)     History of prostate cancer 2006    Hypertension associated with diabetes     Hypertension goal BP (blood pressure) < 130/80     Obesity     Prostate cancer     TIA (transient ischemic attack)     Trouble in sleeping        Past  Surgical History:   Procedure Laterality Date    CARDIAC CATHETERIZATION      CARDIOVERSION N/A 8/10/2018    Procedure: CARDIOVERSION;  Surgeon: Israel Cardoza MD;  Location: [facility] CATH LAB;  Service: Cardiology;  Laterality: N/A;  Dr. Talamantes pt    CATARACT EXTRACTION W/  INTRAOCULAR LENS IMPLANT  11/2013    Bilateral Cataract     COLONOSCOPY N/A 12/20/2019    Procedure: COLONOSCOPY;  Surgeon: Forrest Roy MD;  Location: [facility] ENDO;  Service: Endoscopy;  Laterality: N/A;    COLONOSCOPY W/ POLYPECTOMY      EYE SURGERY      PROSTATECTOMY  2006    TONSILLECTOMY      TONSILLECTOMY, ADENOIDECTOMY, BILATERAL MYRINGOTOMY AND TUBES         Review of patient's allergies indicates:   Allergen Reactions    Amlodipine      swelling       No current facility-administered medications on file prior to encounter.     Current Outpatient Medications on File Prior to Encounter   Medication Sig    atorvastatin (LIPITOR) 40 MG tablet Take 1 tablet (40 mg total) by mouth once daily.    dulaglutide (TRULICITY) 4.5 mg/0.5 mL pen injector Inject 4.5 mg into the skin every 7 days.    DULoxetine (CYMBALTA) 60 MG capsule Take 1 capsule (60 mg total) by mouth once daily.    empagliflozin (JARDIANCE) 25 mg tablet Take 1 tablet (25 mg total) by mouth once daily.    furosemide (LASIX) 40 MG tablet Take 1 tablet (40 mg total) by mouth once daily.    glimepiride (AMARYL) 4 MG tablet Take 1 tablet (4 mg total) by mouth 2 (two) times daily before meals.    lisinopriL (PRINIVIL,ZESTRIL) 20 MG tablet Take 1 tablet (20 mg total) by mouth 2 (two) times daily.    metoprolol succinate (TOPROL-XL) 50 MG 24 hr tablet Take 1 tablet (50 mg total) by mouth once daily.    nateglinide (STARLIX) 120 MG tablet Take 1 tablet (120 mg total) by mouth 3 (three) times daily before meals. If you skip a meal, do not take dose    omeprazole (PRILOSEC) 20 MG capsule Take 1 capsule (20 mg total) by mouth once daily.    rivaroxaban (XARELTO) 15  mg Tab Take 1 tablet (15 mg total) by mouth daily with dinner or evening meal.    blood sugar diagnostic Strp Use daily- Freestyle lite    blood-glucose meter kit Use before meals and before bed when the glucoses are not in control.  Otherwise, test it daily once a day before meals randomly to ensure stability of the gluocse.    ERGOCALCIFEROL, VITAMIN D2, (VITAMIN D ORAL) Take by mouth once daily.     [DISCONTINUED] chlorthalidone (HYGROTEN) 25 MG Tab Take 1 tablet (25 mg total) by mouth once daily.     Family History       Problem Relation (Age of Onset)    Cancer Father    Heart attack Mother    Melanoma Father          Tobacco Use    Smoking status: Never    Smokeless tobacco: Never   Substance and Sexual Activity    Alcohol use: No     Comment: He used to drink but quit in 1990    Drug use: No    Sexual activity: Not on file     Review of Systems   Neurological:  Positive for numbness. Negative for dizziness, facial asymmetry and weakness.   All other systems reviewed and are negative.  Objective:     Vital Signs (Most Recent):  Temp: 98.3 °F (36.8 °C) (07/14/23 0727)  Pulse: 90 (07/14/23 0940)  Resp: (!) 22 (07/14/23 0940)  BP: 121/66 (07/14/23 0940)  SpO2: 98 % (07/14/23 0940) Vital Signs (24h Range):  Temp:  [98.3 °F (36.8 °C)] 98.3 °F (36.8 °C)  Pulse:  [] 90  Resp:  [16-22] 22  SpO2:  [96 %-98 %] 98 %  BP: (121-180)/() 121/66     Weight: 94.6 kg (208 lb 8.9 oz)  Body mass index is 31.71 kg/m².     Physical Exam  HENT:      Head: Normocephalic and atraumatic.   Cardiovascular:      Rate and Rhythm: Normal rate and regular rhythm.      Heart sounds: No murmur heard.  Pulmonary:      Effort: Pulmonary effort is normal. No respiratory distress.      Breath sounds: Normal breath sounds. No wheezing.   Abdominal:      General: Bowel sounds are normal. There is no distension.      Palpations: Abdomen is soft.      Tenderness: There is no abdominal tenderness.   Musculoskeletal:          General: No swelling.   Skin:     General: Skin is warm and dry.   Neurological:      Mental Status: He is alert and oriented to person, place, and time. Mental status is at baseline.      Comments: Moves all extremities, no pronator drift  Touch sensation intact bilaterally              Significant Labs: All pertinent labs within the past 24 hours have been reviewed.  CBC:   Recent Labs   Lab 07/14/23  0748   WBC 6.55   HGB 15.5   HCT 48.1   *     CMP:   Recent Labs   Lab 07/14/23  0748      K 4.5      CO2 20*   *   BUN 26*   CREATININE 1.6*   CALCIUM 8.8   PROT 7.0   ALBUMIN 3.7   BILITOT 0.9   ALKPHOS 111   AST 28   ALT 19   ANIONGAP 11         Significant Imaging: I have reviewed all pertinent imaging results/findings within the past 24 hours.    Assessment/Plan:     * TIA (transient ischemic attack)  Antithrombotics for secondary stroke prevention: Continue home rivaroxaban 15mg.      Statins for secondary stroke prevention and hyperlipidemia, if present:  atorvastatin 40 mg     Aggressive risk factor modification: HTN, DM, HLD, A-Fib     Rehab efforts: The patient has been evaluated by a stroke team provider and the therapy needs have been fully considered based off the presenting complaints and exam findings. The following therapy evaluations are needed: PT/OT     Diagnostics ordered:   - CTA head/neck with contrast  - MRI brain without contrast   - TTE without bubble study, monitor on telemetry while admitted  - Labs: hemoglobin A1c (goal <7%), lipid panel (LDL goal <70mg/dL), TSH  - Treat hyperglycemia to achieve a blood glucose level in a range of 140-180 mg/dL and to closely monitor to prevent hypoglycemia.     VTE prophylaxis: Mechanical prophylaxis: Place SCDs     BP parameters: TIA: SBP <220 until imaging confirmation of no infarct       Atrial fibrillation  Patient with Persistent (7 days or more) atrial fibrillation which is controlled currently with Beta Blocker. Patient is  currently in atrial fibrillation.[id number] Score: 6. HASBLED Score: . Anticoagulation indicated. Anticoagulation done with Xarelto.    Type 2 diabetes mellitus with kidney complication, without long-term current use of insulin  Patient's FSGs are uncontrolled due to hyperglycemia on current medication regimen.  Last A1c reviewed-   Lab Results   Component Value Date    HGBA1C 6.9 (H) 04/10/2023     Most recent fingerstick glucose reviewed-   Recent Labs   Lab 07/14/23  0730   POCTGLUCOSE 191*     Current correctional scale  Low  Maintain anti-hyperglycemic dose as follows-   Antihyperglycemics (From admission, onward)    Start     Stop Route Frequency Ordered    07/14/23 1222  insulin aspart U-100 pen 0-5 Units         -- SubQ Before meals & nightly PRN 07/14/23 1123        Hold Oral hypoglycemics while patient is in the hospital.    GERD (gastroesophageal reflux disease)  Continue PPI        VTE Risk Mitigation (From admission, onward)         Ordered     IP VTE HIGH RISK PATIENT  Once         07/14/23 1059     Place sequential compression device  Until discontinued         07/14/23 1059                   On 07/14/2023, patient should be placed in hospital observation services under my care.        Verenice Silva MD  Department of [facility] Medicine  [facility] - Emergency Dept.

## 2023-07-14 NOTE — CONSULTS
[facility]  Vascular Neurology  Comprehensive Stroke Center  TeleVascular Neurology Acute Consultation Note      Consult to Telemedicine - Acute Stroke  Consult performed by: Estefany Wallis MD  Consult ordered by: Curtis Gauthier MD        Consulting Provider: CURTIS GAUTHIER.  Current Providers  No providers found    Patient Location:  [facility] Emergency Department  Spoke hospital nurse at bedside with patient assisting consultant.     Patient information was obtained from patient.         Assessment/Plan:       Diagnoses:   Neuro  Episode of transient neurologic symptoms  88M presenting with transient left face, arm, leg numbness. No extremity weakness. LKN 0300. Symptoms discovered at 0600. Now significantly improved.     NCCTH reviewed with no acute intracranial hemorrhage or evidence of large territory ischemia. He is not a candidate for IV thrombolysis given full-dose anticoagulation and LKN outside of the 4.5h window. VAN- exam. Given his vascular risk factors, ddx includes TIA vs small ischemic stroke.     - Recommend an expedited CTA head/neck to evaluate for potential symptomatic stenosis/occlusive lesion that might significantly increase risk of recurrent or worsening signs/symptoms.   - Recommend follow-up non-urgent MRI brain without contrast to evaluate for acute ischemia.  - If above studies are abnormal, please load images to teleneurology imaging system and contact us to review.    Antithrombotics for secondary stroke prevention: Continue home rivaroxaban 15mg.     Statins for secondary stroke prevention and hyperlipidemia, if present: Recommend initiation or continuation of a high-intensity statin for goal LDL < 70mg/dL.    Aggressive risk factor modification: HTN, DM, HLD, A-Fib     Rehab efforts: The patient has been evaluated by a stroke team provider and the therapy needs have been fully considered based off the presenting complaints and exam findings.  The following therapy evaluations are needed: None    Diagnostics recommended:   - CTA head/neck with contrast  - MRI brain without contrast   - TTE without bubble study, monitor on telemetry while admitted  - Labs: hemoglobin A1c (goal <7%), lipid panel (LDL goal <70mg/dL), TSH  - Treat hyperglycemia to achieve a blood glucose level in a range of 140-180 mg/dL and to closely monitor to prevent hypoglycemia (Class IIa, Level C-LD).    VTE prophylaxis: Mechanical prophylaxis: Place SCDs    BP parameters: TIA: SBP <220 until imaging confirmation of no infarct       Please contact us with any further questions or concerns or if patient has any acute neurological changes (new symptoms, worsening deficits).            STROKE DOCUMENTATION     Acute Stroke Times:   Acute Stroke Times   Last Known Normal Date: 07/14/23  Last Known Normal Time: 0300  Symptom Onset Date: 07/14/23  Symptom Onset Time: 0300  Stroke Team Called Date: 07/14/23  Stroke Team Called Time: 0729  Stroke Team Arrival Date: 07/14/23  Stroke Team Arrival Time: 0733  CT Interpretation Time: 0737  Thrombolytic Therapy Recommended: No    NIH Scale:  Interval: baseline  1a. Level of Consciousness: 0-->Alert, keenly responsive  1b. LOC Questions: 0-->Answers both questions correctly  1c. LOC Commands: 0-->Performs both tasks correctly  2. Best Gaze: 0-->Normal  3. Visual: 0-->No visual loss  4. Facial Palsy: 0-->Normal symmetrical movements  5a. Motor Arm, Left: 0-->No drift, limb holds 90 (or 45) degrees for full 10 secs  5b. Motor Arm, Right: 0-->No drift, limb holds 90 (or 45) degrees for full 10 secs  6a. Motor Leg, Left: 0-->No drift, leg holds 30 degree position for full 5 secs  6b. Motor Leg, Right: 0-->No drift, leg holds 30 degree position for full 5 secs  7. Limb Ataxia: 0-->Absent  8. Sensory: 0-->Normal, no sensory loss  9. Best Language: 0-->No aphasia, normal  10. Dysarthria: 0-->Normal  11. Extinction and Inattention (formerly Neglect): 0-->No  abnormality  Total (NIH Stroke Scale): 0     Modified Kent Score: 0  Hayti Coma Scale:    ABCD2 Score:    [id number] Score:   HAS -BLED Score:   ICH Score:   Hunt & Jovel Classification:       Blood pressure (!) 162/92, pulse 90, temperature 98.3 °F (36.8 °C), resp. rate 20, weight 94.6 kg (208 lb 8.9 oz), SpO2 97 %.  Eligible for thrombolytic therapy?: No  Thrombolytic therapy recomended: Thrombolytic therapy not recommended due to Outside of treatment window  and Full dose anticoagulation   Possible Interventional Revascularization Candidate? No; at this time symptoms not suggestive of large vessel occlusion    Disposition Recommendation: pending further studies  - If MRI is negative for acute infarction, patient may follow-up with his PCP and Neurology in the outpatient setting.     Subjective:     History of Present Illness:  88M with a PMHx significant for DM, HTN, TIAs presenting with left-sided numbness involving the face, arm, and leg. LKN 0300 - woke up at the time and felt normal. Woke up again at 0600 with numbness to his left face, arm, and leg. Symptoms have since improved. NIHSS 0. He is on rivaroxaban for atrial fibrillation. Last dose yesterday evening.       Woke up with symptoms?: no    Recent bleeding noted: no     Does the patient take any Blood Thinners? yes     Medications: Anticoagulants:  rivaroxaban/Xarelto    Past Medical History: hypertension, diabetes, tia and Afib    Past Surgical History: no relevant surgical history    Family History: no relevant history    Social History: no smoking, no drinking, no drugs    Allergies: Amlodipine     Review of Systems   The following systems were reviewed with pertinent positives and negatives documented in the HPI: Constitutional, Eyes, CV, Respiratory, GI, , Musculoskeletal, Skin, Neurological, Psychiatric      Objective:   Vitals: Blood pressure (!) 162/92, pulse 90, temperature 98.3 °F (36.8 °C), resp. rate 20, weight 94.6 kg (208 lb 8.9  oz), SpO2 97 %.     CT READ: Yes  No hemmorhage. No mass effect. No early infarct signs.     Physical Exam  Vitals reviewed.   Constitutional:       Appearance: Normal appearance.   HENT:      Head: Normocephalic and atraumatic.   Eyes:      General: Lids are normal.      Extraocular Movements: Extraocular movements intact.   Cardiovascular:      Rate and Rhythm: Normal rate and regular rhythm.   Pulmonary:      Effort: Pulmonary effort is normal. No respiratory distress.   Abdominal:      General: Abdomen is flat. There is no distension.   Musculoskeletal:         General: No deformity or signs of injury. Normal range of motion.      Cervical back: Normal range of motion. No rigidity.   Neurological:      General: No focal deficit present.      Mental Status: He is alert and oriented to person, place, and time.      Sensory: No sensory deficit.      Motor: No weakness.   Psychiatric:         Mood and Affect: Mood normal.         Behavior: Behavior normal.             Recommended the emergency room physician to have a brief discussion with the patient and/or family if available regarding the  risks and benefits of treatment, and to briefly document the occurrence of that discussion in his clinical encounter note.     The attending portion of this evaluation, treatment, and documentation was performed per Estefany Wallis MD via audiovisual.    Billing code:  (non-intervention mild to moderate stroke, TIA, some mimics)      This patient has a critical neurological condition/illness, with some potential for high morbidity and mortality.  There is a moderate probability for acute neurological change leading to clinical and possibly life-threatening deterioration requiring highest level of physician preparedness for urgent intervention.  Care was coordinated with other physicians involved in the patient's care.  Radiologic studies and laboratory data were reviewed and interpreted, and plan of care was re-assessed based on  the results.  Diagnosis, treatment options and prognosis may have been discussed with the patient and/or family members or caregiver.    In your opinion, this was a: Tier 2 Van Negative    Consult End Time: 7:59 AM     Estefany Wallis MD, PhD  [facility]  Vascular Neurology   [facility]

## 2023-07-14 NOTE — PT/OT/SLP EVAL
Physical Therapy Evaluation and Discharge Note    Patient Name:  Héctor Desir   MRN:  [id number]    Recommendations:     Discharge Recommendations: home  Discharge Equipment Recommendations: none   Barriers to discharge: None    Assessment:     Héctor Desir is a 88 y.o. male admitted with a medical diagnosis of TIA (transient ischemic attack). .  At this time, patient is functioning at their prior level of function and does not require further acute PT services.     Recent Surgery: * No surgery found *      Plan:     During this hospitalization, patient does not require further acute PT services.  Please re-consult if situation changes.      Subjective     Chief Complaint: L SIDE NUMBNESS/TINGLING  Patient/Family Comments/goals: PT VERY PLEASANT AND IN A GOOD MOOD. PT SON ALSO PRESENT AND SUPPORTIVE.  Pain/Comfort:  Pain Rating 1: 0/10    Patients cultural, spiritual, Gnosticist conflicts given the current situation:      Living Environment:  PT LIVES ALONE IN AN ELEVATED SINGLE STORY HOME WITH RAMPS TO ENTER.   Prior to admission, patients level of function was IND WITH ADLS, AMBULATION, AND ALL GROSS FUNC MOB. PT DRIVES AND IS RETIRED. PT FAMILY LIVES VERY CLOSE AND CHECKS IN ON HIM FREQUENTLY.  Equipment used at home: none.  DME owned (not currently used): none.  Upon discharge, patient will have assistance from FAMILY.    Objective:     Communicated with ER NURSE AND Epic CHART REVIEW prior to session.  Patient found HOB elevated with blood pressure cuff, peripheral IV, telemetry upon PT entry to room.    General Precautions: Standard, fall    Orthopedic Precautions:N/A   Braces: N/A  Respiratory Status: Room air    Exams:  Cognitive Exam:  Patient is oriented to Person, Place, Time, and Situation  Sensation:    -       Intact  light/touch BLE  RLE ROM: WFL  RLE Strength: WFL  LLE ROM: WFL  LLE Strength: WFL    Functional Mobility:  Bed Mobility:     Scooting: independence  Supine to Sit:  independence  Transfers:     Sit to Stand:  independence with no AD  Gait: PT '+ IND    AM-PAC 6 CLICK MOBILITY  Total Score:21       Treatment and Education:  PT PERFORMED ALL BED MOB IND. SENSATION SCREEN OF BLE COMPLETED WITH PT SUPINE AND HOB ELEVATED. ROM/MMT SCREEN PERFORMED WITH PT SITTING EOB. PT WAS IND WITH SIT TO STAND AND '+ IND. PT WITH NO LOB BUT COMPLAINTS OF SOB MID GT. PT REPORTS THAT HE HAS HISTORY OF AFIB.  PT RETURN TO ROOM AND T/F EOB IND. PT BECAME TACHY POST GT BUT RETURNED TO 120S AFTER SEATED REST. PT IND RETURNED SUPINE WITH HOB ELEVATED.    AM-PAC 6 CLICK MOBILITY  Total Score:21     Patient left HOB elevated with all lines intact, call button in reach, and SON present.    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     DM (diabetes mellitus) type II controlled, neurological manifestation 7/5/2013    GERD (gastroesophageal reflux disease)     History of prostate cancer 2006    Hypertension associated with diabetes     Hypertension goal BP (blood pressure) < 130/80     Obesity     Prostate cancer     TIA (transient ischemic attack)     Trouble in sleeping        Past Surgical History:   Procedure Laterality Date    CARDIAC CATHETERIZATION      CARDIOVERSION N/A 8/10/2018    Procedure: CARDIOVERSION;  Surgeon: Israel Cardoza MD;  Location: [facility] CATH LAB;  Service: Cardiology;  Laterality: N/A;  Dr. Talamantes pt    CATARACT EXTRACTION W/  INTRAOCULAR LENS IMPLANT  11/2013    Bilateral Cataract     COLONOSCOPY N/A 12/20/2019    Procedure: COLONOSCOPY;  Surgeon: Forrest Roy MD;   Location: [facility];  Service: Endoscopy;  Laterality: N/A;    COLONOSCOPY W/ POLYPECTOMY      EYE SURGERY      PROSTATECTOMY  2006    TONSILLECTOMY      TONSILLECTOMY, ADENOIDECTOMY, BILATERAL MYRINGOTOMY AND TUBES         Time Tracking:     PT Received On: 07/14/23  PT Start Time: 1134     PT Stop Time: 1157  PT Total Time (min): 23 min     Billable Minutes: Evaluation 10 and Gait Training 13      07/14/2023

## 2023-07-15 VITALS
OXYGEN SATURATION: 97 % | SYSTOLIC BLOOD PRESSURE: 135 MMHG | RESPIRATION RATE: 18 BRPM | WEIGHT: 208 LBS | HEART RATE: 92 BPM | BODY MASS INDEX: 31.52 KG/M2 | TEMPERATURE: 99 F | HEIGHT: 68 IN | DIASTOLIC BLOOD PRESSURE: 79 MMHG

## 2023-07-15 LAB
ALBUMIN SERPL BCP-MCNC: 3.5 G/DL (ref 3.5–5.2)
ALP SERPL-CCNC: 93 U/L (ref 55–135)
ALT SERPL W/O P-5'-P-CCNC: 16 U/L (ref 10–44)
ANION GAP SERPL CALC-SCNC: 11 MMOL/L (ref 8–16)
APTT PPP: 32.2 SEC (ref 21–32)
AST SERPL-CCNC: 16 U/L (ref 10–40)
BASOPHILS # BLD AUTO: 0.04 K/UL (ref 0–0.2)
BASOPHILS NFR BLD: 0.5 % (ref 0–1.9)
BILIRUB SERPL-MCNC: 1 MG/DL (ref 0.1–1)
BUN SERPL-MCNC: 19 MG/DL (ref 8–23)
CALCIUM SERPL-MCNC: 8.6 MG/DL (ref 8.7–10.5)
CHLORIDE SERPL-SCNC: 112 MMOL/L (ref 95–110)
CO2 SERPL-SCNC: 22 MMOL/L (ref 23–29)
CREAT SERPL-MCNC: 1.3 MG/DL (ref 0.5–1.4)
DIFFERENTIAL METHOD: ABNORMAL
EOSINOPHIL # BLD AUTO: 0.3 K/UL (ref 0–0.5)
EOSINOPHIL NFR BLD: 3.8 % (ref 0–8)
ERYTHROCYTE [DISTWIDTH] IN BLOOD BY AUTOMATED COUNT: 15.3 % (ref 11.5–14.5)
EST. GFR  (NO RACE VARIABLE): 54 ML/MIN/1.73 M^2
GLUCOSE SERPL-MCNC: 135 MG/DL (ref 70–110)
HCT VFR BLD AUTO: 44.5 % (ref 40–54)
HGB BLD-MCNC: 14.9 G/DL (ref 14–18)
IMM GRANULOCYTES # BLD AUTO: 0.03 K/UL (ref 0–0.04)
IMM GRANULOCYTES NFR BLD AUTO: 0.4 % (ref 0–0.5)
INR PPP: 1.4 (ref 0.8–1.2)
LYMPHOCYTES # BLD AUTO: 1.2 K/UL (ref 1–4.8)
LYMPHOCYTES NFR BLD: 16.2 % (ref 18–48)
MAGNESIUM SERPL-MCNC: 2.1 MG/DL (ref 1.6–2.6)
MCH RBC QN AUTO: 26.7 PG (ref 27–31)
MCHC RBC AUTO-ENTMCNC: 33.5 G/DL (ref 32–36)
MCV RBC AUTO: 80 FL (ref 82–98)
MONOCYTES # BLD AUTO: 0.7 K/UL (ref 0.3–1)
MONOCYTES NFR BLD: 8.8 % (ref 4–15)
NEUTROPHILS # BLD AUTO: 5.3 K/UL (ref 1.8–7.7)
NEUTROPHILS NFR BLD: 70.3 % (ref 38–73)
NRBC BLD-RTO: 0 /100 WBC
PHOSPHATE SERPL-MCNC: 2.6 MG/DL (ref 2.7–4.5)
PLATELET # BLD AUTO: 127 K/UL (ref 150–450)
PMV BLD AUTO: 11.7 FL (ref 9.2–12.9)
POCT GLUCOSE: 118 MG/DL (ref 70–110)
POCT GLUCOSE: 127 MG/DL (ref 70–110)
POTASSIUM SERPL-SCNC: 3.8 MMOL/L (ref 3.5–5.1)
PROT SERPL-MCNC: 6.1 G/DL (ref 6–8.4)
PROTHROMBIN TIME: 14.9 SEC (ref 9–12.5)
RBC # BLD AUTO: 5.58 M/UL (ref 4.6–6.2)
SODIUM SERPL-SCNC: 145 MMOL/L (ref 136–145)
TROPONIN I SERPL DL<=0.01 NG/ML-MCNC: 0.02 NG/ML (ref 0–0.03)
TROPONIN I SERPL DL<=0.01 NG/ML-MCNC: 0.03 NG/ML (ref 0–0.03)
WBC # BLD AUTO: 7.59 K/UL (ref 3.9–12.7)

## 2023-07-15 PROCEDURE — 84484 ASSAY OF TROPONIN QUANT: CPT | Mod: 91 | Performed by: INTERNAL MEDICINE

## 2023-07-15 PROCEDURE — 36415 COLL VENOUS BLD VENIPUNCTURE: CPT | Performed by: INTERNAL MEDICINE

## 2023-07-15 PROCEDURE — 85610 PROTHROMBIN TIME: CPT | Performed by: INTERNAL MEDICINE

## 2023-07-15 PROCEDURE — 94761 N-INVAS EAR/PLS OXIMETRY MLT: CPT

## 2023-07-15 PROCEDURE — 99215 PR OFFICE/OUTPT VISIT, EST, LEVL V, 40-54 MIN: ICD-10-PCS | Mod: ,,, | Performed by: PSYCHIATRY & NEUROLOGY

## 2023-07-15 PROCEDURE — 85025 COMPLETE CBC W/AUTO DIFF WBC: CPT | Performed by: INTERNAL MEDICINE

## 2023-07-15 PROCEDURE — G0378 HOSPITAL OBSERVATION PER HR: HCPCS

## 2023-07-15 PROCEDURE — 99215 OFFICE O/P EST HI 40 MIN: CPT | Mod: ,,, | Performed by: PSYCHIATRY & NEUROLOGY

## 2023-07-15 PROCEDURE — 83735 ASSAY OF MAGNESIUM: CPT | Performed by: INTERNAL MEDICINE

## 2023-07-15 PROCEDURE — 85730 THROMBOPLASTIN TIME PARTIAL: CPT | Performed by: INTERNAL MEDICINE

## 2023-07-15 PROCEDURE — 25000003 PHARM REV CODE 250: Performed by: INTERNAL MEDICINE

## 2023-07-15 PROCEDURE — 84100 ASSAY OF PHOSPHORUS: CPT | Performed by: INTERNAL MEDICINE

## 2023-07-15 PROCEDURE — 80053 COMPREHEN METABOLIC PANEL: CPT | Performed by: INTERNAL MEDICINE

## 2023-07-15 PROCEDURE — 84484 ASSAY OF TROPONIN QUANT: CPT | Performed by: INTERNAL MEDICINE

## 2023-07-15 RX ORDER — NAPROXEN SODIUM 220 MG/1
81 TABLET, FILM COATED ORAL DAILY
Status: DISCONTINUED | OUTPATIENT
Start: 2023-07-15 | End: 2023-07-15 | Stop reason: HOSPADM

## 2023-07-15 RX ADMIN — ASPIRIN 81 MG CHEWABLE TABLET 81 MG: 81 TABLET CHEWABLE at 08:07

## 2023-07-15 RX ADMIN — ATORVASTATIN CALCIUM 40 MG: 40 TABLET, FILM COATED ORAL at 08:07

## 2023-07-15 RX ADMIN — DULOXETINE HYDROCHLORIDE 60 MG: 30 CAPSULE, DELAYED RELEASE ORAL at 08:07

## 2023-07-15 RX ADMIN — METOPROLOL SUCCINATE 50 MG: 50 TABLET, EXTENDED RELEASE ORAL at 08:07

## 2023-07-15 RX ADMIN — PANTOPRAZOLE SODIUM 40 MG: 40 TABLET, DELAYED RELEASE ORAL at 08:07

## 2023-07-15 NOTE — CONSULTS
Consultation Requested by: Medicine   Chief Complaint: left sided numbness   Service used: No    HPI: Héctor Desir is a 88 y.o. male with a past medical history of diabetes, CKD, hypertension, atrial fibrillation and prostate cancer presented to the ED with with transient left face, arm, leg numbness. No extremity weakness. LKN 0300 yesterday. Symptoms discovered at 0600 yesterday. Now significantly improved. He is on rivaroxaban for atrial fibrillation. Last dose yesterday evening. NCCTH reviewed with no acute intracranial hemorrhage or evidence of large territory ischemia. He is not a candidate for IV thrombolysis given full-dose anticoagulation and LKN outside of the 4.5h window. This morning reported significant improvement. Denies vision problem, trouble talking, dizziness, headache, neck stiffness, lower back pain, history of fall, neck pain, focal weakness or loss of consciousness.          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     DM (diabetes mellitus) type II controlled, neurological manifestation 7/5/2013    GERD (gastroesophageal reflux disease)     History of prostate cancer 2006    Hypertension associated with diabetes     Hypertension goal BP (blood pressure) < 130/80     Obesity     Prostate cancer     TIA (transient ischemic attack)     Trouble in sleeping      Past Surgical History:   Procedure Laterality Date    CARDIAC CATHETERIZATION      CARDIOVERSION N/A 8/10/2018    Procedure: CARDIOVERSION;  Surgeon: Israel Cardoza MD;  Location: [facility] CATH LAB;  Service: Cardiology;  Laterality: N/A;  Dr. Talamantes pt    CATARACT EXTRACTION W/  INTRAOCULAR LENS IMPLANT  11/2013    Bilateral Cataract      COLONOSCOPY N/A 12/20/2019    Procedure: COLONOSCOPY;  Surgeon: Forrest Roy MD;  Location: [facility];  Service: Endoscopy;  Laterality: N/A;    COLONOSCOPY W/ POLYPECTOMY      EYE SURGERY      PROSTATECTOMY  2006    TONSILLECTOMY      TONSILLECTOMY, ADENOIDECTOMY, BILATERAL MYRINGOTOMY AND TUBES       Review of patient's allergies indicates:   Allergen Reactions    Amlodipine      swelling     Current Facility-Administered Medications:     aspirin chewable tablet 81 mg, 81 mg, Oral, Daily, Giovanna Tim DO, 81 mg at 07/15/23 0857    atorvastatin tablet 40 mg, 40 mg, Oral, Daily, Verenice Silva MD, 40 mg at 07/15/23 0852    dextrose 10% bolus 125 mL 125 mL, 12.5 g, Intravenous, PRN, Verenice Silva MD    dextrose 10% bolus 250 mL 250 mL, 25 g, Intravenous, PRN, Verenice Silva MD    DULoxetine DR capsule 60 mg, 60 mg, Oral, Daily, Verenice Silva MD, 60 mg at 07/15/23 0852    glucagon (human recombinant) injection 1 mg, 1 mg, Intramuscular, PRN, Verenice Silva MD    glucose chewable tablet 16 g, 16 g, Oral, PRN, Verenice Silva MD    glucose chewable tablet 24 g, 24 g, Oral, PRN, Verenice Silva MD    hydrALAZINE injection 10 mg, 10 mg, Intravenous, Q6H PRN, Verenice Silva MD    insulin aspart U-100 pen 0-5 Units, 0-5 Units, Subcutaneous, QID (AC + HS) PRN, Verenice Silva MD    metoprolol injection 5 mg, 5 mg, Intravenous, Once, Verenice Silva MD    metoprolol succinate (TOPROL-XL) 24 hr tablet 50 mg, 50 mg, Oral, Daily, Verenice Silva MD, 50 mg at 07/15/23 0852    ondansetron injection 4 mg, 4 mg, Intravenous, Q8H PRN, Verenice Silva MD    pantoprazole EC tablet 40 mg, 40 mg, Oral, Daily, Verenice Silva MD, 40 mg at 07/15/23 0852    rivaroxaban tablet 15 mg, 15 mg, Oral, Daily with dinner, Verenice Silva MD, 15 mg at 07/14/23 1623    sodium chloride 0.9% flush 10 mL, 10 mL, Intravenous, PRN, Verenice Silva MD    Social History     Socioeconomic History     Marital status:    Tobacco Use    Smoking status: Never    Smokeless tobacco: Never   Substance and Sexual Activity    Alcohol use: No     Comment: He used to drink but quit in 1990    Drug use: No     Social Determinants of Health     Financial Resource Strain: Low Risk     Difficulty of Paying Living Expenses: Not hard at all   Food Insecurity: No Food Insecurity    Worried About Running Out of Food in the Last Year: Never true    Ran Out of Food in the Last Year: Never true   Transportation Needs: No Transportation Needs    Lack of Transportation (Medical): No    Lack of Transportation (Non-Medical): No   Physical Activity: Inactive    Days of Exercise per Week: 0 days    Minutes of Exercise per Session: 0 min   Stress: No Stress Concern Present    Feeling of Stress : Not at all   Social Connections: Moderately Isolated    Frequency of Communication with Friends and Family: More than three times a week    Frequency of Social Gatherings with Friends and Family: Once a week    Attends Christianity Services: Never    Active Member of Clubs or Organizations: Yes    Attends Club or Organization Meetings: More than 4 times per year    Marital Status:    Housing Stability: Low Risk     Unable to Pay for Housing in the Last Year: No    Number of Places Lived in the Last Year: 1    Unstable Housing in the Last Year: No     Family History   Problem Relation Age of Onset    Heart attack Mother     Melanoma Father     Cancer Father     Anesthesia problems Neg Hx     Clotting disorder Neg Hx     Kidney disease Neg Hx     Amblyopia Neg Hx     Blindness Neg Hx     Cataracts Neg Hx     Glaucoma Neg Hx     Retinal detachment Neg Hx     Macular degeneration Neg Hx     Strabismus Neg Hx      Review of Systems  Constitutional: no fevers, no weight changes,  No diaphoresis  HEENT: No congestion, no doublevision, no dysphagia, no rhinnorhea, no lacrimation  Cardiovascular: no chest pain or palpitations  Respiratory: no  shortness of breath, no cough  Gastrointestinal: no diarrhea, no constipation  Genitourinary: no dysuria  Musculoskeletal: no joint swelling. No myalgia  Skin: no rash  Psychiatric: no hallucinations, no depression or anxiety  Neurologic: as per HPI  All other review of systems is negative and as per HPI.    Vitals:    07/15/23 1231   BP: 135/79   Pulse: 92   Resp: 18   Temp: 98.7 °F (37.1 °C)       Exam  General: Pleasant, conversant, Well-kempt  HEENT: Head atraumatic. No nasal abnormality. No gaze preference. EOMI.  Cardiovascular: Afib   Lungs: Not in distress   Mental Status: Awake alert and oriented x III. Follows commands. No aphasia or dysarthria. Naming and repetition intact. Mood is appropriate.  Cranial Nerve: PERRL. VFF. EOMI. Facial sensation intact. No facial asymmetry. Hearing intact. Palate elevates. Uvula midline. SCM strong. No tongue deviation.  Motor: Normal tone. No cogwheel rigidity. No bradykinesia. No pronator drift. 5/5 strength bilaterally in the upper extremities including deltoids, biceps, triceps, wrist extensors/flexors, finger flexors/extensors, interossei, and APB. 5/5 strength bilaterally in the lower extremities including iliopsoas, hamstring, quadriceps, tibialis anterior, gastrocnemius, EHL.  Deep Tendon Reflexes: 2+ in biceps, tricepts, brachioradialis b/l. 2+ in patellar and ankle jerks. No Babinski.  Sensory: Intact to soft touch, cold.  Cerebellar: Finger to nose intact. Heel to shin intact.   Gait: Normal narrow based gait.      Other tests:    HbA1c: 6.6  LDL: 77    ECHO:  Atrial fibrillation.  The left ventricle is mildly enlarged with concentric hypertrophy and mildly decreased systolic function.  Severe left atrial enlargement.  Left ventricular diastolic dysfunction.  The estimated PA systolic pressure is 27 mmHg.  Normal right ventricular size with normal right ventricular systolic function.  Normal central venous pressure (3 mmHg).  Mild mitral regurgitation.  Mild  aortic regurgitation.  The estimated ejection fraction is 40-45%.    MRI Brain:  1. Encephalomalacia involving the lateral right temporal lobe could relate to prior infarction or prior traumatic brain injury.  2. No acute infarction.  3. Chronic lacunar infarct within the posterior right lentiform nucleus with patchy T2 alteration of the white matter likely relating to chronic microvascular ischemic disease.     CTA Head and neck:  1. CT angiogram of the brain demonstrates no evidence of large vessel occlusion or hemodynamically significant stenosis.  2. 60% stenosis of the origin of the right internal carotid artery on CTA of the neck due to coarse calcific plaque.  No stenosis of the left carotid system.     Assessment:    Héctor Desir is a 88 y.o. male with a past medical history of diabetes, CKD, hypertension, atrial fibrillation and prostate cancer presented to the ED with with transient left face, arm, leg numbness. No extremity weakness. LKN 0300 yesterday. Symptoms discovered at 0600 yesterday. Now significantly improved. He is on rivaroxaban for atrial fibrillation. MRI brain negative for stroke. He is not a candidate for IV thrombolysis given full-dose anticoagulation and LKN outside of the 4.5h window. Likely TIA or recrudescence of previous stroke/TBI.       Plan:    -- Inpatient  -- Initiate Stroke orderset  -- Place patient on telemetry  -- Activity as tolerated  -- Normoglycemia  -- Normothermia  -- BP goal Normotensive   -- Continue home anticoagulation   -- Atorvastatin 40 mg daily   -- RN swallow screen to be completed by the RN prior to any PO intake. If the patient fails the RN swallow screen then make patient NPO. A formal clinical swallow evaluation by SLP is needed.  -- Follow speech recommendations for starting diet  -- Start 0.9% NS @  cc/ hr unless contraindicated   -- Discontinue IV fluids when the patient becomes stable, is taking fluids by mouth, and is not required for BP  management   -- Physical therapy  -- Occupational therapy  -- Speech therapy  -- Rehab consult  -- We had an in depth discussion with patient regarding the imaging findings, symptom etiology and our plan  -- Case and plan discussed with primary team    -- We discussed his vascular risk factors and the need for his to continue to modify his risks e.g. taking his mediations, proper diet, and exercise.  -- Work with primary care provider on stroke risk factor management, BP <140/90, cholesterol monitoring   -- We discussed the need for his to continue to exercise his body and his mind with physical activity and mental activities.   -- We discussed BEFAST and the need for his to recognize stroke symptoms and report to the ER early is needed.      B-Balance  E-Eyes, vision  F-Facial Droop  A-Arm or leg weakness on one side  S-Speech trouble  T-Time - CALL 911!     -- Please have this patient follow up in stroke clinic in 2-3 months  -- Thank you for involving us in the care of the patient.  Please do not hesitate to contact us if there is any question or concern.  We will sign off.     Kaleb Red MD  Neurology   [facility]

## 2023-07-15 NOTE — HOSPITAL COURSE
MRI brain showed chronic changes and no acute infarction.  Symptoms resolved, patient back to baseline.      PT/OT/SLP evaluated patient, no further therapy recommended based on their evaluation.    During hospital stay, patient continue on his home metoprolol for rate control of his permanent atrial fibrillation.    Neurology consulted, recommended that patient continue current meds including atorvastatin and Xarelto.  He is to follow-up with vascular Neurology in 2-3 months.    Seen and examined personally on the day of discharge.  Patient to continue his current home medication regimen.  All questions answered to his satisfaction.  Stable for discharge home at this time with outpatient follow-up.

## 2023-07-15 NOTE — DISCHARGE SUMMARY
[facility] ([facility])  [facility]  Discharge Summary      Patient Name: Héctor Desir  MRN: [id number]  [facility]: [id number]  Patient Class: OP- Observation  Admission Date: 7/14/2023  [facility] Length of Stay: 0 days  Discharge Date and Time:  07/15/2023 1:19 PM  Attending Physician: Giovanna Dumont DO   Discharging Provider: Giovanna Dumont DO  Primary Care Provider: Tae Goel MD    Primary Care Team: Networked reference to record PCT     HPI:   The patient is an 89 yo male with past medical history of atrial fibrillation, diabetes, CKD, GERD, hypertension, and prostate cancer who presented with left sided numbness. He reports his body is breaking down. He states he is hard of hearing. He woke up around 0300 and felt fine. He noticed left sided numbness from face to left leg. He came to the ED for further evaluation. He reports symptoms have improved. He denies weakness. Vascular neurology (Dr. Wallis) evaluated. Patient presented outside of the window for lytic therapy. Continued ischemic workup recommended with monitoring on telemetry. CTA negative for LVO. [facility] medicine consulted. Patient placed in observation.      * No surgery found *      [facility] Course:   MRI brain showed chronic changes and no acute infarction.  Symptoms resolved, patient back to baseline.      PT/OT/SLP evaluated patient, no further therapy recommended based on their evaluation.    During hospital stay, patient continue on his home metoprolol for rate control of his permanent atrial fibrillation.    Neurology consulted, recommended that patient continue current meds including atorvastatin and Xarelto.  He is to follow-up with vascular Neurology in 2-3 months.    Seen and examined personally on the day of discharge.  Patient to continue his current home medication regimen.  All questions answered to his satisfaction.  Stable for discharge home at this time with outpatient follow-up.     Goals of Care Treatment Preferences:  Code  Status: Full Code      Consults:   Consults (From admission, onward)          Status Ordering Provider     Inpatient consult to Neurology  Once        Provider:  (Not yet assigned)    Completed ANNELISE MCPHERSON     IP consult to case management/social work  Once        Provider:  (Not yet assigned)    Completed WILLI ERICKSON     Consult to Telemedicine - Acute Stroke  Once        Provider:  Estefany Wallis MD    Completed JOSE PEARSON            No new Assessment & Plan notes have been filed under this hospital service since the last note was generated.  Service: [facility] Medicine    Final Active Diagnoses:    Diagnosis Date Noted POA    PRINCIPAL PROBLEM:  TIA (transient ischemic attack) [G45.9] 07/14/2023 Yes    Atrial fibrillation [I48.91] 06/21/2018 Yes    Type 2 diabetes mellitus with kidney complication, without long-term current use of insulin [E11.29]  Yes    GERD (gastroesophageal reflux disease) [K21.9]  Yes      Problems Resolved During this Admission:       Discharged Condition: stable    Disposition: Home or Self Care    Follow Up:   Follow-up Information       Tae Goel MD Follow up in 1 week(s).    Specialty: Family Medicine  Contact information:  [address] [address]  [facility] [address] [phone]                           Patient Instructions:      Ambulatory referral/consult to Vascular Neurology   Standing Status: Future   Referral Priority: Routine Referral Type: Consultation   Referral Reason: Specialty Services Required   Requested Specialty: Vascular Neurology   Number of Visits Requested: 1     Diet Adult Regular     Order Specific Question Answer Comments   Additional restrictions: Cardiac (Low Na/Chol)    Additional restrictions: Diabetic 2000      Notify your health care provider if you experience any of the following:  persistent dizziness, light-headedness, or visual disturbances     Notify your health care provider if you experience any of the following:  increased confusion or weakness      Activity as tolerated       Significant Diagnostic Studies: Labs: CMP   Recent Labs   Lab 07/14/23  0748 07/15/23  0336    145   K 4.5 3.8    112*   CO2 20* 22*   * 135*   BUN 26* 19   CREATININE 1.6* 1.3   CALCIUM 8.8 8.6*   PROT 7.0 6.1   ALBUMIN 3.7 3.5   BILITOT 0.9 1.0   ALKPHOS 111 93   AST 28 16   ALT 19 16   ANIONGAP 11 11   , CBC   Recent Labs   Lab 07/14/23  0748 07/15/23  0336   WBC 6.55 7.59   HGB 15.5 14.9   HCT 48.1 44.5   * 127*   , INR   Lab Results   Component Value Date    INR 1.4 (H) 07/15/2023    INR 1.3 (H) 07/14/2023   , Lipid Panel   Lab Results   Component Value Date    CHOL 134 07/14/2023    HDL 30 (L) 07/14/2023    LDLCALC 77.2 07/14/2023    TRIG 134 07/14/2023    CHOLHDL 22.4 07/14/2023   , A1C:   Recent Labs   Lab 04/10/23  0934 07/14/23  1144   HGBA1C 6.9* 6.6*   , and All labs within the past 24 hours have been reviewed  Radiology: X-Ray: CXR: X-Ray Chest 1 View (CXR):   Results for orders placed or performed during the hospital encounter of 07/14/23   X-Ray Chest AP Single View    Narrative    EXAMINATION:  XR CHEST 1 VIEW    CLINICAL HISTORY:  , Weakness;    COMPARISON:  Chest 03/01/2023.    FINDINGS:  Stable heart size.  Clear lungs.      Impression    Stable chest.  No infiltrate.      Electronically signed by: Timbo Wooten MD  Date:    07/14/2023  Time:    11:20     MRI:   MRI BRAIN WITHOUT CONTRAST     CLINICAL HISTORY:   Transient ischemic attack (TIA);     TECHNIQUE:   Sagittal T1. Axial T1, T2, T2 FLAIR, GRE, DWI. Coronal T2 FLAIR.     COMPARISON:   Prior CT of the head from 07/14/2023.     FINDINGS:   There is no restricted diffusion.  There is peripheral encephalomalacia involving the lateral aspect of the right temporal lobe within the right middle cranial fossa.  Chronic lacunar infarct within the posterior right lentiform nucleus.  Mild to moderate patchy T2 FLAIR hyperintensity within the periventricular and deep white matter bilaterally.   No acute intracranial hemorrhage or mass effect.     The ventricles and sulci are normal in size and configuration. Midline structures are normal. There are preserved arterial flow-voids on T2 weighted imaging. The paranasal sinuses and mastoid air cells are clear.     No abnormal marrow signal is identified.    Impression:       1. Encephalomalacia involving the lateral right temporal lobe could relate to prior infarction or prior traumatic brain injury.   2. No acute infarction.   3. Chronic lacunar infarct within the posterior right lentiform nucleus with patchy T2 alteration of the white matter likely relating to chronic microvascular ischemic disease.       Electronically signed by: Ankur Guzman Jr., MD   Date: 07/14/2023   Time: 15:27     CT scan:   CTA Head and Neck (xpd) [[id number]] Resulted: 07/14/23 1005   Order Status: Completed Updated: 07/14/23 1007   Narrative:     EXAMINATION:   CTA HEAD AND NECK (XPD)     CLINICAL HISTORY:   Stroke/TIA, determine embolic source;     TECHNIQUE:   CTA of the intracranial and extracranial circulation was performed after the administration of intravenous contrast in the arterial phase. This examination was interpreted using multiplanar and 3D reconstructions.  100 mL of Omnipaque 350 was administered intravenously.     COMPARISON:   Prior CT of the head from 07/14/2023.     FINDINGS:   CTA neck:     There is a 4 vessel branching pattern of the aortic arch with the left vertebral artery originating from the arch.  The origins of the vessels from the arch are not significantly stenotic. The subclavian arteries are without hemodynamically significant stenosis.     The right common carotid artery is without significant stenosis. There is calcific plaque within the right carotid bifurcation with focal, 60% stenosis of the origin of the right internal carotid artery.  The more cranial, ascending cervical right internal carotid artery is normal in course and caliber.     The  left common carotid is without significant stenosis. Minimal calcific plaque within the carotid bulb without stenosis of the left internal carotid artery.  The more cranial, ascending cervical left internal carotid artery is normal in course and caliber.     Extracranial vertebral arteries: The vertebral arteries are codominant.  The vertebral arteries are without significant stenosis to their confluence into the basilar artery.     CTA head:     The cavernous internal carotid arteries are normal in course and caliber.  The right middle cerebral artery is normal.  The left middle cerebral artery is normal.  There is an aplastic left A1 segment.  The anterior communicating artery is normal. Right and left posterior cerebral arteries are normal.  There is a patent left-sided posterior communicating artery     The basilar artery is normal in caliber.  Intracranial vertebral arteries are patent to the basilar confluence.     The superior cerebellar arteries are normal. The anterior inferior cerebellar arteries are normal. The posterior inferior cerebellar arteries are normal.    Impression:       1. CT angiogram of the brain demonstrates no evidence of large vessel occlusion or hemodynamically significant stenosis.   2. 60% stenosis of the origin of the right internal carotid artery on CTA of the neck due to coarse calcific plaque.  No stenosis of the left carotid system.   All CT scans at this facility use dose modulation, iterative reconstruction, and/or weight base dosing when appropriate to reduce radiation dose to as low as reasonably achievable.       Electronically signed by: Ankur Guzman Jr., MD   Date: 07/14/2023   Time: 10:05   CT Head Without Contrast [[id number]] Resulted: 07/14/23 0742   Order Status: Completed Updated: 07/14/23 0745   Narrative:     EXAMINATION:   CT HEAD WITHOUT CONTRAST     CLINICAL HISTORY:   Neuro deficit, acute, stroke suspected;     TECHNIQUE:   Low dose axial images were obtained  through the head.  Coronal and sagittal reformations were also performed. Contrast was not administered.     COMPARISON:   None.     FINDINGS:   Old lacunar infarction within the anterior right thalamus.  No intracranial mass, mass effect, hemorrhage, hydrocephalus, acute infarction or abnormal fluid collection.     Mild low attenuation in the periventricular and deep white matter, compatible with small vessel ischemic disease.     No depressed skull fracture or skull lesions.  Degenerative changes within the TMJ.  Mucosal thickening within the left sphenoid sinus.  Wall thickening of the left sphenoid sinus suggesting sequela of chronic sinusitis.    Impression:       No acute intracranial abnormality.     Old lacunar infarction within the right thalamus.     Senescent changes.     Aspect score is 10/10.       Electronically signed by: Jame Watt   Date: 07/14/2023   Time: 07:42     Cardiac Graphics: Echocardiogram: Transthoracic echo (TTE) complete (Cupid Only):   Results for orders placed or performed during the hospital encounter of 07/14/23   Echo   Result Value Ref Range    BSA 2.13 m2    TDI SEPTAL 0.08 m/s    LV LATERAL E/E' RATIO 10.00 m/s    LV SEPTAL E/E' RATIO 12.50 m/s    LA WIDTH 4.60 cm    IVC diameter 1.40 cm    Left Ventricular Outflow Tract Mean Velocity 0.78 cm/s    Left Ventricular Outflow Tract Mean Gradient 2.71 mmHg    TV mean gradient 23 mmHg    TDI LATERAL 0.10 m/s    LVIDd 5.37 3.5 - 6.0 cm    IVS 1.36 (A) 0.6 - 1.1 cm    Posterior Wall 1.18 (A) 0.6 - 1.1 cm    Ao root annulus 3.65 cm    LVIDs 4.22 (A) 2.1 - 4.0 cm    FS 21 28 - 44 %    LA volume 106.21 cm3    STJ 3.57 cm    Ascending aorta 3.28 cm    LV mass 283.53 g    LA size 4.60 cm    TAPSE 1.90 cm    Left Ventricle Relative Wall Thickness 0.44 cm    AV regurgitation pressure 1/2 time 1,048.084017964884189 ms    AV mean gradient 6 mmHg    AV valve area 2.17 cm2    AV Velocity Ratio 0.72     AV index (prosthetic) 0.63     MV  valve area p 1/2 method 4.93 cm2    Mean e' 0.09 m/s    IVRT 68.51 msec    LVOT diameter 2.09 cm    LVOT area 3.4 cm2    LVOT peak ruel 1.09 m/s    LVOT peak VTI 17.50 cm    Ao peak ruel 1.52 m/s    Ao VTI 27.7 cm    RVOT peak ruel 0.82 m/s    RVOT peak VTI 14.7 cm    Mr max ruel 5.69 m/s    LVOT stroke volume 60.01 cm3    AV peak gradient 9 mmHg    PV mean gradient 1.76 mmHg    E/E' ratio 11.11 m/s    MV Peak E Ruel 1.00 m/s    AR Max Ruel 3.39 m/s    TR Max Ruel 2.46 m/s    MV stenosis pressure 1/2 time 44.64 ms    LV Systolic Volume 79.41 mL    LV Systolic Volume Index 38.2 mL/m2    LV Diastolic Volume 139.79 mL    LV Diastolic Volume Index 67.21 mL/m2    LA Volume Index 51.1 mL/m2    LV Mass Index 136 g/m2    RA Major Axis 5.62 cm    Left Atrium Minor Axis 5.88 cm    Left Atrium Major Axis 5.93 cm    Triscuspid Valve Regurgitation Peak Gradient 24 mmHg    RA Width 3.50 cm    Right Atrial Pressure (from IVC) 3 mmHg    EF 40 %    TV rest pulmonary artery pressure 27 mmHg    Narrative    · Atrial fibrillation.  · The left ventricle is mildly enlarged with concentric hypertrophy and   mildly decreased systolic function.  · Severe left atrial enlargement.  · Left ventricular diastolic dysfunction.  · The estimated PA systolic pressure is 27 mmHg.  · Normal right ventricular size with normal right ventricular systolic   function.  · Normal central venous pressure (3 mmHg).  · Mild mitral regurgitation.  · Mild aortic regurgitation.  · The estimated ejection fraction is 40-45%.          Pending Diagnostic Studies:       None           Medications:  Reconciled Home Medications:      Medication List        CONTINUE taking these medications      atorvastatin 40 MG tablet  Commonly known as: LIPITOR  Take 1 tablet (40 mg total) by mouth once daily.     blood sugar diagnostic Strp  Use daily- Freestyle lite     blood-glucose meter kit  Use before meals and before bed when the glucoses are not in control.  Otherwise, test it daily  once a day before meals randomly to ensure stability of the gluocse.     DULoxetine 60 MG capsule  Commonly known as: CYMBALTA  Take 1 capsule (60 mg total) by mouth once daily.     empagliflozin 25 mg tablet  Commonly known as: JARDIANCE  Take 1 tablet (25 mg total) by mouth once daily.     furosemide 40 MG tablet  Commonly known as: LASIX  Take 1 tablet (40 mg total) by mouth once daily.     glimepiride 4 MG tablet  Commonly known as: AMARYL  Take 1 tablet (4 mg total) by mouth 2 (two) times daily before meals.     lisinopriL 20 MG tablet  Commonly known as: PRINIVIL,ZESTRIL  Take 1 tablet (20 mg total) by mouth 2 (two) times daily.     metoprolol succinate 50 MG 24 hr tablet  Commonly known as: TOPROL-XL  Take 1 tablet (50 mg total) by mouth once daily.     nateglinide 120 MG tablet  Commonly known as: STARLIX  Take 1 tablet (120 mg total) by mouth 3 (three) times daily before meals. If you skip a meal, do not take dose     omeprazole 20 MG capsule  Commonly known as: PRILOSEC  Take 1 capsule (20 mg total) by mouth once daily.     rivaroxaban 15 mg Tab  Commonly known as: XARELTO  Take 1 tablet (15 mg total) by mouth daily with dinner or evening meal.     TRULICITY 4.5 mg/0.5 mL pen injector  Generic drug: dulaglutide  Inject 4.5 mg into the skin every 7 days.     VITAMIN D ORAL  Take by mouth once daily.              Indwelling Lines/Drains at time of discharge:   Lines/Drains/Airways       None                   Time spent on the discharge of patient: 40 minutes         Giovanna Dumont DO  Department of [facility] Medicine  [facility] - Tele[facility] ([facility])

## 2023-07-15 NOTE — PLAN OF CARE
[facility] - Tele[facility] ([facility])  Discharge Final Note    Primary Care Provider: Tae Goel MD    Expected Discharge Date: 7/15/2023    Final Discharge Note (most recent)       Final Note - 07/15/23 1423          Final Note    Assessment Type Final Discharge Note     Anticipated Discharge Disposition Home or Self Care        Post-Acute Status    Discharge Delays None known at this time                     Important Message from Medicare             Contact Info       Tae Goel MD   Specialty: Family Medicine   Relationship: PCP - General    [address]  [place] [address]   Phone: [phone]       Next Steps: Follow up in 1 week(s)          Pt has no discharge needs at the time of chart review.

## 2023-08-02 ENCOUNTER — LAB VISIT (OUTPATIENT)
Dept: LAB | Facility: HOSPITAL | Age: 88
End: 2023-08-02
Attending: FAMILY MEDICINE
Payer: MEDICARE

## 2023-08-02 ENCOUNTER — OFFICE VISIT (OUTPATIENT)
Dept: FAMILY MEDICINE | Facility: CLINIC | Age: 88
End: 2023-08-02
Payer: MEDICARE

## 2023-08-02 VITALS
WEIGHT: 214 LBS | HEART RATE: 85 BPM | TEMPERATURE: 98 F | DIASTOLIC BLOOD PRESSURE: 64 MMHG | HEIGHT: 68 IN | BODY MASS INDEX: 32.43 KG/M2 | SYSTOLIC BLOOD PRESSURE: 114 MMHG | RESPIRATION RATE: 16 BRPM

## 2023-08-02 DIAGNOSIS — E11.22 TYPE 2 DIABETES MELLITUS WITH STAGE 3A CHRONIC KIDNEY DISEASE, WITHOUT LONG-TERM CURRENT USE OF INSULIN: ICD-10-CM

## 2023-08-02 DIAGNOSIS — N18.31 TYPE 2 DIABETES MELLITUS WITH STAGE 3A CHRONIC KIDNEY DISEASE, WITHOUT LONG-TERM CURRENT USE OF INSULIN: ICD-10-CM

## 2023-08-02 DIAGNOSIS — G45.9 TIA (TRANSIENT ISCHEMIC ATTACK): Primary | ICD-10-CM

## 2023-08-02 DIAGNOSIS — R20.0 NUMBNESS: ICD-10-CM

## 2023-08-02 LAB
ALBUMIN/CREAT UR: 43.9 UG/MG (ref 0–30)
CREAT UR-MCNC: 187 MG/DL (ref 23–375)
MICROALBUMIN UR DL<=1MG/L-MCNC: 82 UG/ML

## 2023-08-02 PROCEDURE — 99214 OFFICE O/P EST MOD 30 MIN: CPT | Mod: PBBFAC,PO | Performed by: FAMILY MEDICINE

## 2023-08-02 PROCEDURE — 99999 PR PBB SHADOW E&M-EST. PATIENT-LVL IV: ICD-10-PCS | Mod: PBBFAC,,, | Performed by: FAMILY MEDICINE

## 2023-08-02 PROCEDURE — 99214 PR OFFICE/OUTPT VISIT, EST, LEVL IV, 30-39 MIN: ICD-10-PCS | Mod: S$PBB,,, | Performed by: FAMILY MEDICINE

## 2023-08-02 PROCEDURE — 99999 PR PBB SHADOW E&M-EST. PATIENT-LVL IV: CPT | Mod: PBBFAC,,, | Performed by: FAMILY MEDICINE

## 2023-08-02 PROCEDURE — 99214 OFFICE O/P EST MOD 30 MIN: CPT | Mod: S$PBB,,, | Performed by: FAMILY MEDICINE

## 2023-08-02 PROCEDURE — 82570 ASSAY OF URINE CREATININE: CPT | Performed by: FAMILY MEDICINE

## 2023-08-02 NOTE — PROGRESS NOTES
Subjective:      Patient ID: Héctor Desir is a 88 y.o. male.    Chief Complaint: [facility] Follow Up (TIA )      The patient was recently hospitalized at [facility]  for numnbes.  The discharge summary from the hospitalization is copied below for reference and completeness.      Transitional Care Note    Family and/or Caretaker present at visit?  No.  Diagnostic tests reviewed/disposition: No diagnosic tests pending after this hospitalization.  Disease/illness education: he understands what he had.  Home health/community services discussion/referrals: Patient does not have home health established from hospital visit.  They do need home health.  If needed, we will set up home health for the patient.   Establishment or re-establishment of referral orders for community resources: No other necessary community resources.   Discussion with other health care providers: No discussion with other health care providers necessary.   Patient Care Team:  Tae Goel MD as PCP - General (Family Medicine)  TESS Coley OD as Consulting Physician (Optometry)  Tobias Modi MD as Consulting Physician (Nephrology)  Tobias Modi MD as Consulting Physician (Nephrology)  DISCHARGE SUMMARY:  Giovanna Dumont DO  Physician  [facility] Medicine  Discharge Summary     Signed  Creation Time:  7/15/2023  1:19 PM                                    [facility] ([facility])  [facility] Medicine  Discharge Summary        Patient Name: Héctor Desir  MRN: [id number]  PRICILLA: [id number]  Patient Class: OP- Observation  Admission Date: 7/14/2023  [facility] Length of Stay: 0 days  Discharge Date and Time:  07/15/2023 1:19 PM  Attending Physician: Giovanna Dumont DO   Discharging Provider: Giovanna Dumont DO  Primary Care Provider: Tae oGel MD     Primary Care Team: Networked reference to record PCT      HPI:   The patient is an 89 yo male with past medical history of atrial fibrillation, diabetes, CKD, GERD,  hypertension, and prostate cancer who presented with left sided numbness. He reports his body is breaking down. He states he is hard of hearing. He woke up around 0300 and felt fine. He noticed left sided numbness from face to left leg. He came to the ED for further evaluation. He reports symptoms have improved. He denies weakness. Vascular neurology (Dr. Wallis) evaluated. Patient presented outside of the window for lytic therapy. Continued ischemic workup recommended with monitoring on telemetry. CTA negative for LVO. [facility] medicine consulted. Patient placed in observation.        * No surgery found *       [facility] Course:   MRI brain showed chronic changes and no acute infarction.  Symptoms resolved, patient back to baseline.       PT/OT/SLP evaluated patient, no further therapy recommended based on their evaluation.     During hospital stay, patient continue on his home metoprolol for rate control of his permanent atrial fibrillation.     Neurology consulted, recommended that patient continue current meds including atorvastatin and Xarelto.  He is to follow-up with vascular Neurology in 2-3 months.     Seen and examined personally on the day of discharge.  Patient to continue his current home medication regimen.  All questions answered to his satisfaction.  Stable for discharge home at this time with outpatient follow-up.      Goals of Care Treatment Preferences:  Code Status: Full Code        Consults:   Consults (From admission, onward)            Status Ordering Provider       Inpatient consult to Neurology  Once        Provider:  (Not yet assigned)    Completed ANNELISE MCPHERSON consult to case management/social work  Once        Provider:  (Not yet assigned)    Completed WILLI ERICKSON       Consult to Telemedicine - Acute Stroke  Once        Provider:  Estefany Wallis MD    Completed JOSE PEARSON                No new Assessment & Plan notes have been filed under this hospital service since the last note  was generated.  Service: [facility] Medicine           Final Active Diagnoses:     Diagnosis Date Noted POA    PRINCIPAL PROBLEM:  TIA (transient ischemic attack) [G45.9] 07/14/2023 Yes    Atrial fibrillation [I48.91] 06/21/2018 Yes    Type 2 diabetes mellitus with kidney complication, without long-term current use of insulin [E11.29]   Yes    GERD (gastroesophageal reflux disease) [K21.9]   Yes       Problems Resolved During this Admission:         Discharged Condition: stable     Disposition: Home or Self Care     Follow Up:    Follow-up Information         Tae Goel MD Follow up in 1 week(s).    Specialty: Family Medicine  Contact information:  [address] [address]  [phone]                                   Patient Instructions:             Ambulatory referral/consult to Vascular Neurology    Standing Status: Future   Referral Priority: Routine Referral Type: Consultation    Referral Reason: Specialty Services Required    Requested Specialty: Vascular Neurology    Number of Visits Requested: 1       Diet Adult Regular      Order Specific Question Answer Comments   Additional restrictions: Cardiac (Low Na/Chol)     Additional restrictions: Diabetic 2000        Notify your health care provider if you experience any of the following:  persistent dizziness, light-headedness, or visual disturbances      Notify your health care provider if you experience any of the following:  increased confusion or weakness      Activity as tolerated         Significant Diagnostic Studies: Labs: CMP        Recent Labs   Lab 07/14/23  0748 07/15/23  0336    145   K 4.5 3.8    112*   CO2 20* 22*   * 135*   BUN 26* 19   CREATININE 1.6* 1.3   CALCIUM 8.8 8.6*   PROT 7.0 6.1   ALBUMIN 3.7 3.5   BILITOT 0.9 1.0   ALKPHOS 111 93   AST 28 16   ALT 19 16   ANIONGAP 11 11   , CBC        Recent Labs   Lab 07/14/23  0748 07/15/23  0336   WBC 6.55 7.59   HGB 15.5 14.9   HCT 48.1 44.5   * 127*   ,  INR         Lab Results   Component Value Date     INR 1.4 (H) 07/15/2023     INR 1.3 (H) 07/14/2023   , Lipid Panel         Lab Results   Component Value Date     CHOL 134 07/14/2023     HDL 30 (L) 07/14/2023     LDLCALC 77.2 07/14/2023     TRIG 134 07/14/2023     CHOLHDL 22.4 07/14/2023   , A1C:        Recent Labs   Lab 04/10/23  0934 07/14/23  1144   HGBA1C 6.9* 6.6*   , and All labs within the past 24 hours have been reviewed  Radiology: X-Ray: CXR: X-Ray Chest 1 View (CXR):       Results for orders placed or performed during the hospital encounter of 07/14/23   X-Ray Chest AP Single View     Narrative     EXAMINATION:  XR CHEST 1 VIEW     CLINICAL HISTORY:  , Weakness;     COMPARISON:  Chest 03/01/2023.     FINDINGS:  Stable heart size.  Clear lungs.        Impression     Stable chest.  No infiltrate.        Electronically signed by:       Timbo Wooten MD  Date:                                        07/14/2023  Time:                                       11:20      MRI:   MRI BRAIN WITHOUT CONTRAST     CLINICAL HISTORY:   Transient ischemic attack (TIA);     TECHNIQUE:   Sagittal T1. Axial T1, T2, T2 FLAIR, GRE, DWI. Coronal T2 FLAIR.     COMPARISON:   Prior CT of the head from 07/14/2023.     FINDINGS:   There is no restricted diffusion.  There is peripheral encephalomalacia involving the lateral aspect of the right temporal lobe within the right middle cranial fossa.  Chronic lacunar infarct within the posterior right lentiform nucleus.  Mild to moderate patchy T2 FLAIR hyperintensity within the periventricular and deep white matter bilaterally.  No acute intracranial hemorrhage or mass effect.     The ventricles and sulci are normal in size and configuration. Midline structures are normal. There are preserved arterial flow-voids on T2 weighted imaging. The paranasal sinuses and mastoid air cells are clear.     No abnormal marrow signal is identified.    Impression:       1. Encephalomalacia involving the  lateral right temporal lobe could relate to prior infarction or prior traumatic brain injury.   2. No acute infarction.   3. Chronic lacunar infarct within the posterior right lentiform nucleus with patchy T2 alteration of the white matter likely relating to chronic microvascular ischemic disease.       Electronically signed by: Ankur Guzman Jr., MD   Date: 07/14/2023   Time: 15:27      CT scan:   CTA Head and Neck (xpd) [[id number]] Resulted: 07/14/23 1005   Order Status: Completed Updated: 07/14/23 1007   Narrative:     EXAMINATION:   CTA HEAD AND NECK (XPD)     CLINICAL HISTORY:   Stroke/TIA, determine embolic source;     TECHNIQUE:   CTA of the intracranial and extracranial circulation was performed after the administration of intravenous contrast in the arterial phase. This examination was interpreted using multiplanar and 3D reconstructions.  100 mL of Omnipaque 350 was administered intravenously.     COMPARISON:   Prior CT of the head from 07/14/2023.     FINDINGS:   CTA neck:     There is a 4 vessel branching pattern of the aortic arch with the left vertebral artery originating from the arch.  The origins of the vessels from the arch are not significantly stenotic. The subclavian arteries are without hemodynamically significant stenosis.     The right common carotid artery is without significant stenosis. There is calcific plaque within the right carotid bifurcation with focal, 60% stenosis of the origin of the right internal carotid artery.  The more cranial, ascending cervical right internal carotid artery is normal in course and caliber.     The left common carotid is without significant stenosis. Minimal calcific plaque within the carotid bulb without stenosis of the left internal carotid artery.  The more cranial, ascending cervical left internal carotid artery is normal in course and caliber.     Extracranial vertebral arteries: The vertebral arteries are codominant.  The vertebral arteries are without  significant stenosis to their confluence into the basilar artery.     CTA head:     The cavernous internal carotid arteries are normal in course and caliber.  The right middle cerebral artery is normal.  The left middle cerebral artery is normal.  There is an aplastic left A1 segment.  The anterior communicating artery is normal. Right and left posterior cerebral arteries are normal.  There is a patent left-sided posterior communicating artery     The basilar artery is normal in caliber.  Intracranial vertebral arteries are patent to the basilar confluence.     The superior cerebellar arteries are normal. The anterior inferior cerebellar arteries are normal. The posterior inferior cerebellar arteries are normal.    Impression:       1. CT angiogram of the brain demonstrates no evidence of large vessel occlusion or hemodynamically significant stenosis.   2. 60% stenosis of the origin of the right internal carotid artery on CTA of the neck due to coarse calcific plaque.  No stenosis of the left carotid system.   All CT scans at this facility use dose modulation, iterative reconstruction, and/or weight base dosing when appropriate to reduce radiation dose to as low as reasonably achievable.       Electronically signed by: Ankur Guzman Jr., MD   Date: 07/14/2023   Time: 10:05   CT Head Without Contrast [[id number]] Resulted: 07/14/23 0742   Order Status: Completed Updated: 07/14/23 0745   Narrative:     EXAMINATION:   CT HEAD WITHOUT CONTRAST     CLINICAL HISTORY:   Neuro deficit, acute, stroke suspected;     TECHNIQUE:   Low dose axial images were obtained through the head.  Coronal and sagittal reformations were also performed. Contrast was not administered.     COMPARISON:   None.     FINDINGS:   Old lacunar infarction within the anterior right thalamus.  No intracranial mass, mass effect, hemorrhage, hydrocephalus, acute infarction or abnormal fluid collection.     Mild low attenuation in the periventricular and deep  white matter, compatible with small vessel ischemic disease.     No depressed skull fracture or skull lesions.  Degenerative changes within the TMJ.  Mucosal thickening within the left sphenoid sinus.  Wall thickening of the left sphenoid sinus suggesting sequela of chronic sinusitis.    Impression:       No acute intracranial abnormality.     Old lacunar infarction within the right thalamus.     Senescent changes.     Aspect score is 10/10.       Electronically signed by: Jame Watt   Date: 07/14/2023   Time: 07:42      Cardiac Graphics: Echocardiogram: Transthoracic echo (TTE) complete (Cupid Only):         Results for orders placed or performed during the hospital encounter of 07/14/23   Echo   Result Value Ref Range     BSA 2.13 m2     TDI SEPTAL 0.08 m/s     LV LATERAL E/E' RATIO 10.00 m/s     LV SEPTAL E/E' RATIO 12.50 m/s     LA WIDTH 4.60 cm     IVC diameter 1.40 cm     Left Ventricular Outflow Tract Mean Velocity 0.78 cm/s     Left Ventricular Outflow Tract Mean Gradient 2.71 mmHg     TV mean gradient 23 mmHg     TDI LATERAL 0.10 m/s     LVIDd 5.37 3.5 - 6.0 cm     IVS 1.36 (A) 0.6 - 1.1 cm     Posterior Wall 1.18 (A) 0.6 - 1.1 cm     Ao root annulus 3.65 cm     LVIDs 4.22 (A) 2.1 - 4.0 cm     FS 21 28 - 44 %     LA volume 106.21 cm3     STJ 3.57 cm     Ascending aorta 3.28 cm     LV mass 283.53 g     LA size 4.60 cm     TAPSE 1.90 cm     Left Ventricle Relative Wall Thickness 0.44 cm     AV regurgitation pressure 1/2 time 1,048.995871295645063 ms     AV mean gradient 6 mmHg     AV valve area 2.17 cm2     AV Velocity Ratio 0.72       AV index (prosthetic) 0.63       MV valve area p 1/2 method 4.93 cm2     Mean e' 0.09 m/s     IVRT 68.51 msec     LVOT diameter 2.09 cm     LVOT area 3.4 cm2     LVOT peak lewis 1.09 m/s     LVOT peak VTI 17.50 cm     Ao peak lewis 1.52 m/s     Ao VTI 27.7 cm     RVOT peak lewis 0.82 m/s     RVOT peak VTI 14.7 cm     Mr max lewis 5.69 m/s     LVOT stroke volume 60.01 cm3     AV  peak gradient 9 mmHg     PV mean gradient 1.76 mmHg     E/E' ratio 11.11 m/s     MV Peak E Ruel 1.00 m/s     AR Max Ruel 3.39 m/s     TR Max Ruel 2.46 m/s     MV stenosis pressure 1/2 time 44.64 ms     LV Systolic Volume 79.41 mL     LV Systolic Volume Index 38.2 mL/m2     LV Diastolic Volume 139.79 mL     LV Diastolic Volume Index 67.21 mL/m2     LA Volume Index 51.1 mL/m2     LV Mass Index 136 g/m2     RA Major Axis 5.62 cm     Left Atrium Minor Axis 5.88 cm     Left Atrium Major Axis 5.93 cm     Triscuspid Valve Regurgitation Peak Gradient 24 mmHg     RA Width 3.50 cm     Right Atrial Pressure (from IVC) 3 mmHg     EF 40 %     TV rest pulmonary artery pressure 27 mmHg     Narrative     · Atrial fibrillation.  · The left ventricle is mildly enlarged with concentric hypertrophy and   mildly decreased systolic function.  · Severe left atrial enlargement.  · Left ventricular diastolic dysfunction.  · The estimated PA systolic pressure is 27 mmHg.  · Normal right ventricular size with normal right ventricular systolic   function.  · Normal central venous pressure (3 mmHg).  · Mild mitral regurgitation.  · Mild aortic regurgitation.  · The estimated ejection fraction is 40-45%.            Pending Diagnostic Studies:         None             Medications:  Reconciled Home Medications:       Medication List          CONTINUE taking these medications       atorvastatin 40 MG tablet  Commonly known as: LIPITOR  Take 1 tablet (40 mg total) by mouth once daily.      blood sugar diagnostic Strp  Use daily- Freestyle lite      blood-glucose meter kit  Use before meals and before bed when the glucoses are not in control.  Otherwise, test it daily once a day before meals randomly to ensure stability of the gluocse.      DULoxetine 60 MG capsule  Commonly known as: CYMBALTA  Take 1 capsule (60 mg total) by mouth once daily.      empagliflozin 25 mg tablet  Commonly known as: JARDIANCE  Take 1 tablet (25 mg total) by mouth once  "daily.      furosemide 40 MG tablet  Commonly known as: LASIX  Take 1 tablet (40 mg total) by mouth once daily.      glimepiride 4 MG tablet  Commonly known as: AMARYL  Take 1 tablet (4 mg total) by mouth 2 (two) times daily before meals.      lisinopriL 20 MG tablet  Commonly known as: PRINIVIL,ZESTRIL  Take 1 tablet (20 mg total) by mouth 2 (two) times daily.      metoprolol succinate 50 MG 24 hr tablet  Commonly known as: TOPROL-XL  Take 1 tablet (50 mg total) by mouth once daily.      nateglinide 120 MG tablet  Commonly known as: STARLIX  Take 1 tablet (120 mg total) by mouth 3 (three) times daily before meals. If you skip a meal, do not take dose      omeprazole 20 MG capsule  Commonly known as: PRILOSEC  Take 1 capsule (20 mg total) by mouth once daily.      rivaroxaban 15 mg Tab  Commonly known as: XARELTO  Take 1 tablet (15 mg total) by mouth daily with dinner or evening meal.      TRULICITY 4.5 mg/0.5 mL pen injector  Generic drug: dulaglutide  Inject 4.5 mg into the skin every 7 days.      VITAMIN D ORAL  Take by mouth once daily.                   Indwelling Lines/Drains at time of discharge:   Lines/Drains/Airways         None                         Time spent on the discharge of patient: 40 minutes           Giovanna Dumont DO  Department of [facility] Medicine  '[place] - Telemetry ([facility])            Lab Results   Component Value Date    HGBA1C 6.6 (H) 07/14/2023       Lab Results   Component Value Date    CHOL 134 07/14/2023    CHOL 130 04/10/2023    CHOL 129 03/24/2022     Lab Results   Component Value Date    HDL 30 (L) 07/14/2023    HDL 36 (L) 04/10/2023    HDL 35 (L) 03/24/2022     Lab Results   Component Value Date    LDLCALC 77.2 07/14/2023    LDLCALC 75.8 04/10/2023    LDLCALC 65.6 03/24/2022     No results found for: "DLDL"  Lab Results   Component Value Date    TRIG 134 07/14/2023    TRIG 91 04/10/2023    TRIG 142 03/24/2022       f1   Lab Results   Component Value Date    CHOLHDL 22.4 " 07/14/2023    CHOLHDL 27.7 04/10/2023    CHOLHDL 27.1 03/24/2022     He states that he is at his baseline.  He has not had problems recur with the numbness.  The inpatient team wanted him to see vascular neurology in 2-3 months. He does not have an appt.  I will place one.  He is controlled with all problems above, (DM, HTN, HYPERLIPIDEMIA)      Problem List Items Addressed This Visit       TIA (transient ischemic attack) - Primary    Relevant Orders    Ambulatory referral/consult to Vascular Neurology    Type 2 diabetes mellitus with kidney complication, without long-term current use of insulin    Overview     The patient presents with diabetes. The patient denies polyuria, polydipsia, polyphagia, hypoglycemia and paresthesias. The patient's glucose control has been good. Home glucose averages are routinely checked. The patient is without retinopathy currently. The patient has no history of neuropathy. The patient currently complains of no podiatric problems. The patient has excellent compliance.  Hemoglobin A1C   Date Value Ref Range Status   09/29/2022 6.8 (H) 4.0 - 5.6 % Final     Comment:     ADA Screening Guidelines:  5.7-6.4%  Consistent with prediabetes  >or=6.5%  Consistent with diabetes    High levels of fetal hemoglobin interfere with the HbA1C  assay. Heterozygous hemoglobin variants (HbS, HgC, etc)do  not significantly interfere with this assay.   However, presence of multiple variants may affect accuracy.     03/24/2022 6.7 (H) 4.0 - 5.6 % Final     Comment:     ADA Screening Guidelines:  5.7-6.4%  Consistent with prediabetes  >or=6.5%  Consistent with diabetes    High levels of fetal hemoglobin interfere with the HbA1C  assay. Heterozygous hemoglobin variants (HbS, HgC, etc)do  not significantly interfere with this assay.   However, presence of multiple variants may affect accuracy.     11/04/2021 6.7 (H) 4.0 - 5.6 % Final     Comment:     ADA Screening Guidelines:  5.7-6.4%  Consistent with  prediabetes  >or=6.5%  Consistent with diabetes    High levels of fetal hemoglobin interfere with the HbA1C  assay. Heterozygous hemoglobin variants (HbS, HgC, etc)do  not significantly interfere with this assay.   However, presence of multiple variants may affect accuracy.       No results found for: MICHELLE, [id number]  Diabetes Management Status    Statin: Taking  ACE/ARB: Taking    Screening or Prevention Patient's value Goal Complete/Controlled?   HgA1C Testing and Control   Lab Results   Component Value Date    HGBA1C 6.8 (H) 09/29/2022      Annually/Less than 8% Yes   Lipid profile : 03/24/2022 Annually No   LDL control Lab Results   Component Value Date    LDLCALC 65.6 03/24/2022    Annually/Less than 100 mg/dl  No   Nephropathy screening Lab Results   Component Value Date    LABMICR 10.0 03/24/2022     Lab Results   Component Value Date    PROTEINUA Trace (A) 03/27/2023    Annually No   Blood pressure BP Readings from Last 1 Encounters:   04/05/23 123/76    Less than 140/90 Yes   Dilated retinal exam : 05/23/2022 Annually No   Foot exam   : 11/04/2021 Annually Yes              Relevant Orders    Microalbumin/Creatinine Ratio, Urine     Other Visit Diagnoses       Numbness        Relevant Orders    Ambulatory referral/consult to Vascular Neurology            Past Medical History:  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     DM (diabetes mellitus) type II controlled, neurological manifestation 7/5/2013    GERD (gastroesophageal reflux disease)     History of prostate cancer 2006    Hypertension associated with diabetes     Hypertension goal BP (blood pressure) < 130/80     Obesity     Prostate cancer     TIA (transient ischemic  attack)     Trouble in sleeping      Past Surgical History:   Procedure Laterality Date    CARDIAC CATHETERIZATION      CARDIOVERSION N/A 8/10/2018    Procedure: CARDIOVERSION;  Surgeon: Israel Cardoza MD;  Location: [facility] [place] CATH LAB;  Service: Cardiology;  Laterality: N/A;  Dr. Talamantes pt    CATARACT EXTRACTION W/  INTRAOCULAR LENS IMPLANT  11/2013    Bilateral Cataract     COLONOSCOPY N/A 12/20/2019    Procedure: COLONOSCOPY;  Surgeon: Forrest Roy MD;  Location: [facility] [place] ENDO;  Service: Endoscopy;  Laterality: N/A;    COLONOSCOPY W/ POLYPECTOMY      EYE SURGERY      PROSTATECTOMY  2006    TONSILLECTOMY      TONSILLECTOMY, ADENOIDECTOMY, BILATERAL MYRINGOTOMY AND TUBES       Review of patient's allergies indicates:   Allergen Reactions    Amlodipine      swelling     Current Outpatient Medications on File Prior to Visit   Medication Sig Dispense Refill    atorvastatin (LIPITOR) 40 MG tablet Take 1 tablet (40 mg total) by mouth once daily. 90 tablet 3    blood sugar diagnostic Strp Use daily- Freestyle lite 300 strip 3    blood-glucose meter kit Use before meals and before bed when the glucoses are not in control.  Otherwise, test it daily once a day before meals randomly to ensure stability of the gluocse. 1 each 0    dulaglutide (TRULICITY) 4.5 mg/0.5 mL pen injector Inject 4.5 mg into the skin every 7 days.      DULoxetine (CYMBALTA) 60 MG capsule Take 1 capsule (60 mg total) by mouth once daily. 90 capsule 3    empagliflozin (JARDIANCE) 25 mg tablet Take 1 tablet (25 mg total) by mouth once daily. 90 tablet 3    ERGOCALCIFEROL, VITAMIN D2, (VITAMIN D ORAL) Take by mouth once daily.       furosemide (LASIX) 40 MG tablet Take 1 tablet (40 mg total) by mouth once daily. 90 tablet 3    glimepiride (AMARYL) 4 MG tablet Take 1 tablet (4 mg total) by mouth 2 (two) times daily before meals. 180 tablet 3    lisinopriL (PRINIVIL,ZESTRIL) 20 MG tablet Take 1 tablet (20 mg total) by mouth 2 (two) times daily. 180  tablet 3    metoprolol succinate (TOPROL-XL) 50 MG 24 hr tablet Take 1 tablet (50 mg total) by mouth once daily. 90 tablet 3    nateglinide (STARLIX) 120 MG tablet Take 1 tablet (120 mg total) by mouth 3 (three) times daily before meals. If you skip a meal, do not take dose 270 tablet 3    omeprazole (PRILOSEC) 20 MG capsule Take 1 capsule (20 mg total) by mouth once daily. 90 capsule 3    rivaroxaban (XARELTO) 15 mg Tab Take 1 tablet (15 mg total) by mouth daily with dinner or evening meal. 90 tablet 3    [DISCONTINUED] chlorthalidone (HYGROTEN) 25 MG Tab Take 1 tablet (25 mg total) by mouth once daily. 90 tablet 3     No current facility-administered medications on file prior to visit.     Social History     Socioeconomic History    Marital status:    Tobacco Use    Smoking status: Never    Smokeless tobacco: Never   Substance and Sexual Activity    Alcohol use: No     Comment: He used to drink but quit in 1990    Drug use: No     Social Determinants of Health     Financial Resource Strain: Low Risk  (1/12/2023)    Overall Financial Resource Strain (CARDIA)     Difficulty of Paying Living Expenses: Not hard at all   Food Insecurity: No Food Insecurity (1/12/2023)    Hunger Vital Sign     Worried About Running Out of Food in the Last Year: Never true     Ran Out of Food in the Last Year: Never true   Transportation Needs: No Transportation Needs (1/12/2023)    PRAPARE - Transportation     Lack of Transportation (Medical): No     Lack of Transportation (Non-Medical): No   Physical Activity: Inactive (1/12/2023)    Exercise Vital Sign     Days of Exercise per Week: 0 days     Minutes of Exercise per Session: 0 min   Stress: No Stress Concern Present (1/12/2023)    [employer] [place] [employer] - Occupational Stress Questionnaire     Feeling of Stress : Not at all   Social Connections: Moderately Isolated (1/12/2023)    Social Connection and Isolation Panel [NHANES]     Frequency of Communication  "with Friends and Family: More than three times a week     Frequency of Social Gatherings with Friends and Family: Once a week     Attends Cheondoism Services: Never     Active Member of Clubs or Organizations: Yes     Attends Club or Organization Meetings: More than 4 times per year     Marital Status:    Housing Stability: Low Risk  (1/12/2023)    Housing Stability Vital Sign     Unable to Pay for Housing in the Last Year: No     Number of Places Lived in the Last Year: 1     Unstable Housing in the Last Year: No     Family History   Problem Relation Age of Onset    Heart attack Mother     Melanoma Father     Cancer Father     Anesthesia problems Neg Hx     Clotting disorder Neg Hx     Kidney disease Neg Hx     Amblyopia Neg Hx     Blindness Neg Hx     Cataracts Neg Hx     Glaucoma Neg Hx     Retinal detachment Neg Hx     Macular degeneration Neg Hx     Strabismus Neg Hx        Review of Systems   Constitutional: Negative.  Negative for chills, diaphoresis and fever.   HENT:  Negative for congestion, hearing loss, mouth sores, postnasal drip and sore throat.    Eyes:  Negative for pain and visual disturbance.   Respiratory:  Negative for cough, chest tightness, shortness of breath and wheezing.    Cardiovascular:  Negative for chest pain.   Gastrointestinal:  Negative for abdominal pain, anal bleeding, blood in stool, constipation, diarrhea, nausea and vomiting.   Genitourinary:  Negative for dysuria and hematuria.   Musculoskeletal:  Positive for arthralgias. Negative for back pain, neck pain and neck stiffness.   Skin:  Negative for rash.   Neurological:  Negative for dizziness and weakness.       Objective:     /64 (BP Location: Left arm, Patient Position: Sitting, BP Method: Medium (Automatic))   Pulse 85   Temp 98.1 °F (36.7 °C)   Resp 16   Ht 5' 8" (1.727 m)   Wt 97.1 kg (214 lb)   BMI 32.54 kg/m²     Physical Exam  Vitals reviewed.   Constitutional:       General: He is not in acute " distress.     Appearance: Normal appearance. He is well-developed. He is not ill-appearing or diaphoretic.   HENT:      Head: Normocephalic and atraumatic.      Right Ear: Tympanic membrane, ear canal and external ear normal.      Left Ear: Tympanic membrane, ear canal and external ear normal.      Nose: Nose normal.      Mouth/Throat:      Pharynx: No oropharyngeal exudate.   Eyes:      General: No scleral icterus.        Right eye: No discharge.         Left eye: No discharge.      Conjunctiva/sclera: Conjunctivae normal.      Pupils: Pupils are equal, round, and reactive to light.   Neck:      Thyroid: No thyromegaly.      Vascular: No JVD.   Cardiovascular:      Rate and Rhythm: Normal rate and regular rhythm.      Pulses:           Dorsalis pedis pulses are 2+ on the right side and 2+ on the left side.        Posterior tibial pulses are 2+ on the right side and 2+ on the left side.      Heart sounds: Normal heart sounds. No murmur heard.     No friction rub. No gallop.   Pulmonary:      Effort: Pulmonary effort is normal. No respiratory distress.      Breath sounds: Normal breath sounds. No wheezing or rales.   Chest:      Chest wall: No tenderness.   Abdominal:      General: Bowel sounds are normal. There is no distension.      Palpations: Abdomen is soft. There is no mass.      Tenderness: There is no abdominal tenderness. There is no guarding or rebound.   Musculoskeletal:         General: No tenderness. Normal range of motion.      Cervical back: Normal range of motion and neck supple.      Right foot: Normal range of motion. No deformity.      Left foot: Normal range of motion. No deformity.   Feet:      Right foot:      Protective Sensation: 10 sites tested.  10 sites sensed.      Skin integrity: No ulcer, blister, skin breakdown, erythema, warmth, callus or dry skin.      Left foot:      Protective Sensation: 10 sites tested.  10 sites sensed.      Skin integrity: No ulcer, blister, skin breakdown,  erythema, warmth, callus or dry skin.   Lymphadenopathy:      Cervical: No cervical adenopathy.   Skin:     General: Skin is warm and dry.   Neurological:      Mental Status: He is alert and oriented to person, place, and time.      Cranial Nerves: No cranial nerve deficit.      Coordination: Coordination normal.       Assessment:     1. TIA (transient ischemic attack)    2. Numbness    3. Type 2 diabetes mellitus with stage 3a chronic kidney disease, without long-term current use of insulin        Plan:     Problem List Items Addressed This Visit       TIA (transient ischemic attack) - Primary    Relevant Orders    Ambulatory referral/consult to Vascular Neurology    Type 2 diabetes mellitus with kidney complication, without long-term current use of insulin    Relevant Orders    Microalbumin/Creatinine Ratio, Urine     Other Visit Diagnoses       Numbness        Relevant Orders    Ambulatory referral/consult to Vascular Neurology          No follow-ups on file.  Cont control of the diabetes, htn and hyperlipidemia.

## 2023-08-03 ENCOUNTER — TELEPHONE (OUTPATIENT)
Dept: FAMILY MEDICINE | Facility: CLINIC | Age: 88
End: 2023-08-03
Payer: MEDICARE

## 2023-08-16 ENCOUNTER — OFFICE VISIT (OUTPATIENT)
Dept: OPTOMETRY | Facility: CLINIC | Age: 88
End: 2023-08-16
Payer: MEDICARE

## 2023-08-16 DIAGNOSIS — E11.9 DIABETES MELLITUS TYPE 2 WITHOUT RETINOPATHY: Primary | ICD-10-CM

## 2023-08-16 DIAGNOSIS — Z96.1 BILATERAL PSEUDOPHAKIA: ICD-10-CM

## 2023-08-16 PROCEDURE — 92014 PR EYE EXAM, EST PATIENT,COMPREHESV: ICD-10-PCS | Mod: S$PBB,,, | Performed by: OPTOMETRIST

## 2023-08-16 PROCEDURE — 92014 COMPRE OPH EXAM EST PT 1/>: CPT | Mod: S$PBB,,, | Performed by: OPTOMETRIST

## 2023-08-16 PROCEDURE — 99999 PR PBB SHADOW E&M-EST. PATIENT-LVL III: ICD-10-PCS | Mod: PBBFAC,,, | Performed by: OPTOMETRIST

## 2023-08-16 PROCEDURE — 99999 PR PBB SHADOW E&M-EST. PATIENT-LVL III: CPT | Mod: PBBFAC,,, | Performed by: OPTOMETRIST

## 2023-08-16 PROCEDURE — 99213 OFFICE O/P EST LOW 20 MIN: CPT | Mod: PBBFAC,PO | Performed by: OPTOMETRIST

## 2023-08-16 NOTE — PROGRESS NOTES
HPI    Pt here for annual diabetic exam. Last exam- 1 year    Pt sts VA OU is stable. Pt using +3.25 for reading. Pt c/o   flashes/floaters OU, x years. Pt denies pain or use of gtt. Pt DM is under   control, denies checking BS daily.    Hemoglobin A1C       Date                     Value               Ref Range             Status                07/14/2023               6.6 (H)             4.0 - 5.6 %           Final                  04/10/2023               6.9 (H)             4.0 - 5.6 %           Final                09/29/2022               6.8 (H)             4.0 - 5.6 %           Final               Last edited by Samantha Gonsalez on 8/16/2023  8:04 AM.            Assessment /Plan     For exam results, see Encounter Report.    Diabetes mellitus type 2 without retinopathy    Bilateral pseudophakia      No diabetic retinopathy, no csme. Return in 1 year for dilated eye exam.   2. Monitor condition. Patient to report any changes. RTC 1 year recheck.

## 2023-09-18 ENCOUNTER — OFFICE VISIT (OUTPATIENT)
Dept: CARDIOLOGY | Facility: CLINIC | Age: 88
End: 2023-09-18
Payer: MEDICARE

## 2023-09-18 VITALS
WEIGHT: 214 LBS | BODY MASS INDEX: 32.43 KG/M2 | DIASTOLIC BLOOD PRESSURE: 80 MMHG | SYSTOLIC BLOOD PRESSURE: 140 MMHG | HEIGHT: 68 IN | HEART RATE: 72 BPM

## 2023-09-18 DIAGNOSIS — E11.59 HYPERTENSION ASSOCIATED WITH DIABETES: ICD-10-CM

## 2023-09-18 DIAGNOSIS — Z86.73 H/O TIA (TRANSIENT ISCHEMIC ATTACK) AND STROKE: ICD-10-CM

## 2023-09-18 DIAGNOSIS — I15.2 HYPERTENSION ASSOCIATED WITH DIABETES: ICD-10-CM

## 2023-09-18 DIAGNOSIS — E78.2 COMBINED HYPERLIPIDEMIA ASSOCIATED WITH TYPE 2 DIABETES MELLITUS: ICD-10-CM

## 2023-09-18 DIAGNOSIS — I48.19 PERSISTENT ATRIAL FIBRILLATION: ICD-10-CM

## 2023-09-18 DIAGNOSIS — I48.20 CHRONIC ATRIAL FIBRILLATION: ICD-10-CM

## 2023-09-18 DIAGNOSIS — I67.2 CEREBROVASCULAR DISEASE, ARTERIOSCLEROTIC, POST-STROKE: Primary | ICD-10-CM

## 2023-09-18 DIAGNOSIS — Z86.73 CEREBROVASCULAR DISEASE, ARTERIOSCLEROTIC, POST-STROKE: Primary | ICD-10-CM

## 2023-09-18 DIAGNOSIS — R60.9 EDEMA, UNSPECIFIED TYPE: ICD-10-CM

## 2023-09-18 DIAGNOSIS — I48.91 ATRIAL FIBRILLATION, UNSPECIFIED TYPE: ICD-10-CM

## 2023-09-18 DIAGNOSIS — E11.69 COMBINED HYPERLIPIDEMIA ASSOCIATED WITH TYPE 2 DIABETES MELLITUS: ICD-10-CM

## 2023-09-18 PROCEDURE — 99999 PR PBB SHADOW E&M-EST. PATIENT-LVL II: ICD-10-PCS | Mod: PBBFAC,,, | Performed by: INTERNAL MEDICINE

## 2023-09-18 PROCEDURE — 99999 PR PBB SHADOW E&M-EST. PATIENT-LVL II: CPT | Mod: PBBFAC,,, | Performed by: INTERNAL MEDICINE

## 2023-09-18 PROCEDURE — 99214 PR OFFICE/OUTPT VISIT, EST, LEVL IV, 30-39 MIN: ICD-10-PCS | Mod: S$PBB,,, | Performed by: INTERNAL MEDICINE

## 2023-09-18 PROCEDURE — 99212 OFFICE O/P EST SF 10 MIN: CPT | Mod: PBBFAC,PO | Performed by: INTERNAL MEDICINE

## 2023-09-18 PROCEDURE — 99214 OFFICE O/P EST MOD 30 MIN: CPT | Mod: S$PBB,,, | Performed by: INTERNAL MEDICINE

## 2023-09-18 RX ORDER — ATORVASTATIN CALCIUM 40 MG/1
40 TABLET, FILM COATED ORAL DAILY
Qty: 90 TABLET | Refills: 3 | Status: SHIPPED | OUTPATIENT
Start: 2023-09-18

## 2023-09-18 RX ORDER — METOPROLOL SUCCINATE 50 MG/1
50 TABLET, EXTENDED RELEASE ORAL DAILY
Qty: 90 TABLET | Refills: 3 | Status: SHIPPED | OUTPATIENT
Start: 2023-09-18

## 2023-09-18 RX ORDER — LISINOPRIL 20 MG/1
20 TABLET ORAL DAILY
Qty: 90 TABLET | Refills: 3 | Status: SHIPPED | OUTPATIENT
Start: 2023-09-18 | End: 2024-09-12

## 2023-09-18 RX ORDER — FUROSEMIDE 40 MG/1
40 TABLET ORAL DAILY
Qty: 90 TABLET | Refills: 3 | Status: SHIPPED | OUTPATIENT
Start: 2023-09-18

## 2023-09-18 NOTE — PROGRESS NOTES
Subjective:   Patient ID:  Héctor Desir is a 88 y.o. male who presents for follow-up of Follow-up (6 months)      HPI    Summary    Atrial fibrillation.  The left ventricle is mildly enlarged with concentric hypertrophy and mildly decreased systolic function.  Severe left atrial enlargement.  Left ventricular diastolic dysfunction.  The estimated PA systolic pressure is 27 mmHg.  Normal right ventricular size with normal right ventricular systolic function.  Normal central venous pressure (3 mmHg).  Mild mitral regurgitation.  Mild aortic regurgitation.  The estimated ejection fraction is 40-45%.    ROS    Objective:   Physical Exam    Assessment:      1. Cerebrovascular disease, arteriosclerotic, post-stroke    2. H/O TIA (transient ischemic attack) and stroke    3. Atrial fibrillation, unspecified type    4. Combined hyperlipidemia associated with type 2 diabetes mellitus    5. Hypertension associated with diabetes        Plan:

## 2023-09-18 NOTE — PROGRESS NOTES
Subjective:   Patient ID:  Héctor Desir is a 88 y.o. male who presents for follow-up of Follow-up (6 months)      HPI88 yo WM with hx of chronic AF, HTN and previous stoke.Denies chest pain, SOB, or edema Denies palpitations, weak spells, and syncope     Review of Systems   Constitutional: Negative for decreased appetite, fever, malaise/fatigue, weight gain and weight loss.   HENT:  Negative for hearing loss and nosebleeds.    Eyes:  Negative for visual disturbance.   Cardiovascular:  Negative for chest pain, claudication, cyanosis, dyspnea on exertion, irregular heartbeat, leg swelling, near-syncope, orthopnea, palpitations, paroxysmal nocturnal dyspnea and syncope.   Respiratory:  Negative for cough, hemoptysis, shortness of breath, sleep disturbances due to breathing, snoring and wheezing.    Endocrine: Negative for cold intolerance, heat intolerance, polydipsia and polyuria.   Hematologic/Lymphatic: Negative for adenopathy and bleeding problem. Does not bruise/bleed easily.   Skin:  Negative for color change, itching, poor wound healing, rash and suspicious lesions.   Musculoskeletal:  Negative for arthritis, back pain, falls, joint pain, joint swelling, muscle cramps, muscle weakness and myalgias.   Gastrointestinal:  Negative for bloating, abdominal pain, change in bowel habit, constipation, flatus, heartburn, hematemesis, hematochezia, hemorrhoids, jaundice, melena, nausea and vomiting.   Genitourinary:  Negative for bladder incontinence, decreased libido, frequency, hematuria, hesitancy and urgency.   Neurological:  Positive for numbness. Negative for brief paralysis, difficulty with concentration, excessive daytime sleepiness, dizziness, focal weakness, headaches, light-headedness, loss of balance, vertigo and weakness.   Psychiatric/Behavioral:  Negative for altered mental status, depression and memory loss. The patient does not have insomnia and is not nervous/anxious.    Allergic/Immunologic: Negative  "for environmental allergies, hives and persistent infections.       Objective:   Physical Exam  Constitutional:       General: He is not in acute distress.     Appearance: He is well-developed. He is not diaphoretic.      Comments: BP (!) 140/80 (BP Location: Left arm, Patient Position: Sitting, BP Method: Medium (Manual))   Pulse 72   Ht 5' 8" (1.727 m)   Wt 97.1 kg (214 lb)   BMI 32.54 kg/m²      HENT:      Head: Normocephalic and atraumatic.   Eyes:      General: Lids are normal. No scleral icterus.        Right eye: No discharge.      Conjunctiva/sclera: Conjunctivae normal.      Pupils: Pupils are equal, round, and reactive to light.   Neck:      Thyroid: No thyromegaly.      Vascular: No JVD.      Trachea: No tracheal deviation.   Cardiovascular:      Rate and Rhythm: Normal rate. Rhythm irregularly irregular.      Pulses: Intact distal pulses.           Carotid pulses are 2+ on the right side and 2+ on the left side.       Radial pulses are 2+ on the right side and 2+ on the left side.        Femoral pulses are 2+ on the right side and 2+ on the left side.       Popliteal pulses are 2+ on the right side and 2+ on the left side.        Dorsalis pedis pulses are 2+ on the right side and 2+ on the left side.        Posterior tibial pulses are 2+ on the right side and 2+ on the left side.      Heart sounds: Normal heart sounds, S1 normal and S2 normal. No murmur heard.     No friction rub. No gallop.   Pulmonary:      Effort: Pulmonary effort is normal. No respiratory distress.      Breath sounds: Normal breath sounds. No wheezing or rales.   Chest:      Chest wall: No tenderness.   Abdominal:      General: Bowel sounds are normal. There is no distension.      Palpations: Abdomen is soft. There is no hepatomegaly or mass.      Tenderness: There is no abdominal tenderness. There is no guarding or rebound.   Musculoskeletal:         General: No tenderness. Normal range of motion.      Cervical back: Normal " range of motion and neck supple.   Lymphadenopathy:      Cervical: No cervical adenopathy.   Skin:     General: Skin is warm and dry.      Coloration: Skin is not pale.      Findings: No erythema or rash.   Neurological:      Mental Status: He is alert and oriented to person, place, and time.      Cranial Nerves: No cranial nerve deficit.      Coordination: Coordination normal.      Deep Tendon Reflexes: Reflexes are normal and symmetric.   Psychiatric:         Speech: Speech normal.         Behavior: Behavior normal.         Thought Content: Thought content normal.         Judgment: Judgment normal.         Assessment:      1. Cerebrovascular disease, arteriosclerotic, post-stroke    2. H/O TIA (transient ischemic attack) and stroke    3. Atrial fibrillation, unspecified type    4. Combined hyperlipidemia associated with type 2 diabetes mellitus    5. Hypertension associated with diabetes    6. Edema, unspecified type    7. Persistent atrial fibrillation    8. Chronic atrial fibrillation        Plan:   The current medical regimen is effective;  continue present plan and medications.    Cardiac status stable   Patient advised to modify risk factors such as weight, exercise, diet,  tobacco and alcohol exposure   Risk of stroke from atrial fib has been discussed as well as bleeding risk from alternative anticoagulation regiments as well as risk and benefits of rate control vs cardioversion   No orders of the defined types were placed in this encounter.    Follow up in about 6 months (around 3/18/2024).

## 2023-09-20 ENCOUNTER — LAB VISIT (OUTPATIENT)
Dept: LAB | Facility: HOSPITAL | Age: 88
End: 2023-09-20
Attending: NURSE PRACTITIONER
Payer: MEDICARE

## 2023-09-20 DIAGNOSIS — N25.81 SECONDARY HYPERPARATHYROIDISM, RENAL: ICD-10-CM

## 2023-09-20 DIAGNOSIS — N18.31 STAGE 3A CHRONIC KIDNEY DISEASE: ICD-10-CM

## 2023-09-20 LAB
25(OH)D3+25(OH)D2 SERPL-MCNC: 69 NG/ML (ref 30–96)
ALBUMIN SERPL BCP-MCNC: 3.8 G/DL (ref 3.5–5.2)
ANION GAP SERPL CALC-SCNC: 6 MMOL/L (ref 8–16)
BASOPHILS # BLD AUTO: 0.07 K/UL (ref 0–0.2)
BASOPHILS NFR BLD: 1.1 % (ref 0–1.9)
BUN SERPL-MCNC: 25 MG/DL (ref 8–23)
CALCIUM SERPL-MCNC: 8.9 MG/DL (ref 8.7–10.5)
CHLORIDE SERPL-SCNC: 108 MMOL/L (ref 95–110)
CO2 SERPL-SCNC: 27 MMOL/L (ref 23–29)
CREAT SERPL-MCNC: 1.4 MG/DL (ref 0.5–1.4)
DIFFERENTIAL METHOD: ABNORMAL
EOSINOPHIL # BLD AUTO: 0.4 K/UL (ref 0–0.5)
EOSINOPHIL NFR BLD: 5.4 % (ref 0–8)
ERYTHROCYTE [DISTWIDTH] IN BLOOD BY AUTOMATED COUNT: 16.2 % (ref 11.5–14.5)
EST. GFR  (NO RACE VARIABLE): 48.9 ML/MIN/1.73 M^2
GLUCOSE SERPL-MCNC: 118 MG/DL (ref 70–110)
HCT VFR BLD AUTO: 50 % (ref 40–54)
HGB BLD-MCNC: 15.8 G/DL (ref 14–18)
IMM GRANULOCYTES # BLD AUTO: 0.05 K/UL (ref 0–0.04)
IMM GRANULOCYTES NFR BLD AUTO: 0.8 % (ref 0–0.5)
LYMPHOCYTES # BLD AUTO: 1.4 K/UL (ref 1–4.8)
LYMPHOCYTES NFR BLD: 20.5 % (ref 18–48)
MCH RBC QN AUTO: 26.8 PG (ref 27–31)
MCHC RBC AUTO-ENTMCNC: 31.6 G/DL (ref 32–36)
MCV RBC AUTO: 85 FL (ref 82–98)
MONOCYTES # BLD AUTO: 0.6 K/UL (ref 0.3–1)
MONOCYTES NFR BLD: 8.3 % (ref 4–15)
NEUTROPHILS # BLD AUTO: 4.3 K/UL (ref 1.8–7.7)
NEUTROPHILS NFR BLD: 63.9 % (ref 38–73)
NRBC BLD-RTO: 0 /100 WBC
PHOSPHATE SERPL-MCNC: 3.4 MG/DL (ref 2.7–4.5)
PLATELET # BLD AUTO: 137 K/UL (ref 150–450)
PMV BLD AUTO: 11.9 FL (ref 9.2–12.9)
POTASSIUM SERPL-SCNC: 4.1 MMOL/L (ref 3.5–5.1)
PTH-INTACT SERPL-MCNC: 129.7 PG/ML (ref 9–77)
RBC # BLD AUTO: 5.9 M/UL (ref 4.6–6.2)
SODIUM SERPL-SCNC: 141 MMOL/L (ref 136–145)
WBC # BLD AUTO: 6.64 K/UL (ref 3.9–12.7)

## 2023-09-20 PROCEDURE — 82306 VITAMIN D 25 HYDROXY: CPT | Performed by: NURSE PRACTITIONER

## 2023-09-20 PROCEDURE — 83970 ASSAY OF PARATHORMONE: CPT | Performed by: NURSE PRACTITIONER

## 2023-09-20 PROCEDURE — 36415 COLL VENOUS BLD VENIPUNCTURE: CPT | Mod: PO | Performed by: NURSE PRACTITIONER

## 2023-09-20 PROCEDURE — 85025 COMPLETE CBC W/AUTO DIFF WBC: CPT | Performed by: NURSE PRACTITIONER

## 2023-09-20 PROCEDURE — 80069 RENAL FUNCTION PANEL: CPT | Performed by: NURSE PRACTITIONER

## 2023-10-05 ENCOUNTER — TELEPHONE (OUTPATIENT)
Dept: NEPHROLOGY | Facility: CLINIC | Age: 88
End: 2023-10-05
Payer: MEDICARE

## 2023-10-05 NOTE — TELEPHONE ENCOUNTER
----- Message from Giuliana Jack sent at 10/5/2023  9:19 AM CDT -----  Regarding: appointment  Contact: patient  Type:  Sooner Appointment Request    Caller is requesting a sooner appointment.  Caller declined first available appointment listed below.  Caller will not accept being placed on the waitlist and is requesting a message be sent to doctor.    Name of Caller:  patient  When is the first available appointment?  01/24  Symptoms:  follow up  Best Call Back Number:  [phone] (home)     Additional Information:  Please call patient to schedule.  Thanks!

## 2023-10-06 DIAGNOSIS — K21.9 GASTROESOPHAGEAL REFLUX DISEASE WITHOUT ESOPHAGITIS: ICD-10-CM

## 2023-10-06 DIAGNOSIS — N18.31 STAGE 3A CHRONIC KIDNEY DISEASE: ICD-10-CM

## 2023-10-06 RX ORDER — GLIMEPIRIDE 4 MG/1
4 TABLET ORAL
Qty: 180 TABLET | Refills: 1 | Status: SHIPPED | OUTPATIENT
Start: 2023-10-06

## 2023-10-06 RX ORDER — OMEPRAZOLE 20 MG/1
20 CAPSULE, DELAYED RELEASE ORAL DAILY
Qty: 90 CAPSULE | Refills: 3 | Status: SHIPPED | OUTPATIENT
Start: 2023-10-06

## 2023-10-06 NOTE — TELEPHONE ENCOUNTER
Refill Routing Note   Medication(s) are not appropriate for processing by [facility] for the following reason(s):      Required labs abnormal    [facility] action(s):  Defer  Approve Care Due:  None identified          Pharmacist review requested: Yes   Extended chart review required: Yes     Appointments  past 12m or future 3m with PCP    Date Provider   Last Visit   8/2/2023 Tae Goel MD   Next Visit   Visit date not found Tae Goel MD   ED visits in past 90 days: 0        Note composed:2:44 PM 10/06/2023

## 2023-10-06 NOTE — TELEPHONE ENCOUNTER
No care due was identified.  [facility] Embedded Care Due Messages. Reference number: [id number].   10/06/2023 9:22:50 AM CDT

## 2023-10-06 NOTE — TELEPHONE ENCOUNTER
Refill Routing Note   Medication(s) are not appropriate for processing by [facility] for the following reason(s):      Required labs abnormal: eGFR  Drug-disease interaction: empagliflozin and Secondary hyperparathyroidism, renal; Type 2 diabetes mellitus with stage 3a chronic kidney disease, without long-term current use of insulin    [facility] action(s):  Defer  Approve Care Due:  None identified   Medication Therapy Plan:       Pharmacist review requested: Yes     Appointments  past 12m or future 3m with PCP    Date Provider   Last Visit   8/2/2023 Tae Goel MD   Next Visit   Visit date not found Tae Goel MD   ED visits in past 90 days: 0        Note composed:10:14 AM 10/06/2023

## 2023-10-13 ENCOUNTER — LAB VISIT (OUTPATIENT)
Dept: LAB | Facility: HOSPITAL | Age: 88
End: 2023-10-13
Attending: FAMILY MEDICINE
Payer: MEDICARE

## 2023-10-13 LAB
ESTIMATED AVG GLUCOSE: 143 MG/DL (ref 68–131)
HBA1C MFR BLD: 6.6 % (ref 4–5.6)

## 2023-10-13 PROCEDURE — 83036 HEMOGLOBIN GLYCOSYLATED A1C: CPT | Performed by: FAMILY MEDICINE

## 2023-10-13 PROCEDURE — 36415 COLL VENOUS BLD VENIPUNCTURE: CPT | Mod: PO | Performed by: FAMILY MEDICINE

## 2023-10-16 DIAGNOSIS — N18.31 STAGE 3A CHRONIC KIDNEY DISEASE: ICD-10-CM

## 2023-10-16 PROBLEM — G45.9 TIA (TRANSIENT ISCHEMIC ATTACK): Status: RESOLVED | Noted: 2023-07-14 | Resolved: 2023-10-16

## 2023-10-16 RX ORDER — NATEGLINIDE 120 MG/1
120 TABLET ORAL
Qty: 20 TABLET | Refills: 1 | Status: SHIPPED | OUTPATIENT
Start: 2023-10-16

## 2023-10-16 NOTE — TELEPHONE ENCOUNTER
No care due was identified.  [facility] Embedded Care Due Messages. Reference number: [id number].   10/16/2023 1:57:37 PM CDT

## 2023-10-16 NOTE — TELEPHONE ENCOUNTER
----- Message from Jovana Mata sent at 10/16/2023  1:41 PM CDT -----  States he still hasn't received his nateglinide tabs 120 mg from [facility]. States he would like to know, if he needs to get 5 days from his local pharmacy ([facility]) to get him through until he receives his mail order.     [id number] - [place] - [address] [facility] [address] [id number]  [facility]  [place] [address]  Phone: [phone] Fax: [phone]       Please call pt [phone]. Thank you

## 2023-10-16 NOTE — TELEPHONE ENCOUNTER
----- Message from Jovana Mata sent at 10/16/2023  1:41 PM CDT -----  States he still hasn't received his nateglinide tabs 120 mg from [facility]. States he would like to know, if he needs to get 5 days from his local pharmacy ([facility]) to get him through until he receives his mail order.     [id number] - [place] - [address] [facility] [id number]  [facility]  [place] [address]  Phone: [phone] Fax: [phone]       Please call pt [phone]. Thank you

## 2023-12-13 DIAGNOSIS — N18.31 STAGE 3A CHRONIC KIDNEY DISEASE: Primary | ICD-10-CM

## 2024-01-04 ENCOUNTER — OFFICE VISIT (OUTPATIENT)
Dept: NEPHROLOGY | Facility: CLINIC | Age: 89
End: 2024-01-04
Payer: MEDICARE

## 2024-01-04 VITALS
HEIGHT: 68 IN | WEIGHT: 214 LBS | SYSTOLIC BLOOD PRESSURE: 122 MMHG | OXYGEN SATURATION: 97 % | DIASTOLIC BLOOD PRESSURE: 80 MMHG | BODY MASS INDEX: 32.43 KG/M2 | HEART RATE: 80 BPM

## 2024-01-04 DIAGNOSIS — I15.2 HYPERTENSION ASSOCIATED WITH DIABETES: ICD-10-CM

## 2024-01-04 DIAGNOSIS — E66.9 OBESITY (BMI 30-39.9): ICD-10-CM

## 2024-01-04 DIAGNOSIS — N25.81 SECONDARY HYPERPARATHYROIDISM, RENAL: ICD-10-CM

## 2024-01-04 DIAGNOSIS — E11.59 HYPERTENSION ASSOCIATED WITH DIABETES: ICD-10-CM

## 2024-01-04 DIAGNOSIS — N18.32 STAGE 3B CHRONIC KIDNEY DISEASE: ICD-10-CM

## 2024-01-04 DIAGNOSIS — N18.31 STAGE 3A CHRONIC KIDNEY DISEASE: Primary | ICD-10-CM

## 2024-01-04 DIAGNOSIS — N18.31 TYPE 2 DIABETES MELLITUS WITH STAGE 3A CHRONIC KIDNEY DISEASE, WITHOUT LONG-TERM CURRENT USE OF INSULIN: ICD-10-CM

## 2024-01-04 DIAGNOSIS — I48.19 PERSISTENT ATRIAL FIBRILLATION: ICD-10-CM

## 2024-01-04 DIAGNOSIS — E11.22 TYPE 2 DIABETES MELLITUS WITH STAGE 3A CHRONIC KIDNEY DISEASE, WITHOUT LONG-TERM CURRENT USE OF INSULIN: ICD-10-CM

## 2024-01-04 PROCEDURE — 99999 PR PBB SHADOW E&M-EST. PATIENT-LVL III: CPT | Mod: PBBFAC,,, | Performed by: INTERNAL MEDICINE

## 2024-01-04 PROCEDURE — 99213 OFFICE O/P EST LOW 20 MIN: CPT | Mod: PBBFAC,PO | Performed by: INTERNAL MEDICINE

## 2024-01-04 PROCEDURE — 99215 OFFICE O/P EST HI 40 MIN: CPT | Mod: S$PBB,,, | Performed by: INTERNAL MEDICINE

## 2024-01-04 NOTE — PROGRESS NOTES
Subjective:       Patient ID: Héctor Desir is a 88 y.o. White male who presents for follow-up evaluation of Chronic Kidney Disease      HPI   He reports ongoing stress due to his wife's illness. She is no longer on hospice--but he needs to interact with her daughter who can be challenging. He feels overall well and notes his BS are running better, last Hba1c was 7.1. No LE edema nor SOB. Nocturia is 1-2X.       Interval history Feb 2019: Lost to follow up. Wife passed. He's in a new place, doing better since his Hbac1 went up. Saw Endocrine.      Interval history Dec 2019: doing well. No LE edema and no SOB. NO new medications. Got flu shot.      Interval history May 2020:  The patient location is: Home  The chief complaint leading to consultation is: CKD  Visit type: audiovisual  Total time spent with patient: 23 minutes  Each patient to whom he or she provides medical services by telemedicine is:  (1) informed of the relationship between the physician and patient and the respective role of any other health care provider with respect to management of the patient; and (2) notified that he or she may decline to receive medical services by telemedicine and may withdraw from such care at any time.   Notes: He feels well, no change in medical status. No LE edema. Last Hba1c was 7.1       Interval history August 2020: He is feeling very well. He came back from a fishing trip today. No LE edema. He thinks his BS are running pretty well when he checks them, last Hba1c was 7.2    Interval history Jan 2021: Son had COVID, thus he quarantined. He wants COVID vaccine. Overall feels well. Did not get a dog yet    Interval history Dec 2021:  Fishing, staying active. Now on Trulicity--no side effects.      Sept 2022: He is doing well. Last HBa1c was 6.7. Some LE edema. Escaped COVID thus far (known) Has olive oil daily     March 2023: saw BRENNAN    Jan 2024: He is feeling well.  TIA in July. Going fishing today. [place] with  "relatives. Drinking beet powder. Still taking olive oil. "Always eat your desert first' Tolliver years urine on the foot        Review of Systems   Constitutional:  Negative for activity change, appetite change, fatigue and unexpected weight change.   HENT:  Negative for facial swelling.    Eyes:  Negative for visual disturbance.   Respiratory:  Negative for shortness of breath.    Cardiovascular:  Negative for chest pain and leg swelling.   Gastrointestinal:  Positive for constipation.   Endocrine: Negative for polydipsia and polyuria.   Genitourinary:  Positive for frequency. Negative for difficulty urinating, dysuria and hematuria.   Musculoskeletal:  Negative for arthralgias.   Skin:  Negative for rash.   Allergic/Immunologic: Positive for immunocompromised state.   Neurological:  Negative for weakness and headaches.   Psychiatric/Behavioral:  Negative for confusion and decreased concentration.        Objective:      Physical Exam  Vitals and nursing note reviewed.   Constitutional:       General: He is not in acute distress.     Appearance: Normal appearance. He is well-developed. He is not ill-appearing.   HENT:      Head: Atraumatic.   Eyes:      General: No scleral icterus.     Conjunctiva/sclera: Conjunctivae normal.   Neck:      Vascular: No JVD.   Cardiovascular:      Rate and Rhythm: Normal rate.      Heart sounds: Normal heart sounds, S1 normal and S2 normal.      No friction rub.   Pulmonary:      Breath sounds: Normal breath sounds. No wheezing or rales.   Abdominal:      Palpations: Abdomen is soft.   Musculoskeletal:      Cervical back: Neck supple.      Right lower leg: No edema.      Left lower leg: No edema.   Skin:     General: Skin is warm and dry.   Neurological:      Mental Status: He is alert and oriented to person, place, and time. Mental status is at baseline.   Psychiatric:         Mood and Affect: Mood normal.         Behavior: Behavior normal.         Thought Content: Thought content " normal.         Judgment: Judgment normal.            Assessment:       1. Stage 3a chronic kidney disease    2. Type 2 diabetes mellitus with stage 3a chronic kidney disease, without long-term current use of insulin    3. Stage 3b chronic kidney disease    4. Hypertension associated with diabetes    5. Secondary hyperparathyroidism, renal    6. Obesity (BMI 30-39.9)    7. Persistent atrial fibrillation              Plan:             CKD stage 3 currently at 3b with stable kidney function.  Continue RAAS blockade (lisinopril) for renal preservation    HTN is controlled    DM--very well controlled, with Ozempic, recommend decreasing or stopping Amaryl     Mineral and Bone Disease--continue D2. PTH at goal, continue to trend      RTC 4 months  45 minutes of total time spent on the encounter, which includes face to face time and non-face to face time preparing to see the patient (eg, review of tests), Obtaining and/or reviewing separately obtained history, Documenting clinical information in the electronic or other health record, Independently interpreting results (not separately reported) and communicating results to the patient/family/caregiver, or Care coordination (not separately reported).

## 2024-01-08 ENCOUNTER — LAB VISIT (OUTPATIENT)
Dept: LAB | Facility: HOSPITAL | Age: 89
End: 2024-01-08
Attending: INTERNAL MEDICINE
Payer: MEDICARE

## 2024-01-08 DIAGNOSIS — N18.31 STAGE 3A CHRONIC KIDNEY DISEASE: ICD-10-CM

## 2024-01-08 LAB
ALBUMIN SERPL BCP-MCNC: 3.5 G/DL (ref 3.5–5.2)
ANION GAP SERPL CALC-SCNC: 8 MMOL/L (ref 8–16)
BASOPHILS # BLD AUTO: 0.05 K/UL (ref 0–0.2)
BASOPHILS NFR BLD: 0.7 % (ref 0–1.9)
BUN SERPL-MCNC: 26 MG/DL (ref 8–23)
CALCIUM SERPL-MCNC: 8.6 MG/DL (ref 8.7–10.5)
CHLORIDE SERPL-SCNC: 109 MMOL/L (ref 95–110)
CO2 SERPL-SCNC: 25 MMOL/L (ref 23–29)
CREAT SERPL-MCNC: 1.7 MG/DL (ref 0.5–1.4)
DIFFERENTIAL METHOD BLD: ABNORMAL
EOSINOPHIL # BLD AUTO: 0.2 K/UL (ref 0–0.5)
EOSINOPHIL NFR BLD: 3.2 % (ref 0–8)
ERYTHROCYTE [DISTWIDTH] IN BLOOD BY AUTOMATED COUNT: 15.7 % (ref 11.5–14.5)
EST. GFR  (NO RACE VARIABLE): 38.8 ML/MIN/1.73 M^2
GLUCOSE SERPL-MCNC: 172 MG/DL (ref 70–110)
HCT VFR BLD AUTO: 47.2 % (ref 40–54)
HGB BLD-MCNC: 15 G/DL (ref 14–18)
IMM GRANULOCYTES # BLD AUTO: 0.06 K/UL (ref 0–0.04)
IMM GRANULOCYTES NFR BLD AUTO: 0.8 % (ref 0–0.5)
LYMPHOCYTES # BLD AUTO: 1 K/UL (ref 1–4.8)
LYMPHOCYTES NFR BLD: 13 % (ref 18–48)
MCH RBC QN AUTO: 26.8 PG (ref 27–31)
MCHC RBC AUTO-ENTMCNC: 31.8 G/DL (ref 32–36)
MCV RBC AUTO: 84 FL (ref 82–98)
MONOCYTES # BLD AUTO: 0.5 K/UL (ref 0.3–1)
MONOCYTES NFR BLD: 6.6 % (ref 4–15)
NEUTROPHILS # BLD AUTO: 5.5 K/UL (ref 1.8–7.7)
NEUTROPHILS NFR BLD: 75.7 % (ref 38–73)
NRBC BLD-RTO: 0 /100 WBC
PHOSPHATE SERPL-MCNC: 2.4 MG/DL (ref 2.7–4.5)
PLATELET # BLD AUTO: 127 K/UL (ref 150–450)
PMV BLD AUTO: 13 FL (ref 9.2–12.9)
POTASSIUM SERPL-SCNC: 4.3 MMOL/L (ref 3.5–5.1)
PTH-INTACT SERPL-MCNC: 169.3 PG/ML (ref 9–77)
RBC # BLD AUTO: 5.59 M/UL (ref 4.6–6.2)
SODIUM SERPL-SCNC: 142 MMOL/L (ref 136–145)
WBC # BLD AUTO: 7.3 K/UL (ref 3.9–12.7)

## 2024-01-08 PROCEDURE — 80069 RENAL FUNCTION PANEL: CPT | Performed by: INTERNAL MEDICINE

## 2024-01-08 PROCEDURE — 85025 COMPLETE CBC W/AUTO DIFF WBC: CPT | Performed by: INTERNAL MEDICINE

## 2024-01-08 PROCEDURE — 83970 ASSAY OF PARATHORMONE: CPT | Performed by: INTERNAL MEDICINE

## 2024-01-08 PROCEDURE — 36415 COLL VENOUS BLD VENIPUNCTURE: CPT | Mod: PO | Performed by: INTERNAL MEDICINE

## 2024-01-12 PROBLEM — N18.32 STAGE 3B CHRONIC KIDNEY DISEASE: Status: ACTIVE | Noted: 2024-01-12

## 2024-01-30 DIAGNOSIS — Z00.00 ENCOUNTER FOR MEDICARE ANNUAL WELLNESS EXAM: ICD-10-CM

## 2024-03-15 ENCOUNTER — TELEPHONE (OUTPATIENT)
Dept: ADMINISTRATIVE | Facility: CLINIC | Age: 89
End: 2024-03-15
Payer: MEDICARE

## 2024-03-18 ENCOUNTER — OFFICE VISIT (OUTPATIENT)
Dept: FAMILY MEDICINE | Facility: CLINIC | Age: 89
End: 2024-03-18
Payer: MEDICARE

## 2024-03-18 VITALS
SYSTOLIC BLOOD PRESSURE: 136 MMHG | BODY MASS INDEX: 33.63 KG/M2 | WEIGHT: 221.88 LBS | HEART RATE: 78 BPM | HEIGHT: 68 IN | DIASTOLIC BLOOD PRESSURE: 78 MMHG | OXYGEN SATURATION: 97 %

## 2024-03-18 DIAGNOSIS — R60.9 EDEMA, UNSPECIFIED TYPE: ICD-10-CM

## 2024-03-18 DIAGNOSIS — E11.69 COMBINED HYPERLIPIDEMIA ASSOCIATED WITH TYPE 2 DIABETES MELLITUS: ICD-10-CM

## 2024-03-18 DIAGNOSIS — I48.91 ATRIAL FIBRILLATION, UNSPECIFIED TYPE: ICD-10-CM

## 2024-03-18 DIAGNOSIS — F41.9 ANXIETY: ICD-10-CM

## 2024-03-18 DIAGNOSIS — I15.2 HYPERTENSION ASSOCIATED WITH DIABETES: ICD-10-CM

## 2024-03-18 DIAGNOSIS — E11.42 DIABETIC POLYNEUROPATHY ASSOCIATED WITH TYPE 2 DIABETES MELLITUS: ICD-10-CM

## 2024-03-18 DIAGNOSIS — K57.30 DIVERTICULOSIS OF COLON: ICD-10-CM

## 2024-03-18 DIAGNOSIS — D69.6 THROMBOCYTOPENIA: ICD-10-CM

## 2024-03-18 DIAGNOSIS — N18.31 TYPE 2 DIABETES MELLITUS WITH STAGE 3A CHRONIC KIDNEY DISEASE, WITHOUT LONG-TERM CURRENT USE OF INSULIN: ICD-10-CM

## 2024-03-18 DIAGNOSIS — N18.31 STAGE 3A CHRONIC KIDNEY DISEASE: ICD-10-CM

## 2024-03-18 DIAGNOSIS — Z86.73 H/O TIA (TRANSIENT ISCHEMIC ATTACK) AND STROKE: ICD-10-CM

## 2024-03-18 DIAGNOSIS — N25.81 SECONDARY HYPERPARATHYROIDISM, RENAL: ICD-10-CM

## 2024-03-18 DIAGNOSIS — E11.59 HYPERTENSION ASSOCIATED WITH DIABETES: ICD-10-CM

## 2024-03-18 DIAGNOSIS — Z86.010 HISTORY OF COLON POLYPS: ICD-10-CM

## 2024-03-18 DIAGNOSIS — Z85.46 HISTORY OF PROSTATE CANCER: ICD-10-CM

## 2024-03-18 DIAGNOSIS — K21.9 GASTROESOPHAGEAL REFLUX DISEASE WITHOUT ESOPHAGITIS: ICD-10-CM

## 2024-03-18 DIAGNOSIS — E78.2 COMBINED HYPERLIPIDEMIA ASSOCIATED WITH TYPE 2 DIABETES MELLITUS: ICD-10-CM

## 2024-03-18 DIAGNOSIS — E11.22 TYPE 2 DIABETES MELLITUS WITH STAGE 3A CHRONIC KIDNEY DISEASE, WITHOUT LONG-TERM CURRENT USE OF INSULIN: ICD-10-CM

## 2024-03-18 DIAGNOSIS — Z00.00 ENCOUNTER FOR MEDICARE ANNUAL WELLNESS EXAM: Primary | ICD-10-CM

## 2024-03-18 PROCEDURE — 99215 OFFICE O/P EST HI 40 MIN: CPT | Mod: PBBFAC,PO | Performed by: NURSE PRACTITIONER

## 2024-03-18 PROCEDURE — G0439 PPPS, SUBSEQ VISIT: HCPCS | Mod: ,,, | Performed by: NURSE PRACTITIONER

## 2024-03-18 PROCEDURE — 99999 PR PBB SHADOW E&M-EST. PATIENT-LVL V: CPT | Mod: PBBFAC,,, | Performed by: NURSE PRACTITIONER

## 2024-03-18 NOTE — PROGRESS NOTES
"  Héctor Desir presented for a  Medicare AWV and comprehensive Health Risk Assessment today. The following components were reviewed and updated:    Medical history  Family History  Social history  Allergies and Current Medications  Health Risk Assessment  Health Maintenance  Care Team     ** See Completed Assessments for Annual Wellness Visit within the encounter summary.**     The following assessments were completed:  Living Situation  CAGE  Depression Screening  Timed Get Up and Go  Whisper Test  Cognitive Function Screening  Nutrition Screening  ADL Screening  PAQ Screening    Vitals:    03/18/24 1033   BP: 136/78   Pulse: 78   SpO2: 97%   Weight: 100.7 kg (221 lb 14.4 oz)   Height: 5' 8" (1.727 m)     Body mass index is 33.74 kg/m².  Physical Exam  Vitals reviewed.   Constitutional:       General: He is not in acute distress.     Appearance: Normal appearance. He is not ill-appearing.   HENT:      Head: Normocephalic and atraumatic.      Right Ear: External ear normal.      Left Ear: External ear normal.      Nose: Nose normal.   Eyes:      Extraocular Movements: Extraocular movements intact.      Conjunctiva/sclera: Conjunctivae normal.   Cardiovascular:      Rate and Rhythm: Normal rate.      Heart sounds: Normal heart sounds.   Pulmonary:      Effort: Pulmonary effort is normal. No respiratory distress.      Breath sounds: Normal breath sounds.   Abdominal:      General: Abdomen is flat. There is no distension.   Musculoskeletal:         General: Normal range of motion.      Cervical back: Normal range of motion.   Skin:     General: Skin is warm and dry.      Capillary Refill: Capillary refill takes less than 2 seconds.      Coloration: Skin is not pale.      Findings: No rash.   Neurological:      General: No focal deficit present.      Mental Status: He is alert and oriented to person, place, and time. Mental status is at baseline.   Psychiatric:         Mood and Affect: Mood normal.         Speech: " Speech normal. Speech is not rapid and pressured, delayed or slurred.         Behavior: Behavior normal. Behavior is not agitated, slowed, aggressive, withdrawn, hyperactive or combative. Behavior is cooperative.         Thought Content: Thought content normal.         Judgment: Judgment normal.           Diagnoses and health risks identified today and associated recommendations/orders:    1. Encounter for Medicare annual wellness exam  Complete history and physical was completed today.  Complete and thorough medication reconciliation was performed.  Discussed risks and benefits of medications.  Advised patient on orders and health maintenance.  We discussed old records and old labs if available.  Will request any records not available through epic.  Continue current medications listed on your summary sheet.  Advised of due vaccines   - Ambulatory Referral/Consult to Enhanced Annual Wellness Visit (eAWV)    2. Gastroesophageal reflux disease without esophagitis  Chronic. Stable.  Continue current medications and plan of care per PCP and specialists   Continue omeprazole    3. H/O TIA (transient ischemic attack) and stroke  Chronic. Stable.  Continue current medications and plan of care per PCP and specialists   On xarelto and statin    4. Diabetic polyneuropathy associated with type 2 diabetes mellitus  Chronic. Stable.  Continue current medications and plan of care per PCP and specialists     Lab Results   Component Value Date    HGBA1C 6.6 (H) 10/13/2023     Diabetes Medications               dulaglutide (TRULICITY) 4.5 mg/0.5 mL pen injector Inject 4.5 mg into the skin every 7 days.    empagliflozin (JARDIANCE) 25 mg tablet Take 1 tablet (25 mg total) by mouth once daily.    glimepiride (AMARYL) 4 MG tablet Take 1 tablet (4 mg total) by mouth 2 (two) times daily before meals.    nateglinide (STARLIX) 120 MG tablet Take 1 tablet (120 mg total) by mouth 3 (three) times daily before meals. If you skip a meal, do not  take dose     5. Combined hyperlipidemia associated with type 2 diabetes mellitus  Chronic. Stable.  Continue current medications and plan of care per PCP and specialists     -recent labs listed below:  Lab Results   Component Value Date    CHOL 134 07/14/2023     Lab Results   Component Value Date    HDL 30 (L) 07/14/2023     Lab Results   Component Value Date    LDLCALC 77.2 07/14/2023     Lab Results   Component Value Date    TRIG 134 07/14/2023     Lab Results   Component Value Date    ALT 16 07/15/2023    AST 16 07/15/2023    ALKPHOS 93 07/15/2023    BILITOT 1.0 07/15/2023     Hyperlipidemia Medications               atorvastatin (LIPITOR) 40 MG tablet Take 1 tablet (40 mg total) by mouth once daily.     6. Hypertension associated with diabetes  Chronic. Stable.  Continue current medications and plan of care per PCP and specialists   BP at goal today  Follows with cardiology     Hypertension Medications               furosemide (LASIX) 40 MG tablet Take 1 tablet (40 mg total) by mouth once daily.    lisinopriL (PRINIVIL,ZESTRIL) 20 MG tablet Take 1 tablet (20 mg total) by mouth once daily.    metoprolol succinate (TOPROL-XL) 50 MG 24 hr tablet Take 1 tablet (50 mg total) by mouth once daily.     7. Stage 3a chronic kidney disease  Chronic. Stable.  Continue current medications and plan of care per PCP and specialists   Follows with nephrology    BMP  Lab Results   Component Value Date     01/08/2024    K 4.3 01/08/2024     01/08/2024    CO2 25 01/08/2024    BUN 26 (H) 01/08/2024    CREATININE 1.7 (H) 01/08/2024    CALCIUM 8.6 (L) 01/08/2024    ANIONGAP 8 01/08/2024    EGFRNORACEVR 38.8 (A) 01/08/2024     8. Type 2 diabetes mellitus with stage 3a chronic kidney disease, without long-term current use of insulin  Chronic. Stable.  Continue current medications and plan of care per PCP and specialists     9. History of colon polyps  Chronic. Stable.  Continue current medications and plan of care per PCP  and specialists   Last Colonoscopy completed on 12/20/2019    10. Diverticulosis of colon  Chronic. Stable.  Continue current medications and plan of care per PCP and specialists   Last Colonoscopy completed on 12/20/2019    11. History of prostate cancer  Chronic. Stable.  Continue current medications and plan of care per PCP and specialists   Previously followed by nephrology    PSA, Screen (ng/mL)   Date Value   04/10/2023 <0.01     PSA Total (ng/mL)   Date Value   01/23/2014 0.02     PSA, Free (ng/mL)   Date Value   01/23/2014 0.01     PSA, Free % (%)   Date Value   01/23/2014 50.00     12. Secondary hyperparathyroidism, renal  Chronic. Stable.  Continue current medications and plan of care per PCP and specialists   Follows with nephrology    Lab Results   Component Value Date    .3 (H) 01/08/2024    CALCIUM 8.6 (L) 01/08/2024    PHOS 2.4 (L) 01/08/2024     13. Edema, unspecified type  Chronic. Stable.  Continue current medications and plan of care per PCP and specialists   Continue lasix     14. Anxiety  Chronic. Stable.  Continue current medications and plan of care per PCP and specialists   Continue cymbalta    15. Atrial fibrillation, unspecified type  Chronic. Stable.  Continue current medications and plan of care per PCP and specialists   Follows with cardiology  On metoprolol and xarelto    16. Thrombocytopenia  Chronic. Stable.  Continue current medications and plan of care per PCP and specialists     Lab Results   Component Value Date    WBC 7.30 01/08/2024    HGB 15.0 01/08/2024    HCT 47.2 01/08/2024    MCV 84 01/08/2024     (L) 01/08/2024     I offered to discuss end of life issues, including information on how to make advance directives that the patient could use to name someone who would make medical decisions on their behalf if they became too ill to make themselves.    ___Patient declined - already done.    _x__Patient is interested, I provided paperwork and offered to  discuss.    Provided Héctor with a 5-10 year written screening schedule and personal prevention plan. Recommendations were developed using the USPSTF age appropriate recommendations. Education, counseling, and referrals were provided as needed. After Visit Summary printed and given to patient which includes a list of additional screenings\tests needed.    Follow up for visit in clinic as needed.    Kaela Guzmán NP

## 2024-03-25 ENCOUNTER — OFFICE VISIT (OUTPATIENT)
Dept: CARDIOLOGY | Facility: CLINIC | Age: 89
End: 2024-03-25
Payer: MEDICARE

## 2024-03-25 VITALS
HEART RATE: 79 BPM | OXYGEN SATURATION: 98 % | WEIGHT: 219.25 LBS | HEIGHT: 68 IN | SYSTOLIC BLOOD PRESSURE: 134 MMHG | BODY MASS INDEX: 33.23 KG/M2 | DIASTOLIC BLOOD PRESSURE: 80 MMHG

## 2024-03-25 DIAGNOSIS — E11.69 COMBINED HYPERLIPIDEMIA ASSOCIATED WITH TYPE 2 DIABETES MELLITUS: ICD-10-CM

## 2024-03-25 DIAGNOSIS — E78.2 COMBINED HYPERLIPIDEMIA ASSOCIATED WITH TYPE 2 DIABETES MELLITUS: ICD-10-CM

## 2024-03-25 DIAGNOSIS — I67.2 CEREBROVASCULAR DISEASE, ARTERIOSCLEROTIC, POST-STROKE: ICD-10-CM

## 2024-03-25 DIAGNOSIS — E11.59 HYPERTENSION ASSOCIATED WITH DIABETES: ICD-10-CM

## 2024-03-25 DIAGNOSIS — N18.32 STAGE 3B CHRONIC KIDNEY DISEASE: ICD-10-CM

## 2024-03-25 DIAGNOSIS — I15.2 HYPERTENSION ASSOCIATED WITH DIABETES: ICD-10-CM

## 2024-03-25 DIAGNOSIS — I48.21 PERMANENT ATRIAL FIBRILLATION: Primary | ICD-10-CM

## 2024-03-25 DIAGNOSIS — Z86.73 CEREBROVASCULAR DISEASE, ARTERIOSCLEROTIC, POST-STROKE: ICD-10-CM

## 2024-03-25 PROCEDURE — 99212 OFFICE O/P EST SF 10 MIN: CPT | Mod: PBBFAC,PO | Performed by: INTERNAL MEDICINE

## 2024-03-25 PROCEDURE — 99999 PR PBB SHADOW E&M-EST. PATIENT-LVL II: CPT | Mod: PBBFAC,,, | Performed by: INTERNAL MEDICINE

## 2024-03-25 PROCEDURE — 99214 OFFICE O/P EST MOD 30 MIN: CPT | Mod: S$PBB,,, | Performed by: INTERNAL MEDICINE

## 2024-03-25 NOTE — PROGRESS NOTES
Subjective:   Patient ID:  Héctor Desir is a 88 y.o. male who presents for follow-up of No chief complaint on file.      [id number] yo WM with hx of chronic AF, HTN and previous stoke. Has some SOB but no change. Does not feel the AF. Denies any CP.     Review of Systems   Constitutional: Negative for decreased appetite, fever, malaise/fatigue, weight gain and weight loss.   HENT:  Negative for hearing loss and nosebleeds.    Eyes:  Negative for visual disturbance.   Cardiovascular:  Negative for chest pain, claudication, cyanosis, dyspnea on exertion, irregular heartbeat, leg swelling, near-syncope, orthopnea, palpitations, paroxysmal nocturnal dyspnea and syncope.   Respiratory:  Negative for cough, hemoptysis, shortness of breath, sleep disturbances due to breathing, snoring and wheezing.    Endocrine: Negative for cold intolerance, heat intolerance, polydipsia and polyuria.   Hematologic/Lymphatic: Negative for adenopathy and bleeding problem. Does not bruise/bleed easily.   Skin:  Negative for color change, itching, poor wound healing, rash and suspicious lesions.   Musculoskeletal:  Negative for arthritis, back pain, falls, joint pain, joint swelling, muscle cramps, muscle weakness and myalgias.   Gastrointestinal:  Negative for bloating, abdominal pain, change in bowel habit, constipation, flatus, heartburn, hematemesis, hematochezia, hemorrhoids, jaundice, melena, nausea and vomiting.   Genitourinary:  Negative for bladder incontinence, decreased libido, frequency, hematuria, hesitancy and urgency.   Neurological:  Negative for brief paralysis, difficulty with concentration, excessive daytime sleepiness, dizziness, focal weakness, headaches, light-headedness, loss of balance, numbness, vertigo and weakness.   Psychiatric/Behavioral:  Negative for altered mental status, depression and memory loss. The patient does not have insomnia and is not nervous/anxious.    Allergic/Immunologic: Negative for environmental  "allergies, hives and persistent infections.       Objective:   Physical Exam  Constitutional:       General: He is not in acute distress.     Appearance: He is well-developed. He is not diaphoretic.      Comments: /80 (BP Location: Left arm, Patient Position: Sitting, BP Method: Medium (Manual))   Pulse 79   Ht 5' 8" (1.727 m)   Wt 99.5 kg (219 lb 4 oz)   SpO2 98%   BMI 33.34 kg/m²      HENT:      Head: Normocephalic and atraumatic.   Eyes:      General: Lids are normal. No scleral icterus.        Right eye: No discharge.      Conjunctiva/sclera: Conjunctivae normal.      Pupils: Pupils are equal, round, and reactive to light.   Neck:      Thyroid: No thyromegaly.      Vascular: No JVD.      Trachea: No tracheal deviation.   Cardiovascular:      Rate and Rhythm: Normal rate. Rhythm irregularly irregular.      Pulses: Intact distal pulses.           Carotid pulses are 2+ on the right side and 2+ on the left side.       Radial pulses are 2+ on the right side and 2+ on the left side.        Femoral pulses are 2+ on the right side and 2+ on the left side.       Popliteal pulses are 2+ on the right side and 2+ on the left side.        Dorsalis pedis pulses are 2+ on the right side and 2+ on the left side.        Posterior tibial pulses are 2+ on the right side and 2+ on the left side.      Heart sounds: Normal heart sounds, S1 normal and S2 normal. No murmur heard.     No friction rub. No gallop.   Pulmonary:      Effort: Pulmonary effort is normal. No respiratory distress.      Breath sounds: Normal breath sounds. No wheezing or rales.   Chest:      Chest wall: No tenderness.   Abdominal:      General: Bowel sounds are normal. There is no distension.      Palpations: Abdomen is soft. There is no hepatomegaly or mass.      Tenderness: There is no abdominal tenderness. There is no guarding or rebound.   Musculoskeletal:         General: No tenderness. Normal range of motion.      Cervical back: Normal range of " motion and neck supple.   Lymphadenopathy:      Cervical: No cervical adenopathy.   Skin:     General: Skin is warm and dry.      Coloration: Skin is not pale.      Findings: No erythema or rash.   Neurological:      Mental Status: He is alert and oriented to person, place, and time.      Cranial Nerves: No cranial nerve deficit.      Coordination: Coordination normal.      Deep Tendon Reflexes: Reflexes are normal and symmetric.   Psychiatric:         Speech: Speech normal.         Behavior: Behavior normal.         Thought Content: Thought content normal.         Judgment: Judgment normal.         Assessment:      1. Permanent atrial fibrillation    2. Cerebrovascular disease, arteriosclerotic, post-stroke    3. 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 Hypertension associated with diabetes    5. Stage 3b chronic kidney disease        Plan:   Cardiac status stable   BP controlled   Risk of stroke from atrial fib has been discussed as well as bleeding risk from alternative anticoagulation regiments as well as risk and benefits of rate control vs cardioversion   The current medical regimen is effective;  continue present plan and medications.   No orders of the defined types were placed in this encounter.    Follow up in about 6 months (around 9/25/2024).

## 2024-04-16 ENCOUNTER — LAB VISIT (OUTPATIENT)
Dept: LAB | Facility: HOSPITAL | Age: 89
End: 2024-04-16
Attending: FAMILY MEDICINE
Payer: MEDICARE

## 2024-04-16 LAB
ESTIMATED AVG GLUCOSE: 148 MG/DL (ref 68–131)
HBA1C MFR BLD: 6.8 % (ref 4–5.6)

## 2024-04-16 PROCEDURE — 83036 HEMOGLOBIN GLYCOSYLATED A1C: CPT | Performed by: FAMILY MEDICINE

## 2024-04-16 PROCEDURE — 36415 COLL VENOUS BLD VENIPUNCTURE: CPT | Mod: PO | Performed by: FAMILY MEDICINE

## 2024-05-09 RX ORDER — DULAGLUTIDE 4.5 MG/.5ML
INJECTION, SOLUTION SUBCUTANEOUS
Qty: 12 PEN | Refills: 0 | Status: SHIPPED | OUTPATIENT
Start: 2024-05-09 | End: 2024-06-14 | Stop reason: SDUPTHER

## 2024-05-09 NOTE — TELEPHONE ENCOUNTER
Refill Routing Note   Medication(s) are not appropriate for processing by [facility] for the following reason(s):        No active prescription written by provider    [facility] action(s):  Defer   Requires appointment : Yes      Medication Therapy Plan: historical med      Appointments  past 12m or future 3m with PCP    Date Provider   Last Visit   8/2/2023 Tae Goel MD   Next Visit   Visit date not found Tae Goel MD   ED visits in past 90 days: 0        Note composed:10:43 AM 05/09/2024

## 2024-05-09 NOTE — TELEPHONE ENCOUNTER
Care Due:                  Date            Visit Type   Department     Provider  --------------------------------------------------------------------------------                                [facility]  Last Visit: 08-      FOLLOW UP    MEDICINE       Tae Goel  Next Visit: None Scheduled  None         None Found                                                            Last  Test          Frequency    Reason                     Performed    Due Date  --------------------------------------------------------------------------------    Office Visit  12 months..  nateglinide..............  08- 07-    [facility] Embedded Care Due Messages. Reference number: [id number].   5/09/2024 2:28:06 AM CDT

## 2024-06-14 DIAGNOSIS — N18.31 STAGE 3A CHRONIC KIDNEY DISEASE: ICD-10-CM

## 2024-06-14 RX ORDER — DULAGLUTIDE 4.5 MG/.5ML
4.5 INJECTION, SOLUTION SUBCUTANEOUS
Qty: 12 PEN | Refills: 0 | Status: SHIPPED | OUTPATIENT
Start: 2024-06-14

## 2024-06-14 NOTE — TELEPHONE ENCOUNTER
No care due was identified.  [facility] Embedded Care Due Messages. Reference number: [id number].   6/14/2024 12:03:51 PM CDT

## 2024-06-17 ENCOUNTER — PATIENT MESSAGE (OUTPATIENT)
Dept: ADMINISTRATIVE | Facility: HOSPITAL | Age: 89
End: 2024-06-17
Payer: MEDICARE

## 2024-06-18 ENCOUNTER — PATIENT OUTREACH (OUTPATIENT)
Dept: ADMINISTRATIVE | Facility: HOSPITAL | Age: 89
End: 2024-06-18
Payer: MEDICARE

## 2024-06-18 DIAGNOSIS — E11.69 COMBINED HYPERLIPIDEMIA ASSOCIATED WITH TYPE 2 DIABETES MELLITUS: Primary | ICD-10-CM

## 2024-06-18 DIAGNOSIS — E78.2 COMBINED HYPERLIPIDEMIA ASSOCIATED WITH TYPE 2 DIABETES MELLITUS: Primary | ICD-10-CM

## 2024-06-18 NOTE — PROGRESS NOTES
Replying to Campaign Questionnaire for Overdue HM: Labs/Eye Exam    Called patient as requested. Scheduled DM Eye Exam for 08/16 and added lipid panel to lab appt on 07/03 ar requested by patient. Patient voiced understanding of day and time

## 2024-06-27 ENCOUNTER — OFFICE VISIT (OUTPATIENT)
Dept: NEUROLOGY | Facility: CLINIC | Age: 89
End: 2024-06-27
Payer: MEDICARE

## 2024-06-27 VITALS
BODY MASS INDEX: 32.74 KG/M2 | DIASTOLIC BLOOD PRESSURE: 83 MMHG | WEIGHT: 216 LBS | HEART RATE: 92 BPM | HEIGHT: 68 IN | SYSTOLIC BLOOD PRESSURE: 128 MMHG | RESPIRATION RATE: 16 BRPM | OXYGEN SATURATION: 99 %

## 2024-06-27 DIAGNOSIS — Z86.73 CEREBROVASCULAR DISEASE, ARTERIOSCLEROTIC, POST-STROKE: ICD-10-CM

## 2024-06-27 DIAGNOSIS — I48.21 PERMANENT ATRIAL FIBRILLATION: ICD-10-CM

## 2024-06-27 DIAGNOSIS — Z85.46 HISTORY OF PROSTATE CANCER: ICD-10-CM

## 2024-06-27 DIAGNOSIS — E11.59 HYPERTENSION ASSOCIATED WITH DIABETES: ICD-10-CM

## 2024-06-27 DIAGNOSIS — R60.9 EDEMA, UNSPECIFIED TYPE: ICD-10-CM

## 2024-06-27 DIAGNOSIS — M25.561 CHRONIC PAIN OF BOTH KNEES: ICD-10-CM

## 2024-06-27 DIAGNOSIS — I67.2 CEREBROVASCULAR DISEASE, ARTERIOSCLEROTIC, POST-STROKE: ICD-10-CM

## 2024-06-27 DIAGNOSIS — I65.21 CAROTID ARTERY PLAQUE, RIGHT: ICD-10-CM

## 2024-06-27 DIAGNOSIS — N18.32 STAGE 3B CHRONIC KIDNEY DISEASE: ICD-10-CM

## 2024-06-27 DIAGNOSIS — N18.31 TYPE 2 DIABETES MELLITUS WITH STAGE 3A CHRONIC KIDNEY DISEASE, WITHOUT LONG-TERM CURRENT USE OF INSULIN: ICD-10-CM

## 2024-06-27 DIAGNOSIS — K21.9 GASTROESOPHAGEAL REFLUX DISEASE WITHOUT ESOPHAGITIS: ICD-10-CM

## 2024-06-27 DIAGNOSIS — E11.69 COMBINED HYPERLIPIDEMIA ASSOCIATED WITH TYPE 2 DIABETES MELLITUS: ICD-10-CM

## 2024-06-27 DIAGNOSIS — I15.2 HYPERTENSION ASSOCIATED WITH DIABETES: ICD-10-CM

## 2024-06-27 DIAGNOSIS — Z86.73 H/O TIA (TRANSIENT ISCHEMIC ATTACK) AND STROKE: ICD-10-CM

## 2024-06-27 DIAGNOSIS — E78.2 COMBINED HYPERLIPIDEMIA ASSOCIATED WITH TYPE 2 DIABETES MELLITUS: ICD-10-CM

## 2024-06-27 DIAGNOSIS — R29.818 TRANSIENT NEUROLOGICAL SYMPTOMS: ICD-10-CM

## 2024-06-27 DIAGNOSIS — M25.562 CHRONIC PAIN OF BOTH KNEES: ICD-10-CM

## 2024-06-27 DIAGNOSIS — E11.42 DIABETIC POLYNEUROPATHY ASSOCIATED WITH TYPE 2 DIABETES MELLITUS: ICD-10-CM

## 2024-06-27 DIAGNOSIS — G89.29 CHRONIC PAIN OF BOTH KNEES: ICD-10-CM

## 2024-06-27 DIAGNOSIS — G45.9 TRANSIENT CEREBRAL ISCHEMIA, UNSPECIFIED TYPE: ICD-10-CM

## 2024-06-27 DIAGNOSIS — D69.6 THROMBOCYTOPENIA: ICD-10-CM

## 2024-06-27 DIAGNOSIS — Z86.010 HISTORY OF COLON POLYPS: ICD-10-CM

## 2024-06-27 DIAGNOSIS — G45.9 TIA (TRANSIENT ISCHEMIC ATTACK): Primary | ICD-10-CM

## 2024-06-27 DIAGNOSIS — F41.9 ANXIETY: ICD-10-CM

## 2024-06-27 DIAGNOSIS — N25.81 SECONDARY HYPERPARATHYROIDISM, RENAL: ICD-10-CM

## 2024-06-27 DIAGNOSIS — E11.22 TYPE 2 DIABETES MELLITUS WITH STAGE 3A CHRONIC KIDNEY DISEASE, WITHOUT LONG-TERM CURRENT USE OF INSULIN: ICD-10-CM

## 2024-06-27 PROCEDURE — 99214 OFFICE O/P EST MOD 30 MIN: CPT | Mod: PBBFAC | Performed by: PSYCHIATRY & NEUROLOGY

## 2024-06-27 PROCEDURE — 99999 PR PBB SHADOW E&M-EST. PATIENT-LVL IV: CPT | Mod: PBBFAC,,, | Performed by: PSYCHIATRY & NEUROLOGY

## 2024-06-27 NOTE — PROGRESS NOTES
Subjective:       Patient ID: Héctor Desir is a 89 y.o. male.    Chief Complaint: Transient Ischemic Attack          HPI    The patient presented on 06- for evaluation.      The patient presented to [facility] on 07-  with with transient LT face, arm, leg numbness and /100. The numbness resolved within 20-30 minutes.  On 07- CTH, Brain MRI Remote RT LT and RT PLIC infarcts and CTA H/N PERLA 60% stenosis.The patient was maintained on Xarelto (rivaroxaban) for Atrial Fibrillation, Metoprolol, Lisinopril for HTN,  Nateglinide and Glimepiride for T2DM and Lipitor (Atorvastatin) for HLD.           Review of Systems   Constitutional:  Negative for appetite change and fatigue.   HENT:  Positive for hearing loss. Negative for tinnitus.    Eyes:  Negative for photophobia and visual disturbance.   Respiratory:  Negative for apnea and shortness of breath.    Cardiovascular:  Positive for leg swelling. Negative for chest pain and palpitations.   Gastrointestinal:  Negative for nausea and vomiting.   Endocrine: Negative for cold intolerance and heat intolerance.   Genitourinary:  Negative for difficulty urinating and urgency.   Musculoskeletal:  Positive for arthralgias. Negative for back pain, gait problem, joint swelling, myalgias, neck pain and neck stiffness.   Skin:  Negative for color change and rash.   Allergic/Immunologic: Negative for environmental allergies and immunocompromised state.   Neurological:  Negative for dizziness, tremors, seizures, syncope, facial asymmetry, speech difficulty, weakness, light-headedness, numbness and headaches.   Hematological:  Negative for adenopathy. Does not bruise/bleed easily.   Psychiatric/Behavioral:  Negative for agitation, behavioral problems, confusion, decreased concentration, dysphoric mood, hallucinations, self-injury, sleep disturbance and suicidal ideas. The patient is not hyperactive.                  Current Outpatient Medications:      atorvastatin (LIPITOR) 40 MG tablet, Take 1 tablet (40 mg total) by mouth once daily., Disp: 90 tablet, Rfl: 3    blood sugar diagnostic Strp, Use daily- Freestyle lite, Disp: 300 strip, Rfl: 3    blood-glucose meter kit, Use before meals and before bed when the glucoses are not in control.  Otherwise, test it daily once a day before meals randomly to ensure stability of the gluocse., Disp: 1 each, Rfl: 0    dulaglutide (TRULICITY) 4.5 mg/0.5 mL pen injector, Inject 4.5 mg into the skin every 7 days., Disp: 12 Pen, Rfl: 0    DULoxetine (CYMBALTA) 60 MG capsule, Take 1 capsule (60 mg total) by mouth once daily., Disp: 90 capsule, Rfl: 3    empagliflozin (JARDIANCE) 25 mg tablet, Take 1 tablet (25 mg total) by mouth once daily., Disp: 90 tablet, Rfl: 1    ERGOCALCIFEROL, VITAMIN D2, (VITAMIN D ORAL), Take by mouth once daily. , Disp: , Rfl:     furosemide (LASIX) 40 MG tablet, Take 1 tablet (40 mg total) by mouth once daily., Disp: 90 tablet, Rfl: 3    glimepiride (AMARYL) 4 MG tablet, Take 1 tablet (4 mg total) by mouth 2 (two) times daily before meals., Disp: 180 tablet, Rfl: 1    lisinopriL (PRINIVIL,ZESTRIL) 20 MG tablet, Take 1 tablet (20 mg total) by mouth once daily., Disp: 90 tablet, Rfl: 3    metoprolol succinate (TOPROL-XL) 50 MG 24 hr tablet, Take 1 tablet (50 mg total) by mouth once daily., Disp: 90 tablet, Rfl: 3    nateglinide (STARLIX) 120 MG tablet, Take 1 tablet (120 mg total) by mouth 3 (three) times daily before meals. If you skip a meal, do not take dose, Disp: 20 tablet, Rfl: 1    omeprazole (PRILOSEC) 20 MG capsule, Take 1 capsule (20 mg total) by mouth once daily., Disp: 90 capsule, Rfl: 3    rivaroxaban (XARELTO) 15 mg Tab, Take 1 tablet (15 mg total) by mouth daily with dinner or evening meal., Disp: 90 tablet, Rfl: 3    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     DM (diabetes mellitus) type II controlled, neurological manifestation 7/5/2013    GERD (gastroesophageal reflux disease)     History of prostate cancer 2006    Hypertension associated with diabetes     Hypertension goal BP (blood pressure) < 130/80     Obesity     Prostate cancer     TIA (transient ischemic attack)     Trouble in sleeping        Past Surgical History:   Procedure Laterality Date    CARDIAC CATHETERIZATION      CARDIOVERSION N/A 8/10/2018    Procedure: CARDIOVERSION;  Surgeon: Israel Cardoza MD;  Location: [facility] CATH LAB;  Service: Cardiology;  Laterality: N/A;  Dr. Talamantes pt    CATARACT EXTRACTION W/  INTRAOCULAR LENS IMPLANT  11/2013    Bilateral Cataract     COLONOSCOPY N/A 12/20/2019    Procedure: COLONOSCOPY;  Surgeon: Forrest Roy MD;  Location: [facility] ENDO;  Service: Endoscopy;  Laterality: N/A;    COLONOSCOPY W/ POLYPECTOMY      EYE SURGERY      PROSTATECTOMY  2006    TONSILLECTOMY      TONSILLECTOMY, ADENOIDECTOMY, BILATERAL MYRINGOTOMY AND TUBES         Social History     Socioeconomic History    Marital status:    Tobacco Use    Smoking status: Never    Smokeless tobacco: Never   Substance and Sexual Activity    Alcohol use: No     Comment: He used to drink but quit in 1990    Drug use: No     Social Determinants of Health     Financial Resource Strain: Low Risk  (3/18/2024)    Overall Financial Resource Strain (CARDIA)     Difficulty of Paying Living Expenses: Not hard at all   Food Insecurity: No Food Insecurity (3/18/2024)    Hunger Vital Sign     Worried About Running Out of Food in the Last Year: Never true     Ran Out of Food in the Last Year: Never true   Transportation Needs: No Transportation Needs (3/18/2024)    PRAPARE - Transportation     Lack of Transportation (Medical): No     Lack of Transportation  (Non-Medical): No   Physical Activity: Inactive (3/18/2024)    Exercise Vital Sign     Days of Exercise per Week: 0 days     Minutes of Exercise per Session: 0 min   Stress: No Stress Concern Present (3/18/2024)    [employer] [place] - Occupational Stress Questionnaire     Feeling of Stress : Only a little   Housing Stability: Low Risk  (3/18/2024)    Housing Stability Vital Sign     Unable to Pay for Housing in the Last Year: No     Number of Places Lived in the Last Year: 1     Unstable Housing in the Last Year: No             Past/Current Medical/Surgical History, Past/Current Social History, Past/Current Family History and Past/Current Medications were reviewed in detail.        Objective:           VITAL SIGNS WERE REVIEWED      GENERAL APPEARANCE:     The patient looks comfortable.    BMI 32.84     No signs of respiratory distress.    Normal breathing pattern.    No dysmorphic features    Normal eye contact.       GENERAL MEDICAL EXAM:    HEENT:  Head is atraumatic normocephalic.     FUNDUSCOPIC (OPHTHALMOSCOPIC) EXAMINATION showed no disc edema (papilledema).      NECK: No JVD. No visible lesions or goiters.     CHEST-CARDIOPULMONARY: No cyanosis. No tachypnea. Normal respiratory effort.    ZUXUEME-[id number]: No jaundice. No stomas or lesions. No visible hernias. No catheters.     SKIN, HAIR, NAILS: No pathognomonic skin rash.No neurofibromatosis. No visible lesions.No stigmata of autoimmune disease. No clubbing.    LIMBS: No varicose veins. No visible swelling.    MUSCULOSKELETAL: OA  visible deformities.No visible lesions.             Neurologic Exam     Mental Status   Oriented to person, place, and time.   Follows 3 step commands.   Attention: normal. Concentration: normal.   Speech: speech is normal   Level of consciousness: alert  Able to name object. Able to repeat. Normal comprehension.     Cranial Nerves   Cranial nerves II through XII intact.     CN II    Visual fields full to confrontation.   Visual acuity: normal  Right visual field deficit: none  Left visual field deficit: none     CN III, IV, VI   Pupils are equal, round, and reactive to light.  Extraocular motions are normal.   Right pupil: Size: 2 mm. Shape: regular. Reactivity: brisk. Consensual response: intact. Accommodation: intact.   Left pupil: Size: 2 mm. Shape: regular. Reactivity: brisk. Consensual response: intact. Accommodation: intact.   CN III: no CN III palsy  CN VI: no CN VI palsy  Nystagmus: none   Diplopia: none  Ophthalmoparesis: none  Upgaze: normal  Downgaze: normal  Conjugate gaze: present  Vestibulo-ocular reflex: present    CN V   Facial sensation intact.   Right facial sensation deficit: none  Left facial sensation deficit: none  Jaw jerk: normal    CN VII   Facial expression full, symmetric.   Right facial weakness: none  Left facial weakness: none    CN VIII   CN VIII normal.   Hearing: impaired    CN IX, X   CN IX normal.   CN X normal.   Palate: symmetric    CN XI   CN XI normal.   Right sternocleidomastoid strength: normal  Left sternocleidomastoid strength: normal  Right trapezius strength: normal  Left trapezius strength: normal    CN XII   CN XII normal.   Tongue: not atrophic  Fasciculations: absent  Tongue deviation: none    Motor Exam   Muscle bulk: normal  Overall muscle tone: normal  Right arm tone: normal  Left arm tone: normal  Right arm pronator drift: absent  Left arm pronator drift: absent  Right leg tone: normal  Left leg tone: normal    Strength   Right neck flexion: 5/5  Left neck flexion: 5/5  Right neck extension: 5/5  Left neck extension: 5/5  Right deltoid: 5/5  Left deltoid: 5/5  Right biceps: 5/5  Left biceps: 5/5  Right triceps: 5/5  Left triceps: 5/5  Right wrist flexion: 5/5  Left wrist flexion: 5/5  Right wrist extension: 5/5  Left wrist extension: 5/5  Right interossei: 5/5  Left interossei: 5/5  Right iliopsoas: 5/5  Left iliopsoas: 5/5  Right  quadriceps: 5/5  Left quadriceps: 5/5  Right hamstrin/5  Left hamstrin/5  Right glutei: 5/5  Left glutei: 5/5  Right anterior tibial: 5/5  Left anterior tibial: 5/5  Right posterior tibial: 5/5  Left posterior tibial: 5/5  Right peroneal: 5/  Left peroneal: 5/  Right gastroc: 5/5  Left gastroc: 5/5    Sensory Exam   Right arm light touch: normal  Left arm light touch: normal  Right leg light touch: decreased from toes  Left leg light touch: decreased from toes  Right arm vibration: normal  Left arm vibration: normal  Right leg vibration: decreased from toes  Left leg vibration: decreased from toes  Proprioception normal.   Right arm proprioception: normal  Left arm proprioception: normal  Right leg proprioception: normal  Left leg proprioception: normal  Right arm pinprick: normal  Left arm pinprick: normal  Right leg pinprick: decreased from toes  Left leg pinprick: decreased from toes  Graphesthesia: normal  Stereognosis: normal    Gait, Coordination, and Reflexes     Gait  Gait: normal    Coordination   Finger to nose coordination: normal  Heel to shin coordination: normal  Tandem walking coordination: normal    Tremor   Resting tremor: absent  Intention tremor: absent  Action tremor: absent    Reflexes   Right brachioradialis: 1+  Left brachioradialis: 1+  Right biceps: 1+  Left biceps: 1+  Right triceps: 1+  Left triceps: 1+  Right patellar: 0  Left patellar: 0  Right achilles: 0  Left achilles: 0  Right plantar: normal  Left plantar: normal  Right Campo: absent  Left Campo: absent  Right ankle clonus: absent  Left ankle clonus: absent  Right pendular knee jerk: absent  Left pendular knee jerk: absent      Lab Results   Component Value Date    WBC 7.30 2024    HGB 15.0 2024    HCT 47.2 2024    MCV 84 2024     (L) 2024       Sodium   Date Value Ref Range Status   2024 142 136 - 145 mmol/L Final     Potassium   Date Value Ref Range Status   2024 4.3  3.5 - 5.1 mmol/L Final     Chloride   Date Value Ref Range Status   01/08/2024 109 95 - 110 mmol/L Final     CO2   Date Value Ref Range Status   01/08/2024 25 23 - 29 mmol/L Final     Glucose   Date Value Ref Range Status   01/08/2024 172 (H) 70 - 110 mg/dL Final     BUN   Date Value Ref Range Status   01/08/2024 26 (H) 8 - 23 mg/dL Final     Creatinine   Date Value Ref Range Status   01/08/2024 1.7 (H) 0.5 - 1.4 mg/dL Final     Calcium   Date Value Ref Range Status   01/08/2024 8.6 (L) 8.7 - 10.5 mg/dL Final     Total Protein   Date Value Ref Range Status   07/15/2023 6.1 6.0 - 8.4 g/dL Final     Albumin   Date Value Ref Range Status   01/08/2024 3.5 3.5 - 5.2 g/dL Final     Total Bilirubin   Date Value Ref Range Status   07/15/2023 1.0 0.1 - 1.0 mg/dL Final     Comment:     For infants and newborns, interpretation of results should be based  on gestational age, weight and in agreement with clinical  observations.    Premature Infant recommended reference ranges:  Up to 24 hours.............<8.0 mg/dL  Up to 48 hours............<12.0 mg/dL  3-5 days..................<15.0 mg/dL  6-29 days.................<15.0 mg/dL       Alkaline Phosphatase   Date Value Ref Range Status   07/15/2023 93 55 - 135 U/L Final     AST   Date Value Ref Range Status   07/15/2023 16 10 - 40 U/L Final     ALT   Date Value Ref Range Status   07/15/2023 16 10 - 44 U/L Final     Anion Gap   Date Value Ref Range Status   01/08/2024 8 8 - 16 mmol/L Final     eGFR if    Date Value Ref Range Status   12/17/2021 45.1 (A) >60 mL/min/1.73 m^2 Final     eGFR if non    Date Value Ref Range Status   12/17/2021 39.0 (A) >60 mL/min/1.73 m^2 Final     Comment:     Calculation used to obtain the estimated glomerular filtration  rate (eGFR) is the CKD-EPI equation.          Lab Results   Component Value Date    TSH 2.934 07/14/2023           LABORATORY EVALUATION      [id number]      CBC ACD, Low PLT, CMP CKD, TFT NL, Vitamin  D NL, HA1C 6.6-6.8, LDL 57-92 , HIV -ve, HCV -ve     RADIOLOGY EVALUATION       07-      CTH, Brain MRI Remote RT LT and RT PLIC infarcts.    CTA H/N PERLA 60% stenosis                    NEUROPHYSIOLOGY EVALUATION         PATHOLOGY EVALUATION        NEUROCOGNITIVE AND NEUROPSYCHOLOGY EVALUATION           Reviewed the neuroimaging independently       Assessment:           1. TIA (transient ischemic attack)    2. Transient neurological symptoms    3. Gastroesophageal reflux disease without esophagitis    4. H/O TIA (transient ischemic attack) and stroke    5. Diabetic polyneuropathy associated with type 2 diabetes mellitus    6. Combined hyperlipidemia associated with type 2 diabetes mellitus    7. Hypertension associated with diabetes    8. Type 2 diabetes mellitus with stage 3a chronic kidney disease, without long-term current use of insulin    9. History of colon polyps    10. History of prostate cancer    11. Secondary hyperparathyroidism, renal    12. Edema, unspecified type    13. Anxiety    14. Cerebrovascular disease, arteriosclerotic, post-stroke    15. Permanent atrial fibrillation    16. Chronic pain of both knees    17. Stage 3b chronic kidney disease    18. Thrombocytopenia    19. Transient cerebral ischemia, unspecified type    20. Carotid artery plaque, right          Plan:               STATUS POST TRANSIENT ISCHEMIC ATTACH (TIA) 07-     VASCULAR RISK FACTORS: ATRIAL FIBRILLATION (AF),  HYPERTENSION (HTN), DIABETES (T2DM) , HYPERLIPIDEMIA (HLD)         Repeat CTA H/N to follow up on the PERLA 60% stenosis.      Vascular Risk Factors (VRFs) stratification  AF on OAC, BP (120-130/70-80), BS (HA1C <7), BC control (LDL <70).    The patient is maintained on Xarelto (rivaroxaban) for Atrial Fibrillation, Metoprolol, Lisinopril for HTN,  Nateglinide and Glimepiride for T2DM and Lipitor (Atorvastatin) for HLD.       Call 911 if any SUDDEN:       Weakness  Numbness  Slurring of speech  Speech  difficulty   Vertigo  Loss of balance  Loss of vision  Loss of hearing  Double vision  Trouble swallowing  Trouble breathing  Facial drooping              MEDICAL/SURGICAL COMORBIDITIES     All relevant medical comorbidities noted and managed by primary care physician and medical care team.          HEALTHY LIFESTYLE AND PREVENTATIVE CARE    The patient to adhere to the age-appropriate health maintenance guidelines including screening tests and vaccinations. The patient to adhere to  healthy lifestyle, optimal weight, exercise, healthy diet, good sleep hygiene and avoiding drugs including smoking, alcohol and recreational drugs.              Oj Walsh MD, FAAN    Attending Neurologist/Epileptologist         Diplomate, American Board of Psychiatry and Neurology    Diplomate, American Board of Clinical Neurophysiology     Fellow, American Academy of Neurology         I spent a total of 70 minutes on the day of the visit.  This includes face to face time and non-face to face time preparing to see the patient (eg, review of tests), obtaining and/or reviewing separately obtained history, documenting clinical information in the electronic or other health record, independently interpreting results and communicating results to the patient/family/caregiver, or care coordinator.

## 2024-07-01 ENCOUNTER — HOSPITAL ENCOUNTER (OUTPATIENT)
Dept: RADIOLOGY | Facility: HOSPITAL | Age: 89
Discharge: HOME | End: 2024-07-01
Attending: PSYCHIATRY & NEUROLOGY
Payer: MEDICARE

## 2024-07-01 DIAGNOSIS — I65.29 OCCLUSION AND STENOSIS OF UNSPECIFIED CAROTID ARTERY: Primary | ICD-10-CM

## 2024-07-01 DIAGNOSIS — G45.9 TRANSIENT CEREBRAL ISCHEMIA, UNSPECIFIED TYPE: ICD-10-CM

## 2024-07-01 DIAGNOSIS — R29.818 OTHER SYMPTOMS AND SIGNS INVOLVING THE NERVOUS SYSTEM: ICD-10-CM

## 2024-07-02 ENCOUNTER — TELEPHONE (OUTPATIENT)
Dept: FAMILY MEDICINE | Facility: CLINIC | Age: 89
End: 2024-07-02
Payer: MEDICARE

## 2024-07-02 NOTE — TELEPHONE ENCOUNTER
----- Message from Nichole Guzmán sent at 7/2/2024 10:58 AM CDT -----  Contact: CAMPBELL AUSTIN [[id number]]  ..Type:  Patient Requesting Call    Who Called: CAMPBELL AUSTIN [[id number]]  Does the patient know what this is regarding?: pt reports multiple pharmacies out of stock for dulaglutide (TRULICITY) 4.5 mg/0.5 mL pen injector, pt wants advice on what to do next   Would the patient rather a call back or a response via [facility]?  call  Best Call Back Number: [phone] (home)   Additional Information:

## 2024-07-02 NOTE — TELEPHONE ENCOUNTER
----- Message from Rula Handy sent at 7/2/2024 10:21 AM CDT -----  Contact: pt  Pt is calling and need the nurse to call him about his med, dulaglutide (TRULICITY) 4.5 mg/0.5 mL pen injector.  He states that [facility] is out of stock and he need to be advise what to do.  Please call him back at [phone]

## 2024-07-02 NOTE — TELEPHONE ENCOUNTER
I spoke with the patient about this. Pt is unable to find his dulaglutide (TRULICITY) 4.5 mg/0.5 mL pen injector. Pt has reached out to multiple pharmacies. Pt requesting recommendations and possible alternate medication. Please advise

## 2024-07-02 NOTE — TELEPHONE ENCOUNTER
Pt called stating that [facility] is out of stock of Trulicity. Pt called asking for advise on next steps. Advised patient to call local pharmacies to ensure the medication is in stock and find the lowest price for patient and to call back with pharmacy name and location for us to call medication into. Patient verbalized understanding

## 2024-07-03 ENCOUNTER — LAB VISIT (OUTPATIENT)
Dept: LAB | Facility: HOSPITAL | Age: 89
End: 2024-07-03
Attending: INTERNAL MEDICINE
Payer: MEDICARE

## 2024-07-03 DIAGNOSIS — N18.31 STAGE 3A CHRONIC KIDNEY DISEASE: ICD-10-CM

## 2024-07-03 DIAGNOSIS — E11.69 COMBINED HYPERLIPIDEMIA ASSOCIATED WITH TYPE 2 DIABETES MELLITUS: ICD-10-CM

## 2024-07-03 DIAGNOSIS — E78.2 COMBINED HYPERLIPIDEMIA ASSOCIATED WITH TYPE 2 DIABETES MELLITUS: ICD-10-CM

## 2024-07-03 LAB
ALBUMIN SERPL BCP-MCNC: 3.8 G/DL (ref 3.5–5.2)
ANION GAP SERPL CALC-SCNC: 11 MMOL/L (ref 8–16)
BASOPHILS # BLD AUTO: 0.06 K/UL (ref 0–0.2)
BASOPHILS NFR BLD: 0.8 % (ref 0–1.9)
BUN SERPL-MCNC: 23 MG/DL (ref 8–23)
CALCIUM SERPL-MCNC: 9.2 MG/DL (ref 8.7–10.5)
CHLORIDE SERPL-SCNC: 107 MMOL/L (ref 95–110)
CHOLEST SERPL-MCNC: 122 MG/DL (ref 120–199)
CHOLEST/HDLC SERPL: 3.4 {RATIO} (ref 2–5)
CO2 SERPL-SCNC: 23 MMOL/L (ref 23–29)
CREAT SERPL-MCNC: 1.6 MG/DL (ref 0.5–1.4)
CREAT SERPL-MCNC: 1.7 MG/DL (ref 0.5–1.4)
DIFFERENTIAL METHOD BLD: ABNORMAL
EOSINOPHIL # BLD AUTO: 0.3 K/UL (ref 0–0.5)
EOSINOPHIL NFR BLD: 4.3 % (ref 0–8)
ERYTHROCYTE [DISTWIDTH] IN BLOOD BY AUTOMATED COUNT: 16 % (ref 11.5–14.5)
EST. GFR  (NO RACE VARIABLE): 41.4 ML/MIN/1.73 M^2
GLUCOSE SERPL-MCNC: 130 MG/DL (ref 70–110)
HCT VFR BLD AUTO: 49.4 % (ref 40–54)
HDLC SERPL-MCNC: 36 MG/DL (ref 40–75)
HDLC SERPL: 29.5 % (ref 20–50)
HGB BLD-MCNC: 15.4 G/DL (ref 14–18)
IMM GRANULOCYTES # BLD AUTO: 0.05 K/UL (ref 0–0.04)
IMM GRANULOCYTES NFR BLD AUTO: 0.6 % (ref 0–0.5)
LDLC SERPL CALC-MCNC: 69.2 MG/DL (ref 63–159)
LYMPHOCYTES # BLD AUTO: 1.3 K/UL (ref 1–4.8)
LYMPHOCYTES NFR BLD: 16.2 % (ref 18–48)
MCH RBC QN AUTO: 26.6 PG (ref 27–31)
MCHC RBC AUTO-ENTMCNC: 31.2 G/DL (ref 32–36)
MCV RBC AUTO: 85 FL (ref 82–98)
MONOCYTES # BLD AUTO: 0.7 K/UL (ref 0.3–1)
MONOCYTES NFR BLD: 8.4 % (ref 4–15)
NEUTROPHILS # BLD AUTO: 5.4 K/UL (ref 1.8–7.7)
NEUTROPHILS NFR BLD: 69.7 % (ref 38–73)
NONHDLC SERPL-MCNC: 86 MG/DL
NRBC BLD-RTO: 0 /100 WBC
PHOSPHATE SERPL-MCNC: 2.9 MG/DL (ref 2.7–4.5)
PLATELET # BLD AUTO: 131 K/UL (ref 150–450)
PMV BLD AUTO: 12.9 FL (ref 9.2–12.9)
POTASSIUM SERPL-SCNC: 3.9 MMOL/L (ref 3.5–5.1)
PTH-INTACT SERPL-MCNC: 153.9 PG/ML (ref 9–77)
RBC # BLD AUTO: 5.8 M/UL (ref 4.6–6.2)
SAMPLE: ABNORMAL
SODIUM SERPL-SCNC: 141 MMOL/L (ref 136–145)
TRIGL SERPL-MCNC: 84 MG/DL (ref 30–150)
WBC # BLD AUTO: 7.71 K/UL (ref 3.9–12.7)

## 2024-07-03 PROCEDURE — 83970 ASSAY OF PARATHORMONE: CPT | Performed by: INTERNAL MEDICINE

## 2024-07-03 PROCEDURE — 85025 COMPLETE CBC W/AUTO DIFF WBC: CPT | Performed by: INTERNAL MEDICINE

## 2024-07-03 PROCEDURE — 80061 LIPID PANEL: CPT | Performed by: FAMILY MEDICINE

## 2024-07-03 PROCEDURE — 36415 COLL VENOUS BLD VENIPUNCTURE: CPT | Mod: PO | Performed by: INTERNAL MEDICINE

## 2024-07-03 PROCEDURE — 80069 RENAL FUNCTION PANEL: CPT | Performed by: INTERNAL MEDICINE

## 2024-07-03 NOTE — TELEPHONE ENCOUNTER
Let him know I changed it but the trulicity is the only one his insurance has on formulary.  I wrote it for him but I don't know if his insurance will cover.     I have signed for the following orders AND/OR meds.  Please call the patient and ask the patient to schedule the testing AND/OR inform about any medications that were sent.      No orders of the defined types were placed in this encounter.      Medications Ordered This Encounter   Medications    tirzepatide 10 mg/0.5 mL PnIj     Sig: Inject 10 mg into the skin every 7 days.     Dispense:  4 Pen     Refill:  2       [unfilled]

## 2024-07-03 NOTE — TELEPHONE ENCOUNTER
----- Message from Ginny Lopez sent at 7/3/2024  7:16 AM CDT -----  Regarding: requesting a call from Dr Goel's nurse  Patient is asking that Dr Goel's nurse please call him at [phone] to continue the conversation concerning his diabetic medication. Patient states that he has new information to inform her of. Thank you

## 2024-07-05 DIAGNOSIS — E11.69 COMBINED HYPERLIPIDEMIA ASSOCIATED WITH TYPE 2 DIABETES MELLITUS: ICD-10-CM

## 2024-07-05 DIAGNOSIS — E78.2 COMBINED HYPERLIPIDEMIA ASSOCIATED WITH TYPE 2 DIABETES MELLITUS: ICD-10-CM

## 2024-07-05 RX ORDER — ATORVASTATIN CALCIUM 40 MG/1
40 TABLET, FILM COATED ORAL DAILY
Qty: 90 TABLET | Refills: 3 | Status: SHIPPED | OUTPATIENT
Start: 2024-07-05

## 2024-07-05 NOTE — PROGRESS NOTES
I have reviewed the labs and am recommending the following:  LIPID NORMAL ON MEDS-The cholesterol panel screening showed levels that are considered at target with the current medications. Continue meds & check annually.    Health maintenance items that remain on your list that need to be arranged are listed below.   Please notify me if you are prepared to get them completed.    Shingles Vaccine(1 of 2) Never done  RSV Vaccine (Age 60+ and Pregnant patients)(1 - 1-dose 60+ series) Never done  TETANUS VACCINE due on 07/05/2023  Diabetes Urine Screening due on 08/02/2024  Foot Exam due on 08/02/2024  Eye Exam due on 08/16/2024    Dr. Tae Goel

## 2024-07-15 ENCOUNTER — TELEPHONE (OUTPATIENT)
Dept: FAMILY MEDICINE | Facility: CLINIC | Age: 89
End: 2024-07-15
Payer: MEDICARE

## 2024-07-15 RX ORDER — DULAGLUTIDE 4.5 MG/.5ML
4.5 INJECTION, SOLUTION SUBCUTANEOUS
Qty: 4 PEN | Refills: 2 | Status: SHIPPED | OUTPATIENT
Start: 2024-07-15 | End: 2025-07-15

## 2024-07-15 NOTE — TELEPHONE ENCOUNTER
Let him know that since he could not get the trulicity, I changed him to   [facility].  Sig - Route: Inject 10 mg into the skin every 7 days. - Subcutaneous   Sent to pharmacy as: tirzepatide 10 mg/0.5 mL PnIj   Class: Normal   Order: [id number]   Date/Time Signed: 7/3/2024 11:46       E-Prescribing Status: Receipt confirmed by pharmacy (7/3/2024 11:46 AM CDT)   Prior authorization: Approved

## 2024-07-15 NOTE — TELEPHONE ENCOUNTER
I have signed for the following orders AND/OR meds.  Please call the patient and ask the patient to schedule the testing AND/OR inform about any medications that were sent.      No orders of the defined types were placed in this encounter.      Medications Ordered This Encounter   Medications    dulaglutide (TRULICITY) 4.5 mg/0.5 mL pen injector     Sig: Inject 4.5 mg into the skin every 7 days.     Dispense:  4 pen      Refill:  2       [unfilled]

## 2024-07-15 NOTE — TELEPHONE ENCOUNTER
Patient states that he has finally found a pharmacy to fill this prescription in [facility] , but I do not see trulicity on active med list . Please rachana pt was seen 7/3/24  [address] [facility]   Phone    Fax

## 2024-07-15 NOTE — TELEPHONE ENCOUNTER
----- Message from Ro Lott sent at 7/15/2024 10:08 AM CDT -----  Contact: self   .Type: Patient Call Back        Who called:      Patient   What is the request in detail:    Called in concerning trulicity medication . Patient states that he has finally found a pharmacy to fill this prescription in [facility]   [address] in [place]   Phone    Fax    Can the clinic reply by [facility]?           Would the patient rather a call back or a response via [facility]?      call   Best call back number:  [phone]

## 2024-07-15 NOTE — TELEPHONE ENCOUNTER
----- Message from Ro Lott sent at 7/15/2024 10:08 AM CDT -----  Contact: self   .Type: Patient Call Back        Who called:      Patient   What is the request in detail:    Called in concerning trulicity medication . Patient states that he has finally found a pharmacy to fill this prescription in [facility]   [id number] [facility] in [facility]   Phone    Fax    Can the clinic reply by [facility]?           Would the patient rather a call back or a response via [facility]?      call   Best call back number:  [phone]

## 2024-07-15 NOTE — TELEPHONE ENCOUNTER
I spoke with the patient about this. Pt is requesting to stay on Trulicity. Pt is requesting this medication to be sent to [facility]. Pt has been unable to obtain tirzepatide 10 mg/0.5 mL PnIj.     Please call when this is done.

## 2024-07-16 ENCOUNTER — HOSPITAL ENCOUNTER (OUTPATIENT)
Dept: RADIOLOGY | Facility: HOSPITAL | Age: 89
Discharge: HOME | End: 2024-07-16
Attending: PSYCHIATRY & NEUROLOGY
Payer: MEDICARE

## 2024-07-16 DIAGNOSIS — R29.818 OTHER SYMPTOMS AND SIGNS INVOLVING THE NERVOUS SYSTEM: ICD-10-CM

## 2024-07-16 DIAGNOSIS — I65.29 OCCLUSION AND STENOSIS OF UNSPECIFIED CAROTID ARTERY: ICD-10-CM

## 2024-07-16 PROCEDURE — 70547 MR ANGIOGRAPHY NECK W/O DYE: CPT | Mod: TC

## 2024-07-17 NOTE — PROGRESS NOTES
MRA NECK WO:    07-    The visualized cervical carotid and vertebral arteries are patent without evidence of a hemodynamically significant stenosis.  Less than 50% stenosis of the proximal right cervical ICA.    No significant stenosis seen follow up with PCP for continual health maintenance related cholesterol management

## 2024-07-18 ENCOUNTER — TELEPHONE (OUTPATIENT)
Dept: FAMILY MEDICINE | Facility: CLINIC | Age: 89
End: 2024-07-18
Payer: MEDICARE

## 2024-07-18 NOTE — TELEPHONE ENCOUNTER
----- Message from Madan Colon sent at 7/18/2024  8:24 AM CDT -----  Type:  Sooner Apoointment Request    Caller is requesting a sooner appointment.  Caller declined first available appointment listed below.  Caller will not accept being placed on the waitlist and is requesting a message be sent to doctor.  Name of Caller: Héctor  When is the first available appointment?Feb 19th  Symptoms:Followup MRI  Would the patient rather a call back or a response via [facility]? Call back   Best Call Back Number:[phone]  Additional Information:

## 2024-07-24 RX ORDER — DULAGLUTIDE 4.5 MG/.5ML
INJECTION, SOLUTION SUBCUTANEOUS
Refills: 0 | OUTPATIENT
Start: 2024-07-24

## 2024-07-24 NOTE — TELEPHONE ENCOUNTER
Refill Decision Note   Héctor Desir  is requesting a refill authorization.  Brief Assessment and Rationale for Refill:  Quick Discontinue     Medication Therapy Plan:    Pharmacy is requesting new scripts for the following medications without required information, (sig/ frequency/qty/etc)      Medication Reconciliation Completed: No     Comments: Pharmacies have been requesting medications for patients without required information, (sig, frequency, qty, etc.). In addition, requests are sent for medication(s) pt. are currently not taking, and medications patients have never taken.    We have spoken to the pharmacies about these request types and advised their teams previously that we are unable to assess these New Script requests and require all details for these requests. This is a known issue and has been reported.     Note composed:5:52 PM 07/24/2024

## 2024-07-24 NOTE — TELEPHONE ENCOUNTER
Care Due:                  Date            Visit Type   Department     Provider  --------------------------------------------------------------------------------                                hospitals [facility]  Last Visit: 08-      FOLLOW UP    MEDICINE       Tae Goel                               -                              [facility] FAMILY  Next Visit: 08-      CARE ([facility])   MEDICINE       Tae Goel                                                            Last  Test          Frequency    Reason                     Performed    Due Date  --------------------------------------------------------------------------------    CMP.........  12 months..  atorvastatin.............  07-   07-    HBA1C.......  6 months...  dulaglutide,               04-   10-                             empagliflozin,                             glimepiride, nateglinide.    Health [facility] Embedded Care Due Messages. Reference number: [id number].   7/24/2024 2:36:26 PM CDT

## 2024-07-25 ENCOUNTER — PATIENT MESSAGE (OUTPATIENT)
Dept: FAMILY MEDICINE | Facility: CLINIC | Age: 89
End: 2024-07-25
Payer: MEDICARE

## 2024-08-12 ENCOUNTER — PATIENT MESSAGE (OUTPATIENT)
Dept: FAMILY MEDICINE | Facility: CLINIC | Age: 89
End: 2024-08-12
Payer: MEDICARE

## 2024-08-12 RX ORDER — DULAGLUTIDE 4.5 MG/.5ML
4.5 INJECTION, SOLUTION SUBCUTANEOUS
Qty: 4 PEN | Refills: 2 | Status: SHIPPED | OUTPATIENT
Start: 2024-08-12 | End: 2024-08-14

## 2024-08-12 NOTE — TELEPHONE ENCOUNTER
No care due was identified.  Health [facility] Embedded Care Due Messages. Reference number: [id number].   8/12/2024 12:05:04 PM CDT

## 2024-08-14 ENCOUNTER — OFFICE VISIT (OUTPATIENT)
Dept: FAMILY MEDICINE | Facility: CLINIC | Age: 89
End: 2024-08-14
Payer: MEDICARE

## 2024-08-14 VITALS
RESPIRATION RATE: 16 BRPM | WEIGHT: 215 LBS | BODY MASS INDEX: 32.58 KG/M2 | HEIGHT: 68 IN | HEART RATE: 77 BPM | SYSTOLIC BLOOD PRESSURE: 138 MMHG | DIASTOLIC BLOOD PRESSURE: 86 MMHG

## 2024-08-14 DIAGNOSIS — E11.69 COMBINED HYPERLIPIDEMIA ASSOCIATED WITH TYPE 2 DIABETES MELLITUS: ICD-10-CM

## 2024-08-14 DIAGNOSIS — N18.31 TYPE 2 DIABETES MELLITUS WITH STAGE 3A CHRONIC KIDNEY DISEASE, WITHOUT LONG-TERM CURRENT USE OF INSULIN: ICD-10-CM

## 2024-08-14 DIAGNOSIS — Z86.73 CEREBROVASCULAR DISEASE, ARTERIOSCLEROTIC, POST-STROKE: Primary | ICD-10-CM

## 2024-08-14 DIAGNOSIS — N18.32 STAGE 3B CHRONIC KIDNEY DISEASE: ICD-10-CM

## 2024-08-14 DIAGNOSIS — I48.21 PERMANENT ATRIAL FIBRILLATION: ICD-10-CM

## 2024-08-14 DIAGNOSIS — I67.2 CEREBROVASCULAR DISEASE, ARTERIOSCLEROTIC, POST-STROKE: Primary | ICD-10-CM

## 2024-08-14 DIAGNOSIS — E11.22 TYPE 2 DIABETES MELLITUS WITH STAGE 3A CHRONIC KIDNEY DISEASE, WITHOUT LONG-TERM CURRENT USE OF INSULIN: ICD-10-CM

## 2024-08-14 DIAGNOSIS — E78.2 COMBINED HYPERLIPIDEMIA ASSOCIATED WITH TYPE 2 DIABETES MELLITUS: ICD-10-CM

## 2024-08-14 PROCEDURE — 99999 PR PBB SHADOW E&M-EST. PATIENT-LVL IV: CPT | Mod: PBBFAC,,, | Performed by: FAMILY MEDICINE

## 2024-08-14 PROCEDURE — 99214 OFFICE O/P EST MOD 30 MIN: CPT | Mod: S$PBB,,, | Performed by: FAMILY MEDICINE

## 2024-08-14 PROCEDURE — 99214 OFFICE O/P EST MOD 30 MIN: CPT | Mod: PBBFAC,PO | Performed by: FAMILY MEDICINE

## 2024-08-14 PROCEDURE — G2211 COMPLEX E/M VISIT ADD ON: HCPCS | Mod: S$PBB,,, | Performed by: FAMILY MEDICINE

## 2024-08-14 NOTE — PROGRESS NOTES
Subjective:      Patient ID: Héctor Desir is a 89 y.o. male.    Chief Complaint: Annual Exam and Results    History of Present Illness    CHIEF COMPLAINT:  Mr. Desir presents today for follow up on MRI results.    CEREBROVASCULAR DISEASE:  He reports a history of five transient ischemic attacks (TIAs). Recent MRI and CTA reveal 60% blockage in the right internal carotid artery, not requiring intervention at this time. An old stroke is visible on brain scan, likely from a previous TIA. No new abnormalities were found. He denies any current symptoms related to these findings and expresses understanding of the results as explained by the neurologist and primary care physician. He recently visited the hospital for a mini-stroke and was referred to a neurologist who ordered the diagnostic imaging.    MEDICATION MANAGEMENT:  Current medications include Xarelto (for atrial fibrillation), Lipitor (for cholesterol), and unspecified medications for blood pressure and diabetes. He expresses concern about medication availability, particularly a recent change from Trulicity to a new diabetes medication. All prescriptions except one were on automatic refill with [facility], but there have been recent fulfillment issues. He brought his current medications to the appointment for review.    CHRONIC CONDITIONS:  He has atrial fibrillation (managed with Xarelto), type 2 diabetes, stage 3B kidney disease, and hyperlipidemia. Blood pressure is well-controlled. Diabetes was well-managed at last check in April. Cholesterol levels were at goal in July. He is following up with nephrologist, Dr. Sims, for kidney disease management.    TRULICITY PRESCRIPTION ISSUE:  He reports ongoing issues with his Trulicity prescription. [facility] is unable to fill it, causing concern about treatment interruption. He previously experienced a similar issue resulting in a two-week gap in medication, resolved by obtaining a month's supply  from a local pharmacy in [place]. He emphasizes the medication was working well without side effects before this supply issue.    PREVENTIVE CARE:  He has an eye exam scheduled. He refuses shingles, RSV, and tetanus vaccinations.    SOCIAL HISTORY:  He indirectly mentions having a sedentary job.    ROS:  Neurological: -weakness       CTA Head and Neck (xpd)  Order: [id number]  Status: Final result     Visible to patient: Yes (not seen)     Next appt: 08/19/2024 at 01:45 PM in Optometry (Richard Jackson, OD)     0 Result Notes  Details    Reading Physician Reading Date Result Priority   Ankur Guzman Jr., MD  [phone] 7/14/2023 STAT     Narrative & Impression  EXAMINATION:  CTA HEAD AND NECK (XPD)     CLINICAL HISTORY:  Stroke/TIA, determine embolic source;     TECHNIQUE:  CTA of the intracranial and extracranial circulation was performed after the administration of intravenous contrast in the arterial phase. This examination was interpreted using multiplanar and 3D reconstructions.  100 mL of Omnipaque 350 was administered intravenously.     COMPARISON:  Prior CT of the head from 07/14/2023.     FINDINGS:  CTA neck:     There is a 4 vessel branching pattern of the aortic arch with the left vertebral artery originating from the arch.  The origins of the vessels from the arch are not significantly stenotic. The subclavian arteries are without hemodynamically significant stenosis.     The right common carotid artery is without significant stenosis. There is calcific plaque within the right carotid bifurcation with focal, 60% stenosis of the origin of the right internal carotid artery.  The more cranial, ascending cervical right internal carotid artery is normal in course and caliber.     The left common carotid is without significant stenosis. Minimal calcific plaque within the carotid bulb without stenosis of the left internal carotid artery.  The more cranial, ascending cervical left internal carotid artery  is normal in course and caliber.     Extracranial vertebral arteries: The vertebral arteries are codominant.  The vertebral arteries are without significant stenosis to their confluence into the basilar artery.     CTA head:     The cavernous internal carotid arteries are normal in course and caliber.  The right middle cerebral artery is normal.  The left middle cerebral artery is normal.  There is an aplastic left A1 segment.  The anterior communicating artery is normal. Right and left posterior cerebral arteries are normal.  There is a patent left-sided posterior communicating artery     The basilar artery is normal in caliber.  Intracranial vertebral arteries are patent to the basilar confluence.     The superior cerebellar arteries are normal. The anterior inferior cerebellar arteries are normal. The posterior inferior cerebellar arteries are normal.     Impression:     1. CT angiogram of the brain demonstrates no evidence of large vessel occlusion or hemodynamically significant stenosis.  2. 60% stenosis of the origin of the right internal carotid artery on CTA of the neck due to coarse calcific plaque.  No stenosis of the left carotid system.  All CT scans at this facility use dose modulation, iterative reconstruction, and/or weight base dosing when appropriate to reduce radiation dose to as low as reasonably achievable.        Electronically signed by: Ankur Guzman Jr., MD  Date:                                            07/14/2023  Time:                                           10:05                  CT Head Without Contrast  Order: [id number]  Status: Final result     Visible to patient: Yes (not seen)     Next appt: 08/19/2024 at 01:45 PM in Optometry (Richard Jackson, OD)     0 Result Notes  Details    Reading Physician Reading Date Result Priority   Kevin Watt III, MD  [phone] 7/14/2023 STAT     Narrative & Impression  EXAMINATION:  CT HEAD WITHOUT CONTRAST     CLINICAL  HISTORY:  Neuro deficit, acute, stroke suspected;     TECHNIQUE:  Low dose axial images were obtained through the head.  Coronal and sagittal reformations were also performed. Contrast was not administered.     COMPARISON:  None.     FINDINGS:  Old lacunar infarction within the anterior right thalamus.  No intracranial mass, mass effect, hemorrhage, hydrocephalus, acute infarction or abnormal fluid collection.     Mild low attenuation in the periventricular and deep white matter, compatible with small vessel ischemic disease.     No depressed skull fracture or skull lesions.  Degenerative changes within the TMJ.  Mucosal thickening within the left sphenoid sinus.  Wall thickening of the left sphenoid sinus suggesting sequela of chronic sinusitis.     Impression:     No acute intracranial abnormality.     Old lacunar infarction within the right thalamus.     Senescent changes.     Aspect score is 10/10.        Electronically signed by: Jame Watt  Date:                                            07/14/2023  Time:                                           07:42       MRA Neck without contrast  Order: [id number]  Status: Final result     Visible to patient: Yes (not seen)     Next appt: 08/19/2024 at 01:45 PM in Optometry (Richard Jackson, OD)     Dx: Other symptoms and signs involving th...     2 Result Notes    1 Patient Communication  Details    Reading Physician Reading Date Result Priority   Charlie Dsa MD  [phone] 7/16/2024 ASAP     Narrative & Impression  EXAMINATION:  MRA NECK WITHOUT CONTRAST     CLINICAL HISTORY:  Occlusion and stenosis of unspecified carotid arteryCarotid artery stenosis;     TECHNIQUE:  Time of flight MRA of the carotid and vertebral arteries was performed with 3D reconstructions according to the standard neck MRA protocol.     COMPARISON:  CTA head and neck 07/14/2023     FINDINGS:  Left-sided aortic arch with 3 or 4 branch vessels.     The right common carotid artery  demonstrates no hemodynamically significant stenosis.  Mild narrowing of the proximal right with less than 50% stenosis.  The remaining right cervical ICA is patent without hemodynamically significant stenosis.     The left common carotid artery demonstrates no hemodynamically significant stenosis. The ascending cervical left internal carotid artery demonstrates no hemodynamically significant stenosis.     Extracranial vertebral arteries: The origins of the vertebral arteries are not well identified.  The left vertebral artery either arises off the arch or the very proximal left subclavian artery.  Visualized bilateral vertebral arteries are patent without hemodynamically significant stenosis.  The right vertebral artery is dominant.  Expected loss of the normal flow related signal secondary to tortuosity of the distal extracranial vertebral arteries     Impression:     The visualized cervical carotid and vertebral arteries are patent without evidence of a hemodynamically significant stenosis.  Less than 50% stenosis of the proximal right cervical ICA.        Electronically signed by: Charlie Das  Date:                                            07/16/2024  Time:                                           15:50           Exam Ended: 07/16/24 15:33 CDT             MRI BRAIN WITHOUT CONTRAST  Status: Final result     Gro[facility] Results Release    Ryonet Status: Active  Results Release     PACS Images for [employer] Viewer     Show images for MRI BRAIN WITHOUT CONTRAST  MRI BRAIN WITHOUT CONTRAST  Order: [id number]  Status: Final result     Visible to patient: Yes (not seen)     Next appt: 08/19/2024 at 01:45 PM in Optometry (Richard Jackson, OD)     0 Result Notes  Details    Reading Physician Reading Date Result Priority   Ankur Guzman Jr., MD  [phone] 7/14/2023 STAT     Narrative & Impression  EXAMINATION:  MRI BRAIN WITHOUT CONTRAST     CLINICAL HISTORY:  Transient ischemic attack (TIA);      TECHNIQUE:  Sagittal T1. Axial T1, T2, T2 FLAIR, GRE, DWI. Coronal T2 FLAIR.     COMPARISON:  Prior CT of the head from 07/14/2023.     FINDINGS:  There is no restricted diffusion.  There is peripheral encephalomalacia involving the lateral aspect of the right temporal lobe within the right middle cranial fossa.  Chronic lacunar infarct within the posterior right lentiform nucleus.  Mild to moderate patchy T2 FLAIR hyperintensity within the periventricular and deep white matter bilaterally.  No acute intracranial hemorrhage or mass effect.     The ventricles and sulci are normal in size and configuration. Midline structures are normal. There are preserved arterial flow-voids on T2 weighted imaging. The paranasal sinuses and mastoid air cells are clear.     No abnormal marrow signal is identified.     Impression:     1. Encephalomalacia involving the lateral right temporal lobe could relate to prior infarction or prior traumatic brain injury.  2. No acute infarction.  3. Chronic lacunar infarct within the posterior right lentiform nucleus with patchy T2 alteration of the white matter likely relating to chronic microvascular ischemic disease.        Electronically signed by: Ankur Guzman Jr., MD  Date:                                            07/14/2023  Time:                                           15:27     Problem List Items Addressed This Visit       Atrial fibrillation    Overview     He has chronic afib.  He has had a cardioversion but this did not help. He is following with Dr. Talamantes for this and he is on xarelto for this and is on toprol for rate control.  It is controlled. HE IS ON XARELTO FOR THIS ALSO.    RATE IS CONTROLLED.   Pulse Readings from Last 3 Encounters:   02/19/20 86   01/20/20 84   12/20/19 86              Cerebrovascular disease, arteriosclerotic, post-stroke - Primary    CKD (chronic kidney disease) stage 3, GFR 30-59 ml/min    Overview     He has CKD and is seeing Dr. Sims.  He  has not seen her in a while and we need to get this done.   CMP  Sodium   Date Value Ref Range Status   07/03/2024 141 136 - 145 mmol/L Final     Potassium   Date Value Ref Range Status   07/03/2024 3.9 3.5 - 5.1 mmol/L Final     Chloride   Date Value Ref Range Status   07/03/2024 107 95 - 110 mmol/L Final     CO2   Date Value Ref Range Status   07/03/2024 23 23 - 29 mmol/L Final     Glucose   Date Value Ref Range Status   07/03/2024 130 (H) 70 - 110 mg/dL Final     BUN   Date Value Ref Range Status   07/03/2024 23 8 - 23 mg/dL Final     Creatinine   Date Value Ref Range Status   07/03/2024 1.6 (H) 0.5 - 1.4 mg/dL Final     Calcium   Date Value Ref Range Status   07/03/2024 9.2 8.7 - 10.5 mg/dL Final     Total Protein   Date Value Ref Range Status   07/15/2023 6.1 6.0 - 8.4 g/dL Final     Albumin   Date Value Ref Range Status   07/03/2024 3.8 3.5 - 5.2 g/dL Final     Total Bilirubin   Date Value Ref Range Status   07/15/2023 1.0 0.1 - 1.0 mg/dL Final     Comment:     For infants and newborns, interpretation of results should be based  on gestational age, weight and in agreement with clinical  observations.    Premature Infant recommended reference ranges:  Up to 24 hours.............<8.0 mg/dL  Up to 48 hours............<12.0 mg/dL  3-5 days..................<15.0 mg/dL  6-29 days.................<15.0 mg/dL       Alkaline Phosphatase   Date Value Ref Range Status   07/15/2023 93 55 - 135 U/L Final     AST   Date Value Ref Range Status   07/15/2023 16 10 - 40 U/L Final     ALT   Date Value Ref Range Status   07/15/2023 16 10 - 44 U/L Final     Anion Gap   Date Value Ref Range Status   07/03/2024 11 8 - 16 mmol/L Final     eGFR if    Date Value Ref Range Status   12/17/2021 45.1 (A) >60 mL/min/1.73 m^2 Final     eGFR if non    Date Value Ref Range Status   12/17/2021 39.0 (A) >60 mL/min/1.73 m^2 Final     Comment:     Calculation used to obtain the estimated glomerular filtration  rate  (eGFR) is the CKD-EPI equation.                 Combined hyperlipidemia associated with type 2 diabetes mellitus    Overview     The patient presents with hyperlipidemia.  The patient reports tolerating the medication well and is in excellent compliance.  There have been no medication side effects.  The patient denies chest pain, neuropathy, and myalgias.  The patient has reduced fat intake and has been exercising.  Current treatment has included the medications listed in the med card.    Lab Results   Component Value Date    CHOL 122 07/03/2024    CHOL 134 07/14/2023    CHOL 130 04/10/2023       Lab Results   Component Value Date    HDL 36 (L) 07/03/2024    HDL 30 (L) 07/14/2023    HDL 36 (L) 04/10/2023       Lab Results   Component Value Date    LDLCALC 69.2 07/03/2024    LDLCALC 77.2 07/14/2023    LDLCALC 75.8 04/10/2023       Lab Results   Component Value Date    TRIG 84 07/03/2024    TRIG 134 07/14/2023    TRIG 91 04/10/2023       Lab Results   Component Value Date    CHOLHDL 29.5 07/03/2024    CHOLHDL 22.4 07/14/2023    CHOLHDL 27.7 04/10/2023     Lab Results   Component Value Date    ALT 16 07/15/2023    AST 16 07/15/2023    ALKPHOS 93 07/15/2023    BILITOT 1.0 07/15/2023              Type 2 diabetes mellitus with kidney complication, without long-term current use of insulin    Overview     The patient presents with diabetes. The patient denies polyuria, polydipsia, polyphagia, hypoglycemia and paresthesias. The patient's glucose control has been good. Home glucose averages are routinely checked. The patient is without retinopathy currently. The patient has no history of neuropathy. The patient currently complains of no podiatric problems. The patient has excellent compliance.  Hemoglobin A1C   Date Value Ref Range Status   04/16/2024 6.8 (H) 4.0 - 5.6 % Final     Comment:     ADA Screening Guidelines:  5.7-6.4%  Consistent with prediabetes  >or=6.5%  Consistent with diabetes    High levels of fetal  "hemoglobin interfere with the HbA1C  assay. Heterozygous hemoglobin variants (HbS, HgC, etc)do  not significantly interfere with this assay.   However, presence of multiple variants may affect accuracy.     10/13/2023 6.6 (H) 4.0 - 5.6 % Final     Comment:     ADA Screening Guidelines:  5.7-6.4%  Consistent with prediabetes  >or=6.5%  Consistent with diabetes    High levels of fetal hemoglobin interfere with the HbA1C  assay. Heterozygous hemoglobin variants (HbS, HgC, etc)do  not significantly interfere with this assay.   However, presence of multiple variants may affect accuracy.     07/14/2023 6.6 (H) 4.0 - 5.6 % Final     Comment:     ADA Screening Guidelines:  5.7-6.4%  Consistent with prediabetes  >or=6.5%  Consistent with diabetes    High levels of fetal hemoglobin interfere with the HbA1C  assay. Heterozygous hemoglobin variants (HbS, HgC, etc)do  not significantly interfere with this assay.   However, presence of multiple variants may affect accuracy.       No results found for: "MICROALBUR", "[id number]"  Diabetes Management Status    Statin: Taking  ACE/ARB: Taking    Screening or Prevention Patient's value Goal Complete/Controlled?   HgA1C Testing and Control   Lab Results   Component Value Date    HGBA1C 6.8 (H) 04/16/2024      Annually/Less than 8% Yes   Lipid profile : 07/03/2024 Annually No   LDL control Lab Results   Component Value Date    LDLCALC 69.2 07/03/2024    Annually/Less than 100 mg/dl  No   Nephropathy screening Lab Results   Component Value Date    LABMICR 82.0 08/02/2023     Lab Results   Component Value Date    PROTEINUA Negative 09/20/2023    Annually No   Blood pressure BP Readings from Last 1 Encounters:   08/14/24 138/86    Less than 140/90 Yes   Dilated retinal exam : 08/16/2023 Annually No   Foot exam   : 08/02/2023 Annually Yes              Relevant Medications    tirzepatide 7.5 mg/0.5 mL PnIj    Other Relevant Orders    Hemoglobin A1C    Microalbumin/Creatinine Ratio, Urine "       The patient's Health Maintenance was reviewed and the following appears to be due:   Health Maintenance Due   Topic Date Due    Diabetes Urine Screening  08/02/2024    Foot Exam  08/02/2024    Eye Exam  08/16/2024       Past Medical History:  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     DM (diabetes mellitus) type II controlled, neurological manifestation 7/5/2013    GERD (gastroesophageal reflux disease)     History of prostate cancer 2006    Hypertension associated with diabetes     Hypertension goal BP (blood pressure) < 130/80     Obesity     Prostate cancer     TIA (transient ischemic attack)     Trouble in sleeping      Past Surgical History:   Procedure Laterality Date    CARDIAC CATHETERIZATION      CARDIOVERSION N/A 8/10/2018    Procedure: CARDIOVERSION;  Surgeon: Israel Cardoza MD;  Location: [facility] CATH LAB;  Service: Cardiology;  Laterality: N/A;  Dr. Talamantes pt    CATARACT EXTRACTION W/  INTRAOCULAR LENS IMPLANT  11/2013    Bilateral Cataract     COLONOSCOPY N/A 12/20/2019    Procedure: COLONOSCOPY;  Surgeon: Forrest Roy MD;  Location: [facility] ENDO;  Service: Endoscopy;  Laterality: N/A;    COLONOSCOPY W/ POLYPECTOMY      EYE SURGERY      PROSTATECTOMY  2006    TONSILLECTOMY      TONSILLECTOMY, ADENOIDECTOMY, BILATERAL MYRINGOTOMY AND TUBES       Review of patient's allergies indicates:   Allergen Reactions    Amlodipine      swelling     Current Outpatient Medications on File Prior to Visit   Medication Sig Dispense Refill    atorvastatin (LIPITOR) 40 MG tablet Take 1 tablet (40 mg total) by mouth once daily. 90 tablet 3    blood sugar diagnostic Strp Use daily- Freestyle lite 300 strip 3    blood-glucose meter  kit Use before meals and before bed when the glucoses are not in control.  Otherwise, test it daily once a day before meals randomly to ensure stability of the gluocse. 1 each 0    DULoxetine (CYMBALTA) 60 MG capsule Take 1 capsule (60 mg total) by mouth once daily. 90 capsule 0    empagliflozin (JARDIANCE) 25 mg tablet Take 1 tablet (25 mg total) by mouth once daily. 90 tablet 1    ERGOCALCIFEROL, VITAMIN D2, (VITAMIN D ORAL) Take by mouth once daily.       furosemide (LASIX) 40 MG tablet Take 1 tablet (40 mg total) by mouth once daily. 90 tablet 3    glimepiride (AMARYL) 4 MG tablet Take 1 tablet (4 mg total) by mouth 2 (two) times daily before meals. 180 tablet 1    lisinopriL (PRINIVIL,ZESTRIL) 20 MG tablet Take 1 tablet (20 mg total) by mouth once daily. 90 tablet 3    metoprolol succinate (TOPROL-XL) 50 MG 24 hr tablet Take 1 tablet (50 mg total) by mouth once daily. 90 tablet 3    nateglinide (STARLIX) 120 MG tablet TAKE 1 TABLET THREE TIMES A DAY BEFORE MEALS IF YOU SKIP A MEAL, DO NOT TAKE DOSE 270 tablet 0    omeprazole (PRILOSEC) 20 MG capsule Take 1 capsule (20 mg total) by mouth once daily. 90 capsule 3    rivaroxaban (XARELTO) 15 mg Tab Take 1 tablet (15 mg total) by mouth daily with dinner or evening meal. 90 tablet 3    [DISCONTINUED] dulaglutide (TRULICITY) 4.5 mg/0.5 mL pen injector Inject 4.5 mg into the skin every 7 days. 4 pen 2    [DISCONTINUED] chlorthalidone (HYGROTEN) 25 MG Tab Take 1 tablet (25 mg total) by mouth once daily. 90 tablet 3     No current facility-administered medications on file prior to visit.     Social History     Socioeconomic History    Marital status:    Tobacco Use    Smoking status: Never    Smokeless tobacco: Never   Substance and Sexual Activity    Alcohol use: No     Comment: He used to drink but quit in 1990    Drug use: No     Social Determinants of Health     Financial Resource Strain: Low Risk  (3/18/2024)    Overall Financial Resource Strain (CARDIA)      Difficulty of Paying Living Expenses: Not hard at all   Food Insecurity: No Food Insecurity (3/18/2024)    Hunger Vital Sign     Worried About Running Out of Food in the Last Year: Never true     Ran Out of Food in the Last Year: Never true   Transportation Needs: No Transportation Needs (3/18/2024)    PRAPARE - Transportation     Lack of Transportation (Medical): No     Lack of Transportation (Non-Medical): No   Physical Activity: Inactive (3/18/2024)    Exercise Vital Sign     Days of Exercise per Week: 0 days     Minutes of Exercise per Session: 0 min   Stress: No Stress Concern Present (3/18/2024)    [employer] - Occupational Stress Questionnaire     Feeling of Stress : Only a little   Housing Stability: Low Risk  (3/18/2024)    Housing Stability Vital Sign     Unable to Pay for Housing in the Last Year: No     Number of Places Lived in the Last Year: 1     Unstable Housing in the Last Year: No     Family History   Problem Relation Name Age of Onset    Heart attack Mother      Melanoma Father      Cancer Father      Anesthesia problems Neg Hx      Clotting disorder Neg Hx      Kidney disease Neg Hx      Amblyopia Neg Hx      Blindness Neg Hx      Cataracts Neg Hx      Glaucoma Neg Hx      Retinal detachment Neg Hx      Macular degeneration Neg Hx      Strabismus Neg Hx         Review of Systems   Constitutional: Negative.  Negative for chills, diaphoresis and fever.   HENT:  Negative for congestion, hearing loss, mouth sores, postnasal drip and sore throat.    Eyes:  Negative for pain and visual disturbance.   Respiratory:  Negative for cough, chest tightness, shortness of breath and wheezing.    Cardiovascular:  Negative for chest pain.   Gastrointestinal:  Negative for abdominal pain, anal bleeding, blood in stool, constipation, diarrhea, nausea and vomiting.   Genitourinary:  Negative for dysuria and hematuria.   Musculoskeletal:  Negative for back pain, neck pain and neck  "stiffness.   Skin:  Negative for rash.   Neurological:  Negative for dizziness and weakness.       Objective:   /86 (BP Location: Left arm, Patient Position: Sitting, BP Method: Medium (Automatic))   Pulse 77   Resp 16   Ht 5' 8" (1.727 m)   Wt 97.5 kg (215 lb)   BMI 32.69 kg/m²   Physical Exam    Vitals: Blood pressure within normal limits.       Physical Exam  Constitutional:       General: He is not in acute distress.     Appearance: He is well-developed. He is not diaphoretic.   Pulmonary:      Effort: Pulmonary effort is normal. No respiratory distress.   Neurological:      Mental Status: He is alert and oriented to person, place, and time.   Psychiatric:         Behavior: Behavior normal.         Thought Content: Thought content normal.         Judgment: Judgment normal.       Assessment:     1. Cerebrovascular disease, arteriosclerotic, post-stroke    2. Permanent atrial fibrillation    3. 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 Type 2 diabetes mellitus with stage 3a chronic kidney disease, without long-term current use of insulin    5. Stage 3b chronic kidney disease      Plan:   Assessment & Plan    I69.351 Hemiplegia and hemiparesis following cerebral infarction affecting right dominant side  I65.21 Occlusion and stenosis of right carotid artery  G45.9 Transient cerebral ischemic attack, unspecified  E11.9 Type 2 diabetes mellitus without complications  I48.91 Unspecified atrial fibrillation  E78.5 Hyperlipidemia, unspecified  N18.9 Chronic kidney disease, unspecified  Z86.73 Personal history of TIA, and cerebral infarction without residual deficits  CAROTID ARTERY DISEASE:  - Reviewed MRI and CTA results: 60% blockage in right internal carotid artery, not requiring intervention at this time (threshold for action is 70% or greater).  STROKE/TIA:  - Noted old stroke on CT scan of head, consistent with patient's history of TIAs.  - Continued current treatment regimen " for stroke prevention, including blood pressure control, diabetes management, and cholesterol treatment.  HYPERTENSION:  - Blood pressure well-controlled.  ATRIAL FIBRILLATION:  - Atrial fibrillation managed with blood thinner.  HYPERLIPIDEMIA:  - Cholesterol at goal based on July labs.  DIABETES:  - Diabetes well-controlled as of April labs, due for recheck in October.  - Addressed Trulicity shortage by prescribing Mounjaro as substitute at equivalent dosing.  - Started Mounjaro 7.5 mg, sent prescription to [facility] for 12 pens (3-month supply).  - Discontinued Trulicity 4.5 mg.  - Ordered HbA1c test for October.  CHRONIC KIDNEY DISEASE:  - Kidney disease (stage 3B) being followed by nephrologist Dr. Sims.  - Ordered annual urine protein test for kidney function.  MEDICATIONS/SUPPLEMENTS:  - Continued current medication regimen: mood medication in the morning, atorvastatin in the evening, lisinopril twice daily (morning and evening), Jardiance in the morning, metoprolol in the morning, Lasix in the morning, Xarelto daily with dinner or evening meal, omeprazole in the evening, ANP twice daily with meals, glimepiride in the morning and at noon.  FOLLOW UP:  - Follow up in October for HbA1c test.  - Contact the office if [facility] cannot fill the Mounjaro prescription.  - Mr. Desir to provide urine sample for protein test, can return with sample if unable to provide during visit.       I have discontinued Héctor Desir's TRULICITY. I am also having him start on tirzepatide. Additionally, I am having him maintain his blood-glucose meter, (ERGOCALCIFEROL, VITAMIN D2, (VITAMIN D ORAL)), blood sugar diagnostic, furosemide, lisinopriL, metoprolol succinate, rivaroxaban, glimepiride, omeprazole, empagliflozin, atorvastatin, DULoxetine, and nateglinide.  No problem-specific Assessment & Plan notes found for this encounter.      No follow-ups on file.    Héctor was seen today for annual exam and  results.    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  Type 2 diabetes mellitus with stage 3a chronic kidney disease, without long-term current use of insulin  -     Hemoglobin A1C; Future  -     tirzepatide 7.5 mg/0.5 mL PnIj; Inject 7.5 mg into the skin every 7 days.  -     Microalbumin/Creatinine Ratio, Urine; Future    Stage 3b chronic kidney disease  Continue follow-up with Dr. Modi    Medications Ordered This Encounter   Medications    tirzepatide 7.5 mg/0.5 mL PnIj     Sig: Inject 7.5 mg into the skin every 7 days.     Dispense:  13 each     Refill:  1     The patient was instructed to stop the following meds:  Medications Discontinued During This Encounter   Medication Reason    dulaglutide (TRULICITY) 4.5 mg/0.5 mL pen injector      Orders Placed This Encounter   Procedures    Hemoglobin A1C     Draw a Hemoglobin A1c in 3 months.     Standing Status:   Future     Number of Occurrences:   1     Standing Expiration Date:   8/14/2025    Microalbumin/Creatinine Ratio, Urine     May change to clinic collect if the patient is in the clinic at the time.     Standing Status:   Future     Number of Occurrences:   1     Standing Expiration Date:   8/14/2025     Order Specific Question:   Specimen Source     Answer:   Urine       Medication List with Changes/Refills   New Medications    TIRZEPATIDE 7.5 MG/0.5 ML PNIJ    Inject 7.5 mg into the skin every 7 days.   Current Medications    ATORVASTATIN (LIPITOR) 40 MG TABLET    Take 1 tablet (40 mg total) by mouth once daily.    BLOOD SUGAR DIAGNOSTIC STRP    Use daily- Freestyle lite    BLOOD-GLUCOSE METER KIT    Use before meals and before bed when the glucoses are not in control.  Otherwise, test it  daily once a day before meals randomly to ensure stability of the gluocse.    DULOXETINE (CYMBALTA) 60 MG CAPSULE    Take 1 capsule (60 mg total) by mouth once daily.    EMPAGLIFLOZIN (JARDIANCE) 25 MG TABLET    Take 1 tablet (25 mg total) by mouth once daily.    ERGOCALCIFEROL, VITAMIN D2, (VITAMIN D ORAL)    Take by mouth once daily.     FUROSEMIDE (LASIX) 40 MG TABLET    Take 1 tablet (40 mg total) by mouth once daily.    GLIMEPIRIDE (AMARYL) 4 MG TABLET    Take 1 tablet (4 mg total) by mouth 2 (two) times daily before meals.    LISINOPRIL (PRINIVIL,ZESTRIL) 20 MG TABLET    Take 1 tablet (20 mg total) by mouth once daily.    METOPROLOL SUCCINATE (TOPROL-XL) 50 MG 24 HR TABLET    Take 1 tablet (50 mg total) by mouth once daily.    NATEGLINIDE (STARLIX) 120 MG TABLET    TAKE 1 TABLET THREE TIMES A DAY BEFORE MEALS IF YOU SKIP A MEAL, DO NOT TAKE DOSE    OMEPRAZOLE (PRILOSEC) 20 MG CAPSULE    Take 1 capsule (20 mg total) by mouth once daily.    RIVAROXABAN (XARELTO) 15 MG TAB    Take 1 tablet (15 mg total) by mouth daily with dinner or evening meal.   Discontinued Medications    DULAGLUTIDE (TRULICITY) 4.5 MG/0.5 ML PEN INJECTOR    Inject 4.5 mg into the skin every 7 days.      Medication List with Changes/Refills   New Medications    TIRZEPATIDE 7.5 MG/0.5 ML PNIJ    Inject 7.5 mg into the skin every 7 days.       Start Date: 8/14/2024 End Date: --   Current Medications    ATORVASTATIN (LIPITOR) 40 MG TABLET    Take 1 tablet (40 mg total) by mouth once daily.       Start Date: 7/5/2024  End Date: --    BLOOD SUGAR DIAGNOSTIC STRP    Use daily- Freestyle lite       Start Date: 8/18/2017 End Date: --    BLOOD-GLUCOSE METER KIT    Use before meals and before bed when the glucoses are not in control.  Otherwise, test it daily once a day before meals randomly to ensure stability of the gluocse.       Start Date: 1/13/2016 End Date: --    DULOXETINE (CYMBALTA) 60 MG CAPSULE    Take 1 capsule (60 mg total) by mouth once  daily.       Start Date: 7/25/2024 End Date: --    EMPAGLIFLOZIN (JARDIANCE) 25 MG TABLET    Take 1 tablet (25 mg total) by mouth once daily.       Start Date: 6/14/2024 End Date: --    ERGOCALCIFEROL, VITAMIN D2, (VITAMIN D ORAL)    Take by mouth once daily.        Start Date: --        End Date: --    FUROSEMIDE (LASIX) 40 MG TABLET    Take 1 tablet (40 mg total) by mouth once daily.       Start Date: 9/18/2023 End Date: --    GLIMEPIRIDE (AMARYL) 4 MG TABLET    Take 1 tablet (4 mg total) by mouth 2 (two) times daily before meals.       Start Date: 10/6/2023 End Date: --    LISINOPRIL (PRINIVIL,ZESTRIL) 20 MG TABLET    Take 1 tablet (20 mg total) by mouth once daily.       Start Date: 9/18/2023 End Date: 9/12/2024    METOPROLOL SUCCINATE (TOPROL-XL) 50 MG 24 HR TABLET    Take 1 tablet (50 mg total) by mouth once daily.       Start Date: 9/18/2023 End Date: --    NATEGLINIDE (STARLIX) 120 MG TABLET    TAKE 1 TABLET THREE TIMES A DAY BEFORE MEALS IF YOU SKIP A MEAL, DO NOT TAKE DOSE       Start Date: 7/26/2024 End Date: --    OMEPRAZOLE (PRILOSEC) 20 MG CAPSULE    Take 1 capsule (20 mg total) by mouth once daily.       Start Date: 10/6/2023 End Date: --    RIVAROXABAN (XARELTO) 15 MG TAB    Take 1 tablet (15 mg total) by mouth daily with dinner or evening meal.       Start Date: 9/18/2023 End Date: --   Discontinued Medications    DULAGLUTIDE (TRULICITY) 4.5 MG/0.5 ML PEN INJECTOR    Inject 4.5 mg into the skin every 7 days.       Start Date: 8/12/2024 End Date: 8/14/2024            Stop Trulicity and start him on Mounjaro 7.5 mg once a week.    This note was generated with the assistance of ambient listening technology. Verbal consent was obtained by the patient and accompanying visitor(s) for the recording of patient appointment to facilitate this note. I attest to having reviewed and edited the generated note for accuracy, though some syntax or spelling errors may persist. Please contact the author of this note  for any clarification.

## 2024-08-15 ENCOUNTER — LAB VISIT (OUTPATIENT)
Dept: LAB | Facility: HOSPITAL | Age: 89
End: 2024-08-15
Attending: FAMILY MEDICINE
Payer: MEDICARE

## 2024-08-15 DIAGNOSIS — E11.9 TYPE 2 DIABETES MELLITUS WITHOUT COMPLICATION, WITHOUT LONG-TERM CURRENT USE OF INSULIN: Primary | ICD-10-CM

## 2024-08-15 DIAGNOSIS — E11.9 TYPE 2 DIABETES MELLITUS WITHOUT COMPLICATION, WITHOUT LONG-TERM CURRENT USE OF INSULIN: ICD-10-CM

## 2024-08-15 PROCEDURE — 82043 UR ALBUMIN QUANTITATIVE: CPT | Performed by: FAMILY MEDICINE

## 2024-08-15 PROCEDURE — 82570 ASSAY OF URINE CREATININE: CPT | Performed by: FAMILY MEDICINE

## 2024-08-15 NOTE — TELEPHONE ENCOUNTER
No care due was identified.  [facility] Embedded Care Due Messages. Reference number: [id number].   8/15/2024 1:30:58 PM CDT

## 2024-08-16 LAB
ALBUMIN/CREAT UR: 122.4 UG/MG (ref 0–30)
CREAT UR-MCNC: 58 MG/DL (ref 23–375)
MICROALBUMIN UR DL<=1MG/L-MCNC: 71 UG/ML

## 2024-08-16 NOTE — TELEPHONE ENCOUNTER
OK> let him know I sent in the trulicity as you stated below and will cancel the mounjaro.     I have signed for the following orders AND/OR meds.  Please call the patient and ask the patient to schedule the testing AND/OR inform about any medications that were sent.      Orders Placed This Encounter   Procedures    Microalbumin/Creatinine Ratio, Urine     Standing Status:   Future     Number of Occurrences:   1     Standing Expiration Date:   10/14/2025     Order Specific Question:   Specimen Source     Answer:   Urine       Medications Ordered This Encounter   Medications    dulaglutide (TRULICITY) 4.5 mg/0.5 mL pen injector     Sig: Inject 4.5 mg into the skin every 7 days.     Dispense:  12 pen      Ref[facility]:  1       @[facility]

## 2024-08-19 ENCOUNTER — OFFICE VISIT (OUTPATIENT)
Dept: OPTOMETRY | Facility: CLINIC | Age: 89
End: 2024-08-19
Payer: MEDICARE

## 2024-08-19 DIAGNOSIS — E11.9 DIABETES MELLITUS TYPE 2 WITHOUT RETINOPATHY: Primary | ICD-10-CM

## 2024-08-19 DIAGNOSIS — Z96.1 BILATERAL PSEUDOPHAKIA: ICD-10-CM

## 2024-08-19 PROCEDURE — 99999 PR PBB SHADOW E&M-EST. PATIENT-LVL III: CPT | Mod: PBBFAC,,, | Performed by: OPTOMETRIST

## 2024-08-19 PROCEDURE — 99213 OFFICE O/P EST LOW 20 MIN: CPT | Mod: PBBFAC,PO | Performed by: OPTOMETRIST

## 2024-08-19 PROCEDURE — 92014 COMPRE OPH EXAM EST PT 1/>: CPT | Mod: S$PBB,,, | Performed by: OPTOMETRIST

## 2024-08-19 NOTE — PROGRESS NOTES
HPI    DM eye exam MARCIE 08/16/23    Pt denies any changes in va since last visit. Pt notices flashes and   floaters that come and go. Pt denies gtts. DM and HTN controlled w aid.     Hemoglobin A1C       Date                     Value               Ref Range             Status                08/14/2024               7.0 (H)             4.0 - 5.6 %           Final                           04/16/2024               6.8 (H)             4.0 - 5.6 %           Final                          10/13/2023               6.6 (H)             4.0 - 5.6 %           Final                    ----------   Last edited by Violet Sumner on 8/19/2024  2:04 PM.            Assessment /Plan     For exam results, see Encounter Report.    Diabetes mellitus type 2 without retinopathy    Bilateral pseudophakia      No diabetic retinopathy, no csme. Return in 1 year for dilated eye exam, optos done today, pt chose to defer dfe.  2. Monitor condition. Patient to report any changes. RTC 1 year recheck.                  Yes

## 2024-09-11 ENCOUNTER — PATIENT MESSAGE (OUTPATIENT)
Dept: FAMILY MEDICINE | Facility: CLINIC | Age: 89
End: 2024-09-11
Payer: MEDICARE

## 2024-09-12 ENCOUNTER — OFFICE VISIT (OUTPATIENT)
Dept: NEPHROLOGY | Facility: CLINIC | Age: 89
End: 2024-09-12
Payer: MEDICARE

## 2024-09-12 VITALS
HEIGHT: 68 IN | SYSTOLIC BLOOD PRESSURE: 142 MMHG | DIASTOLIC BLOOD PRESSURE: 78 MMHG | OXYGEN SATURATION: 99 % | BODY MASS INDEX: 32.58 KG/M2 | HEART RATE: 61 BPM | WEIGHT: 215 LBS

## 2024-09-12 DIAGNOSIS — I67.2 CEREBROVASCULAR DISEASE, ARTERIOSCLEROTIC, POST-STROKE: ICD-10-CM

## 2024-09-12 DIAGNOSIS — E66.9 OBESITY (BMI 30-39.9): ICD-10-CM

## 2024-09-12 DIAGNOSIS — E11.22 TYPE 2 DIABETES MELLITUS WITH STAGE 3B CHRONIC KIDNEY DISEASE, WITHOUT LONG-TERM CURRENT USE OF INSULIN: ICD-10-CM

## 2024-09-12 DIAGNOSIS — G45.9 TIA (TRANSIENT ISCHEMIC ATTACK): ICD-10-CM

## 2024-09-12 DIAGNOSIS — I48.19 PERSISTENT ATRIAL FIBRILLATION: ICD-10-CM

## 2024-09-12 DIAGNOSIS — E55.9 VITAMIN D DEFICIENCY: ICD-10-CM

## 2024-09-12 DIAGNOSIS — E11.59 HYPERTENSION ASSOCIATED WITH DIABETES: ICD-10-CM

## 2024-09-12 DIAGNOSIS — N18.32 TYPE 2 DIABETES MELLITUS WITH STAGE 3B CHRONIC KIDNEY DISEASE, WITHOUT LONG-TERM CURRENT USE OF INSULIN: ICD-10-CM

## 2024-09-12 DIAGNOSIS — N25.81 SECONDARY HYPERPARATHYROIDISM, RENAL: ICD-10-CM

## 2024-09-12 DIAGNOSIS — Z86.73 CEREBROVASCULAR DISEASE, ARTERIOSCLEROTIC, POST-STROKE: ICD-10-CM

## 2024-09-12 DIAGNOSIS — I15.2 HYPERTENSION ASSOCIATED WITH DIABETES: ICD-10-CM

## 2024-09-12 DIAGNOSIS — N18.32 STAGE 3B CHRONIC KIDNEY DISEASE: Primary | ICD-10-CM

## 2024-09-12 PROCEDURE — 99999 PR PBB SHADOW E&M-EST. PATIENT-LVL III: CPT | Mod: PBBFAC,,, | Performed by: INTERNAL MEDICINE

## 2024-09-12 PROCEDURE — 99215 OFFICE O/P EST HI 40 MIN: CPT | Mod: S$PBB,,, | Performed by: INTERNAL MEDICINE

## 2024-09-12 PROCEDURE — 99213 OFFICE O/P EST LOW 20 MIN: CPT | Mod: PBBFAC,PO | Performed by: INTERNAL MEDICINE

## 2024-09-12 RX ORDER — TIRZEPATIDE 5 MG/.5ML
5 INJECTION, SOLUTION SUBCUTANEOUS
COMMUNITY

## 2024-09-12 NOTE — PROGRESS NOTES
Subjective:       Patient ID: Héctor Desir is a 89 y.o. White male who presents for follow-up evaluation of Chronic Kidney Disease      HPI   He reports ongoing stress due to his wife's illness. She is no longer on hospice--but he needs to interact with her daughter who can be challenging. He feels overall well and notes his BS are running better, last Hba1c was 7.1. No LE edema nor SOB. Nocturia is 1-2X.       Interval history Feb 2019: Lost to follow up. Wife passed. He's in a new place, doing better since his Hbac1 went up. Saw Endocrine.      Interval history Dec 2019: doing well. No LE edema and no SOB. NO new medications. Got flu shot.      Interval history May 2020:  The patient location is: Home  The chief complaint leading to consultation is: CKD  Visit type: audiovisual  Total time spent with patient: 23 minutes  Each patient to whom he or she provides medical services by telemedicine is:  (1) informed of the relationship between the physician and patient and the respective role of any other health care provider with respect to management of the patient; and (2) notified that he or she may decline to receive medical services by telemedicine and may withdraw from such care at any time.   Notes: He feels well, no change in medical status. No LE edema. Last Hba1c was 7.1       Interval history August 2020: He is feeling very well. He came back from a fishing trip today. No LE edema. He thinks his BS are running pretty well when he checks them, last Hba1c was 7.2    Interval history Jan 2021: Son had COVID, thus he quarantined. He wants COVID vaccine. Overall feels well. Did not get a dog yet    Interval history Dec 2021:  Fishing, staying active. Now on Trulicity--no side effects.      Sept 2022: He is doing well. Last HBa1c was 6.7. Some LE edema. Escaped COVID thus far (known) Has olive oil daily     March 2023: saw BRENNAN    Jan 2024: He is feeling well.  TIA in July. Going fishing today. [place] GRANT with  "relatives. Drinking beet powder. Still taking olive oil. "Always eat your desert first' [place] years "urine on the foot"    Sep 2024: Doing OK. Still fishing.  Two months without Trulicity. Now on Mounjaro       Review of Systems   Constitutional:  Positive for fatigue (tires out more easily).   Respiratory:  Negative for shortness of breath.    Cardiovascular:  Positive for leg swelling.   Allergic/Immunologic: Positive for immunocompromised state.   Psychiatric/Behavioral:  Negative for confusion and decreased concentration.        Objective:      Physical Exam  Vitals and nursing note reviewed.   Constitutional:       General: He is not in acute distress.     Appearance: Normal appearance. He is well-developed. He is not ill-appearing.   HENT:      Head: Atraumatic.   Neck:      Vascular: No JVD.   Cardiovascular:      Rate and Rhythm: Normal rate. Rhythm irregular.      Heart sounds: S1 normal and S2 normal.      No friction rub.   Pulmonary:      Breath sounds: No wheezing or rales.   Abdominal:      Palpations: Abdomen is soft.   Musculoskeletal:      Cervical back: Neck supple.      Right lower leg: Edema present.      Left lower leg: Edema present.   Skin:     General: Skin is warm and dry.   Neurological:      Mental Status: He is alert and oriented to person, place, and time. Mental status is at baseline.   Psychiatric:         Mood and Affect: Mood normal.            Assessment:       1. Stage 3b chronic kidney disease    2. Vitamin D deficiency    3. Secondary hyperparathyroidism, renal    4. Hypertension associated with diabetes    5. Type 2 diabetes mellitus with stage 3b chronic kidney disease, without long-term current use of insulin    6. Obesity (BMI 30-39.9)    7. Persistent atrial fibrillation    8. TIA (transient ischemic attack)    9. Cerebrovascular disease, arteriosclerotic, post-stroke                Plan:             CKD stage 3 currently at 3b with stable kidney function.  Continue RAAS " blockade (lisinopril) for renal preservation, Jardiance, and now Mounjaro.     HTN was mildly elevated today. BP log, may need to increase to 30mg of lisinopril     DM2--was well controlled, but out of Trulicity, now on Mounjaro    Mineral and Bone Disease/Secondary HPT--continue D2. PTH at goal, continue to trend    GI--discussed use of PPI, newer study not as concerning     Neuro/CVA--will alert Neuro re: CTA scheduling       RTC 5 months  45 minutes of total time spent on the encounter, which includes face to face time and non-face to face time preparing to see the patient (eg, review of tests), Obtaining and/or reviewing separately obtained history, Documenting clinical information in the electronic or other health record, Independently interpreting results (not separately reported) and communicating results to the patient/family/caregiver, or Care coordination (not separately reported).         Chest X-ray with no radiographic evidence of acute cardiopulmonary disease pt received lethargic, decreased arousability, awareness. O2 via NC. pt is not a candidate for PO diet 2/2 high risk of aspiration.

## 2024-09-24 ENCOUNTER — OFFICE VISIT (OUTPATIENT)
Dept: CARDIOLOGY | Facility: CLINIC | Age: 89
End: 2024-09-24
Payer: MEDICARE

## 2024-09-24 VITALS
SYSTOLIC BLOOD PRESSURE: 138 MMHG | BODY MASS INDEX: 33.65 KG/M2 | HEART RATE: 64 BPM | HEIGHT: 68 IN | DIASTOLIC BLOOD PRESSURE: 74 MMHG | WEIGHT: 222 LBS

## 2024-09-24 DIAGNOSIS — Z86.73 H/O TIA (TRANSIENT ISCHEMIC ATTACK) AND STROKE: ICD-10-CM

## 2024-09-24 DIAGNOSIS — E11.59 HYPERTENSION ASSOCIATED WITH DIABETES: ICD-10-CM

## 2024-09-24 DIAGNOSIS — Z86.73 CEREBROVASCULAR DISEASE, ARTERIOSCLEROTIC, POST-STROKE: ICD-10-CM

## 2024-09-24 DIAGNOSIS — I48.21 PERMANENT ATRIAL FIBRILLATION: Primary | ICD-10-CM

## 2024-09-24 DIAGNOSIS — I15.2 HYPERTENSION ASSOCIATED WITH DIABETES: ICD-10-CM

## 2024-09-24 DIAGNOSIS — E78.2 COMBINED HYPERLIPIDEMIA ASSOCIATED WITH TYPE 2 DIABETES MELLITUS: ICD-10-CM

## 2024-09-24 DIAGNOSIS — E11.69 COMBINED HYPERLIPIDEMIA ASSOCIATED WITH TYPE 2 DIABETES MELLITUS: ICD-10-CM

## 2024-09-24 DIAGNOSIS — I67.2 CEREBROVASCULAR DISEASE, ARTERIOSCLEROTIC, POST-STROKE: ICD-10-CM

## 2024-09-24 PROCEDURE — 99213 OFFICE O/P EST LOW 20 MIN: CPT | Mod: PBBFAC,PO | Performed by: INTERNAL MEDICINE

## 2024-09-24 PROCEDURE — 99999 PR PBB SHADOW E&M-EST. PATIENT-LVL III: CPT | Mod: PBBFAC,,, | Performed by: INTERNAL MEDICINE

## 2024-09-24 PROCEDURE — 99214 OFFICE O/P EST MOD 30 MIN: CPT | Mod: S$PBB,,, | Performed by: INTERNAL MEDICINE

## 2024-09-24 RX ORDER — LISINOPRIL 20 MG/1
20 TABLET ORAL DAILY
Qty: 90 TABLET | Refills: 3 | Status: SHIPPED | OUTPATIENT
Start: 2024-09-24 | End: 2025-09-19

## 2024-09-24 NOTE — PROGRESS NOTES
Subjective   Patient ID:  Héctor Desir is a 89 y.o. male who presents for follow-up of Follow-up (6 months)      HPI89 yo WM with hx of chronic AF, HTN and previous stoke. Has chronic SOB that has not changed. Denies CP. Denies palpitations, weak spells, and syncope     Review of Systems   Constitutional: Negative for decreased appetite, fever, malaise/fatigue, weight gain and weight loss.   HENT:  Negative for hearing loss and nosebleeds.    Eyes:  Negative for visual disturbance.   Cardiovascular:  Positive for dyspnea on exertion. Negative for chest pain, claudication, cyanosis, irregular heartbeat, leg swelling, near-syncope, orthopnea, palpitations, paroxysmal nocturnal dyspnea and syncope.   Respiratory:  Positive for shortness of breath. Negative for cough, hemoptysis, sleep disturbances due to breathing, snoring and wheezing.    Endocrine: Negative for cold intolerance, heat intolerance, polydipsia and polyuria.   Hematologic/Lymphatic: Negative for adenopathy and bleeding problem. Does not bruise/bleed easily.   Skin:  Negative for color change, itching, poor wound healing, rash and suspicious lesions.   Musculoskeletal:  Negative for arthritis, back pain, falls, joint pain, joint swelling, muscle cramps, muscle weakness and myalgias.   Gastrointestinal:  Negative for bloating, abdominal pain, change in bowel habit, constipation, flatus, heartburn, hematemesis, hematochezia, hemorrhoids, jaundice, melena, nausea and vomiting.   Genitourinary:  Negative for bladder incontinence, decreased libido, frequency, hematuria, hesitancy and urgency.   Neurological:  Negative for brief paralysis, difficulty with concentration, excessive daytime sleepiness, dizziness, focal weakness, headaches, light-headedness, loss of balance, numbness, vertigo and weakness.   Psychiatric/Behavioral:  Negative for altered mental status, depression and memory loss. The patient does not have insomnia and is not nervous/anxious.   "  Allergic/Immunologic: Negative for environmental allergies, hives and persistent infections.          Objective     Physical Exam  Constitutional:       General: He is not in acute distress.     Appearance: He is well-developed. He is obese. He is not diaphoretic.      Comments: /74 (BP Location: Right arm, Patient Position: Sitting, BP Method: Large (Manual))   Pulse 64   Ht 5' 8" (1.727 m)   Wt 100.7 kg (222 lb)   BMI 33.75 kg/m²      HENT:      Head: Normocephalic and atraumatic.   Eyes:      General: Lids are normal. No scleral icterus.        Right eye: No discharge.      Conjunctiva/sclera: Conjunctivae normal.      Pupils: Pupils are equal, round, and reactive to light.   Neck:      Thyroid: No thyromegaly.      Vascular: No JVD.      Trachea: No tracheal deviation.   Cardiovascular:      Rate and Rhythm: Normal rate. Rhythm irregularly irregular.      Pulses: Intact distal pulses.           Carotid pulses are 2+ on the right side and 2+ on the left side.       Radial pulses are 2+ on the right side and 2+ on the left side.        Femoral pulses are 2+ on the right side and 2+ on the left side.       Popliteal pulses are 2+ on the right side and 2+ on the left side.        Dorsalis pedis pulses are 2+ on the right side and 2+ on the left side.        Posterior tibial pulses are 2+ on the right side and 2+ on the left side.      Heart sounds: Normal heart sounds, S1 normal and S2 normal. No murmur heard.     No friction rub. No gallop.   Pulmonary:      Effort: Pulmonary effort is normal. No respiratory distress.      Breath sounds: Normal breath sounds. No wheezing or rales.   Chest:      Chest wall: No tenderness.   Abdominal:      General: Bowel sounds are normal. There is no distension.      Palpations: Abdomen is soft. There is no hepatomegaly or mass.      Tenderness: There is no abdominal tenderness. There is no guarding or rebound.   Musculoskeletal:         General: No tenderness. Normal " range of motion.      Cervical back: Normal range of motion and neck supple.   Lymphadenopathy:      Cervical: No cervical adenopathy.   Skin:     General: Skin is warm and dry.      Coloration: Skin is not pale.      Findings: No erythema or rash.   Neurological:      Mental Status: He is alert and oriented to person, place, and time.      Cranial Nerves: No cranial nerve deficit.      Coordination: Coordination normal.      Deep Tendon Reflexes: Reflexes are normal and symmetric.   Psychiatric:         Speech: Speech normal.         Behavior: Behavior normal.         Thought Content: Thought content normal.         Judgment: Judgment normal.            Assessment and Plan     1. Permanent atrial fibrillation    2. Cerebrovascular disease, arteriosclerotic, post-stroke    3. 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 Hypertension associated with diabetes    5. H/O TIA (transient ischemic attack) and stroke        Plan:  BP and lipids controlled  Patient advised to modify risk factors such as weight, exercise, diet,  tobacco and alcohol exposure    Risk of stroke from atrial fib has been discussed as well as bleeding risk from alternative anticoagulation regiments as well as risk and benefits of rate control vs cardioversion   The current medical regimen is effective;  continue present plan and medications.   No orders of the defined types were placed in this encounter.    Follow up in about 6 months (around 3/24/2025).   Advance Care Planning     Date: 09/24/2024

## 2024-09-26 ENCOUNTER — HOSPITAL ENCOUNTER (OUTPATIENT)
Dept: RADIOLOGY | Facility: HOSPITAL | Age: 89
Discharge: HOME | End: 2024-09-26
Attending: PSYCHIATRY & NEUROLOGY
Payer: MEDICARE

## 2024-09-26 PROCEDURE — 70496 CT ANGIOGRAPHY HEAD: CPT | Mod: 26,,, | Performed by: STUDENT IN AN ORGANIZED HEALTH CARE EDUCATION/TRAINING PROGRAM

## 2024-09-26 PROCEDURE — 70498 CT ANGIOGRAPHY NECK: CPT | Mod: 26,,, | Performed by: STUDENT IN AN ORGANIZED HEALTH CARE EDUCATION/TRAINING PROGRAM

## 2024-09-26 PROCEDURE — 25500020 PHARM REV CODE 255: Performed by: PSYCHIATRY & NEUROLOGY

## 2024-09-26 PROCEDURE — 25000003 PHARM REV CODE 250: Performed by: PSYCHIATRY & NEUROLOGY

## 2024-09-26 PROCEDURE — 70496 CT ANGIOGRAPHY HEAD: CPT | Mod: TC

## 2024-09-26 RX ADMIN — IOHEXOL 100 ML: 350 INJECTION, SOLUTION INTRAVENOUS at 02:09

## 2024-09-26 RX ADMIN — SODIUM CHLORIDE 500 ML: 0.9 INJECTION, SOLUTION INTRAVENOUS at 03:09

## 2024-09-26 RX ADMIN — SODIUM CHLORIDE 500 ML: 0.9 INJECTION, SOLUTION INTRAVENOUS at 02:09

## 2024-09-30 PROBLEM — G45.9 TIA (TRANSIENT ISCHEMIC ATTACK): Status: RESOLVED | Noted: 2024-06-27 | Resolved: 2024-09-30

## 2024-11-29 DIAGNOSIS — I15.2 HYPERTENSION ASSOCIATED WITH DIABETES: ICD-10-CM

## 2024-11-29 DIAGNOSIS — R60.9 EDEMA, UNSPECIFIED TYPE: ICD-10-CM

## 2024-11-29 DIAGNOSIS — I48.19 PERSISTENT ATRIAL FIBRILLATION: ICD-10-CM

## 2024-11-29 DIAGNOSIS — E11.59 HYPERTENSION ASSOCIATED WITH DIABETES: ICD-10-CM

## 2024-11-29 DIAGNOSIS — K21.9 GASTROESOPHAGEAL REFLUX DISEASE WITHOUT ESOPHAGITIS: ICD-10-CM

## 2024-11-29 DIAGNOSIS — I48.20 CHRONIC ATRIAL FIBRILLATION: ICD-10-CM

## 2024-11-29 DIAGNOSIS — N18.31 STAGE 3A CHRONIC KIDNEY DISEASE: ICD-10-CM

## 2024-11-29 RX ORDER — OMEPRAZOLE 20 MG/1
20 CAPSULE, DELAYED RELEASE ORAL DAILY
Qty: 90 CAPSULE | Refills: 1 | Status: SHIPPED | OUTPATIENT
Start: 2024-11-29

## 2024-11-29 NOTE — TELEPHONE ENCOUNTER
Care Due:                  Date            Visit Type   Department     Provider  --------------------------------------------------------------------------------                                EP -                              PRIMARY      [facility]  Last Visit: 08-      CARE ([facility])   MEDICINE       Tae Goel  Next Visit: None Scheduled  None         None Found                                                            Last  Test          Frequency    Reason                     Performed    Due Date  --------------------------------------------------------------------------------    CMP.........  12 months..  atorvastatin.............  07-   07-    HBA1C.......  6 months...  empagliflozin,             08-   02-                             glimepiride, nateglinide.    [facility] Embedded Care Due Messages. Reference number: [id number].   11/29/2024 12:35:27 PM CST

## 2024-11-30 RX ORDER — FUROSEMIDE 40 MG/1
40 TABLET ORAL DAILY
Qty: 90 TABLET | Refills: 3 | Status: SHIPPED | OUTPATIENT
Start: 2024-11-30

## 2025-01-24 DIAGNOSIS — N18.31 STAGE 3A CHRONIC KIDNEY DISEASE: ICD-10-CM

## 2025-01-25 RX ORDER — NATEGLINIDE 120 MG/1
TABLET ORAL
Qty: 270 TABLET | Refills: 0 | Status: SHIPPED | OUTPATIENT
Start: 2025-01-25 | End: 2025-01-28 | Stop reason: SDUPTHER

## 2025-01-25 NOTE — TELEPHONE ENCOUNTER
Refill Decision Note   Héctor Desir  is requesting a refill authorization.  Brief Assessment and Rationale for Refill:  Approve     Medication Therapy Plan:        Comments:     Note composed:10:52 AM 01/25/2025

## 2025-01-25 NOTE — TELEPHONE ENCOUNTER
No care due was identified.  [facility] Embedded Care Due Messages. Reference number: [id number].   1/24/2025 8:19:41 PM CST

## 2025-01-27 ENCOUNTER — TELEPHONE (OUTPATIENT)
Dept: FAMILY MEDICINE | Facility: CLINIC | Age: 89
End: 2025-01-27
Payer: MEDICARE

## 2025-01-27 NOTE — TELEPHONE ENCOUNTER
----- Message from Melanie sent at 1/27/2025  8:29 AM CST -----  Contact: CAMPBELL AUSTIN [[id number]]  .Type:  Patient Requesting Call    Who Called:CAMPBELL AUSTIN [[id number]]  Does the patient know what this is regarding?:Patient requesting a call back in regards to having trouble getting his blood pressure medications refilled and needing a shot for blood pressure and diabetes   Would the patient rather a call back or a response via [facility]? Call back  Best Call Back Number:.[phone] (home)    Additional Information:

## 2025-01-28 DIAGNOSIS — N18.31 STAGE 3A CHRONIC KIDNEY DISEASE: ICD-10-CM

## 2025-01-28 RX ORDER — NATEGLINIDE 120 MG/1
TABLET ORAL
Qty: 270 TABLET | Refills: 0 | Status: SHIPPED | OUTPATIENT
Start: 2025-01-28

## 2025-01-28 RX ORDER — TIRZEPATIDE 5 MG/.5ML
5 INJECTION, SOLUTION SUBCUTANEOUS
Qty: 4 PEN | Refills: 2 | Status: SHIPPED | OUTPATIENT
Start: 2025-01-28

## 2025-01-28 NOTE — TELEPHONE ENCOUNTER
----- Message from Indy sent at 1/28/2025  1:07 PM CST -----  Regarding: Medical Advice  Contact: Héctor  Type:  Needs Medical Advice    Who Called: Héctor  Symptoms (please be specific):    How long has patient had these symptoms:    Pharmacy name and phone #:      [facility] - [place] - [facility]  [facility]  [place] [address]  Phone: [phone] Fax: [phone]     Would the patient rather a call back or a response via [facility]? call  Best Call Back Number: [phone] (home)      Additional Information:  Héctor is requesting a callback from the nurse today in regard to he is out of one prescription RX: nateglinide (STARLIX) 120 MG tablet and when he tried to renew the second one he was told it is not handled there any longer. Please send the RX: nateglinide (STARLIX) 120 MG tablet to [facility] for one refill. RX: tirzepatide (MOUNJARO) 7.5 mg/0.5 mL PnIj is the one not handled at [facility] anylonger. It can be sent to [facility] once also.

## 2025-01-28 NOTE — TELEPHONE ENCOUNTER
Care Due:                  Date            Visit Type   Department     Provider  --------------------------------------------------------------------------------                                EP -                              PRIMARY      [facility] FAMILY  Last Visit: 08-      CARE ([facility])   MEDICINE       Tae Goel  Next Visit: None Scheduled  None         None Found                                                            Last  Test          Frequency    Reason                     Performed    Due Date  --------------------------------------------------------------------------------    CMP.........  12 months..  atorvastatin.............  07-   07-    HBA1C.......  6 months...  empagliflozin,             08-   02-                             glimepiride, nateglinide.    [facility] Embedded Care Due Messages. Reference number: [id number].   1/28/2025 1:41:37 PM CST

## 2025-02-17 ENCOUNTER — LAB VISIT (OUTPATIENT)
Dept: LAB | Facility: HOSPITAL | Age: 89
End: 2025-02-17
Attending: FAMILY MEDICINE
Payer: MEDICARE

## 2025-02-17 DIAGNOSIS — E55.9 VITAMIN D DEFICIENCY: ICD-10-CM

## 2025-02-17 DIAGNOSIS — N18.32 STAGE 3B CHRONIC KIDNEY DISEASE: ICD-10-CM

## 2025-02-17 LAB
ALBUMIN SERPL BCP-MCNC: 3.7 G/DL (ref 3.5–5.2)
ANION GAP SERPL CALC-SCNC: 12 MMOL/L (ref 8–16)
BUN SERPL-MCNC: 22 MG/DL (ref 8–23)
CALCIUM SERPL-MCNC: 8.6 MG/DL (ref 8.7–10.5)
CHLORIDE SERPL-SCNC: 108 MMOL/L (ref 95–110)
CO2 SERPL-SCNC: 23 MMOL/L (ref 23–29)
CREAT SERPL-MCNC: 1.3 MG/DL (ref 0.5–1.4)
CREAT UR-MCNC: 85 MG/DL (ref 23–375)
EST. GFR  (NO RACE VARIABLE): 53.2 ML/MIN/1.73 M^2
GLUCOSE SERPL-MCNC: 148 MG/DL (ref 70–110)
MAGNESIUM SERPL-MCNC: 2 MG/DL (ref 1.6–2.6)
PHOSPHATE SERPL-MCNC: 2.6 MG/DL (ref 2.7–4.5)
POTASSIUM SERPL-SCNC: 4.1 MMOL/L (ref 3.5–5.1)
PROT UR-MCNC: 20 MG/DL (ref 0–15)
PROT/CREAT UR: 0.24 MG/G{CREAT} (ref 0–0.2)
SODIUM SERPL-SCNC: 143 MMOL/L (ref 136–145)

## 2025-02-17 PROCEDURE — 83036 HEMOGLOBIN GLYCOSYLATED A1C: CPT | Performed by: FAMILY MEDICINE

## 2025-02-17 PROCEDURE — 82306 VITAMIN D 25 HYDROXY: CPT | Performed by: INTERNAL MEDICINE

## 2025-02-17 PROCEDURE — 83735 ASSAY OF MAGNESIUM: CPT | Performed by: INTERNAL MEDICINE

## 2025-02-17 PROCEDURE — 80069 RENAL FUNCTION PANEL: CPT | Performed by: INTERNAL MEDICINE

## 2025-02-17 PROCEDURE — 83970 ASSAY OF PARATHORMONE: CPT | Performed by: INTERNAL MEDICINE

## 2025-02-17 PROCEDURE — 82570 ASSAY OF URINE CREATININE: CPT | Performed by: INTERNAL MEDICINE

## 2025-02-18 LAB
25(OH)D3+25(OH)D2 SERPL-MCNC: 48 NG/ML (ref 30–96)
ESTIMATED AVG GLUCOSE: 157 MG/DL (ref 68–131)
HBA1C MFR BLD: 7.1 % (ref 4–5.6)
PTH-INTACT SERPL-MCNC: 140.4 PG/ML (ref 9–77)

## 2025-02-19 ENCOUNTER — RESULTS FOLLOW-UP (OUTPATIENT)
Dept: FAMILY MEDICINE | Facility: CLINIC | Age: 89
End: 2025-02-19

## 2025-02-19 DIAGNOSIS — R60.9 EDEMA, UNSPECIFIED TYPE: ICD-10-CM

## 2025-02-19 DIAGNOSIS — E11.59 HYPERTENSION ASSOCIATED WITH DIABETES: ICD-10-CM

## 2025-02-19 DIAGNOSIS — I48.20 CHRONIC ATRIAL FIBRILLATION: ICD-10-CM

## 2025-02-19 DIAGNOSIS — I48.19 PERSISTENT ATRIAL FIBRILLATION: ICD-10-CM

## 2025-02-19 DIAGNOSIS — I15.2 HYPERTENSION ASSOCIATED WITH DIABETES: ICD-10-CM

## 2025-02-19 RX ORDER — FUROSEMIDE 40 MG/1
40 TABLET ORAL DAILY
Qty: 90 TABLET | Refills: 3 | Status: SHIPPED | OUTPATIENT
Start: 2025-02-19

## 2025-02-19 RX ORDER — TIRZEPATIDE 5 MG/.5ML
5 INJECTION, SOLUTION SUBCUTANEOUS
Qty: 12 PEN | Refills: 1 | Status: SHIPPED | OUTPATIENT
Start: 2025-02-19

## 2025-02-19 RX ORDER — LISINOPRIL 20 MG/1
20 TABLET ORAL DAILY
Qty: 90 TABLET | Refills: 3 | Status: SHIPPED | OUTPATIENT
Start: 2025-02-19 | End: 2026-02-14

## 2025-02-19 NOTE — PROGRESS NOTES
The A1c is minimally elevated but it is fine for you because I do not want to push your sugar too low and cause you to become hypoglycemic.  Please repeat this level in 6 months.

## 2025-02-19 NOTE — TELEPHONE ENCOUNTER
No care due was identified.  [facility] Embedded Care Due Messages. Reference number: [id number].   2/19/2025 12:38:15 PM CST

## 2025-02-19 NOTE — TELEPHONE ENCOUNTER
Refill Routing Note   Medication(s) are not appropriate for processing by [facility] for the following reason(s):        New or recently adjusted medication    [facility] action(s):  Defer               Appointments  past 12m or future 3m with PCP    Date Provider   Last Visit   8/14/2024 Tae Goel MD   Next Visit   Visit date not found Tae Goel MD   ED visits in past 90 days: 0        Note composed:2:15 PM 02/19/2025

## 2025-02-21 DIAGNOSIS — Z00.00 ENCOUNTER FOR MEDICARE ANNUAL WELLNESS EXAM: ICD-10-CM

## 2025-02-25 DIAGNOSIS — N18.31 STAGE 3A CHRONIC KIDNEY DISEASE: ICD-10-CM

## 2025-02-25 RX ORDER — EMPAGLIFLOZIN 25 MG/1
25 TABLET, FILM COATED ORAL
Qty: 90 TABLET | Refills: 1 | Status: SHIPPED | OUTPATIENT
Start: 2025-02-25

## 2025-02-25 NOTE — TELEPHONE ENCOUNTER
Refill Routing Note   Medication(s) are not appropriate for processing by [facility] for the following reason(s):        Drug-disease interaction    [facility] action(s):  Defer      Medication Therapy Plan: Hypertension associated with diabetes    Pharmacist review requested: Yes     Appointments  past 12m or future 3m with PCP    Date Provider   Last Visit   8/14/2024 Tae Goel MD   Next Visit   Visit date not found Tae Goel MD   ED visits in past 90 days: 0        Note composed:12:45 PM 02/25/2025

## 2025-02-25 NOTE — TELEPHONE ENCOUNTER
No care due was identified.  [facility] Embedded Care Due Messages. Reference number: [id number].   2/25/2025 12:39:14 PM CST

## 2025-02-25 NOTE — TELEPHONE ENCOUNTER
Refill Decision Note   Héctor Thang  is requesting a refill authorization.  Brief Assessment and Rationale for Refill:  Approve     Medication Therapy Plan:         Pharmacist review requested: Yes   Comments:     Note composed:1:15 PM 02/25/2025

## 2025-02-26 ENCOUNTER — OFFICE VISIT (OUTPATIENT)
Dept: NEPHROLOGY | Facility: CLINIC | Age: 89
End: 2025-02-26
Payer: MEDICARE

## 2025-02-26 VITALS
DIASTOLIC BLOOD PRESSURE: 68 MMHG | HEART RATE: 78 BPM | HEIGHT: 68 IN | WEIGHT: 216.69 LBS | BODY MASS INDEX: 32.84 KG/M2 | SYSTOLIC BLOOD PRESSURE: 130 MMHG

## 2025-02-26 DIAGNOSIS — E55.9 VITAMIN D DEFICIENCY: ICD-10-CM

## 2025-02-26 DIAGNOSIS — I48.19 PERSISTENT ATRIAL FIBRILLATION: ICD-10-CM

## 2025-02-26 DIAGNOSIS — I67.2 CEREBROVASCULAR DISEASE, ARTERIOSCLEROTIC, POST-STROKE: ICD-10-CM

## 2025-02-26 DIAGNOSIS — N18.32 STAGE 3B CHRONIC KIDNEY DISEASE: Primary | ICD-10-CM

## 2025-02-26 DIAGNOSIS — Z86.73 CEREBROVASCULAR DISEASE, ARTERIOSCLEROTIC, POST-STROKE: ICD-10-CM

## 2025-02-26 DIAGNOSIS — N18.32 TYPE 2 DIABETES MELLITUS WITH STAGE 3B CHRONIC KIDNEY DISEASE, WITHOUT LONG-TERM CURRENT USE OF INSULIN: ICD-10-CM

## 2025-02-26 DIAGNOSIS — I15.2 HYPERTENSION ASSOCIATED WITH DIABETES: ICD-10-CM

## 2025-02-26 DIAGNOSIS — E11.59 HYPERTENSION ASSOCIATED WITH DIABETES: ICD-10-CM

## 2025-02-26 DIAGNOSIS — E66.9 OBESITY (BMI 30-39.9): ICD-10-CM

## 2025-02-26 DIAGNOSIS — N25.81 SECONDARY HYPERPARATHYROIDISM, RENAL: ICD-10-CM

## 2025-02-26 DIAGNOSIS — E11.22 TYPE 2 DIABETES MELLITUS WITH STAGE 3B CHRONIC KIDNEY DISEASE, WITHOUT LONG-TERM CURRENT USE OF INSULIN: ICD-10-CM

## 2025-02-26 PROCEDURE — 99213 OFFICE O/P EST LOW 20 MIN: CPT | Mod: PBBFAC,PO | Performed by: INTERNAL MEDICINE

## 2025-02-26 PROCEDURE — 99999 PR PBB SHADOW E&M-EST. PATIENT-LVL III: CPT | Mod: PBBFAC,,, | Performed by: INTERNAL MEDICINE

## 2025-02-26 PROCEDURE — 99214 OFFICE O/P EST MOD 30 MIN: CPT | Mod: S$PBB,,, | Performed by: INTERNAL MEDICINE

## 2025-02-26 NOTE — PROGRESS NOTES
Subjective:       Patient ID: Héctor Desir is a 89 y.o. White male who presents for follow-up evaluation of Chronic Kidney Disease      HPI   He reports ongoing stress due to his wife's illness. She is no longer on hospice--but he needs to interact with her daughter who can be challenging. He feels overall well and notes his BS are running better, last Hba1c was 7.1. No LE edema nor SOB. Nocturia is 1-2X.       Interval history Feb 2019: Lost to follow up. Wife passed. He's in a new place, doing better since his Hbac1 went up. Saw Endocrine.      Interval history Dec 2019: doing well. No LE edema and no SOB. NO new medications. Got flu shot.      Interval history May 2020:  The patient location is: Home  The chief complaint leading to consultation is: CKD  Visit type: audiovisual  Total time spent with patient: 23 minutes  Each patient to whom he or she provides medical services by telemedicine is:  (1) informed of the relationship between the physician and patient and the respective role of any other health care provider with respect to management of the patient; and (2) notified that he or she may decline to receive medical services by telemedicine and may withdraw from such care at any time.   Notes: He feels well, no change in medical status. No LE edema. Last Hba1c was 7.1       Interval history August 2020: He is feeling very well. He came back from a fishing trip today. No LE edema. He thinks his BS are running pretty well when he checks them, last Hba1c was 7.2    Interval history Jan 2021: Son had COVID, thus he quarantined. He wants COVID vaccine. Overall feels well. Did not get a dog yet    Interval history Dec 2021:  Fishing, staying active. Now on Trulicity--no side effects.      Sept 2022: He is doing well. Last HBa1c was 6.7. Some LE edema. Escaped COVID thus far (known) Has olive oil daily     March 2023: saw BRENNAN    Jan 2024: He is feeling well.  TIA in July. Going fishing today. [place] GRANT with  "relatives. Drinking beet powder. Still taking olive oil. "Always eat your desert first' Tolliver years "urine on the foot"    Sep 2024: Doing OK. Still fishing.  Two months without Trulicity. Now on Mounjaro     Feb 2025: He is feeling OK, feels his age. He describes SOB but no orthopnea and no LE edema.       Review of Systems   Constitutional:  Positive for fatigue (tires out more easily).   Respiratory:  Negative for shortness of breath.    Cardiovascular:  Negative for leg swelling.   Allergic/Immunologic: Positive for immunocompromised state.   Psychiatric/Behavioral:  Negative for confusion and decreased concentration.        Objective:      Physical Exam  Vitals and nursing note reviewed.   Constitutional:       General: He is not in acute distress.     Appearance: Normal appearance. He is well-developed. He is not ill-appearing.   HENT:      Head: Atraumatic.   Neck:      Vascular: No JVD.   Cardiovascular:      Rate and Rhythm: Normal rate. Rhythm irregular.      Heart sounds: S1 normal and S2 normal.      No friction rub.   Pulmonary:      Breath sounds: No wheezing or rales.   Abdominal:      Palpations: Abdomen is soft.   Musculoskeletal:      Cervical back: Neck supple.      Right lower leg: No edema.      Left lower leg: No edema.   Skin:     General: Skin is warm and dry.   Neurological:      Mental Status: He is alert and oriented to person, place, and time. Mental status is at baseline.   Psychiatric:         Mood and Affect: Mood normal.            Assessment:       1. Stage 3b chronic kidney disease    2. Hypertension associated with diabetes    3. Secondary hyperparathyroidism, renal    4. Type 2 diabetes mellitus with stage 3b chronic kidney disease, without long-term current use of insulin    5. Obesity (BMI 30-39.9)    6. Persistent atrial fibrillation    7. Vitamin D deficiency    8. Cerebrovascular disease, arteriosclerotic, post-stroke            Plan:             CKD stage 3 currently at 3b " with stable kidney function.  Continue RAAS blockade (lisinopril) for renal preservation, Jardiance, and now Mounjaro.     HTN is currently adequately controlled     DM2--controlled for age but with consistent shipment of Mounjaro I suspect his A1c will decrease significantly     Mineral and Bone Disease/Secondary HPT--continue D2. PTH at goal, continue to trend. Watch calcium, may need to add D analogue     RTC 6 months  30 minutes of total time spent on the encounter, which includes face to face time and non-face to face time preparing to see the patient (eg, review of tests), Obtaining and/or reviewing separately obtained history, Documenting clinical information in the electronic or other health record, Independently interpreting results (not separately reported) and communicating results to the patient/family/caregiver, or Care coordination (not separately reported).

## 2025-03-25 ENCOUNTER — OFFICE VISIT (OUTPATIENT)
Dept: CARDIOLOGY | Facility: CLINIC | Age: 89
End: 2025-03-25
Payer: MEDICARE

## 2025-03-25 VITALS
HEIGHT: 68 IN | WEIGHT: 216.81 LBS | HEART RATE: 82 BPM | BODY MASS INDEX: 32.86 KG/M2 | OXYGEN SATURATION: 98 % | SYSTOLIC BLOOD PRESSURE: 144 MMHG | DIASTOLIC BLOOD PRESSURE: 86 MMHG

## 2025-03-25 DIAGNOSIS — I48.19 PERSISTENT ATRIAL FIBRILLATION: ICD-10-CM

## 2025-03-25 DIAGNOSIS — I48.20 CHRONIC ATRIAL FIBRILLATION: ICD-10-CM

## 2025-03-25 DIAGNOSIS — I70.0 AORTIC ATHEROSCLEROSIS: Primary | ICD-10-CM

## 2025-03-25 DIAGNOSIS — R60.9 EDEMA, UNSPECIFIED TYPE: ICD-10-CM

## 2025-03-25 DIAGNOSIS — E78.2 COMBINED HYPERLIPIDEMIA ASSOCIATED WITH TYPE 2 DIABETES MELLITUS: ICD-10-CM

## 2025-03-25 DIAGNOSIS — E11.69 COMBINED HYPERLIPIDEMIA ASSOCIATED WITH TYPE 2 DIABETES MELLITUS: ICD-10-CM

## 2025-03-25 DIAGNOSIS — I15.2 HYPERTENSION ASSOCIATED WITH DIABETES: ICD-10-CM

## 2025-03-25 DIAGNOSIS — E11.59 HYPERTENSION ASSOCIATED WITH DIABETES: ICD-10-CM

## 2025-03-25 PROCEDURE — 99213 OFFICE O/P EST LOW 20 MIN: CPT | Mod: PBBFAC,PO | Performed by: INTERNAL MEDICINE

## 2025-03-25 PROCEDURE — 99999 PR PBB SHADOW E&M-EST. PATIENT-LVL III: CPT | Mod: PBBFAC,,, | Performed by: INTERNAL MEDICINE

## 2025-03-25 PROCEDURE — 99214 OFFICE O/P EST MOD 30 MIN: CPT | Mod: S$PBB,,, | Performed by: INTERNAL MEDICINE

## 2025-03-25 RX ORDER — LISINOPRIL 20 MG/1
20 TABLET ORAL DAILY
Qty: 90 TABLET | Refills: 3 | Status: SHIPPED | OUTPATIENT
Start: 2025-03-25 | End: 2026-03-20

## 2025-03-25 RX ORDER — ATORVASTATIN CALCIUM 40 MG/1
40 TABLET, FILM COATED ORAL DAILY
Qty: 90 TABLET | Refills: 3 | Status: SHIPPED | OUTPATIENT
Start: 2025-03-25

## 2025-03-25 RX ORDER — METOPROLOL SUCCINATE 50 MG/1
50 TABLET, EXTENDED RELEASE ORAL DAILY
Qty: 90 TABLET | Refills: 3 | Status: SHIPPED | OUTPATIENT
Start: 2025-03-25

## 2025-03-25 RX ORDER — FUROSEMIDE 40 MG/1
40 TABLET ORAL DAILY
Qty: 90 TABLET | Refills: 3 | Status: SHIPPED | OUTPATIENT
Start: 2025-03-25

## 2025-03-25 NOTE — PROGRESS NOTES
Subjective   Patient ID:  Héctor Desir is a 89 y.o. male who presents for follow-up of No chief complaint on file.      HPI89 yo WM with hx of chronic AF, HTN and previous stoke Still has some chronic SOB but unchanged. He states he is doing well.     Review of Systems   Constitutional: Negative for decreased appetite, fever, malaise/fatigue, weight gain and weight loss.   HENT:  Negative for hearing loss and nosebleeds.    Eyes:  Negative for visual disturbance.   Cardiovascular:  Positive for dyspnea on exertion. Negative for chest pain, claudication, cyanosis, irregular heartbeat, leg swelling, near-syncope, orthopnea, palpitations, paroxysmal nocturnal dyspnea and syncope.   Respiratory:  Negative for cough, hemoptysis, shortness of breath, sleep disturbances due to breathing, snoring and wheezing.    Endocrine: Negative for cold intolerance, heat intolerance, polydipsia and polyuria.   Hematologic/Lymphatic: Negative for adenopathy and bleeding problem. Does not bruise/bleed easily.   Skin:  Negative for color change, itching, poor wound healing, rash and suspicious lesions.   Musculoskeletal:  Negative for arthritis, back pain, falls, joint pain, joint swelling, muscle cramps, muscle weakness and myalgias.   Gastrointestinal:  Negative for bloating, abdominal pain, change in bowel habit, constipation, flatus, heartburn, hematemesis, hematochezia, hemorrhoids, jaundice, melena, nausea and vomiting.   Genitourinary:  Negative for bladder incontinence, decreased libido, frequency, hematuria, hesitancy and urgency.   Neurological:  Negative for brief paralysis, difficulty with concentration, excessive daytime sleepiness, dizziness, focal weakness, headaches, light-headedness, loss of balance, numbness, vertigo and weakness.   Psychiatric/Behavioral:  Negative for altered mental status, depression and memory loss. The patient does not have insomnia and is not nervous/anxious.    Allergic/Immunologic: Negative for  "environmental allergies, hives and persistent infections.          Objective     Physical Exam  Constitutional:       General: He is not in acute distress.     Appearance: He is well-developed. He is not diaphoretic.      Comments: BP (!) 144/86 (BP Location: Right arm, Patient Position: Sitting)   Pulse 82   Ht 5' 8" (1.727 m)   Wt 98.3 kg (216 lb 12.8 oz)   SpO2 98%   BMI 32.96 kg/m²      HENT:      Head: Normocephalic and atraumatic.   Eyes:      General: Lids are normal. No scleral icterus.        Right eye: No discharge.      Conjunctiva/sclera: Conjunctivae normal.      Pupils: Pupils are equal, round, and reactive to light.   Neck:      Thyroid: No thyromegaly.      Vascular: No JVD.      Trachea: No tracheal deviation.   Cardiovascular:      Rate and Rhythm: Normal rate. Rhythm irregularly irregular.      Pulses: Intact distal pulses.           Carotid pulses are 2+ on the right side and 2+ on the left side.       Radial pulses are 2+ on the right side and 2+ on the left side.        Femoral pulses are 2+ on the right side and 2+ on the left side.       Popliteal pulses are 2+ on the right side and 2+ on the left side.        Dorsalis pedis pulses are 2+ on the right side and 2+ on the left side.        Posterior tibial pulses are 2+ on the right side and 2+ on the left side.      Heart sounds: Normal heart sounds, S1 normal and S2 normal. No murmur heard.     No friction rub. No gallop.   Pulmonary:      Effort: Pulmonary effort is normal. No respiratory distress.      Breath sounds: Normal breath sounds. No wheezing or rales.   Chest:      Chest wall: No tenderness.   Abdominal:      General: Bowel sounds are normal. There is no distension.      Palpations: Abdomen is soft. There is no hepatomegaly or mass.      Tenderness: There is no abdominal tenderness. There is no guarding or rebound.   Musculoskeletal:         General: No tenderness. Normal range of motion.      Cervical back: Normal range of " motion and neck supple.   Lymphadenopathy:      Cervical: No cervical adenopathy.   Skin:     General: Skin is warm and dry.      Coloration: Skin is not pale.      Findings: No erythema or rash.   Neurological:      Mental Status: He is alert and oriented to person, place, and time.      Cranial Nerves: No cranial nerve deficit.      Coordination: Coordination normal.      Deep Tendon Reflexes: Reflexes are normal and symmetric.   Psychiatric:         Speech: Speech normal.         Behavior: Behavior normal.         Thought Content: Thought content normal.         Judgment: Judgment normal.            Assessment and Plan     1. Aortic atherosclerosis stable   2. Persistent atrial fibrillation rate controlled   3. Chronic atrial fibrillation    4. Hypertension associated with diabetes reasonable control   5. Edema, unspecified type resolved   6. Combined hyperlipidemia associated with type 2 diabetes mellitus controlled       Plan:  The current medical regimen is effective;  continue present plan and medications.    Risk of stroke from atrial fib has been discussed as well as bleeding risk from alternative anticoagulation regiments as well as risk and benefits of rate control vs cardioversion   No orders of the defined types were placed in this encounter.    Follow up in about 6 months (around 9/25/2025).   Advance Care Planning     Date: 03/25/2025

## 2025-04-21 NOTE — ED PROVIDER NOTES
Encounter Date: 12/12/2022       History     Chief Complaint   Patient presents with    Leg Pain     Left leg pain after hitting leg on trailer hitch x 4 days,     87-year-old male with complaint of bruising to left lower leg for the past couple days.  Patient reports that he accidentally struck his left lower leg on a trailer hitch 5 days ago and noticed bruising a few days ago.  Patient reports mild pain at site.  Patient reports ambulatory without problems.      Review of patient's allergies indicates:   Allergen Reactions    Amlodipine      swelling     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     DM (diabetes mellitus) type II controlled, neurological manifestation 7/5/2013    GERD (gastroesophageal reflux disease)     History of prostate cancer 2006    Hypertension associated with diabetes     Hypertension goal BP (blood pressure) < 130/80     Obesity     Prostate cancer     TIA (transient ischemic attack)     Trouble in sleeping      Past Surgical History:   Procedure Laterality Date    CARDIAC CATHETERIZATION      CARDIOVERSION N/A 8/10/2018    Procedure: CARDIOVERSION;  Surgeon: Israel Cardoza MD;  Location: [facility] CATH LAB;  Service: Cardiology;  Laterality: N/A;  Dr. Talamantes pt    CATARACT EXTRACTION W/  INTRAOCULAR LENS IMPLANT  11/2013    Bilateral Cataract     COLONOSCOPY N/A 12/20/2019    Procedure: COLONOSCOPY;  Surgeon: Forrest Roy MD;  Location: [facility] ENDO;  Service: Endoscopy;  Laterality: N/A;    COLONOSCOPY W/ POLYPECTOMY      EYE SURGERY      PROSTATECTOMY  2006    TONSILLECTOMY      TONSILLECTOMY, ADENOIDECTOMY, BILATERAL MYRINGOTOMY AND TUBES       Family History   Problem Relation Age of Onset    Heart  Giovanni Oquendo,     Napier   attack Mother     Melanoma Father     Cancer Father     Anesthesia problems Neg Hx     Clotting disorder Neg Hx     Kidney disease Neg Hx     Amblyopia Neg Hx     Blindness Neg Hx     Cataracts Neg Hx     Glaucoma Neg Hx     Retinal detachment Neg Hx     Macular degeneration Neg Hx     Strabismus Neg Hx      Social History     Tobacco Use    Smoking status: Never    Smokeless tobacco: Never   Substance Use Topics    Alcohol use: No     Comment: He used to drink but quit in 1990    Drug use: No     Review of Systems   Constitutional:  Negative for fever.   HENT:  Negative for sore throat.    Respiratory:  Negative for shortness of breath.    Cardiovascular:  Negative for chest pain.   Gastrointestinal:  Negative for nausea.   Genitourinary:  Negative for dysuria.   Musculoskeletal:  Negative for back pain.        Left leg pain    Skin:  Negative for rash.   Neurological:  Negative for weakness.   Hematological:  Does not bruise/bleed easily.     Physical Exam     Initial Vitals [12/12/22 1338]   BP Pulse Resp Temp SpO2   (!) 140/77 85 20 98.4 °F (36.9 °C) 97 %      MAP       --         Physical Exam    Nursing note and vitals reviewed.  Constitutional: He appears well-developed and well-nourished.   HENT:   Head: Normocephalic and atraumatic.   Eyes: Conjunctivae are normal. Pupils are equal, round, and reactive to light.   Neck: Neck supple.   Normal range of motion.  Cardiovascular:  Normal rate, regular rhythm, normal heart sounds and intact distal pulses.           Pulmonary/Chest: Breath sounds normal.   Abdominal: Abdomen is soft. There is no rebound and no guarding.   Musculoskeletal:         General: Normal range of motion.      Cervical back: Normal range of motion and neck supple.      Comments: Abrasion with small hematoma left distal anterior medial tibia region with moderate ecchymosis left medial ankle and distal tibia region, ambulatory without difficulty, 2+ left dorsalis pedis pulse     Neurological:  He is alert.   Skin: Skin is warm and dry.   Psychiatric: He has a normal mood and affect. His behavior is normal. Thought content normal.       ED Course   Procedures  Labs Reviewed - No data to display       Imaging Results              X-Ray Tibia Fibula 2 View Left (Final result)  Result time 12/12/22 14:21:01      Final result by Barney Felipe MD (12/12/22 14:21:01)                   Impression:      1. Negative for acute fracture or dislocation of the left leg.  2. Diffuse subcutaneous soft tissue edema.      Electronically signed by: Barney Felipe  Date:    12/12/2022  Time:    14:21               Narrative:    EXAMINATION:  XR TIBIA FIBULA 2 VIEW LEFT    CLINICAL HISTORY:  Pain in leg, unspecified.    COMPARISON:  None.    FINDINGS:  No acute fracture or dislocation of the left tibia or fibula.  No osseous lesion.  Diffuse subcutaneous soft tissue edema.  No gas or radiopaque foreign body.                                    Imaging Results              X-Ray Tibia Fibula 2 View Left (Final result)  Result time 12/12/22 14:21:01      Final result by Barney Felipe MD (12/12/22 14:21:01)                   Impression:      1. Negative for acute fracture or dislocation of the left leg.  2. Diffuse subcutaneous soft tissue edema.      Electronically signed by: Barney Felipe  Date:    12/12/2022  Time:    14:21               Narrative:    EXAMINATION:  XR TIBIA FIBULA 2 VIEW LEFT    CLINICAL HISTORY:  Pain in leg, unspecified.    COMPARISON:  None.    FINDINGS:  No acute fracture or dislocation of the left tibia or fibula.  No osseous lesion.  Diffuse subcutaneous soft tissue edema.  No gas or radiopaque foreign body.                                         Medications - No data to display                           Clinical Impression:   Final diagnoses:  [M79.606] Leg pain  [S80.12XA] Contusion of left leg, initial encounter (Primary)  [S80.12XA] Hematoma of left lower leg        ED  Disposition Condition    Discharge Stable          ED Prescriptions    None       Follow-up Information       Follow up With Specialties Details Why Contact Info    Tae Goel MD Family Medicine Schedule an appointment as soon as possible for a visit  As needed [id number] [address] [facility] [address]  [phone]               Iván Echavarria NP  12/12/22 6926

## 2025-06-10 DIAGNOSIS — N18.31 STAGE 3A CHRONIC KIDNEY DISEASE: ICD-10-CM

## 2025-06-10 NOTE — TELEPHONE ENCOUNTER
Care Due:                  Date            Visit Type   Department     Provider  --------------------------------------------------------------------------------                                EP -                              PRIMARY      [facility] FAMILY  Last Visit: 08-      CARE ([facility])   MEDICINE       Tae Goel  Next Visit: None Scheduled  None         None Found                                                            Last  Test          Frequency    Reason                     Performed    Due Date  --------------------------------------------------------------------------------    Office Visit  12 months..  nateglinide..............  08- 08-    HBA1C.......  6 months...  JARDIANCE, glimepiride,    02- 08-                             nateglinide, tirzepatide.    [facility] Embedded Care Due Messages. Reference number: [id number].   6/10/2025 4:33:56 PM CDT

## 2025-06-11 RX ORDER — NATEGLINIDE 120 MG/1
TABLET ORAL
Qty: 270 TABLET | Refills: 0 | Status: SHIPPED | OUTPATIENT
Start: 2025-06-11

## 2025-06-11 RX ORDER — TIRZEPATIDE 5 MG/.5ML
5 INJECTION, SOLUTION SUBCUTANEOUS
Qty: 12 PEN | Refills: 1 | Status: SHIPPED | OUTPATIENT
Start: 2025-06-11

## 2025-06-26 DIAGNOSIS — N18.31 STAGE 3A CHRONIC KIDNEY DISEASE: ICD-10-CM

## 2025-06-26 DIAGNOSIS — K21.9 GASTROESOPHAGEAL REFLUX DISEASE WITHOUT ESOPHAGITIS: ICD-10-CM

## 2025-06-26 RX ORDER — OMEPRAZOLE 20 MG/1
20 CAPSULE, DELAYED RELEASE ORAL
Qty: 90 CAPSULE | Refills: 0 | Status: SHIPPED | OUTPATIENT
Start: 2025-06-26

## 2025-06-26 RX ORDER — GLIMEPIRIDE 4 MG/1
TABLET ORAL
Refills: 0 | OUTPATIENT
Start: 2025-06-26

## 2025-06-26 NOTE — TELEPHONE ENCOUNTER
[facility] Note  Quick DC. Inappropriate Request   Refill request requires further review by MD: NO   Medication Therapy Plan: Pharmacy is requesting new script(s) for the following medications without required information, (sig/ frequency/qty/etc)     [facility] action(s):  Quick Discontinue      Duplicate Pended Encounter(s)/ Last Prescribed Details:    Pharmacies have been requesting medications for patients without required information, (sig, frequency, qty, etc.). In addition, requests are sent for medication(s) pt. are currently not taking, and medications patients have never taken.    We have spoken to the pharmacies about these request types and advised their teams previously that we are unable to assess these New Script requests and require all details for these requests. This is a known issue and has been reported.        Medication related problems are not assessed for [facility].   Medication Reconciliation Completed? NO Were there pending details that required adjustment? NO     Automatic Epic Generated Protocol Data Below:   Requested Prescriptions     Pending Prescriptions Disp Refills    glimepiride (AMARYL) 4 MG tablet [Pharmacy Med Name: GLIMEPIRIDE TABS 4MG]  0              Appointments      Date Provider   Last Visit   8/14/2024 Tae Goel MD   Next Visit   6/26/2025 Tae Goel MD        Note composed:2:40 PM 06/26/2025

## 2025-06-26 NOTE — TELEPHONE ENCOUNTER
No care due was identified.  [facility] Embedded Care Due Messages. Reference number: [id number].   6/26/2025 2:08:52 PM CDT

## 2025-06-26 NOTE — TELEPHONE ENCOUNTER
No care due was identified.  [facility] Embedded Care Due Messages. Reference number: [id number].   6/26/2025 2:09:24 PM CDT

## 2025-06-26 NOTE — TELEPHONE ENCOUNTER
Refill Decision Note   Héctor Desir  is requesting a refill authorization.  Brief Assessment and Rationale for Refill:  Approve     Medication Therapy Plan:         Comments:     Note composed:2:45 PM 06/26/2025

## 2025-06-27 ENCOUNTER — PATIENT MESSAGE (OUTPATIENT)
Dept: FAMILY MEDICINE | Facility: CLINIC | Age: 89
End: 2025-06-27
Payer: MEDICARE

## 2025-06-27 DIAGNOSIS — E11.42 DIABETIC POLYNEUROPATHY ASSOCIATED WITH TYPE 2 DIABETES MELLITUS: ICD-10-CM

## 2025-06-27 DIAGNOSIS — F41.9 ANXIETY: ICD-10-CM

## 2025-06-27 RX ORDER — DULOXETIN HYDROCHLORIDE 60 MG/1
60 CAPSULE, DELAYED RELEASE ORAL
Qty: 90 CAPSULE | Refills: 0 | Status: SHIPPED | OUTPATIENT
Start: 2025-06-27

## 2025-06-27 NOTE — TELEPHONE ENCOUNTER
Refill Routing Note   Medication(s) are not appropriate for processing by [facility] for the following reason(s):        Required vitals abnormal    [facility] action(s):  Defer               Appointments  past 12m or future 3m with PCP    Date Provider   Last Visit   8/14/2024 Tae Goel MD   Next Visit   Visit date not found Tae Goel MD   ED visits in past 90 days: 0        Note composed:5:32 AM 06/27/2025

## 2025-06-27 NOTE — TELEPHONE ENCOUNTER
No care due was identified.  [facility] Embedded Care Due Messages. Reference number: [id number].   6/27/2025 2:10:42 AM CDT

## 2025-07-07 ENCOUNTER — TELEPHONE (OUTPATIENT)
Dept: FAMILY MEDICINE | Facility: CLINIC | Age: 89
End: 2025-07-07
Payer: MEDICARE

## 2025-07-07 NOTE — TELEPHONE ENCOUNTER
Copied from CRM #[id number]. Topic: Appointments - Appointment Access  >> Jul 7, 2025 10:06 AM Carmella wrote:  Type:  Sooner Apoointment Request    Caller is requesting a sooner appointment.  Caller declined first available appointment listed below.  Caller will not accept being placed on the waitlist and is requesting a message be sent to doctor.  Name of Caller:wife   When is the first available appointment?03/2026  Symptoms:annual / L leg dragging and L hand shaking issues   Would the patient rather a call back or a response via [facility]? Call back   Best Call Back Number:[phone]   Additional Information: requesting to see faisal barragan is also booked until aug

## 2025-07-26 ENCOUNTER — HOSPITAL ENCOUNTER (EMERGENCY)
Facility: HOSPITAL | Age: 89
Discharge: HOME | End: 2025-07-26
Attending: EMERGENCY MEDICINE
Payer: MEDICARE

## 2025-07-26 VITALS
DIASTOLIC BLOOD PRESSURE: 64 MMHG | HEIGHT: 68 IN | WEIGHT: 213 LBS | HEART RATE: 79 BPM | OXYGEN SATURATION: 95 % | RESPIRATION RATE: 14 BRPM | SYSTOLIC BLOOD PRESSURE: 132 MMHG | BODY MASS INDEX: 32.28 KG/M2 | TEMPERATURE: 98 F

## 2025-07-26 DIAGNOSIS — R19.7 DIARRHEA, UNSPECIFIED TYPE: Primary | ICD-10-CM

## 2025-07-26 LAB
ABORH RETYPE: NORMAL
ABSOLUTE EOSINOPHIL (OHS): 4.13 K/UL
ABSOLUTE MONOCYTE (OHS): 0.7 K/UL (ref 0.3–1)
ABSOLUTE NEUTROPHIL COUNT (OHS): 5.65 K/UL (ref 1.8–7.7)
ALBUMIN SERPL BCP-MCNC: 3.6 G/DL (ref 3.5–5.2)
ALP SERPL-CCNC: 144 UNIT/L (ref 40–150)
ALT SERPL W/O P-5'-P-CCNC: 20 UNIT/L (ref 10–44)
ANION GAP (OHS): 12 MMOL/L (ref 8–16)
ANISOCYTOSIS BLD QL SMEAR: SLIGHT
APTT PPP: 27.1 SECONDS (ref 21–32)
AST SERPL-CCNC: 23 UNIT/L (ref 11–45)
BASOPHILS # BLD AUTO: 0.02 K/UL
BASOPHILS NFR BLD AUTO: 0.2 %
BILIRUB SERPL-MCNC: 0.7 MG/DL (ref 0.1–1)
BUN SERPL-MCNC: 25 MG/DL (ref 8–23)
CALCIUM SERPL-MCNC: 8.9 MG/DL (ref 8.7–10.5)
CHLORIDE SERPL-SCNC: 110 MMOL/L (ref 95–110)
CO2 SERPL-SCNC: 21 MMOL/L (ref 23–29)
CREAT SERPL-MCNC: 1.7 MG/DL (ref 0.5–1.4)
ERYTHROCYTE [DISTWIDTH] IN BLOOD BY AUTOMATED COUNT: 16 % (ref 11.5–14.5)
GFR SERPLBLD CREATININE-BSD FMLA CKD-EPI: 38 ML/MIN/1.73/M2
GLUCOSE SERPL-MCNC: 202 MG/DL (ref 70–110)
HCT VFR BLD AUTO: 47.6 % (ref 40–54)
HCV AB SERPL QL IA: NORMAL
HGB BLD-MCNC: 15.5 GM/DL (ref 14–18)
HIV 1+2 AB+HIV1 P24 AG SERPL QL IA: NORMAL
IMM GRANULOCYTES # BLD AUTO: 0.04 K/UL (ref 0–0.04)
IMM GRANULOCYTES NFR BLD AUTO: 0.3 % (ref 0–0.5)
INDIRECT COOMBS: NORMAL
INR PPP: 1.2 (ref 0.8–1.2)
LARGE/GIANT PLATELETS (OHS): PRESENT
LYMPHOCYTES # BLD AUTO: 1 K/UL (ref 1–4.8)
MCH RBC QN AUTO: 27 PG (ref 27–31)
MCHC RBC AUTO-ENTMCNC: 32.6 G/DL (ref 32–36)
MCV RBC AUTO: 83 FL (ref 82–98)
NUCLEATED RBC (/100WBC) (OHS): 0 /100 WBC
OB PNL STL: POSITIVE
OVALOCYTES BLD QL SMEAR: NORMAL
PLATELET # BLD AUTO: 141 K/UL (ref 150–450)
PLATELET BLD QL SMEAR: NORMAL
PMV BLD AUTO: 11.4 FL (ref 9.2–12.9)
POIKILOCYTOSIS BLD QL SMEAR: SLIGHT
POTASSIUM SERPL-SCNC: 3.9 MMOL/L (ref 3.5–5.1)
PROT SERPL-MCNC: 6.4 GM/DL (ref 6–8.4)
PROTHROMBIN TIME: 13.1 SECONDS (ref 9–12.5)
RBC # BLD AUTO: 5.75 M/UL (ref 4.6–6.2)
RELATIVE EOSINOPHIL (OHS): 35.8 %
RELATIVE LYMPHOCYTE (OHS): 8.7 % (ref 18–48)
RELATIVE MONOCYTE (OHS): 6.1 % (ref 4–15)
RELATIVE NEUTROPHIL (OHS): 48.9 % (ref 38–73)
RH BLD: NORMAL
SODIUM SERPL-SCNC: 143 MMOL/L (ref 136–145)
SPECIMEN OUTDATE: NORMAL
WBC # BLD AUTO: 11.54 K/UL (ref 3.9–12.7)

## 2025-07-26 PROCEDURE — 25500020 PHARM REV CODE 255: Performed by: EMERGENCY MEDICINE

## 2025-07-26 PROCEDURE — 93005 ELECTROCARDIOGRAM TRACING: CPT

## 2025-07-26 PROCEDURE — 85025 COMPLETE CBC W/AUTO DIFF WBC: CPT | Performed by: EMERGENCY MEDICINE

## 2025-07-26 PROCEDURE — 82272 OCCULT BLD FECES 1-3 TESTS: CPT | Performed by: EMERGENCY MEDICINE

## 2025-07-26 PROCEDURE — 85610 PROTHROMBIN TIME: CPT | Performed by: EMERGENCY MEDICINE

## 2025-07-26 PROCEDURE — 86803 HEPATITIS C AB TEST: CPT | Performed by: EMERGENCY MEDICINE

## 2025-07-26 PROCEDURE — 87389 HIV-1 AG W/HIV-1&-2 AB AG IA: CPT | Performed by: EMERGENCY MEDICINE

## 2025-07-26 PROCEDURE — 80053 COMPREHEN METABOLIC PANEL: CPT | Performed by: EMERGENCY MEDICINE

## 2025-07-26 PROCEDURE — 86850 RBC ANTIBODY SCREEN: CPT | Performed by: EMERGENCY MEDICINE

## 2025-07-26 PROCEDURE — 93010 ELECTROCARDIOGRAM REPORT: CPT | Mod: ,,, | Performed by: INTERNAL MEDICINE

## 2025-07-26 PROCEDURE — 85730 THROMBOPLASTIN TIME PARTIAL: CPT | Performed by: EMERGENCY MEDICINE

## 2025-07-26 PROCEDURE — 99285 EMERGENCY DEPT VISIT HI MDM: CPT | Mod: 25

## 2025-07-26 RX ADMIN — IOHEXOL 100 ML: 350 INJECTION, SOLUTION INTRAVENOUS at 07:07

## 2025-07-26 NOTE — ED PROVIDER NOTES
"Emergency Medicine Provider Note - 7/26/2025       SCRIBE NOTE: I, Sabiha Kong, am scribing for, and in the presence of Precious Hendrickson, , FACEP     History     Chief Complaint   Patient presents with    Rectal Bleeding     Noted diarrhea/ black stools 4 days ago, started taking Pepto Bismol yesterday. States abd feels uncomfortable, but denies pain. + nausea, denies vomiting.        Allergies:  Review of patient's allergies indicates:   Allergen Reactions    Amlodipine      swelling       History of Present Illness   HPI    7/26/2025, 6:28 PM  History obtained from the patient and medical records      History of Present Illness: Héctor Desir is a 89 y.o. male patient with a PMHx of GERD, HTN, TIA, HLD, DM2, HTN, CKD, A-fib, anxiety, and prostate cancer who presents to the Emergency Department for evaluation of "black stools "which began ~4.5 days ago. Symptoms are constant and moderate in severity. No mitigating or exacerbating factors reported. Associated sxs include diarrhea and lower abdominal pain. Patient denies any fever, n/v, syncope or changes in lightheadedness (would occur when standing up prior to new sxs) at this time. No prior Tx specified. Pt is on Plavix. No further complaints or concerns at this time.     Photo of "black stool"      Arrival mode: Personal Transportation    PCP: Tae Goel MD    Past Medical History:  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     DM (diabetes mellitus) type II controlled, neurological manifestation 7/5/2013    GERD (gastroesophageal reflux disease)     History of prostate cancer 2006    Hypertension associated with diabetes     Hypertension goal BP (blood pressure) < 130/80  " "   Obesity     Prostate cancer     TIA (transient ischemic attack)     Trouble in sleeping        Past Surgical History:  Past Surgical History:   Procedure Laterality Date    CARDIAC CATHETERIZATION      CARDIOVERSION N/A 8/10/2018    Procedure: CARDIOVERSION;  Surgeon: Israel Cardoza MD;  Location: [facility] CATH LAB;  Service: Cardiology;  Laterality: N/A;  Dr. Talamantes pt    CATARACT EXTRACTION W/  INTRAOCULAR LENS IMPLANT  11/2013    Bilateral Cataract     COLONOSCOPY N/A 12/20/2019    Procedure: COLONOSCOPY;  Surgeon: Forrest Roy MD;  Location: [facility] ENDO;  Service: Endoscopy;  Laterality: N/A;    COLONOSCOPY W/ POLYPECTOMY      EYE SURGERY      PROSTATECTOMY  2006    TONSILLECTOMY      TONSILLECTOMY, ADENOIDECTOMY, BILATERAL MYRINGOTOMY AND TUBES           Family History:  Family History   Problem Relation Name Age of Onset    Heart attack Mother      Melanoma Father      Cancer Father      Anesthesia problems Neg Hx      Clotting disorder Neg Hx      Kidney disease Neg Hx      Amblyopia Neg Hx      Blindness Neg Hx      Cataracts Neg Hx      Glaucoma Neg Hx      Retinal detachment Neg Hx      Macular degeneration Neg Hx      Strabismus Neg Hx         Social History:  Social History     Tobacco Use    Smoking status: Never    Smokeless tobacco: Never   Substance and Sexual Activity    Alcohol use: No     Comment: He used to drink but quit in 1990    Drug use: No    Sexual activity: Not on file       I have reviewed the Past Medical History, Past Surgical History, Family History and Social History as documented above.      Review of Systems   Review of Systems   Constitutional:  Negative for fever.   HENT:  Negative for sore throat.    Respiratory:  Negative for shortness of breath.    Cardiovascular:  Negative for chest pain.   Gastrointestinal:  Positive for abdominal pain (lower), blood in stool ("black stool") and diarrhea. Negative for nausea and vomiting.   Genitourinary:  Negative for dysuria. " "  Musculoskeletal:  Negative for back pain.   Skin:  Negative for rash.   Neurological:  Negative for weakness and light-headedness (no changes; chronically occurs when he stands up).   Hematological:  Does not bruise/bleed easily.   All other systems reviewed and are negative.       Physical Exam     Initial Vitals [07/26/25 1741]   BP Pulse Resp Temp SpO2   120/71 105 20 98.4 °F (36.9 °C) 95 %      MAP       --          Physical Exam    Nursing Notes and Vital Signs Reviewed.  Constitutional: Patient is in no acute distress. Well-developed and well-nourished.  Head: Atraumatic. Normocephalic.  Eyes: PERRL. EOM intact. Conjunctivae are not pale. No scleral icterus.  ENT: Mucous membranes are moist. Oropharynx is clear and symmetric.    Neck: Supple. Full ROM. No lymphadenopathy.  Cardiovascular: Regular rate. Regular rhythm. No murmurs, rubs, or gallops. Distal pulses are 2+ and symmetric.  Pulmonary/Chest: No respiratory distress. Clear to auscultation bilaterally. No wheezing or rales.  Abdominal: Soft and non-distended. Tenderness to lower abdomen.  No rebound or guarding.  Rectal:  No tenderness.  No masses.  No hemorrhoids.  Normal sphincter tone.  Stool color is green.    Musculoskeletal: Moves all extremities. No obvious deformities. No edema. No calf tenderness.  Skin: Warm and dry.  Neurological:  Alert, awake, and appropriate.  Normal speech.  No acute focal neurological deficits are appreciated.  Psychiatric: Normal affect. Good eye contact. Appropriate in content.         ED Course   ED Procedures:  Procedures    ED Vital Signs:  Vitals:    07/26/25 1741 07/26/25 1755 07/26/25 1757 07/26/25 1758   BP: 120/71  138/65    Pulse: 105 82 73    Resp: 20  14    Temp: 98.4 °F (36.9 °C)      TempSrc: Oral      SpO2: 95%      Weight: 96.7 kg (213 lb 3.2 oz)   96.6 kg (213 lb)   Height:    5' 8" (1.727 m)    07/26/25 1801 07/26/25 1920 07/26/25 2112   BP:  (!) 116/59 132/64   Pulse: 82 79 79   Resp:   14   Temp:   "    TempSrc:      SpO2:      Weight:      Height:          All Lab Results:  Results for orders placed or performed during the hospital encounter of 07/26/25   Hepatitis C Antibody    Collection Time: 07/26/25  6:25 PM   Result Value Ref Range    Hep C Ab Interp Non-Reactive Non-Reactive   HIV 1/2 Ag/Ab (4th Gen)    Collection Time: 07/26/25  6:25 PM   Result Value Ref Range    HIV 1/2 Ag/Ab Non-Reactive Non-Reactive   EKG 12-lead    Collection Time: 07/26/25  6:25 PM   Result Value Ref Range    QRS Duration 156 ms    OHS QTC Calculation 486 ms   Occult blood x 1, stool    Collection Time: 07/26/25  6:26 PM    Specimen: Stool   Result Value Ref Range    OCCULT BLOOD STOOL Positive (A) Negative   Comprehensive metabolic panel    Collection Time: 07/26/25  6:35 PM   Result Value Ref Range    Sodium 143 136 - 145 mmol/L    Potassium 3.9 3.5 - 5.1 mmol/L    Chloride 110 95 - 110 mmol/L    CO2 21 (L) 23 - 29 mmol/L    Glucose 202 (H) 70 - 110 mg/dL    BUN 25 (H) 8 - 23 mg/dL    Creatinine 1.7 (H) 0.5 - 1.4 mg/dL    Calcium 8.9 8.7 - 10.5 mg/dL    Protein Total 6.4 6.0 - 8.4 gm/dL    Albumin 3.6 3.5 - 5.2 g/dL    Bilirubin Total 0.7 0.1 - 1.0 mg/dL     40 - 150 unit/L    AST 23 11 - 45 unit/L    ALT 20 10 - 44 unit/L    Anion Gap 12 8 - 16 mmol/L    eGFR 38 (L) >60 mL/min/1.73/m2   Protime-INR    Collection Time: 07/26/25  6:35 PM   Result Value Ref Range    PT 13.1 (H) 9.0 - 12.5 seconds    INR 1.2 0.8 - 1.2   APTT    Collection Time: 07/26/25  6:35 PM   Result Value Ref Range    PTT 27.1 21.0 - 32.0 seconds   CBC with Differential    Collection Time: 07/26/25  6:35 PM   Result Value Ref Range    WBC 11.54 3.90 - 12.70 K/uL    RBC 5.75 4.60 - 6.20 M/uL    HGB 15.5 14.0 - 18.0 gm/dL    HCT 47.6 40.0 - 54.0 %    MCV 83 82 - 98 fL    MCH 27.0 27.0 - 31.0 pg    MCHC 32.6 32.0 - 36.0 g/dL    RDW 16.0 (H) 11.5 - 14.5 %    Platelet Count 141 (L) 150 - 450 K/uL    MPV 11.4 9.2 - 12.9 fL    Nucleated RBC 0 <=0 /100 WBC     Neut % 48.9 38 - 73 %    Lymph % 8.7 (L) 18 - 48 %    Mono % 6.1 4 - 15 %    Eos % 35.8 (H) <=8 %    Basophil % 0.2 <=1.9 %    Imm Grans % 0.3 0.0 - 0.5 %    Neut # 5.65 1.8 - 7.7 K/uL    Lymph # 1.00 1 - 4.8 K/uL    Mono # 0.70 0.3 - 1 K/uL    Eos # 4.13 (H) <=0.5 K/uL    Baso # 0.02 <=0.2 K/uL    Imm Grans # 0.04 0.00 - 0.04 K/uL   Morphology    Collection Time: 07/26/25  6:35 PM    Specimen: Blood   Result Value Ref Range    Platelet Estimate Appears Normal      Anisocytosis Slight     Poik Slight     Ovalocytes Occasional     Large/Giant Platelets Present    Type & Screen    Collection Time: 07/26/25  6:35 PM   Result Value Ref Range    Specimen Outdate 07/29/2025 23:59     Group & Rh O POS     Indirect Elise NEG    ABORH RETYPE    Collection Time: 07/26/25  6:35 PM   Result Value Ref Range    ABORH Retype O POS          Reviewed Prior Labs:   Latest Reference Range & Units 07/15/23 03:36 09/20/23 07:27 01/08/24 07:30 07/03/24 07:17   Hemoglobin 14.0 - 18.0 g/dL 14.9 15.8 15.0 15.4   Hematocrit 40.0 - 54.0 % 44.5 50.0 47.2 49.4       ECG Results              EKG 12-lead (In process)        Collection Time Result Time QRS Duration OHS QTC Calculation    07/26/25 18:25:25 07/26/25 20:46:57 156 486                     In process by Interface, Lab In [facility] (07/26/25 20:47:04)                   Narrative:    Test Reason : K92.2,    Vent. Rate :  70 BPM     Atrial Rate :    BPM     P-R Int :    ms          QRS Dur : 156 ms      QT Int : 450 ms       P-R-T Axes :      8 122 degrees    QTcB Int : 486 ms    Atrial fibrillation  Left bundle branch block  Abnormal ECG  When compared with ECG of 14-Jul-2023 17:47,  T wave inversion now evident in Lateral leads    Referred By: AAAREFERRAL SELF           Confirmed By:                                     Imaging Results:  Imaging Results              CTA Acute GI Bleed, Abdomen and Pelvis (Final result)  Result time 07/26/25 20:09:51      Final result by Sandra Hoffman  MD (07/26/25 20:09:51)                   Impression:       No findings highly concerning for active ongoing gastric distal hemorrhage    All CT scans at [this location] are performed using dose modulation techniques as appropriate to a performed exam including the following: automated exposure control; adjustment of the mA and/or kV according to patient size (this includes techniques or standardized protocols for targeted exams where dose is matched to indication / reason for exam; i.e. extremities or head); use of iterative reconstruction technique.    Finalized on: 7/26/2025 8:09 PM By:  Sandra Hoffman MD  [facility]# [id number]      2025-07-26 20:11:57.576     [facility]               Narrative:    EXAM: CTA ACUTE GI BLEED, ABDOMEN AND PELVIS    CLINICAL INDICATION:  Lower abdominal pain    TECHNIQUE: Axial and multiplanar 2-D reformations provided  CT abdomen pelvis without and with intravenous contrast multiphasic enhancement CT angiogram    FINDINGS:  Aberrant or variable systemic artery aorta to right lower lobe lung.  Abdominal aorta patent nonaneurysmal.  No overt active ongoing gastric distal hemorrhage identified with nonspecific intraluminal hyperdensity in the bowel on all 3 phases.  No obstructive or overt inflammatory bowel findings.    Liver pancreas spleen unremarkable  Adrenal glands enlarged greater left multinodular.  Kidneys without hydronephrosis  Urinary bladder decompressed                                                  The Emergency Provider reviewed the vital signs and test results, which are outlined above.     ED Discussion   ED Medication(s):  Medications   iohexoL (OMNIPAQUE 350) injection 100 mL (100 mLs Intravenous Given 7/26/25 1934)       ED Course as of 07/27/25 0002   Sat Jul 26, 2025 1902 Occult Blood(!): Positive [LB]   1942 BUN(!): 25 [LB]   1943 Creatinine(!): 1.7 [LB]   1943 Occult Blood(!): Positive [LB]   2102 Patient did not have any episodes tachycardia or hypotension.  Patient's  stool was more dark green in color.  It did not appear melanotic.  On the digital rectal exam, there was a single spot of bright red blood.  Patient has been having symptoms for 4-5 days.  He has not anemic.  Discussed low risk of GI bleed.  Patient feels comfortable being discharged to home.  Plan to follow up with his primary care physician [LB]      ED Course User Index  [LB] Precious Hendrickson, DO          9:04 PM Reassessment: Dr. Hendrickson reassessed the pt.  The pt is resting comfortably and is NAD.  Pt states their sx have improved. Discussed test results, shared treatment plan, specific conditions for return, and the need for f/u.  Answered their questions at this time.  Pt understands and agrees to the plan.  The pt has remained hemodynamically stable through ED course and is stable for discharge.    I discussed with patient and/or family/caretaker that evaluation in the ED does not suggest any emergent or life threatening medical conditions requiring immediate intervention beyond what was provided in the ED, and I believe patient is safe for discharge.  Regardless, an unremarkable evaluation in the ED does not preclude the development or presence of a serious of life threatening condition. As such, patient was instructed to return immediately for any worsening or change in current symptoms.     MIPS Measures     Smoker? No     Hypertension: Patient has a known history of hypertension.     Medical Decision Making                 Medical Decision Making  Differential diagnosis: Electrolyte abnormality, GI bleed, diverticulitis, diverticulosis    Cardiac monitor:  Rhythm: Rate controlled atrial fibrillation  Diagnosis:  Diarrhea  Rate: 79 beats per minute    ED course: IV established.  Hemoglobin/hematocrit 15.5/47.6.  White cell count normal.  Platelet count decreased 141.  Patient has history of thrombocytopenia.  K +3.9.  BUN 25.  Creatinine 1.7. Patient's creatinine range 1.3 to 1.7.  Occult blood  positive, however during the rectal exam, much of the stool was light green in color.  There was a small dot of bright red blood.  CTA acute GI bleed:  No findings concerning for active ongoing hemorrhage.  No obstructive or overt inflammatory bowel changes.  Patient does appear to be low risk GI bleed.  Patient was extensively educated on return precautions.  All questions answered.    Amount and/or Complexity of Data Reviewed  Labs: ordered. Decision-making details documented in ED Course.  Radiology: ordered. Decision-making details documented in ED Course.  ECG/medicine tests: ordered and independent interpretation performed. Decision-making details documented in ED Course.    Risk  Prescription drug management.        Prescription Management: I performed a review of the patient's current Rx medication list as input by nursing staff.    Discharge Medication List as of 7/26/2025  9:04 PM        CONTINUE these medications which have NOT CHANGED    Details   atorvastatin (LIPITOR) 40 MG tablet Take 1 tablet (40 mg total) by mouth once daily., Starting Tue 3/25/2025, Normal      DULoxetine (CYMBALTA) 60 MG capsule TAKE 1 CAPSULE DAILY, Starting Fri 6/27/2025, Normal      ERGOCALCIFEROL, VITAMIN D2, (VITAMIN D ORAL) Take by mouth once daily. , Historical Med      furosemide (LASIX) 40 MG tablet Take 1 tablet (40 mg total) by mouth once daily., Starting Tue 3/25/2025, Normal      glimepiride (AMARYL) 4 MG tablet Take 1 tablet (4 mg total) by mouth 2 (two) times daily before meals., Starting Fri 10/6/2023, Normal      JARDIANCE 25 mg tablet TAKE 1 TABLET DAILY, Starting Tue 2/25/2025, Normal      lisinopriL (PRINIVIL,ZESTRIL) 20 MG tablet Take 1 tablet (20 mg total) by mouth once daily., Starting Tue 3/25/2025, Until Fri 3/20/2026, Normal      metoprolol succinate (TOPROL-XL) 50 MG 24 hr tablet Take 1 tablet (50 mg total) by mouth once daily., Starting Tue 3/25/2025, Normal      nateglinide (STARLIX) 120 MG tablet TAKE  "1 TABLET THREE TIMES A DAY BEFORE MEALS IF YOU SKIP A MEAL, DO NOT TAKE DOSE, Normal      omeprazole (PRILOSEC) 20 MG capsule TAKE 1 CAPSULE DAILY, Starting Thu 6/26/2025, Normal      rivaroxaban (XARELTO) 15 mg Tab Take 1 tablet (15 mg total) by mouth daily with dinner or evening meal., Starting Tue 3/25/2025, Normal      tirzepatide (MOUNJARO) 5 mg/0.5 mL PnIj Inject 5 mg into the skin every 7 days., Starting Wed 6/11/2025, Normal      blood sugar diagnostic Strp Use daily- Freestyle lite, Normal      blood-glucose meter kit Use before meals and before bed when the glucoses are not in control.  Otherwise, test it daily once a day before meals randomly to ensure stability of the gluocse., Print              Discussed case verbally with:N/A    Referrals:  No orders of the defined types were placed in this encounter.      Portions of this note may have been created with voice recognition software. Occasional "wrong-word" or "sound-a-like" substitutions may have occurred due to the inherent limitations of voice recognition software. Please, read the note carefully and recognize, using context, where substitutions have occurred.          Clinical Impression       ICD-10-CM ICD-9-CM   1. Diarrhea, unspecified type  R19.7 787.91         ED Disposition     Discharge to home  Patient condition: Stable        Scribe Attestation:   Scribe #1: Sabiha ESTRADA,  performed the above scribed service and the documentation accurately describes the services I performed. I attest to the accuracy of the note.     Attending:   Physician Attestation Statement for Scribe #1: Precious ESTRADA DO, FACEP, personally performed the services described in this documentation, as scribed by Sabiha Kong, in my presence, and it is both accurate and complete.                   Precious Hendrickson DO  07/27/25 0004    "

## 2025-07-27 LAB
OHS QRS DURATION: 156 MS
OHS QTC CALCULATION: 486 MS

## 2025-07-29 LAB — HOLD SPECIMEN: NORMAL

## 2025-07-30 ENCOUNTER — LAB VISIT (OUTPATIENT)
Dept: LAB | Facility: HOSPITAL | Age: 89
End: 2025-07-30
Attending: FAMILY MEDICINE
Payer: MEDICARE

## 2025-07-30 ENCOUNTER — OFFICE VISIT (OUTPATIENT)
Dept: FAMILY MEDICINE | Facility: CLINIC | Age: 89
End: 2025-07-30
Payer: MEDICARE

## 2025-07-30 VITALS
WEIGHT: 214 LBS | HEART RATE: 79 BPM | HEIGHT: 68 IN | BODY MASS INDEX: 32.43 KG/M2 | RESPIRATION RATE: 14 BRPM | DIASTOLIC BLOOD PRESSURE: 82 MMHG | OXYGEN SATURATION: 97 % | SYSTOLIC BLOOD PRESSURE: 138 MMHG

## 2025-07-30 DIAGNOSIS — R79.89 ELEVATED SERUM CREATININE: ICD-10-CM

## 2025-07-30 DIAGNOSIS — E11.22 TYPE 2 DIABETES MELLITUS WITH STAGE 3A CHRONIC KIDNEY DISEASE, WITHOUT LONG-TERM CURRENT USE OF INSULIN: ICD-10-CM

## 2025-07-30 DIAGNOSIS — N18.31 TYPE 2 DIABETES MELLITUS WITH STAGE 3A CHRONIC KIDNEY DISEASE, WITHOUT LONG-TERM CURRENT USE OF INSULIN: ICD-10-CM

## 2025-07-30 DIAGNOSIS — E11.69 COMBINED HYPERLIPIDEMIA ASSOCIATED WITH TYPE 2 DIABETES MELLITUS: ICD-10-CM

## 2025-07-30 DIAGNOSIS — R19.7 DIARRHEA, UNSPECIFIED TYPE: Primary | ICD-10-CM

## 2025-07-30 DIAGNOSIS — I67.2 CEREBROVASCULAR DISEASE, ARTERIOSCLEROTIC, POST-STROKE: ICD-10-CM

## 2025-07-30 DIAGNOSIS — E78.2 COMBINED HYPERLIPIDEMIA ASSOCIATED WITH TYPE 2 DIABETES MELLITUS: ICD-10-CM

## 2025-07-30 DIAGNOSIS — R19.5 HEME POSITIVE STOOL: ICD-10-CM

## 2025-07-30 DIAGNOSIS — I15.2 HYPERTENSION ASSOCIATED WITH DIABETES: ICD-10-CM

## 2025-07-30 DIAGNOSIS — Z86.73 CEREBROVASCULAR DISEASE, ARTERIOSCLEROTIC, POST-STROKE: ICD-10-CM

## 2025-07-30 DIAGNOSIS — I48.21 PERMANENT ATRIAL FIBRILLATION: ICD-10-CM

## 2025-07-30 DIAGNOSIS — R26.9 GAIT ABNORMALITY: ICD-10-CM

## 2025-07-30 DIAGNOSIS — E11.59 HYPERTENSION ASSOCIATED WITH DIABETES: ICD-10-CM

## 2025-07-30 DIAGNOSIS — M25.59 PAIN IN OTHER JOINT: ICD-10-CM

## 2025-07-30 DIAGNOSIS — Z86.0100 HISTORY OF COLON POLYPS: ICD-10-CM

## 2025-07-30 LAB
ABSOLUTE EOSINOPHIL (OHS): 1.71 K/UL
ABSOLUTE MONOCYTE (OHS): 0.62 K/UL (ref 0.3–1)
ABSOLUTE NEUTROPHIL COUNT (OHS): 4.93 K/UL (ref 1.8–7.7)
ALBUMIN SERPL BCP-MCNC: 3.5 G/DL (ref 3.5–5.2)
ALP SERPL-CCNC: 151 UNIT/L (ref 40–150)
ALT SERPL W/O P-5'-P-CCNC: 26 UNIT/L (ref 0–55)
ANION GAP (OHS): 8 MMOL/L (ref 8–16)
AST SERPL-CCNC: 48 UNIT/L (ref 0–50)
BASOPHILS # BLD AUTO: 0.06 K/UL
BASOPHILS NFR BLD AUTO: 0.7 %
BILIRUB SERPL-MCNC: 0.6 MG/DL (ref 0.1–1)
BUN SERPL-MCNC: 23 MG/DL (ref 8–23)
CALCIUM SERPL-MCNC: 8.2 MG/DL (ref 8.7–10.5)
CHLORIDE SERPL-SCNC: 109 MMOL/L (ref 95–110)
CHOLEST SERPL-MCNC: 104 MG/DL (ref 120–199)
CHOLEST/HDLC SERPL: 4 {RATIO} (ref 2–5)
CO2 SERPL-SCNC: 25 MMOL/L (ref 23–29)
CREAT SERPL-MCNC: 1.7 MG/DL (ref 0.5–1.4)
EAG (OHS): 151 MG/DL (ref 68–131)
ERYTHROCYTE [DISTWIDTH] IN BLOOD BY AUTOMATED COUNT: 16.4 % (ref 11.5–14.5)
GFR SERPLBLD CREATININE-BSD FMLA CKD-EPI: 38 ML/MIN/1.73/M2
GLUCOSE SERPL-MCNC: 154 MG/DL (ref 70–110)
HBA1C MFR BLD: 6.9 % (ref 4–5.6)
HCT VFR BLD AUTO: 46.3 % (ref 40–54)
HDLC SERPL-MCNC: 26 MG/DL (ref 40–75)
HDLC SERPL: 25 % (ref 20–50)
HGB BLD-MCNC: 14.7 GM/DL (ref 14–18)
IMM GRANULOCYTES # BLD AUTO: 0.05 K/UL (ref 0–0.04)
IMM GRANULOCYTES NFR BLD AUTO: 0.6 % (ref 0–0.5)
LDLC SERPL CALC-MCNC: 47 MG/DL (ref 63–159)
LYMPHOCYTES # BLD AUTO: 1.14 K/UL (ref 1–4.8)
MCH RBC QN AUTO: 26.7 PG (ref 27–31)
MCHC RBC AUTO-ENTMCNC: 31.7 G/DL (ref 32–36)
MCV RBC AUTO: 84 FL (ref 82–98)
NONHDLC SERPL-MCNC: 78 MG/DL
NUCLEATED RBC (/100WBC) (OHS): 0 /100 WBC
PLATELET # BLD AUTO: 128 K/UL (ref 150–450)
PMV BLD AUTO: 12.5 FL (ref 9.2–12.9)
POTASSIUM SERPL-SCNC: 4.1 MMOL/L (ref 3.5–5.1)
PROT SERPL-MCNC: 6.3 GM/DL (ref 6–8.4)
RBC # BLD AUTO: 5.5 M/UL (ref 4.6–6.2)
RELATIVE EOSINOPHIL (OHS): 20.1 %
RELATIVE LYMPHOCYTE (OHS): 13.4 % (ref 18–48)
RELATIVE MONOCYTE (OHS): 7.3 % (ref 4–15)
RELATIVE NEUTROPHIL (OHS): 57.9 % (ref 38–73)
SODIUM SERPL-SCNC: 142 MMOL/L (ref 136–145)
TRIGL SERPL-MCNC: 155 MG/DL (ref 30–150)
WBC # BLD AUTO: 8.51 K/UL (ref 3.9–12.7)

## 2025-07-30 PROCEDURE — 36415 COLL VENOUS BLD VENIPUNCTURE: CPT | Mod: PO

## 2025-07-30 PROCEDURE — G2211 COMPLEX E/M VISIT ADD ON: HCPCS | Mod: ,,, | Performed by: FAMILY MEDICINE

## 2025-07-30 PROCEDURE — 83036 HEMOGLOBIN GLYCOSYLATED A1C: CPT

## 2025-07-30 PROCEDURE — 99215 OFFICE O/P EST HI 40 MIN: CPT | Mod: PBBFAC,PO | Performed by: FAMILY MEDICINE

## 2025-07-30 PROCEDURE — 99999 PR PBB SHADOW E&M-EST. PATIENT-LVL V: CPT | Mod: PBBFAC,,, | Performed by: FAMILY MEDICINE

## 2025-07-30 PROCEDURE — 85025 COMPLETE CBC W/AUTO DIFF WBC: CPT

## 2025-07-30 PROCEDURE — 99214 OFFICE O/P EST MOD 30 MIN: CPT | Mod: S$PBB,,, | Performed by: FAMILY MEDICINE

## 2025-07-30 PROCEDURE — 84295 ASSAY OF SERUM SODIUM: CPT

## 2025-07-30 PROCEDURE — 83718 ASSAY OF LIPOPROTEIN: CPT

## 2025-07-30 RX ORDER — ACETAMINOPHEN 500 MG
500 TABLET ORAL EVERY 6 HOURS PRN
Start: 2025-07-30

## 2025-07-31 ENCOUNTER — E-CONSULT (OUTPATIENT)
Dept: CARDIOLOGY | Facility: CLINIC | Age: 89
End: 2025-07-31
Payer: MEDICARE

## 2025-07-31 ENCOUNTER — PATIENT MESSAGE (OUTPATIENT)
Dept: GASTROENTEROLOGY | Facility: CLINIC | Age: 89
End: 2025-07-31
Payer: MEDICARE

## 2025-07-31 ENCOUNTER — OFFICE VISIT (OUTPATIENT)
Dept: GASTROENTEROLOGY | Facility: CLINIC | Age: 89
End: 2025-07-31
Payer: MEDICARE

## 2025-07-31 VITALS — HEIGHT: 68 IN | BODY MASS INDEX: 32.55 KG/M2 | WEIGHT: 214.75 LBS

## 2025-07-31 DIAGNOSIS — Z87.19 HISTORY OF HEMORRHOIDS: ICD-10-CM

## 2025-07-31 DIAGNOSIS — R19.4 CHANGE IN BOWEL HABITS: ICD-10-CM

## 2025-07-31 DIAGNOSIS — R19.7 DIARRHEA, UNSPECIFIED TYPE: Primary | ICD-10-CM

## 2025-07-31 DIAGNOSIS — Z01.810 PRE-OPERATIVE CARDIOVASCULAR EXAMINATION: Primary | ICD-10-CM

## 2025-07-31 DIAGNOSIS — Z79.01 ANTICOAGULANT LONG-TERM USE: ICD-10-CM

## 2025-07-31 DIAGNOSIS — K92.1 BLACK STOOL: ICD-10-CM

## 2025-07-31 DIAGNOSIS — R10.32 BILATERAL LOWER ABDOMINAL DISCOMFORT: ICD-10-CM

## 2025-07-31 DIAGNOSIS — Z79.01 LONG TERM (CURRENT) USE OF ANTICOAGULANTS: Primary | ICD-10-CM

## 2025-07-31 DIAGNOSIS — R74.8 ELEVATED ALKALINE PHOSPHATASE LEVEL: ICD-10-CM

## 2025-07-31 DIAGNOSIS — R19.5 HEME POSITIVE STOOL: ICD-10-CM

## 2025-07-31 DIAGNOSIS — R10.31 BILATERAL LOWER ABDOMINAL DISCOMFORT: ICD-10-CM

## 2025-07-31 DIAGNOSIS — Z86.0100 HISTORY OF COLON POLYPS: ICD-10-CM

## 2025-07-31 PROCEDURE — 99214 OFFICE O/P EST MOD 30 MIN: CPT | Mod: PBBFAC,PO | Performed by: NURSE PRACTITIONER

## 2025-07-31 PROCEDURE — 99214 OFFICE O/P EST MOD 30 MIN: CPT | Mod: S$PBB,,, | Performed by: NURSE PRACTITIONER

## 2025-07-31 PROCEDURE — 99451 NTRPROF PH1/NTRNET/EHR 5/>: CPT | Mod: S$PBB,,, | Performed by: INTERNAL MEDICINE

## 2025-07-31 PROCEDURE — 99999 PR PBB SHADOW E&M-EST. PATIENT-LVL IV: CPT | Mod: PBBFAC,,, | Performed by: NURSE PRACTITIONER

## 2025-07-31 RX ORDER — LOPERAMIDE HCL 2 MG
2 TABLET ORAL 4 TIMES DAILY PRN
COMMUNITY

## 2025-07-31 NOTE — PATIENT INSTRUCTIONS
"   Diarrhea in Adolescents and Adults   The Basics   Written by the doctors and editors at [facility]   What is diarrhea? -- Diarrhea describes bowel movements that are runny or watery, and happen 3 or more times in a day. Diarrhea is very common. Most adolescents and adults have diarrhea about 4 times a year. Just about everyone has it at some point.  What causes diarrhea? -- Diarrhea can be caused by:  Viruses  Bacteria that live in food or water  Parasites, such as tiny worms that you can catch in some countries  Side effects from some medicines  Problems digesting certain types of food  Diseases that harm the digestive system (figure 1)  Is there anything I can do on my own to get better? -- Yes. Here are some things you can try at home:  Drink a lot of liquids that have water, salt, and sugar. Good choices are water mixed with juice, flavored soda, and soup broth. If you are drinking enough fluids, your urine will be light yellow or almost clear.  Try to eat a little food. Good choices are potatoes, noodles, rice, oatmeal, crackers, bananas, soup, and boiled vegetables. Salty foods also help.  Should I see a doctor or nurse? -- See your doctor or nurse if:  You have more than 6 runny bowel movements in 24 hours  You have blood in your bowel movements   You have a fever higher than 101.3ºF (38.5ºC) that does not go away after a day  You have severe belly pain  You are 70 or older  Your body has lost too much water. This is called "dehydration." Signs include:  Lots of diarrhea that is very watery  Feeling very tired  Thirst  Dry mouth or tongue  Muscle cramps  Dizziness  Confusion  Urine that is very yellow, or not needing to urinate for more than 5 hours  Will I need tests? -- Many people do not need to have tests. But it's possible that your doctor will do tests to check if you are dehydrated or to figure out what is causing your diarrhea. Your doctor might do:  Blood tests  Tests on a sample of your bowel " "movements  How is diarrhea treated? -- That depends on what is causing your diarrhea. You might not need any treatment. If you do, your doctor might recommend:  Fluids through an "IV" - An IV is a thin tube that goes into your vein. People with a lot of diarrhea might need IV fluids to treat or prevent dehydration.  Stopping some of your medicines  Changing the foods you eat  Antibiotics - These medicines treat bacterial infections. Most people do not need antibiotics, even if they have a bacterial infection. If you are very sick with fever and blood in your bowel movements, your doctor might prescribe antibiotics to help you get better faster.   Medicines that ease diarrhea - These medicines include loperamide (brand name: Imodium), diphenoxylate-atropine (brand name: Lomotil), and bismuth subsalicylate (brand names: Pepto-Bismol, Kaopectate). You should not take loperamide or diphenoxylate-atropine if you have a fever or blood in your bowel movements. Also, taking too much loperamide has led to serious heart problems in some people. If you have health problems or already take other medicines, talk to your doctor or nurse before trying loperamide. For all of these medicines, it's important to not take more than the label tells you to.   Can diarrhea be prevented? -- You can reduce your chances of getting and spreading diarrhea by:  Washing your hands after changing diapers, cooking, eating, going to the bathroom, taking out the trash, touching animals, and blowing your nose.  Staying home from work or school until you feel better.  Paying attention to food safety. Tips include:  Not drinking unpasteurized milk or foods made with it  Washing fruits and vegetables well before eating them  Keeping the refrigerator colder than 40ºF and the freezer below 0ºF  Cooking meat and seafood until well done  Cooking eggs until the yolk is firm  Washing hands, knives, and cutting boards after they touch raw food  For more tips " "on food safety, see the table (table 1).  All topics are updated as new evidence becomes available and our peer review process is complete.  This topic retrieved from [employer] on: Sep 21, 2021.  Topic 98532 Version 14.0  Release: 29.4.2 - C29.263  © 2021 UpToDate, Inc. and/or its affiliates. All rights reserved.  figure 1: Digestive system     This drawing shows the organs in the body that process food. Together these organs are called "the digestive system," or "digestive tract." As food travels through this system, the body absorbs nutrients and water.  Graphic 16867 Version 4.0    table 1: Tips for safe food handling[1]  Purchase    Do not buy already-cooked food that is stored next to raw food, even if it is stored on ice.   Do not buy food in cans that are dented, cracked, or have a bulging lid.   Storage    Make sure meat and poultry products are refrigerated when bought.   Use plastic bags to keep juices from meat and fish from touching other foods.   Store perishable items (that can go bad quickly) in the refrigerator within an hour of buying.   Keep refrigerator temperature between 32 and 40°F (0 and 4°C) and freezer temperature at or below 0°F (-18°C).   Freeze meat and poultry that will not be cooked within 48 hours.   Freeze tuna, bluefish, and heath-heath that will not be cooked within 24 hours. Other fish can be stored in the refrigerator for 48 hours.   Do not store eggs on the refrigerator door (since that is the warmest part of the refrigerator).   Put leftovers in the refrigerator within 2 hours of cooking them.   Divide leftovers into parts and store in small containers.   Reheat leftovers to 165°F (74°C) before eating.   Preparation    Wash hands with soap and water before cooking and after handling raw meat, poultry, fish, or raw eggs.   Thaw frozen meats and fish in the refrigerator or microwave, not by leaving them out.   Marinate foods in the refrigerator, not at room temperature.   Avoid " contact of cooked foods with forks, spoons, knives, plates, or areas that might not be clean.   Wash forks, spoons, knives, plates, and cutting areas with soap and water after they have touched raw meat, poultry, fish, or eggs.   Avoid letting the juices from uncooked meat, poultry, or fish touch cooked foods or foods that will be eaten raw.   Carefully wash all fresh fruits and vegetables.   Avoid recipes that include raw eggs.   Cooking    Use a meat thermometer.  Cook beef, veal, and lamb (steaks, roasts, chops) to 145°F (63°C) and rest for 3 minutes.   Cook ground beef, pork, veal, and lamb to 160°F (71°C).   Cook poultry (chicken, turkey) to 165°F (74°C).   Cook fresh pork (roasts, chops, ham that is not precooked) to 145°F (63°C) and rest for 3 minutes.   Cook precooked ham to 140°F (60°C).   Cook fish until the flesh is firm and separates easily with a fork.   Cook shellfish until the flesh is firm.    Cook eggs until the yolk and white are firm.   Boil juices from raw meat or fish before using on cooked food.   Serving    Serve cooked foods on clean plates with clean forks, spoons, and knives.   Keep hot foods at 140°F (60°C) and cold foods below 40°F (4°C).   Never leave foods at room temperature longer than 2 hours, or 1 hour if the room is hotter than 90°F (32°C).   Use coolers and ice packs to take perishable foods (that might go bad) away from home.   Graphic 42385 Version 7.0  Consumer Information Use and Disclaimer   This information is not specific medical advice and does not replace information you receive from your health care provider. This is only a brief summary of general information. It does NOT include all information about conditions, illnesses, injuries, tests, procedures, treatments, therapies, discharge instructions or life-style choices that may apply to you. You must talk with your health care provider for complete information about your health and treatment options. This information  should not be used to decide whether or not to accept your health care provider's advice, instructions or recommendations. Only your health care provider has the knowledge and training to provide advice that is right for you. The use of this information is governed by the [employer] End User License Agreement, available at https://www.[employer]O/en/solutions/[employer]/about/don.The use of [employer] content is governed by the [employer] Terms of Use. ©2021 UpToDate, Inc. All rights reserved.  Copyright   © 2021 UpToDate, Inc. and/or its affiliates. All rights reserved.        Colon Polyps   The Basics   Written by the doctors and editors at [employer]   What are colon polyps? -- Colon polyps are tiny growths that form on the inside of the large intestine (also known as the colon) (figure 1). Polyps are very common. About one-third to one-half of all adults have them by the time they are 50 years old. They do not usually cause symptoms. But some polyps can be or become cancer, so doctors sometimes remove them.  What are the symptoms of colon polyps? -- Colon polyps do not usually cause symptoms.  How do doctors find colon polyps? -- Doctors usually find colon polyps when they are doing screening tests to check for colon or rectal cancer. Cancer screening tests are tests that are done to try and find cancer early, before a person has symptoms. The screening tests for colon and rectal cancer include:  Colonoscopy - Before having a colonoscopy, you will get medicine to help you relax. Then a doctor will put a thin tube into your anus and advance it into your colon (figure 2). The tube has a camera attached to it, so the doctor can look inside your colon. The tube also has tools on the end, so the doctor can remove pieces of tissue, including polyps. After polyps are removed, they usually go to a lab to be tested for cancer and other problems.  Sigmoidoscopy - A sigmoidoscopy is very similar to a colonoscopy. The only  "difference is that this test looks only at the first part of the colon, and a colonoscopy looks at the whole colon.  CT colonography (also known as virtual colonoscopy) - For a virtual colonoscopy, you have a special kind of X-ray taken, called a "CT scan." This test creates pictures of the colon.  Stool test - "Stool" is another word for "bowel movements." Stool tests check for blood or abnormal genes in samples of stool. If a stool test indicates that something might be wrong with the colon, doctors usually follow up with a colonoscopy. Then doctors find polyps, if they are there.  Capsule colonoscopy - Rarely, your doctor might do something called a "capsule" colonoscopy. For this test, you swallow a special capsule that contains tiny wireless video cameras.   How are colon polyps treated? -- Doctors remove polyps using the same tools they use for a colonoscopy. They can remove polyps either by snipping them off with a special cutting tool, or by catching the polyps in a noose (figure 3). Most polyps can be removed during a colonoscopy. But sometimes, large polyps need to be removed at a later time, either with another colonoscopy or with surgery.  What happens after I have polyps removed? -- You might need to have a colonoscopy every few years to check for more polyps. In some people polyps come back. And if you had the kind of polyps that could become cancer, your doctor will want to remove them as they appear. Also, if the polyps you had removed were the kind that could become cancer, people in your family might need to be checked for polyps and colon cancer earlier than if you did not have polyps.  Depending on your situation, your doctor might suggest genetic testing. This can show if your polyps are related to a specific gene that runs in families. If this turns out to be the case, they might recommend other tests that can be done to prevent cancer or find it early.  Can colon polyps be prevented? -- To " "reduce your chances of getting polyps or colon cancer:  Eat a diet that is low in fat and high in fruits, vegetables, and fiber  Lose weight, if you are overweight  Do not smoke  Limit the amount of alcohol you drink  All topics are updated as new evidence becomes available and our peer review process is complete.  This topic retrieved from [employer] on: Sep 21, 2021.  Topic 22617 Version 8.0  Release: 29.4.2 - C29.263  © 2021 UpToDate, Inc. and/or its affiliates. All rights reserved.  figure 1: Digestive system     This drawing shows the organs in the body that process food. Together these organs are called "the digestive system," or "digestive tract." As food travels through this system, the body absorbs nutrients and water.  Graphic 62858 Version 4.0    figure 2: Colonoscopy     During a colonoscopy, you lie on your side and the doctor puts a thin tube with a camera into your anus (from behind). Then the doctor advances the tube into the rectum and colon. The camera sends pictures from inside your colon to a television screen.  Graphic 73736 Version 6.0    figure 3: Removing a colon polyp     One way doctors remove colon polyps is to use a noose as a tool. They loop a wire around the polyp and squeeze the loop tight. When the polyp comes off, the doctor sucks it up into the endoscope, so that it can go to the lab for tests.  Graphic 54775 Version 5.0    Consumer Information Use and Disclaimer   This information is not specific medical advice and does not replace information you receive from your health care provider. This is only a brief summary of general information. It does NOT include all information about conditions, illnesses, injuries, tests, procedures, treatments, therapies, discharge instructions or life-style choices that may apply to you. You must talk with your health care provider for complete information about your health and treatment options. This information should not be used to decide whether " or not to accept your health care provider's advice, instructions or recommendations. Only your health care provider has the knowledge and training to provide advice that is right for you. The use of this information is governed by the [employer] End User License Agreement, available at https://www.[employer]/en/solutions/[employer]/about/don.The use of [employer] content is governed by the [employer] Terms of Use. ©2021 UpToDate, Inc. All rights reserved.  Copyright   © 2021 UpToDate, Inc. and/or its affiliates. All rights reserved.

## 2025-07-31 NOTE — PROGRESS NOTES
Subjective:       Patient ID: Héctor Desir is a 89 y.o. male Body mass index is 32.65 kg/m².    Chief Complaint: Diarrhea    This patient is new to me.  Referring Provider: Dr. Tae Goel for heme positive stool, diarrhea, & history of colon polyps.     PCP placed orders for stool studies yesterday as follows: Clostridioides difficile EIA; Giardia / Cryptosporidum, EIA; Stool Exam-Ova,Cysts,Parasites & WBC, Stool      GI Problem  The primary symptoms include abdominal pain, diarrhea and melena (had some black stool at first and went to the ER for it, none since then). Primary symptoms do not include fever, weight loss, fatigue, nausea, vomiting, hematemesis, jaundice, hematochezia or dysuria. The problem has been rapidly improving.   The abdominal pain began more than 2 days ago (started with diarrhea). The abdominal pain has been rapidly improving since its onset. Pain location: rated 2/10 currently, described as lower abdominal discomfort. The severity of the abdominal pain is 2/10 (currently).   The diarrhea began more than 1 week ago (started ~7/23/2025, went to the ER for it. patient reports he went fishing 2 weeks ago and he ate hog head cheese that day and the next day.). Daily occurrences: last bowel movement was yesterday of loose brown stools, took 2 imodium pills and no bowel movement since then. Risk factors for illness producing diarrhea include suspect food intake (hog head cheese the second day looked questionable, his friend got a little GI upset).   The illness does not include chills, dysphagia, odynophagia, bloating or constipation (prior to diarrhea starting he had constipation and was taking colace PRN for it; denies currently). Significant associated medical issues include GERD (controlled with taking prilosec 20 mg once daily) and hemorrhoids. Associated medical issues do not include inflammatory bowel disease.     Review of Systems   Constitutional:  Negative for appetite change,  chills, fatigue, fever and weight loss.   HENT:  Negative for sore throat and trouble swallowing.    Respiratory:  Negative for cough, choking and shortness of breath.    Cardiovascular:  Negative for chest pain.   Gastrointestinal:  Positive for abdominal pain, blood in stool (denies any visible blood in stool but occult positive stool), diarrhea and melena (had some black stool at first and went to the ER for it, none since then). Negative for anal bleeding, bloating, constipation (prior to diarrhea starting he had constipation and was taking colace PRN for it; denies currently), dysphagia, hematemesis, hematochezia, jaundice, nausea, rectal pain and vomiting.   Genitourinary:  Negative for difficulty urinating, dysuria and flank pain.   Neurological:  Negative for weakness.       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     DM (diabetes mellitus) type II controlled, neurological manifestation 07/05/2013    GERD (gastroesophageal reflux disease)     History of prostate cancer 2006    Hypertension associated with diabetes     Hypertension goal BP (blood pressure) < 130/80     Obesity     Prostate cancer     TIA (transient ischemic attack)     Trouble in sleeping      Past Surgical History:   Procedure Laterality Date    CARDIAC CATHETERIZATION      CARDIOVERSION N/A 08/10/2018    Procedure: CARDIOVERSION;  Surgeon: Israel Cardoza MD;  Location: [facility] CATH LAB;  Service: Cardiology;  Laterality: N/A;  Dr. Talamantes pt    CATARACT EXTRACTION W/  INTRAOCULAR LENS IMPLANT  11/2013    Bilateral Cataract     COLONOSCOPY N/A 12/20/2019    Procedure: COLONOSCOPY;  Surgeon: Forrest Roy MD;  Location: [facility] ENDO;   Service: Endoscopy;  Laterality: N/A; Repeat colonoscopy in 5 years for surveillance.    COLONOSCOPY W/ POLYPECTOMY      EYE SURGERY      PROSTATECTOMY  2006    TONSILLECTOMY      TONSILLECTOMY, ADENOIDECTOMY, BILATERAL MYRINGOTOMY AND TUBES      UPPER GASTROINTESTINAL ENDOSCOPY      prior to 2010 per patient report     Family History   Problem Relation Name Age of Onset    Heart attack Mother      Melanoma Father      Cancer Father      Anesthesia problems Neg Hx      Clotting disorder Neg Hx      Kidney disease Neg Hx      Amblyopia Neg Hx      Blindness Neg Hx      Cataracts Neg Hx      Glaucoma Neg Hx      Retinal detachment Neg Hx      Macular degeneration Neg Hx      Strabismus Neg Hx      Colon cancer Neg Hx      Crohn's disease Neg Hx      Ulcerative colitis Neg Hx      Celiac disease Neg Hx       Social History     Tobacco Use    Smoking status: Never    Smokeless tobacco: Never   Substance Use Topics    Alcohol use: No     Comment: He used to drink but quit in 1990    Drug use: No     Wt Readings from Last 10 Encounters:   07/31/25 97.4 kg (214 lb 11.7 oz)   07/30/25 97.1 kg (214 lb)   07/26/25 96.6 kg (213 lb)   03/25/25 98.3 kg (216 lb 12.8 oz)   02/26/25 98.3 kg (216 lb 11.2 oz)   09/24/24 100.7 kg (222 lb)   09/12/24 97.5 kg (215 lb)   08/14/24 97.5 kg (215 lb)   06/27/24 98 kg (216 lb)   03/25/24 99.5 kg (219 lb 4 oz)     Lab Results   Component Value Date    WBC 8.51 07/30/2025    HGB 14.7 07/30/2025    HCT 46.3 07/30/2025    MCV 84 07/30/2025     (L) 07/30/2025     CMP  Sodium   Date Value Ref Range Status   07/30/2025 142 136 - 145 mmol/L Final   02/17/2025 143 136 - 145 mmol/L Final     Potassium   Date Value Ref Range Status   07/30/2025 4.1 3.5 - 5.1 mmol/L Final   02/17/2025 4.1 3.5 - 5.1 mmol/L Final     Chloride   Date Value Ref Range Status   07/30/2025 109 95 - 110 mmol/L Final   02/17/2025 108 95 - 110 mmol/L Final     CO2   Date Value Ref Range Status   07/30/2025 25 23 - 29 mmol/L  Final   02/17/2025 23 23 - 29 mmol/L Final     Glucose   Date Value Ref Range Status   07/30/2025 154 (H) 70 - 110 mg/dL Final   02/17/2025 148 (H) 70 - 110 mg/dL Final     BUN   Date Value Ref Range Status   07/30/2025 23 8 - 23 mg/dL Final     Creatinine   Date Value Ref Range Status   07/30/2025 1.7 (H) 0.5 - 1.4 mg/dL Final     Calcium   Date Value Ref Range Status   07/30/2025 8.2 (L) 8.7 - 10.5 mg/dL Final   02/17/2025 8.6 (L) 8.7 - 10.5 mg/dL Final     Protein Total   Date Value Ref Range Status   07/30/2025 6.3 6.0 - 8.4 gm/dL Final     Total Protein   Date Value Ref Range Status   07/15/2023 6.1 6.0 - 8.4 g/dL Final     Albumin   Date Value Ref Range Status   07/30/2025 3.5 3.5 - 5.2 g/dL Final   02/17/2025 3.7 3.5 - 5.2 g/dL Final     Total Bilirubin   Date Value Ref Range Status   07/15/2023 1.0 0.1 - 1.0 mg/dL Final     Comment:     For infants and newborns, interpretation of results should be based  on gestational age, weight and in agreement with clinical  observations.    Premature Infant recommended reference ranges:  Up to 24 hours.............<8.0 mg/dL  Up to 48 hours............<12.0 mg/dL  3-5 days..................<15.0 mg/dL  6-29 days.................<15.0 mg/dL       Bilirubin Total   Date Value Ref Range Status   07/30/2025 0.6 0.1 - 1.0 mg/dL Final     Comment:     For infants and newborns, interpretation of results should be based   on gestational age, weight and in agreement with clinical   observations.    Premature Infant recommended reference ranges:   0-24 hours:  <8.0 mg/dL   24-48 hours: <12.0 mg/dL   3-5 days:    <15.0 mg/dL   6-29 days:   <15.0 mg/dL     Alkaline Phosphatase   Date Value Ref Range Status   07/15/2023 93 55 - 135 U/L Final     ALP   Date Value Ref Range Status   07/30/2025 151 (H) 40 - 150 unit/L Final     AST   Date Value Ref Range Status   07/30/2025 48 0 - 50 unit/L Final     Comment:       An activated reagent was used, which may result in slightly higher  values compared to standard method.   07/15/2023 16 10 - 40 U/L Final     ALT   Date Value Ref Range Status   07/30/2025 26 0 - 55 unit/L Final     Comment:       An activated reagent was used, which may result in slightly higher values compared to standard method.   07/15/2023 16 10 - 44 U/L Final     Anion Gap   Date Value Ref Range Status   07/30/2025 8 8 - 16 mmol/L Final     Comment:     UNABLE TO CALCULATE     eGFR if    Date Value Ref Range Status   12/17/2021 45.1 (A) >60 mL/min/1.73 m^2 Final     eGFR if non    Date Value Ref Range Status   12/17/2021 39.0 (A) >60 mL/min/1.73 m^2 Final     Comment:     Calculation used to obtain the estimated glomerular filtration  rate (eGFR) is the CKD-EPI equation.        Lab Results   Component Value Date    TSH 2.934 07/14/2023     Lab Results   Component Value Date    OCCULTBLOOD Positive (A) 07/26/2025     Lab Results   Component Value Date    HEPCAB Non-Reactive 07/26/2025     Reviewed prior medical records including radiology report of 7/26/2025 CTA acute GI Bleed abdomen pelvis and ER visit note; & endoscopy history (see surgical history/procedures).    Objective:      Physical Exam  Vitals and nursing note reviewed.   Constitutional:       General: He is not in acute distress.     Appearance: Normal appearance. He is well-developed. He is not diaphoretic.   HENT:      Mouth/Throat:      Lips: Pink. No lesions.      Mouth: Mucous membranes are moist. No oral lesions.      Tongue: No lesions.      Pharynx: Oropharynx is clear. No pharyngeal swelling or posterior oropharyngeal erythema.   Eyes:      General: No scleral icterus.     Conjunctiva/sclera: Conjunctivae normal.   Pulmonary:      Effort: Pulmonary effort is normal. No respiratory distress.      Breath sounds: Normal breath sounds. No wheezing.   Abdominal:      General: Bowel sounds are normal. There is no distension or abdominal bruit.      Palpations: Abdomen is soft.  Abdomen is not rigid. There is no mass.      Tenderness: There is no abdominal tenderness. There is no guarding or rebound. Negative signs include García's sign and McBurney's sign.   Skin:     General: Skin is warm and dry.      Coloration: Skin is not jaundiced or pale.      Findings: No erythema or rash.   Neurological:      Mental Status: He is alert and oriented to person, place, and time.   Psychiatric:         Behavior: Behavior normal.         Thought Content: Thought content normal.         Judgment: Judgment normal.         Assessment:       1. Diarrhea, unspecified type    2. Change in bowel habits    3. Bilateral lower abdominal discomfort    4. Heme positive stool    5. History of black stool    6. History of colon polyps    7. History of hemorrhoids    8. Elevated alkaline phosphatase level    9. Anticoagulant long-term use        Plan:       Diarrhea, unspecified type & Change in bowel habits  - schedule Colonoscopy, discussed procedure with the patient, including risks and benefits, patient verbalized understanding  - complete stool studies as ordered by PCP (Clostridioides difficile EIA; Giardia / Cryptosporidum, EIA; Stool Exam-Ova,Cysts,Parasites & WBC, Stool) and culture as ordered below  -     Stool culture; Future; Expected date: 07/31/2025  - informed patient can take imodium as directed PRN but advised to take only sparingly, patient verbalized understanding  - recommended increase fiber in diet, especially soluble fiber since this can help bulk up the stool consistency and may help to slow down how fast the stool goes through the colon and can prevent diarrhea; Recommend high fiber diet (20-30 grams of fiber daily)/OTC fiber supplements daily as directed, such as Benefiber or Metamucil.  - recommend OTC probiotic, such as Florastor or Culturelle, taken as directed on packaging  - avoid lactose, alcohol, & caffeine  - avoid known triggers  - discussed with patient that certain medications,  such as mounjaro, may be contributing to symptoms. I recommend follow-up with provider who manages medication to discuss about possible alternative therapy, patient verbalized understanding    Bilateral lower abdominal discomfort  - schedule Colonoscopy, discussed procedure with the patient, including risks and benefits, patient verbalized understanding    Heme positive stool & History of black stool  - schedule EGD, discussed procedure with patient, including risks and benefits, patient verbalized understanding  - schedule Colonoscopy, discussed procedure with the patient, including risks and benefits, patient verbalized understanding  - CONTINUE PRILOSEC 20 MG ONCE DAILY AS DIRECTED  - Discussed with patient about possible side effects of bismuth/iron supplementation use, including but not limited to change in stool color; patient verbalized understanding  - avoid/minimize use of NSAIDs- since they can cause GI upset, bleeding and/or ulcers. If NSAID must be taken, recommend take with food.    History of colon polyps  - schedule Colonoscopy, discussed procedure with the patient, including risks and benefits, patient verbalized understanding    History of hemorrhoids  - avoid constipation and straining with bowel movements; try using an OTC stool softener as directed and increase fiber in diet (20-30 grams daily)/OTC fiber supplement such as metamucil (take as directed)  - recommend SITZ baths  - possible referral to colorectal surgery if symptoms persist    Elevated alkaline phosphatase level  - Recommend follow-up with Primary Care Provider for continued evaluation and management.    Anticoagulant long-term use  - medication(s) may need to be held for endoscopy, nurse will confirm with endoscopist, cardiologist, and/or PCP.    Follow up in about 1 month (around 8/31/2025), or if symptoms worsen or fail to improve.    If no improvement in symptoms or symptoms worsen, call/follow-up at clinic or go to ER.        31  minutes of total time spent on the encounter, which includes face to face time and non-face to face time preparing to see the patient (e.g., review of tests), Obtaining and/or reviewing separately obtained history, Documenting clinical information in the electronic or other health record, Independently interpreting results (not separately reported) and communicating results to the patient/family/caregiver, or Care coordination (not separately reported).

## 2025-07-31 NOTE — CONSULTS
[facility] - Cardiology  Response for E-Consult     Patient Name: Héctor Desir  MRN: [id number]  Primary Care Provider: Tae Goel MD   Requesting Provider: Perla Greenberg MD  E-Consult to General Cardiology  Consult performed by: Bon Barney MD  Consult ordered by: Perla Greenberg MD          Chart reviewed personally.    No evidence seen in Epic of [facility] within the last month.    As long as no significant chest pain or shortness of breath with exertion, patient is at acceptable risk to proceed from a Cardiology standpoint.    Okay to hold Xarelto 48 hours prior to procedure, restart as soon as safe postprocedure.      Additional future steps to consider: NA    Total time of Consultation: 5 minute    I did not speak to the requesting provider verbally about this.     *This eConsult is based on the clinical data available to me and is furnished without benefit of a physical examination. The eConsult will need to be interpreted in light of any clinical issues or changes in patient status not available to me at the time of filing this eConsults. Significant changes in patient condition or level of acuity should result in immediate formal consultation and reevaluation. Please alert me if you have further questions.    Thank you for this eConsult referral.     Bon Barney MD  [facility] - Cardiology

## 2025-08-01 ENCOUNTER — LAB VISIT (OUTPATIENT)
Dept: LAB | Facility: HOSPITAL | Age: 89
End: 2025-08-01
Attending: NURSE PRACTITIONER
Payer: MEDICARE

## 2025-08-01 DIAGNOSIS — R19.7 DIARRHEA, UNSPECIFIED TYPE: ICD-10-CM

## 2025-08-01 PROCEDURE — 87449 NOS EACH ORGANISM AG IA: CPT

## 2025-08-01 PROCEDURE — 87427 SHIGA-LIKE TOXIN AG IA: CPT

## 2025-08-01 PROCEDURE — 89055 LEUKOCYTE ASSESSMENT FECAL: CPT

## 2025-08-01 PROCEDURE — 87046 STOOL CULTR AEROBIC BACT EA: CPT | Mod: 59

## 2025-08-01 PROCEDURE — 87328 CRYPTOSPORIDIUM AG IA: CPT

## 2025-08-01 PROCEDURE — 87177 OVA AND PARASITES SMEARS: CPT

## 2025-08-01 PROCEDURE — 87046 STOOL CULTR AEROBIC BACT EA: CPT

## 2025-08-03 LAB
C DIFF GDH STL QL: NEGATIVE
C DIFF TOX A+B STL QL IA: NEGATIVE
WBC #/AREA STL HPF: NORMAL /[HPF]

## 2025-08-04 ENCOUNTER — CLINICAL SUPPORT (OUTPATIENT)
Dept: REHABILITATION | Facility: HOSPITAL | Age: 89
End: 2025-08-04
Attending: FAMILY MEDICINE
Payer: MEDICARE

## 2025-08-04 DIAGNOSIS — R26.9 GAIT ABNORMALITY: ICD-10-CM

## 2025-08-04 DIAGNOSIS — R68.89 DECREASED FUNCTIONAL ACTIVITY TOLERANCE: Primary | ICD-10-CM

## 2025-08-04 DIAGNOSIS — R26.89 POOR BALANCE: ICD-10-CM

## 2025-08-04 LAB
C COLI+JEJ+UPSA DNA STL QL NAA+NON-PROBE: NEGATIVE
CRYPTOSP AG SPEC QL: NEGATIVE
E COLI SXT1 STL QL IA: NEGATIVE
E COLI SXT2 STL QL IA: NEGATIVE
G LAMBLIA AG STL QL IA: NEGATIVE

## 2025-08-04 PROCEDURE — 97161 PT EVAL LOW COMPLEX 20 MIN: CPT | Mod: PN | Performed by: PHYSICAL THERAPIST

## 2025-08-04 PROCEDURE — 97110 THERAPEUTIC EXERCISES: CPT | Mod: PN | Performed by: PHYSICAL THERAPIST

## 2025-08-04 NOTE — PROGRESS NOTES
Outpatient Rehab  Physical Therapy Evaluation    Patient Name: Héctor Desir  MRN: [id number]  YOB: 1935  Encounter Date: 8/4/2025    Therapy Diagnosis:   Encounter Diagnoses   Name Primary?    Gait abnormality     Decreased functional activity tolerance Yes    Poor balance      Physician: Tae Goel MD    Physician Orders: Eval and Treat  Medical Diagnosis: Gait abnormality  Surgical Diagnosis: Not applicable for this Episode   Surgical Date: Not applicable for this Episode  Days Since Last Surgery: Not applicable for this Episode  Visit # / Visits Authorized:  1 / 1  Insurance Authorization Period: 7/30/2025 to 7/30/2026  Date of Evaluation: 8/4/2025  Plan of Care Certification: 8/4/2025 to 10/4/25     Time In: 1230   Time Out: 1315  Total Time (in minutes): 45   Total Billable Time (in minutes): 45    Intake Outcome Measure for FOTO Survey    Therapist reviewed FOTO scores for Héctor Desir on 8/4/2025.   FOTO report - see Media section or FOTO account episode details.     Intake Score (%): 60    Precautions:  Additional Precautions and Protocol Details: HTN, Type 2 Diabetes, Hx of TIA    Subjective   History of Present Illness  Héctor is a 89 y.o. male who reports to physical therapy with a chief concern of Weakness and Poor Balance.     The patient reports a medical diagnosis of Gait abnormality (R26.9).                      Dominant Hand: Right  History of Present Condition/Illness: Patient reports that he is coming to therapy for balance and strength training. Has never been to therapy before. Reports having a lot of co-morbidities such as Type 2 Diabetes and HTN - both of which of being treated via medication. Has a history of TIA's several yeats ago. He has noticed that his left foot causing him to trip occasionally. Has also reports having tremors in the left hand - both of these things started happening after a mini stroke a couple months ago. Normally has joint pain but nothing too  bad. Does not use an AD and normally lives alone. Has a ramp entry to the house. No trouble with keeping up with ADL's. No recent falls to report. No numbness or tingling other than his normal neuropathy.          Activities of Daily Living  Social history was obtained from Patient.                     Currently independent with activities of daily living? Yes              Pain  No Pain Reported: Yes       Clinical Progression (since onset): Stable         Treatment History  Treatments  Previously Received Treatments: No  Currently Receiving Treatments: No    Living Arrangements  Living Situation  Housing: Home independently  Living Arrangements: Alone  Support Systems: Children    Home Setup  Type of Structure: House  Home Access: Ramped entrance  Number of Levels in Home: One level        Employment     Employment Status: Retired  Stays active at home.      Past Medical History/Physical Systems Review:   Héctor Desir  has a past medical history of Anxiety, Atrial fibrillation, CKD (chronic kidney disease) stage 3, GFR 30-59 ml/min, Colon polyps, Combined hyperlipidemia associated with type 2 diabetes mellitus, Diverticulosis, 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, DM (diabetes mellitus) type II controlled, neurological manifestation, GERD (gastroesophageal reflux disease), History of prostate cancer, Hypertension associated with diabetes, Hypertension goal BP (blood pressure) < 130/80, Obesity, Prostate cancer, TIA (transient ischemic attack), and Trouble in sleeping.    Héctor Desir  has a past surgical history that includes Prostatectomy (2006); TONSILLECTOMY, ADENOIDECTOMY, BILATERAL yringotomy and tubes; Cardiac catheterization; Cataract extraction w/  intraocular lens implant (11/2013); Colonoscopy w/ polypectomy; Tonsillectomy; Eye surgery; Cardioversion (N/A, 08/10/2018); Colonoscopy (N/A, 12/20/2019); and Upper gastrointestinal endoscopy.    Héctor has a current medication  list which includes the following prescription(s): acetaminophen, atorvastatin, blood sugar diagnostic, blood-glucose meter, duloxetine, ergocalciferol (vitamin d2), furosemide, glimepiride, jardiance, lisinopril, loperamide, metoprolol succinate, nateglinide, omeprazole, rivaroxaban, mounjaro, and [DISCONTINUED] chlorthalidone.    Review of patient's allergies indicates:   Allergen Reactions    Amlodipine      swelling        Objective      Shoulder Strength - Planes of Motion   Right Strength Right Pain Left Strength Left  Pain   Flexion 4+   4+     Extension 4+   4+     ABduction 4+   4+     ADduction           Horizontal ABduction           Horizontal ADduction           Internal Rotation 0° 4+   4+     Internal Rotation 90°           External Rotation 0° 4+   4+     External Rotation 90°                 Right  Strength  Right Hand Dynamometer Position: 2  Elbow Position Forearm Position Trial 1 (lbs) Trial 2  (lbs) Trial 3  (lbs) Average  (lbs) Pain   Flexed Neutral 98               Left  Strength  Left Hand Dynamometer Position: 2  Elbow Position Forearm Position Trial 1 (lbs) Trial 2 (lbs) Trial 3 (lbs) Average (lbs) Pain   Flexed Neutral 78                 Hip Strength - Planes of Motion   Right Strength Right Pain Left Strength Left  Pain   Flexion (L2) 4+   4+     Extension 4+   4+     ABduction 4+   4+     ADduction 4+   4+     Internal Rotation           External Rotation               Knee Strength   Right Strength Right Pain Left Strength Left  Pain   Flexion (S2) 5   5     Prone Flexion           Extension (L3) 5   5                Transfers Assessment  Sit to Stand Assistance: Independent  Chair to Bed Assistance: Independent  Bed to Chair Assistance: Independent  Toilet/Commode Transfer Assistance: Independent      Fall Risk  Functional mobility test results suggest the patient is not: At Risk for Falls  Four Stage Balance Test  Narrow Base of Support: 10 sec sec  Tandem Stand - Right Foot  in Front: 5 sec  Tandem Stand - Left Foot in Front: 5 sec  Semi-Tandem Stand - Right Foot in Front: 10 sec  Semi-Tandem Stand - Left Foot in Front: 10 sec  Single Leg Stand - Right Foot: 5 sec  Single Leg Stand - Left Foot: 2 sec       Timed Up & Go (TUG)  Time: 9.43 seconds     An older adult who takes >=12 seconds to complete the TUG is at risk for falling.       Sit to Stand Testing  The patient completed 5 sit to stand transfers in 13.86 sec. hands in lap The patient completed 10 repetitions of a sit to stand transfer in 30 seconds. hands in lap              Treatment:  Therapeutic Exercise  TE 1: Standing Heel Raises x10 reps B  TE 2: Standing Hip Abduction x10 reps B  TE 3: Standing Hip Extension x10 reps B  TE 4: Sit to Stands x10 reps      Time Entry(in minutes):  PT Evaluation (Low) Time Entry: 35  Therapeutic Exercise Time Entry: 10    Assessment & Plan   Assessment  Héctor presents with a condition of Low complexity.   Presentation of Symptoms: Stable       Functional Limitations: Activity tolerance, Ambulating on uneven surfaces, Bed mobility, Carrying objects, Completing self-care activities, Decreased ambulation distance/endurance, Increased risk of fall, Maintaining balance, Getting off the floor, Gait limitations, Functional mobility, Driving, Manipulating objects, Participating in leisure activities, Participating in sports, Proprioception, Performing household chores, Range of motion, Reaching, Squatting, Standing tolerance, Transfers    Patient Goal for Therapy (PT): Improve strength and balance.  Prognosis: Good  Assessment Details: Treatment will focus on UE and LE strength training combined with balance specific exercises that do not exacerbate joint pain/symptoms.     Plan  From a physical therapy perspective, the patient would benefit from: Skilled Rehab Services    Planned therapy interventions include: Therapeutic exercise, Therapeutic activities, Neuromuscular re-education, Manual therapy,  ADLs/IADLs, Aquatic therapy, Gait training, and Other (Comment). Dry Needling (by a certified therapist)  Planned modalities to include: Biofeedback, Electrical stimulation - passive/unattended, Mechanical traction, Ultrasound, Thermotherapy (hot pack), and Cryotherapy (cold pack).        Visit Frequency: 2 times Per Week for 8 Weeks.       This plan was discussed with Patient.            The patient's spiritual, cultural, and educational needs were considered, and the patient is agreeable to the plan of care and goals.     Education  Education was done with Patient. The patient's learning style includes Demonstration, Listening, and Pictures/video. The patient Verbalizes understanding and Demonstrates understanding.               Goals:   Active       LTG       Patient to increase hip abduction strength by 1/2 grade, in order to improve endurance and increase ability to perform all functional activities for increased time.         Start:  08/04/25    Expected End:  10/04/25            Patient to be able to ambulate at least 15 minutes without LOB and with proper gait mechanics for improved ability to perform ADL's.       Start:  08/04/25    Expected End:  10/04/25            Patient to improve score on the FOTO predicted score or better, to improve QOL.        Start:  08/04/25    Expected End:  10/04/25            Patient to be able to perform single leg balance tests bilaterally for at least 10s each without LOB.       Start:  08/04/25    Expected End:  10/04/25               STG       Patient to be educated on HEP.        Start:  08/04/25    Expected End:  09/04/25            Patient to increase hip flexion and extension strength by 1/2 grade, in order to improve endurance and increase ability to perform all functional activities for increased time.         Start:  08/04/25    Expected End:  09/04/25            Patient to be able to perform tandem stance balance without modification on both legs for at least 10  seconds without LOB.       Start:  08/04/25    Expected End:  09/04/25            Patient to improve score on 5 times sit to stand, TUG (Timed up and go), and 30 second sit to  order to decrease fall risk.        Start:  08/04/25    Expected End:  09/04/25              Chika Tejeda PT, DPT

## 2025-08-05 LAB — BACTERIA STL CULT: NORMAL

## 2025-08-05 NOTE — PROGRESS NOTES
Subjective:      Patient ID: Héctor Desir is a 89 y.o. male.    Chief Complaint: Edema and [facility] Follow Up    History of Present Illness    CHIEF COMPLAINT:  Héctor presents for follow-up after a recent emergency room visit for GI bleeding and to discuss joint pain and tremors.    HPI:  Héctor, an 89-year-old male, was recently evaluated at the emergency room due to GI bleeding. He reports one episode of bright red blood in his stool, with subsequent episodes being blackish. A stool test performed at the hospital was positive for blood. Since his ER visit, he has had continued diarrhea. He took Imodium as prescribed, which initially helped, but he had another episode of diarrhea at 3:00 AM two days after starting the medication, producing brown stool.    He attributes his initial symptoms to possible food poisoning from consuming hoghead cheese that may have spoiled due to inadequate refrigeration during a fishing trip. His fishing partner also had some stomach congestion but not the same severe symptoms.    Prior to the diarrhea, he had been constipated with very hard stools. He took a stool softener which normalized his bowel movements before the current issues began.    In addition to his GI symptoms, he reports joint pain, particularly in his fingers. He has noticed a tremor in his left hand when picking up objects, which has been present for some time. He stumbles occasionally, particularly over paint stripes, with his left leg, but has not fallen.    He has a history of stroke and TIA (Transient Ischemic Attack), for which he takes Xarelto due to atrial fibrillation.    He denies vomiting blood or black material, seeing worms in his stool, or recent travel to other countries.    TEST RESULTS:  Héctor recently underwent a stool test, which was positive for blood. His blood count was noted to be okay when checked at the emergency room. Héctor's blood sugar was found to be elevated during the same emergency room  visit.    IMAGING:  Héctor recently had an MRI of the head, which was reviewed by the current doctor. He also underwent a CT of the abdomen at the emergency room.         Problem List Items Addressed This Visit       Atrial fibrillation    Overview   He has chronic afib.  He has had a cardioversion but this did not help. He is following with Dr. Talamantes for this and he is on xarelto for this and is on toprol for rate control.  It is controlled. HE IS ON XARELTO FOR THIS ALSO.    RATE IS CONTROLLED.   Pulse Readings from Last 3 Encounters:   07/26/25 79   03/25/25 82   02/26/25 78              Cerebrovascular disease, arteriosclerotic, post-stroke    Overview   He has cerebrovascular disease and has had a prior stroke.  He has afib and is on xarelto for this for prevention.    MRI BRAIN WITHOUT CONTRAST  Order: [id number]   Status: Final result       Next appt: Today at 09:40 AM in Family Medicine (Tae Goel MD)    Test Result Released: Yes (not seen)    0 Result Notes  Details    Reading Physician Reading Date Result Priority   Ankur Guzman Jr., MD  [phone] 7/14/2023 STAT     Narrative & Impression  EXAMINATION:  MRI BRAIN WITHOUT CONTRAST     CLINICAL HISTORY:  Transient ischemic attack (TIA);     TECHNIQUE:  Sagittal T1. Axial T1, T2, T2 FLAIR, GRE, DWI. Coronal T2 FLAIR.     COMPARISON:  Prior CT of the head from 07/14/2023.     FINDINGS:  There is no restricted diffusion.  There is peripheral encephalomalacia involving the lateral aspect of the right temporal lobe within the right middle cranial fossa.  Chronic lacunar infarct within the posterior right lentiform nucleus.  Mild to moderate patchy T2 FLAIR hyperintensity within the periventricular and deep white matter bilaterally.  No acute intracranial hemorrhage or mass effect.     The ventricles and sulci are normal in size and configuration. Midline structures are normal. There are preserved arterial flow-voids on T2 weighted imaging. The  paranasal sinuses and mastoid air cells are clear.     No abnormal marrow signal is identified.     Impression:     1. Encephalomalacia involving the lateral right temporal lobe could relate to prior infarction or prior traumatic brain injury.  2. No acute infarction.  3. Chronic lacunar infarct within the posterior right lentiform nucleus with patchy T2 alteration of the white matter likely relating to chronic microvascular ischemic disease.        Electronically signed by:Ankur Guzman Jr., MD  Date:                                            07/14/2023  Time:                                           15:27            Combined hyperlipidemia associated with type 2 diabetes mellitus    Overview   The patient presents with hyperlipidemia.  The patient reports tolerating the medication well and is in excellent compliance.  There have been no medication side effects.  The patient denies chest pain, neuropathy, and myalgias.  The patient has reduced fat intake and has been exercising.  Current treatment has included the medications listed in the med card.    Lab Results   Component Value Date    CHOL 122 07/03/2024    CHOL 134 07/14/2023    CHOL 130 04/10/2023       Lab Results   Component Value Date    HDL 36 (L) 07/03/2024    HDL 30 (L) 07/14/2023    HDL 36 (L) 04/10/2023       Lab Results   Component Value Date    LDLCALC 69.2 07/03/2024    LDLCALC 77.2 07/14/2023    LDLCALC 75.8 04/10/2023       Lab Results   Component Value Date    TRIG 84 07/03/2024    TRIG 134 07/14/2023    TRIG 91 04/10/2023       Lab Results   Component Value Date    CHOLHDL 29.5 07/03/2024    CHOLHDL 22.4 07/14/2023    CHOLHDL 27.7 04/10/2023     Lab Results   Component Value Date    ALT 20 07/26/2025    AST 23 07/26/2025    ALKPHOS 144 07/26/2025    BILITOT 0.7 07/26/2025              Relevant Orders    Lipid Panel (Completed)    History of colon polyps    Relevant Orders    Ambulatory referral/consult to Gastroenterology    Hypertension  associated with diabetes    Overview   The patient presents with essential hypertension.  The patient is tolerating the medication well and is in excellent compliance.  The patient is experiencing no side effects.  Counseling was offered regarding low salt diets.  The patient has a reduced salt intake.  The patient denies chest pain, palpitations, shortness of breath, dyspnea on exertion, left or murmur neck pain, nausea, vomiting, diaphoresis, paroxysmal nocturnal dyspnea, and orthopnea.           Type 2 diabetes mellitus with kidney complication, without long-term current use of insulin    Overview   >>OVERVIEW FOR TYPE II DIABETES MELLITUS WITH NEUROLOGICAL MANIFESTATIONS, UNCONTROLLED WRITTEN ON 7/30/2025  6:28 AM BY YUMIKO BOONE MD    The patient presents with diabetes.  The patient denies polyuria, polydipsia, polyphagia, hypoglycemia and paresthesias.  The patient's glucose control has been good.  Home glucose averages are routinely checked.  The patient is without retinopathy currently.  The patient has no history of neuropathy.  The patient currently complains of no podiatric problems.  The patient has excellent compliance.  Hemoglobin A1C   Date Value Ref Range Status   02/17/2025 7.1 (H) 4.0 - 5.6 % Final     Comment:     ADA Screening Guidelines:  5.7-6.4%  Consistent with prediabetes  >or=6.5%  Consistent with diabetes    High levels of fetal hemoglobin interfere with the HbA1C  assay. Heterozygous hemoglobin variants (HbS, HgC, etc)do  not significantly interfere with this assay.   However, presence of multiple variants may affect accuracy.     08/14/2024 7.0 (H) 4.0 - 5.6 % Final     Comment:     ADA Screening Guidelines:  5.7-6.4%  Consistent with prediabetes  >or=6.5%  Consistent with diabetes    High levels of fetal hemoglobin interfere with the HbA1C  assay. Heterozygous hemoglobin variants (HbS, HgC, etc)do  not significantly interfere with this assay.   However, presence of multiple  "variants may affect accuracy.     04/16/2024 6.8 (H) 4.0 - 5.6 % Final     Comment:     ADA Screening Guidelines:  5.7-6.4%  Consistent with prediabetes  >or=6.5%  Consistent with diabetes    High levels of fetal hemoglobin interfere with the HbA1C  assay. Heterozygous hemoglobin variants (HbS, HgC, etc)do  not significantly interfere with this assay.   However, presence of multiple variants may affect accuracy.       No results found for: "MICROALBUR", "[id number]"  Diabetes Management Status    Statin: Taking  ACE/ARB: Taking    Screening or Prevention Patient's value Goal Complete/Controlled?   HgA1C Testing and Control   Lab Results   Component Value Date    HGBA1C 7.1 (H) 02/17/2025      Annually/Less than 8% Yes   Lipid profile : 07/03/2024 Annually Yes   LDL control Lab Results   Component Value Date    LDLCALC 69.2 07/03/2024    Annually/Less than 100 mg/dl  Yes   Nephropathy screening Lab Results   Component Value Date    LABMICR 71.0 08/15/2024     Lab Results   Component Value Date    PROTEINUA Negative 09/20/2023    Annually Yes   Blood pressure BP Readings from Last 1 Encounters:   07/26/25 132/64    Less than 140/90 No   Dilated retinal exam : 08/19/2024 Annually Yes   Foot exam   : 08/02/2023 Annually Yes            Relevant Orders    Hemoglobin A1C (Completed)     Other Visit Diagnoses         Diarrhea, unspecified type    -  Primary    Relevant Orders    Ambulatory referral/consult to Gastroenterology    Clostridioides difficile EIA (Completed)    Giardia / Cryptosporidum, EIA (Completed)    GA  L CULTURE    Stool Exam-Ova,Cysts,Parasites    WBC, Stool (Completed)      Heme positive stool        Relevant Orders    CBC Auto Differential (Completed)    Ambulatory referral/consult to Gastroenterology      Elevated serum creatinine        Relevant Orders    Comprehensive Metabolic Panel (Completed)      Pain in other joint        Relevant Medications    acetaminophen (TYLENOL) 500 MG tablet      Gait " abnormality        Relevant Orders    Ambulatory Referral/Consult to Physical Therapy            The patient's Health Maintenance was reviewed and the following appears to be due:   Health Maintenance Due   Topic Date Due    RSV Vaccine (Age 60+ and Pregnant patients) (1 - 1-dose 75+ series) Never done    Foot Exam  08/02/2024    Colonoscopy  12/20/2024    Diabetes Urine Screening  08/15/2025    Diabetic Eye Exam  08/19/2025       Past Medical History:  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     DM (diabetes mellitus) type II controlled, neurological manifestation 07/05/2013    GERD (gastroesophageal reflux disease)     History of prostate cancer 2006    Hypertension associated with diabetes     Hypertension goal BP (blood pressure) < 130/80     Obesity     Prostate cancer     TIA (transient ischemic attack)     Trouble in sleeping      Past Surgical History:   Procedure Laterality Date    CARDIAC CATHETERIZATION      CARDIOVERSION N/A 08/10/2018    Procedure: CARDIOVERSION;  Surgeon: Israel Cardoza MD;  Location: [facility] CATH LAB;  Service: Cardiology;  Laterality: N/A;  Dr. Talamantes pt    CATARACT EXTRACTION W/  INTRAOCULAR LENS IMPLANT  11/2013    Bilateral Cataract     COLONOSCOPY N/A 12/20/2019    Procedure: COLONOSCOPY;  Surgeon: Forrest Roy MD;  Location: [facility] ENDO;  Service: Endoscopy;  Laterality: N/A; Repeat colonoscopy in 5 years for surveillance.    COLONOSCOPY W/ POLYPECTOMY      EYE SURGERY      PROSTATECTOMY  2006    TONSILLECTOMY      TONSILLECTOMY, ADENOIDECTOMY, BILATERAL MYRINGOTOMY AND TUBES      UPPER GASTROINTESTINAL ENDOSCOPY      prior to 2010 per patient report     Review of patient's  "allergies indicates:   Allergen Reactions    Amlodipine      swelling     Medications Ordered Prior to Encounter[1]  Social History[2]  Family History   Problem Relation Name Age of Onset    Heart attack Mother      Melanoma Father      Cancer Father      Anesthesia problems Neg Hx      Clotting disorder Neg Hx      Kidney disease Neg Hx      Amblyopia Neg Hx      Blindness Neg Hx      Cataracts Neg Hx      Glaucoma Neg Hx      Retinal detachment Neg Hx      Macular degeneration Neg Hx      Strabismus Neg Hx      Colon cancer Neg Hx      Crohn's disease Neg Hx      Ulcerative colitis Neg Hx      Celiac disease Neg Hx         Review of Systems   Gastrointestinal:  Positive for diarrhea. Negative for nausea and vomiting.   Musculoskeletal:  Positive for arthralgias.       Objective:   /82 (BP Location: Left arm, Patient Position: Sitting)   Pulse 79   Resp 14   Ht 5' 8" (1.727 m)   Wt 97.1 kg (214 lb)   SpO2 97%   BMI 32.54 kg/m²   Physical Exam    Musculoskeletal: Good  strength bilaterally. Good strength in upper extremities. Good strength in lower extremities.       Physical Exam  Constitutional:       General: He is not in acute distress.     Appearance: He is well-developed. He is not diaphoretic.   Cardiovascular:      Rate and Rhythm: Rhythm irregular.   Pulmonary:      Effort: Pulmonary effort is normal. No respiratory distress.   Neurological:      Mental Status: He is alert and oriented to person, place, and time.   Psychiatric:         Behavior: Behavior normal.         Thought Content: Thought content normal.         Judgment: Judgment normal.       Assessment:     1. Diarrhea, unspecified type    2. Permanent atrial fibrillation    3. Cerebrovascular disease, arteriosclerotic, post-stroke    4. Combined hyperlipidemia associated with type 2 diabetes mellitus    5. Heme positive stool    6. History of colon polyps    7. Hypertension associated with diabetes    8. Type 2 diabetes mellitus " with stage 3a chronic kidney disease, without long-term current use of insulin    9. Elevated serum creatinine    10. Pain in other joint    11. Gait abnormality      Plan:   Assessment & Plan    IMPRESSION:  Reviewed recent hospital records, including Dr. Hendrickson's notes and imaging.  Assessed recent episode of GI bleeding, considering long-term use of anticoagulants.  Evaluated change in stool color from red to black, indicative of possible upper GI bleed.  Considered food poisoning as potential cause of diarrhea, but concerned about positive stool test for blood.  Assessed left-hand tremor and gait issues in context of previous stroke history.  Conducted brief neurological exam, noting good strength despite reported symptoms.    PLAN SUMMARY:  - Referred to gastroenterologist in [place] for GI bleeding evaluation  - Started Tylenol 500 mg, 2 tablets every 6 hours as needed for joint pain (max twice daily)  - Ordered labs: cholesterol and A1C  - Ordered stool cultures  - Referred to physical therapy for gait training and fall prevention    PLAN NOTE:  - Explained potential causes of black stool, including upper GI bleeding mixed with stomach acid.  - Ordered stool cultures.  - Referred to gastroenterologist in [place] for further evaluation of GI bleeding.  - Discussed limitations on anti-inflammatory use due to anticoagulant medication.  - Started Tylenol 500 mg, 2 tablets every 6 hours as needed for joint pain, up to twice daily.  - Ordered labs including cholesterol and A1C.  - Referred to physical therapy for gait training to address stumbling and fall prevention.       I am having Héctor Desir start on acetaminophen. I am also having him maintain his blood-glucose meter, (ERGOCALCIFEROL, VITAMIN D2, (VITAMIN D ORAL)), blood sugar diagnostic, glimepiride, JARDIANCE, rivaroxaban, metoprolol succinate, lisinopriL, furosemide, nateglinide, MOUNJARO, omeprazole, and DULoxetine.  No problem-specific  Assessment & Plan notes found for this encounter.      No follow-ups on file.    Héctor was seen today for edema and hospital follow up.    Diagnoses and all orders for this visit:    Diarrhea, unspecified type  -     Ambulatory referral/consult to Gastroenterology; Future  -     Clostridioides difficile EIA; Future  -     Giardia / Cryptosporidum, EIA; Future  -     UT  L CULTURE  -     Stool Exam-Ova,Cysts,Parasites; Future  -     WBC, Stool; Future    Permanent atrial fibrillation  Cont f/u with cardiology  Cerebrovascular disease, arteriosclerotic, post-stroke  F/u with neuro  Combined hyperlipidemia associated with type 2 diabetes mellitus  -     Lipid Panel; Future    Heme positive stool  -     CBC Auto Differential; Future  -     Ambulatory referral/consult to Gastroenterology; Future    History of colon polyps  -     Ambulatory referral/consult to Gastroenterology; Future    Hypertension associated with diabetes    Type 2 diabetes mellitus with stage 3a chronic kidney disease, without long-term current use of insulin  -     Hemoglobin A1C; Future    Elevated serum creatinine  -     Comprehensive Metabolic Panel; Future    Pain in other joint  -     acetaminophen (TYLENOL) 500 MG tablet; Take 1 tablet (500 mg total) by mouth every 6 (six) hours as needed for Pain.    Gait abnormality  -     Ambulatory Referral/Consult to Physical Therapy; Future      Medications Ordered This Encounter   Medications    acetaminophen (TYLENOL) 500 MG tablet     Sig: Take 1 tablet (500 mg total) by mouth every 6 (six) hours as needed for Pain.     The patient was instructed to stop the following meds:  There are no discontinued medications.  Orders Placed This Encounter   Procedures    Clostridioides difficile EIA     Standing Status:   Future     Number of Occurrences:   1     Expected Date:   7/30/2025     Expiration Date:   9/28/2026     Send normal result to authorizing provider's In Basket if patient is active on MyChart::    Yes    Lipid Panel     Standing Status:   Future     Number of Occurrences:   1     Expected Date:   7/30/2025     Expiration Date:   7/30/2026     Send normal result to authorizing provider's In Basket if patient is active on MyChart::   Yes    Hemoglobin A1C     Standing Status:   Future     Number of Occurrences:   1     Expected Date:   7/30/2025     Expiration Date:   7/30/2026     Send normal result to authorizing provider's In Basket if patient is active on MyChart::   Yes    Comprehensive Metabolic Panel     Standing Status:   Future     Number of Occurrences:   1     Expected Date:   7/30/2025     Expiration Date:   7/30/2026     Send normal result to authorizing provider's In Basket if patient is active on MyChart::   No    CBC Auto Differential     Standing Status:   Future     Number of Occurrences:   1     Expected Date:   7/30/2025     Expiration Date:   9/28/2026     Send normal result to authorizing provider's In Basket if patient is active on MyChart::   No    Giardia / Cryptosporidum, EIA     Standing Status:   Future     Number of Occurrences:   1     Expected Date:   7/30/2025     Expiration Date:   9/28/2026     Send normal result to authorizing provider's In Basket if patient is active on MyChart::   Yes    Stool Exam-Ova,Cysts,Parasites     Obtain a sample in a cup at home and return it to the clinic lab.     Standing Status:   Future     Number of Occurrences:   1     Expected Date:   7/30/2025     Expiration Date:   7/30/2026     Specimen Source:   stool     Has diarrhea been present >/=7 days?:   Yes     Is the patient immunocompromised?:   No     In the last 30 days, has the patient traveled to a developing country or area of [place] where helminth infections are prevalent?:   No     Have worm segments been visible in stool/undergarments?:   No     Send normal result to authorizing provider's In Basket if patient is active on MyChart::   Yes    WBC, Stool     Obtain a sample in a cup  at home and return it to the clinic lab.     Standing Status:   Future     Number of Occurrences:   1     Expected Date:   7/30/2025     Expiration Date:   7/30/2026     Send normal result to authorizing provider's In Basket if patient is active on MyChart::   Yes    Ambulatory referral/consult to Gastroenterology     Standing Status:   Future     Number of Occurrences:   1     Expected Date:   8/6/2025     Expiration Date:   8/30/2026     Referral Priority:   Routine     Referral Type:   Consultation     Referral Reason:   Specialty Services Required     Requested Specialty:   Gastroenterology     Number of Visits Requested:   1    Ambulatory Referral/Consult to Physical Therapy     Standing Status:   Future     Number of Occurrences:   1     Expected Date:   8/6/2025     Expiration Date:   8/30/2026     Referral Priority:   Routine     Referral Type:   Physical Therapy     Referral Reason:   Specialty Services Required     Number of Visits Requested:   1    AL  L CULTURE       Medication List with Changes/Refills   New Medications    ACETAMINOPHEN (TYLENOL) 500 MG TABLET    Take 1 tablet (500 mg total) by mouth every 6 (six) hours as needed for Pain.   Current Medications    BLOOD SUGAR DIAGNOSTIC STRP    Use daily- Freestyle lite    BLOOD-GLUCOSE METER KIT    Use before meals and before bed when the glucoses are not in control.  Otherwise, test it daily once a day before meals randomly to ensure stability of the gluocse.    DULOXETINE (CYMBALTA) 60 MG CAPSULE    TAKE 1 CAPSULE DAILY    ERGOCALCIFEROL, VITAMIN D2, (VITAMIN D ORAL)    Take by mouth once daily.     FUROSEMIDE (LASIX) 40 MG TABLET    Take 1 tablet (40 mg total) by mouth once daily.    GLIMEPIRIDE (AMARYL) 4 MG TABLET    Take 1 tablet (4 mg total) by mouth 2 (two) times daily before meals.    JARDIANCE 25 MG TABLET    TAKE 1 TABLET DAILY    LISINOPRIL (PRINIVIL,ZESTRIL) 20 MG TABLET    Take 1 tablet (20 mg total) by mouth once daily.    LOPERAMIDE  (IMODIUM A-D) 2 MG TAB    Take 2 mg by mouth 4 (four) times daily as needed.    METOPROLOL SUCCINATE (TOPROL-XL) 50 MG 24 HR TABLET    Take 1 tablet (50 mg total) by mouth once daily.    NATEGLINIDE (STARLIX) 120 MG TABLET    TAKE 1 TABLET THREE TIMES A DAY BEFORE MEALS IF YOU SKIP A MEAL, DO NOT TAKE DOSE    OMEPRAZOLE (PRILOSEC) 20 MG CAPSULE    TAKE 1 CAPSULE DAILY    RIVAROXABAN (XARELTO) 15 MG TAB    Take 1 tablet (15 mg total) by mouth daily with dinner or evening meal.    TIRZEPATIDE (MOUNJARO) 5 MG/0.5 ML PNIJ    Inject 5 mg into the skin every 7 days.   Changed and/or Refilled Medications    Modified Medication Previous Medication    ATORVASTATIN (LIPITOR) 20 MG TABLET atorvastatin (LIPITOR) 40 MG tablet       Take 1 tablet (20 mg total) by mouth once daily.    Take 1 tablet (40 mg total) by mouth once daily.      Medication List with Changes/Refills   New Medications    ACETAMINOPHEN (TYLENOL) 500 MG TABLET    Take 1 tablet (500 mg total) by mouth every 6 (six) hours as needed for Pain.       Start Date: 7/30/2025 End Date: --   Current Medications    BLOOD SUGAR DIAGNOSTIC STRP    Use daily- Freestyle lite       Start Date: 8/18/2017 End Date: --    BLOOD-GLUCOSE METER KIT    Use before meals and before bed when the glucoses are not in control.  Otherwise, test it daily once a day before meals randomly to ensure stability of the gluocse.       Start Date: 1/13/2016 End Date: --    DULOXETINE (CYMBALTA) 60 MG CAPSULE    TAKE 1 CAPSULE DAILY       Start Date: 6/27/2025 End Date: --    ERGOCALCIFEROL, VITAMIN D2, (VITAMIN D ORAL)    Take by mouth once daily.        Start Date: --        End Date: --    FUROSEMIDE (LASIX) 40 MG TABLET    Take 1 tablet (40 mg total) by mouth once daily.       Start Date: 3/25/2025 End Date: --    GLIMEPIRIDE (AMARYL) 4 MG TABLET    Take 1 tablet (4 mg total) by mouth 2 (two) times daily before meals.       Start Date: 10/6/2023 End Date: --    JARDIANCE 25 MG TABLET    TAKE 1  TABLET DAILY       Start Date: 2/25/2025 End Date: --    LISINOPRIL (PRINIVIL,ZESTRIL) 20 MG TABLET    Take 1 tablet (20 mg total) by mouth once daily.       Start Date: 3/25/2025 End Date: 3/20/2026    LOPERAMIDE (IMODIUM A-D) 2 MG TAB    Take 2 mg by mouth 4 (four) times daily as needed.       Start Date: --        End Date: --    METOPROLOL SUCCINATE (TOPROL-XL) 50 MG 24 HR TABLET    Take 1 tablet (50 mg total) by mouth once daily.       Start Date: 3/25/2025 End Date: --    NATEGLINIDE (STARLIX) 120 MG TABLET    TAKE 1 TABLET THREE TIMES A DAY BEFORE MEALS IF YOU SKIP A MEAL, DO NOT TAKE DOSE       Start Date: 6/11/2025 End Date: --    OMEPRAZOLE (PRILOSEC) 20 MG CAPSULE    TAKE 1 CAPSULE DAILY       Start Date: 6/26/2025 End Date: --    RIVAROXABAN (XARELTO) 15 MG TAB    Take 1 tablet (15 mg total) by mouth daily with dinner or evening meal.       Start Date: 3/25/2025 End Date: --    TIRZEPATIDE (MOUNJARO) 5 MG/0.5 ML PNIJ    Inject 5 mg into the skin every 7 days.       Start Date: 6/11/2025 End Date: --   Changed and/or Refilled Medications    Modified Medication Previous Medication    ATORVASTATIN (LIPITOR) 20 MG TABLET atorvastatin (LIPITOR) 40 MG tablet       Take 1 tablet (20 mg total) by mouth once daily.    Take 1 tablet (40 mg total) by mouth once daily.       Start Date: 7/31/2025 End Date: --    Start Date: 3/25/2025 End Date: 7/31/2025                This note was generated with the assistance of ambient listening technology. Verbal consent was obtained by the patient and accompanying visitor(s) for the recording of patient appointment to facilitate this note. I attest to having reviewed and edited the generated note for accuracy, though some syntax or spelling errors may persist. Please contact the author of this note for any clarification.           [1]   Current Outpatient Medications on File Prior to Visit   Medication Sig Dispense Refill    blood sugar diagnostic Strp Use daily- Freestyle lite  300 strip 3    blood-glucose meter kit Use before meals and before bed when the glucoses are not in control.  Otherwise, test it daily once a day before meals randomly to ensure stability of the gluocse. 1 each 0    DULoxetine (CYMBALTA) 60 MG capsule TAKE 1 CAPSULE DAILY 90 capsule 0    ERGOCALCIFEROL, VITAMIN D2, (VITAMIN D ORAL) Take by mouth once daily.       furosemide (LASIX) 40 MG tablet Take 1 tablet (40 mg total) by mouth once daily. 90 tablet 3    glimepiride (AMARYL) 4 MG tablet Take 1 tablet (4 mg total) by mouth 2 (two) times daily before meals. 180 tablet 1    JARDIANCE 25 mg tablet TAKE 1 TABLET DAILY 90 tablet 1    lisinopriL (PRINIVIL,ZESTRIL) 20 MG tablet Take 1 tablet (20 mg total) by mouth once daily. 90 tablet 3    metoprolol succinate (TOPROL-XL) 50 MG 24 hr tablet Take 1 tablet (50 mg total) by mouth once daily. 90 tablet 3    nateglinide (STARLIX) 120 MG tablet TAKE 1 TABLET THREE TIMES A DAY BEFORE MEALS IF YOU SKIP A MEAL, DO NOT TAKE DOSE 270 tablet 0    omeprazole (PRILOSEC) 20 MG capsule TAKE 1 CAPSULE DAILY 90 capsule 0    rivaroxaban (XARELTO) 15 mg Tab Take 1 tablet (15 mg total) by mouth daily with dinner or evening meal. 90 tablet 3    tirzepatide (MOUNJARO) 5 mg/0.5 mL PnIj Inject 5 mg into the skin every 7 days. 12 Pen 1    [DISCONTINUED] chlorthalidone (HYGROTEN) 25 MG Tab Take 1 tablet (25 mg total) by mouth once daily. 90 tablet 3     No current facility-administered medications on file prior to visit.   [2]   Social History  Socioeconomic History    Marital status:    Tobacco Use    Smoking status: Never    Smokeless tobacco: Never   Substance and Sexual Activity    Alcohol use: No     Comment: He used to drink but quit in 1990    Drug use: No     Social Drivers of Health     Financial Resource Strain: Low Risk  (3/18/2024)    Overall Financial Resource Strain (CARDIA)     Difficulty of Paying Living Expenses: Not hard at all   Food Insecurity: No Food Insecurity  (3/18/2024)    Hunger Vital Sign     Worried About Running Out of Food in the Last Year: Never true     Ran Out of Food in the Last Year: Never true   Transportation Needs: No Transportation Needs (3/18/2024)    PRAPARE - Transportation     Lack of Transportation (Medical): No     Lack of Transportation (Non-Medical): No   Physical Activity: Inactive (3/18/2024)    Exercise Vital Sign     Days of Exercise per Week: 0 days     Minutes of Exercise per Session: 0 min   Stress: No Stress Concern Present (3/18/2024)    [employer] - Occupational Stress Questionnaire     Feeling of Stress : Only a little   Housing Stability: Low Risk  (3/18/2024)    Housing Stability Vital Sign     Unable to Pay for Housing in the Last Year: No     Number of Places Lived in the Last Year: 1     Unstable Housing in the Last Year: No

## 2025-08-11 ENCOUNTER — CLINICAL SUPPORT (OUTPATIENT)
Dept: REHABILITATION | Facility: HOSPITAL | Age: 89
End: 2025-08-11
Payer: MEDICARE

## 2025-08-11 ENCOUNTER — RESULTS FOLLOW-UP (OUTPATIENT)
Dept: GASTROENTEROLOGY | Facility: CLINIC | Age: 89
End: 2025-08-11
Payer: MEDICARE

## 2025-08-11 DIAGNOSIS — R26.89 POOR BALANCE: Primary | ICD-10-CM

## 2025-08-11 PROBLEM — R68.89 DECREASED FUNCTIONAL ACTIVITY TOLERANCE: Status: RESOLVED | Noted: 2025-08-04 | Resolved: 2025-08-11

## 2025-08-11 PROCEDURE — 97530 THERAPEUTIC ACTIVITIES: CPT | Mod: PN | Performed by: PHYSICAL THERAPIST

## 2025-08-11 PROCEDURE — 97112 NEUROMUSCULAR REEDUCATION: CPT | Mod: PN | Performed by: PHYSICAL THERAPIST

## 2025-08-11 PROCEDURE — 97110 THERAPEUTIC EXERCISES: CPT | Mod: PN | Performed by: PHYSICAL THERAPIST

## 2025-08-13 ENCOUNTER — CLINICAL SUPPORT (OUTPATIENT)
Dept: REHABILITATION | Facility: HOSPITAL | Age: 89
End: 2025-08-13
Payer: MEDICARE

## 2025-08-13 DIAGNOSIS — R26.89 POOR BALANCE: Primary | ICD-10-CM

## 2025-08-13 PROCEDURE — 97112 NEUROMUSCULAR REEDUCATION: CPT | Mod: PN | Performed by: PHYSICAL THERAPIST

## 2025-08-13 PROCEDURE — 97530 THERAPEUTIC ACTIVITIES: CPT | Mod: PN | Performed by: PHYSICAL THERAPIST

## 2025-08-13 PROCEDURE — 97110 THERAPEUTIC EXERCISES: CPT | Mod: PN | Performed by: PHYSICAL THERAPIST

## 2025-08-17 DIAGNOSIS — N18.31 STAGE 3A CHRONIC KIDNEY DISEASE: ICD-10-CM

## 2025-08-18 ENCOUNTER — CLINICAL SUPPORT (OUTPATIENT)
Dept: REHABILITATION | Facility: HOSPITAL | Age: 89
End: 2025-08-18
Payer: MEDICARE

## 2025-08-18 DIAGNOSIS — R26.89 POOR BALANCE: Primary | ICD-10-CM

## 2025-08-18 PROCEDURE — 97112 NEUROMUSCULAR REEDUCATION: CPT | Mod: PN | Performed by: PHYSICAL THERAPIST

## 2025-08-18 PROCEDURE — 97530 THERAPEUTIC ACTIVITIES: CPT | Mod: PN | Performed by: PHYSICAL THERAPIST

## 2025-08-18 RX ORDER — NATEGLINIDE 120 MG/1
TABLET ORAL
Qty: 270 TABLET | Refills: 3 | Status: SHIPPED | OUTPATIENT
Start: 2025-08-18

## 2025-08-19 ENCOUNTER — TELEPHONE (OUTPATIENT)
Dept: SURGERY | Facility: HOSPITAL | Age: 89
End: 2025-08-19
Payer: MEDICARE

## 2025-08-20 ENCOUNTER — CLINICAL SUPPORT (OUTPATIENT)
Dept: REHABILITATION | Facility: HOSPITAL | Age: 89
End: 2025-08-20
Payer: MEDICARE

## 2025-08-20 ENCOUNTER — TELEPHONE (OUTPATIENT)
Dept: GASTROENTEROLOGY | Facility: CLINIC | Age: 89
End: 2025-08-20
Payer: MEDICARE

## 2025-08-20 DIAGNOSIS — R26.89 POOR BALANCE: Primary | ICD-10-CM

## 2025-08-20 PROCEDURE — 97110 THERAPEUTIC EXERCISES: CPT | Mod: PN | Performed by: PHYSICAL THERAPIST

## 2025-08-20 PROCEDURE — 97530 THERAPEUTIC ACTIVITIES: CPT | Mod: PN | Performed by: PHYSICAL THERAPIST

## 2025-08-20 PROCEDURE — 97112 NEUROMUSCULAR REEDUCATION: CPT | Mod: PN | Performed by: PHYSICAL THERAPIST

## 2025-08-22 ENCOUNTER — HOSPITAL ENCOUNTER (OUTPATIENT)
Facility: HOSPITAL | Age: 89
Discharge: HOME | End: 2025-08-22
Attending: INTERNAL MEDICINE | Admitting: INTERNAL MEDICINE
Payer: MEDICARE

## 2025-08-22 ENCOUNTER — ANESTHESIA (OUTPATIENT)
Dept: ENDOSCOPY | Facility: HOSPITAL | Age: 89
End: 2025-08-22
Payer: MEDICARE

## 2025-08-22 ENCOUNTER — ANESTHESIA EVENT (OUTPATIENT)
Dept: ENDOSCOPY | Facility: HOSPITAL | Age: 89
End: 2025-08-22
Payer: MEDICARE

## 2025-08-22 DIAGNOSIS — R19.7 DIARRHEA: ICD-10-CM

## 2025-08-22 DIAGNOSIS — R19.5 HEME POSITIVE STOOL: ICD-10-CM

## 2025-08-22 DIAGNOSIS — R19.7 DIARRHEA, UNSPECIFIED TYPE: ICD-10-CM

## 2025-08-22 DIAGNOSIS — K62.5 RECTAL BLEEDING: Primary | ICD-10-CM

## 2025-08-22 DIAGNOSIS — Z86.0100 HISTORY OF COLON POLYPS: ICD-10-CM

## 2025-08-22 PROCEDURE — 63600175 PHARM REV CODE 636 W HCPCS: Mod: PO | Performed by: NURSE ANESTHETIST, CERTIFIED REGISTERED

## 2025-08-22 PROCEDURE — 27201012 HC FORCEPS, HOT/COLD, DISP: Mod: PO | Performed by: INTERNAL MEDICINE

## 2025-08-22 PROCEDURE — 27201089 HC SNARE, DISP (ANY): Mod: PO | Performed by: INTERNAL MEDICINE

## 2025-08-22 PROCEDURE — 63600175 PHARM REV CODE 636 W HCPCS: Mod: PO | Performed by: INTERNAL MEDICINE

## 2025-08-22 PROCEDURE — 37000008 HC ANESTHESIA 1ST 15 MINUTES: Mod: PO | Performed by: INTERNAL MEDICINE

## 2025-08-22 PROCEDURE — 88305 TISSUE EXAM BY PATHOLOGIST: CPT | Mod: TC,91 | Performed by: INTERNAL MEDICINE

## 2025-08-22 PROCEDURE — 45385 COLONOSCOPY W/LESION REMOVAL: CPT | Mod: PO | Performed by: INTERNAL MEDICINE

## 2025-08-22 PROCEDURE — 45380 COLONOSCOPY AND BIOPSY: CPT | Mod: XS,PO | Performed by: INTERNAL MEDICINE

## 2025-08-22 PROCEDURE — 37000009 HC ANESTHESIA EA ADD 15 MINS: Mod: PO | Performed by: INTERNAL MEDICINE

## 2025-08-22 PROCEDURE — 45380 COLONOSCOPY AND BIOPSY: CPT | Mod: XS,,, | Performed by: INTERNAL MEDICINE

## 2025-08-22 PROCEDURE — 45385 COLONOSCOPY W/LESION REMOVAL: CPT | Mod: ,,, | Performed by: INTERNAL MEDICINE

## 2025-08-22 RX ORDER — PROPOFOL 10 MG/ML
VIAL (ML) INTRAVENOUS CONTINUOUS PRN
Status: DISCONTINUED | OUTPATIENT
Start: 2025-08-22 | End: 2025-08-22

## 2025-08-22 RX ORDER — LIDOCAINE HYDROCHLORIDE 20 MG/ML
INJECTION INTRAVENOUS
Status: DISCONTINUED | OUTPATIENT
Start: 2025-08-22 | End: 2025-08-22

## 2025-08-22 RX ORDER — PROPOFOL 10 MG/ML
VIAL (ML) INTRAVENOUS
Status: DISCONTINUED | OUTPATIENT
Start: 2025-08-22 | End: 2025-08-22

## 2025-08-22 RX ORDER — SODIUM CHLORIDE, SODIUM LACTATE, POTASSIUM CHLORIDE, CALCIUM CHLORIDE 600; 310; 30; 20 MG/100ML; MG/100ML; MG/100ML; MG/100ML
INJECTION, SOLUTION INTRAVENOUS CONTINUOUS
Status: DISCONTINUED | OUTPATIENT
Start: 2025-08-22 | End: 2025-08-22 | Stop reason: HOSPADM

## 2025-08-22 RX ADMIN — PROPOFOL 150 MCG/KG/MIN: 10 INJECTION, EMULSION INTRAVENOUS at 07:08

## 2025-08-22 RX ADMIN — LIDOCAINE HYDROCHLORIDE 100 MG: 20 INJECTION INTRAVENOUS at 07:08

## 2025-08-22 RX ADMIN — PROPOFOL 100 MG: 10 INJECTION, EMULSION INTRAVENOUS at 07:08

## 2025-08-22 RX ADMIN — SODIUM CHLORIDE, SODIUM LACTATE, POTASSIUM CHLORIDE, AND CALCIUM CHLORIDE: .6; .31; .03; .02 INJECTION, SOLUTION INTRAVENOUS at 06:08

## 2025-08-25 ENCOUNTER — CLINICAL SUPPORT (OUTPATIENT)
Dept: REHABILITATION | Facility: HOSPITAL | Age: 89
End: 2025-08-25
Payer: MEDICARE

## 2025-08-25 VITALS
HEIGHT: 68 IN | BODY MASS INDEX: 32.43 KG/M2 | RESPIRATION RATE: 16 BRPM | TEMPERATURE: 97 F | OXYGEN SATURATION: 98 % | DIASTOLIC BLOOD PRESSURE: 80 MMHG | SYSTOLIC BLOOD PRESSURE: 122 MMHG | HEART RATE: 72 BPM | WEIGHT: 214 LBS

## 2025-08-25 DIAGNOSIS — N18.31 STAGE 3A CHRONIC KIDNEY DISEASE: ICD-10-CM

## 2025-08-25 DIAGNOSIS — R26.89 POOR BALANCE: Primary | ICD-10-CM

## 2025-08-25 LAB
ESTROGEN SERPL-MCNC: NORMAL PG/ML
INSULIN SERPL-ACNC: NORMAL U[IU]/ML
LAB AP CLINICAL INFORMATION: NORMAL
LAB AP GROSS DESCRIPTION: NORMAL
LAB AP PERFORMING LOCATION(S): NORMAL
LAB AP REPORT FOOTNOTES: NORMAL
T3RU NFR SERPL: NORMAL %

## 2025-08-25 PROCEDURE — 97530 THERAPEUTIC ACTIVITIES: CPT | Mod: PN | Performed by: PHYSICAL THERAPIST

## 2025-08-25 PROCEDURE — 97112 NEUROMUSCULAR REEDUCATION: CPT | Mod: PN | Performed by: PHYSICAL THERAPIST

## 2025-08-25 RX ORDER — EMPAGLIFLOZIN 25 MG/1
25 TABLET, FILM COATED ORAL
Qty: 90 TABLET | Refills: 1 | Status: SHIPPED | OUTPATIENT
Start: 2025-08-25

## 2025-08-27 ENCOUNTER — CLINICAL SUPPORT (OUTPATIENT)
Dept: REHABILITATION | Facility: HOSPITAL | Age: 89
End: 2025-08-27
Attending: PHYSICAL THERAPIST
Payer: MEDICARE

## 2025-08-27 DIAGNOSIS — R26.89 POOR BALANCE: Primary | ICD-10-CM

## 2025-08-27 PROCEDURE — 97110 THERAPEUTIC EXERCISES: CPT | Mod: PN | Performed by: PHYSICAL THERAPIST

## 2025-08-27 PROCEDURE — 97530 THERAPEUTIC ACTIVITIES: CPT | Mod: PN | Performed by: PHYSICAL THERAPIST

## 2025-08-27 PROCEDURE — 97112 NEUROMUSCULAR REEDUCATION: CPT | Mod: PN | Performed by: PHYSICAL THERAPIST

## 2025-09-02 ENCOUNTER — CLINICAL SUPPORT (OUTPATIENT)
Dept: REHABILITATION | Facility: HOSPITAL | Age: 89
End: 2025-09-02
Attending: PHYSICAL THERAPIST
Payer: MEDICARE

## 2025-09-02 DIAGNOSIS — R26.89 POOR BALANCE: Primary | ICD-10-CM

## 2025-09-02 PROCEDURE — 97530 THERAPEUTIC ACTIVITIES: CPT | Mod: PN | Performed by: PHYSICAL THERAPIST

## 2025-09-03 ENCOUNTER — CLINICAL SUPPORT (OUTPATIENT)
Dept: REHABILITATION | Facility: HOSPITAL | Age: 89
End: 2025-09-03
Attending: PHYSICAL THERAPIST
Payer: MEDICARE

## 2025-09-03 DIAGNOSIS — R26.89 POOR BALANCE: Primary | ICD-10-CM

## 2025-09-03 PROCEDURE — 97530 THERAPEUTIC ACTIVITIES: CPT | Mod: PN | Performed by: PHYSICAL THERAPIST
